# Patient Record
Sex: MALE | Race: WHITE | NOT HISPANIC OR LATINO | Employment: PART TIME | URBAN - METROPOLITAN AREA
[De-identification: names, ages, dates, MRNs, and addresses within clinical notes are randomized per-mention and may not be internally consistent; named-entity substitution may affect disease eponyms.]

---

## 2019-08-05 ENCOUNTER — APPOINTMENT (EMERGENCY)
Dept: CT IMAGING | Facility: HOSPITAL | Age: 54
End: 2019-08-05
Payer: MEDICAID

## 2019-08-05 ENCOUNTER — HOSPITAL ENCOUNTER (EMERGENCY)
Facility: HOSPITAL | Age: 54
Discharge: HOME | End: 2019-08-05
Attending: EMERGENCY MEDICINE | Admitting: EMERGENCY MEDICINE
Payer: MEDICAID

## 2019-08-05 VITALS
WEIGHT: 155 LBS | RESPIRATION RATE: 18 BRPM | SYSTOLIC BLOOD PRESSURE: 136 MMHG | DIASTOLIC BLOOD PRESSURE: 72 MMHG | TEMPERATURE: 97.1 F | HEART RATE: 76 BPM | OXYGEN SATURATION: 97 % | HEIGHT: 67 IN | BODY MASS INDEX: 24.33 KG/M2

## 2019-08-05 DIAGNOSIS — R51.9 NONINTRACTABLE HEADACHE, UNSPECIFIED CHRONICITY PATTERN, UNSPECIFIED HEADACHE TYPE: Primary | ICD-10-CM

## 2019-08-05 DIAGNOSIS — R42 VERTIGO: ICD-10-CM

## 2019-08-05 LAB
ALBUMIN SERPL BCP-MCNC: 4.2 G/DL (ref 3.5–5.7)
ALP SERPL-CCNC: 70 U/L (ref 40–150)
ALT SERPL W P-5'-P-CCNC: 19 U/L (ref 7–52)
ANION GAP SERPL CALCULATED.3IONS-SCNC: 4 MMOL/L (ref 4–13)
APTT PPP: 35 SECONDS (ref 23–37)
AST SERPL W P-5'-P-CCNC: 16 U/L (ref 13–39)
BASOPHILS # BLD AUTO: 0.1 THOUSANDS/ΜL (ref 0–0.1)
BASOPHILS NFR BLD AUTO: 1 % (ref 0–2)
BILIRUB SERPL-MCNC: 0.3 MG/DL (ref 0.2–1)
BUN SERPL-MCNC: 17 MG/DL (ref 7–25)
CALCIUM SERPL-MCNC: 9.7 MG/DL (ref 8.6–10.5)
CHLORIDE SERPL-SCNC: 105 MMOL/L (ref 98–107)
CO2 SERPL-SCNC: 28 MMOL/L (ref 21–31)
CREAT SERPL-MCNC: 1.19 MG/DL (ref 0.7–1.3)
EOSINOPHIL # BLD AUTO: 0.2 THOUSAND/ΜL (ref 0–0.61)
EOSINOPHIL NFR BLD AUTO: 2 % (ref 0–5)
ERYTHROCYTE [DISTWIDTH] IN BLOOD BY AUTOMATED COUNT: 14.6 % (ref 11.5–14.5)
GFR SERPL CREATININE-BSD FRML MDRD: 70 ML/MIN/1.73SQ M
GLUCOSE SERPL-MCNC: 90 MG/DL (ref 65–99)
HCT VFR BLD AUTO: 44.8 % (ref 42–47)
HGB BLD-MCNC: 15.3 G/DL (ref 14–18)
INR PPP: 0.95 (ref 0.9–1.5)
LYMPHOCYTES # BLD AUTO: 2 THOUSANDS/ΜL (ref 0.6–4.47)
LYMPHOCYTES NFR BLD AUTO: 23 % (ref 21–51)
MAGNESIUM SERPL-MCNC: 2.2 MG/DL (ref 1.9–2.7)
MCH RBC QN AUTO: 28.4 PG (ref 26–34)
MCHC RBC AUTO-ENTMCNC: 34.1 G/DL (ref 31–37)
MCV RBC AUTO: 83 FL (ref 81–99)
MONOCYTES # BLD AUTO: 0.7 THOUSAND/ΜL (ref 0.17–1.22)
MONOCYTES NFR BLD AUTO: 8 % (ref 2–12)
NEUTROPHILS # BLD AUTO: 5.9 THOUSANDS/ΜL (ref 1.4–6.5)
NEUTS SEG NFR BLD AUTO: 66 % (ref 42–75)
PLATELET # BLD AUTO: 228 THOUSANDS/UL (ref 149–390)
PMV BLD AUTO: 8.1 FL (ref 8.6–11.7)
POTASSIUM SERPL-SCNC: 3.7 MMOL/L (ref 3.5–5.5)
PROT SERPL-MCNC: 7.2 G/DL (ref 6.4–8.9)
PROTHROMBIN TIME: 11 SECONDS (ref 10.2–13)
RBC # BLD AUTO: 5.37 MILLION/UL (ref 4.3–5.9)
SODIUM SERPL-SCNC: 137 MMOL/L (ref 134–143)
TSH SERPL DL<=0.05 MIU/L-ACNC: 1.85 UIU/ML (ref 0.45–5.33)
WBC # BLD AUTO: 8.9 THOUSAND/UL (ref 4.8–10.8)

## 2019-08-05 PROCEDURE — 80053 COMPREHEN METABOLIC PANEL: CPT | Performed by: EMERGENCY MEDICINE

## 2019-08-05 PROCEDURE — 96374 THER/PROPH/DIAG INJ IV PUSH: CPT

## 2019-08-05 PROCEDURE — 93005 ELECTROCARDIOGRAM TRACING: CPT

## 2019-08-05 PROCEDURE — 99284 EMERGENCY DEPT VISIT MOD MDM: CPT

## 2019-08-05 PROCEDURE — 85730 THROMBOPLASTIN TIME PARTIAL: CPT | Performed by: EMERGENCY MEDICINE

## 2019-08-05 PROCEDURE — 36415 COLL VENOUS BLD VENIPUNCTURE: CPT | Performed by: EMERGENCY MEDICINE

## 2019-08-05 PROCEDURE — 85610 PROTHROMBIN TIME: CPT | Performed by: EMERGENCY MEDICINE

## 2019-08-05 PROCEDURE — 83735 ASSAY OF MAGNESIUM: CPT | Performed by: EMERGENCY MEDICINE

## 2019-08-05 PROCEDURE — 84443 ASSAY THYROID STIM HORMONE: CPT | Performed by: EMERGENCY MEDICINE

## 2019-08-05 PROCEDURE — 70450 CT HEAD/BRAIN W/O DYE: CPT

## 2019-08-05 PROCEDURE — 85025 COMPLETE CBC W/AUTO DIFF WBC: CPT | Performed by: EMERGENCY MEDICINE

## 2019-08-05 PROCEDURE — 96361 HYDRATE IV INFUSION ADD-ON: CPT

## 2019-08-05 RX ORDER — MECLIZINE HCL 12.5 MG/1
50 TABLET ORAL ONCE
Status: COMPLETED | OUTPATIENT
Start: 2019-08-05 | End: 2019-08-05

## 2019-08-05 RX ORDER — KETOROLAC TROMETHAMINE 30 MG/ML
15 INJECTION, SOLUTION INTRAMUSCULAR; INTRAVENOUS ONCE
Status: COMPLETED | OUTPATIENT
Start: 2019-08-05 | End: 2019-08-05

## 2019-08-05 RX ADMIN — KETOROLAC TROMETHAMINE 15 MG: 30 INJECTION, SOLUTION INTRAMUSCULAR; INTRAVENOUS at 20:44

## 2019-08-05 RX ADMIN — SODIUM CHLORIDE 1000 ML: 0.9 INJECTION, SOLUTION INTRAVENOUS at 19:13

## 2019-08-05 RX ADMIN — MECLIZINE 50 MG: 12.5 TABLET ORAL at 19:13

## 2019-08-05 NOTE — ED PROVIDER NOTES
History  Chief Complaint   Patient presents with    Dizziness     started yesteday with dizziness around late morning  pt states his neck is stiff as well  Lying /98 Pulse 101    Sitting /82 pulse 99  standing /87 pulse 80    61-year-old male with history of hypertension, hyperlipidemia, diabetes and bipolar disorder presents for evaluation of headache and dizziness x1 day  Patient reports generalized headache which is throbbing and currently mild in nature  Additionally patient with dizziness described as the room spinning which has been occurring intermittently times 24 hours  Patient 1st noticed symptoms early yesterday morning  Otherwise patient no fevers, chills, focal neurologic deficit, paresthesias or slurred speech or facial droop  History provided by:  Patient   used: No    Dizziness   Associated symptoms: headaches    Associated symptoms: no chest pain, no diarrhea, no nausea, no shortness of breath and no vomiting        None       Past Medical History:   Diagnosis Date    Diabetes mellitus ([facility] )     Hyperlipidemia     Hypertension     Psychiatric disorder     bipolar    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[facility] )        History reviewed  No pertinent surgical history  History reviewed  No pertinent family history  I have reviewed and agree with the history as documented  Social History     Tobacco Use    Smoking status: Current Every Day Smoker     Packs/day: 1 00    Smokeless tobacco: Never Used   Substance Use Topics    Alcohol use: Not Currently     Frequency: Never    Drug use: Not Currently        Review of Systems   Constitutional: Negative for chills and fever  HENT: Negative for rhinorrhea and sore throat  Eyes: Negative for visual disturbance  Respiratory: Negative for cough and shortness of breath  Cardiovascular: Negative for chest pain and leg swelling     Gastrointestinal: Negative for abdominal pain, diarrhea, nausea and vomiting  Genitourinary: Negative for dysuria  Musculoskeletal: Negative for back pain and myalgias  Skin: Negative for rash  Neurological: Positive for dizziness and headaches  Psychiatric/Behavioral: Negative for confusion  All other systems reviewed and are negative  Physical Exam  Physical Exam   Constitutional: He is oriented to person, place, and time  He appears well-developed and well-nourished  HENT:   Nose: Nose normal    Mouth/Throat: Oropharynx is clear and moist  No oropharyngeal exudate  Eyes: Pupils are equal, round, and reactive to light  Conjunctivae and EOM are normal  No scleral icterus  Neck: Normal range of motion  Neck supple  No JVD present  No tracheal deviation present  Cardiovascular: Normal rate, regular rhythm and normal heart sounds  No murmur heard  Pulmonary/Chest: Effort normal and breath sounds normal  No respiratory distress  He has no wheezes  He has no rales  Abdominal: Soft  Bowel sounds are normal  There is no tenderness  There is no guarding  Musculoskeletal: Normal range of motion  He exhibits no edema or tenderness  Neurological: He is alert and oriented to person, place, and time  No cranial nerve deficit or sensory deficit  He exhibits normal muscle tone  5/5 motor, nl sens   Skin: Skin is warm and dry  Psychiatric: He has a normal mood and affect  His behavior is normal    Nursing note and vitals reviewed        Vital Signs  ED Triage Vitals [08/05/19 1836]   Temperature Pulse Respirations Blood Pressure SpO2   (!) 97 1 °F (36 2 °C) 89 18 150/87 99 %      Temp Source Heart Rate Source Patient Position - Orthostatic VS BP Location FiO2 (%)   Temporal -- Standing - Orthostatic VS Right arm --      Pain Score       No Pain           Vitals:    08/05/19 1836 08/05/19 1915 08/05/19 2000 08/05/19 2030   BP: 150/87 132/76 137/78    Pulse: 89 92 80 75   Patient Position - Orthostatic VS: Standing - Orthostatic VS            Visual Acuity      ED Medications  Medications   ketorolac (TORADOL) injection 15 mg (has no administration in time range)   sodium chloride 0 9 % bolus 1,000 mL (1,000 mL Intravenous New Bag 8/5/19 1913)   meclizine (ANTIVERT) tablet 50 mg (50 mg Oral Given 8/5/19 1913)       Diagnostic Studies  Results Reviewed     Procedure Component Value Units Date/Time    TSH [[id number]]  (Normal) Collected:  08/05/19 1910    Lab Status:  Final result Specimen:  Blood from Arm, Right Updated:  08/05/19 1951     TSH 3RD GENERATON 1 850 uIU/mL     Narrative:       Patients undergoing fluorescein dye angiography may retain small amounts of fluorescein in the body for 48-72 hours post procedure  Samples containing fluorescein can produce falsely depressed TSH values  If the patient had this procedure,a specimen should be resubmitted post fluorescein clearance        Magnesium [[id number]]  (Normal) Collected:  08/05/19 1910    Lab Status:  Final result Specimen:  Blood from Arm, Right Updated:  08/05/19 1935     Magnesium 2 2 mg/dL     Comprehensive metabolic panel [[id number]] Collected:  08/05/19 1910    Lab Status:  Final result Specimen:  Blood from Arm, Right Updated:  08/05/19 1935     Sodium 137 mmol/L      Potassium 3 7 mmol/L      Chloride 105 mmol/L      CO2 28 mmol/L      ANION GAP 4 mmol/L      BUN 17 mg/dL      Creatinine 1 19 mg/dL      Glucose 90 mg/dL      Calcium 9 7 mg/dL      AST 16 U/L      ALT 19 U/L      Alkaline Phosphatase 70 U/L      Total Protein 7 2 g/dL      Albumin 4 2 g/dL      Total Bilirubin 0 30 mg/dL      eGFR 70 ml/min/1 73sq m     Narrative:       [facility] guidelines for Chronic Kidney Disease (CKD):     Stage 1 with normal or high GFR (GFR > 90 mL/min/1 73 square meters)    Stage 2 Mild CKD (GFR = 60-89 mL/min/1 73 square meters)    Stage 3A Moderate CKD (GFR = 45-59 mL/min/1 73 square meters)    Stage 3B Moderate CKD (GFR = 30-44 mL/min/1 73 square meters)    Stage 4 Severe CKD (GFR = 15-29 mL/min/1 73 square meters)    Stage 5 End Stage CKD (GFR <15 mL/min/1 73 square meters)  Note: GFR calculation is accurate only with a steady state creatinine    Protime-INR [[id number]]  (Normal) Collected:  08/05/19 1910    Lab Status:  Final result Specimen:  Blood from Arm, Right Updated:  08/05/19 1930     Protime 11 0 seconds      INR 0 95    APTT [[id number]]  (Normal) Collected:  08/05/19 1910    Lab Status:  Final result Specimen:  Blood from Arm, Right Updated:  08/05/19 1930     PTT 35 seconds     CBC and differential [[id number]]  (Abnormal) Collected:  08/05/19 1910    Lab Status:  Final result Specimen:  Blood from Arm, Right Updated:  08/05/19 1916     WBC 8 90 Thousand/uL      RBC 5 37 Million/uL      Hemoglobin 15 3 g/dL      Hematocrit 44 8 %      MCV 83 fL      MCH 28 4 pg      MCHC 34 1 g/dL      RDW 14 6 %      MPV 8 1 fL      Platelets 969 Thousands/uL      Neutrophils Relative 66 %      Lymphocytes Relative 23 %      Monocytes Relative 8 %      Eosinophils Relative 2 %      Basophils Relative 1 %      Neutrophils Absolute 5 90 Thousands/µL      Lymphocytes Absolute 2 00 Thousands/µL      Monocytes Absolute 0 70 Thousand/µL      Eosinophils Absolute 0 20 Thousand/µL      Basophils Absolute 0 10 Thousands/µL                  CT head without contrast   Final Result by Radha Veliz MD (08/05 2001)      No acute intracranial abnormality  Workstation performed: [id number]                    Procedures  Procedures       ED Course  ED Course as of Aug 05 2044   [facility] Aug 05, 2019   1251 Patient seen and examined at bedside  Labs and imaging ordered  1930 Labs reviewed, no acute abnormalities noted  2032 Imaging negative for acute pathology  Toradol 15 mg IV given  Patient with resolution of dizziness at this time  2039 Labs and imaging reviewed with patient  Discussed with patient discharge plan and instructions    Patient to follow up with primary care physician as an outpatient  Patient to return to ED if any change or worsening condition: increased pain, new onset fever or bleeding  MDM    Disposition  Final diagnoses:   Nonintractable headache, unspecified chronicity pattern, unspecified headache type   Vertigo     Time reflects when diagnosis was documented in both MDM as applicable and the Disposition within this note     Time User Action Codes Description Comment    8/5/2019  8:42 PM Sheree Capellan [R51] Nonintractable headache, unspecified chronicity pattern, unspecified headache type     8/5/2019  8:42 PM Stewart Canada [R42] Vertigo       ED Disposition     ED Disposition Condition Date/Time Comment    Discharge Stable Mon Aug 5, 2019  8:42 PM Sera Gandara discharge to home/self care  Follow-up Information     Follow up With Specialties Details Why [address] [facility]  Emergency Department Emergency Medicine  As needed, If symptoms worsen [address] [facility] [address] [phone]    PMD  In 3 days            Patient's Medications    No medications on file     No discharge procedures on file      ED Provider  Electronically Signed by           Ryan Lainez DO  08/05/19 1005

## 2019-08-07 LAB
ATRIAL RATE: 86 BPM
P AXIS: 34 DEGREES
PR INTERVAL: 146 MS
QRS AXIS: 54 DEGREES
QRSD INTERVAL: 90 MS
QT INTERVAL: 348 MS
QTC INTERVAL: 416 MS
T WAVE AXIS: 63 DEGREES
VENTRICULAR RATE: 86 BPM

## 2019-08-07 PROCEDURE — 93010 ELECTROCARDIOGRAM REPORT: CPT | Performed by: INTERNAL MEDICINE

## 2019-09-09 ENCOUNTER — HOSPITAL ENCOUNTER (EMERGENCY)
Facility: HOSPITAL | Age: 54
Discharge: HOME | End: 2019-09-09
Attending: FAMILY MEDICINE
Payer: COMMERCIAL

## 2019-09-09 VITALS
DIASTOLIC BLOOD PRESSURE: 77 MMHG | OXYGEN SATURATION: 95 % | SYSTOLIC BLOOD PRESSURE: 150 MMHG | HEART RATE: 102 BPM | BODY MASS INDEX: 25.11 KG/M2 | TEMPERATURE: 98.7 F | RESPIRATION RATE: 16 BRPM | WEIGHT: 160 LBS | HEIGHT: 67 IN

## 2019-09-09 DIAGNOSIS — S39.013A STRAIN OF MUSCLE OF RIGHT GROIN REGION: Primary | ICD-10-CM

## 2019-09-09 PROCEDURE — 99283 EMERGENCY DEPT VISIT LOW MDM: CPT

## 2019-09-09 RX ORDER — NAPROXEN 500 MG/1
500 TABLET ORAL 2 TIMES DAILY WITH MEALS
Qty: 30 TABLET | Refills: 0 | Status: SHIPPED | OUTPATIENT
Start: 2019-09-09

## 2019-09-09 NOTE — ED PROVIDER NOTES
History  Chief Complaint   Patient presents with    Groin Pain     right groin started saturday felt tare     Two days ago patient rolled over in bed and felt something pop in his right upper thigh and groin  Patient has had pain in this area since especially with moving or walking  History provided by:  Patient  Leg Pain   Location:  Leg  Injury: yes    Mechanism of injury comment:  Rolled over in bed  Leg location:  R leg  Pain details:     Quality:  Aching    Radiates to: right knee  Severity:  Moderate    Onset quality:  Sudden    Duration:  2 days    Timing:  Intermittent    Progression:  Unchanged  Chronicity:  New  Relieved by:  Nothing  Worsened by:  Flexion and bearing weight  Ineffective treatments:  None tried  Associated symptoms: no back pain, no decreased ROM, no fever, no itching, no numbness, no stiffness and no swelling        None       Past Medical History:   Diagnosis Date    Diabetes mellitus ([facility] )     Hyperlipidemia     Hypertension     Psychiatric disorder     bipolar    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[facility] )        History reviewed  No pertinent surgical history  History reviewed  No pertinent family history  I have reviewed and agree with the history as documented  Social History     Tobacco Use    Smoking status: Current Every Day Smoker     Packs/day: 1 00    Smokeless tobacco: Never Used   Substance Use Topics    Alcohol use: Not Currently     Frequency: Never    Drug use: Not Currently        Review of Systems   Constitutional: Negative for chills and fever  HENT: Negative for congestion, rhinorrhea and sore throat  Eyes: Negative for visual disturbance  Respiratory: Negative for cough and shortness of breath  Cardiovascular: Negative for chest pain  Gastrointestinal: Negative for abdominal pain, diarrhea, nausea and vomiting  Genitourinary: Negative for dysuria  Musculoskeletal: Negative for back pain and stiffness     Skin: Negative for itching and rash    Neurological: Negative for headaches  Physical Exam  Physical Exam   Constitutional: He is oriented to person, place, and time  He appears well-developed and well-nourished  HENT:   Head: Normocephalic and atraumatic  Right Ear: External ear normal    Left Ear: External ear normal    Nose: Nose normal    Eyes: Conjunctivae and EOM are normal    Neck: Normal range of motion  Neck supple  Cardiovascular: Normal rate, regular rhythm and normal heart sounds  Pulmonary/Chest: Effort normal and breath sounds normal    Abdominal: Soft  Bowel sounds are normal  He exhibits no mass  There is no tenderness  No hernia  Musculoskeletal: Normal range of motion  He exhibits tenderness  Right upper and medial thigh with tenderness; pain with hip flexion and bearing weight   Neurological: He is alert and oriented to person, place, and time  Skin: Skin is warm and dry  Capillary refill takes less than 2 seconds  Nursing note and vitals reviewed        Vital Signs  ED Triage Vitals   Temperature Pulse Respirations Blood Pressure SpO2   09/09/19 1530 09/09/19 1525 09/09/19 1525 09/09/19 1525 09/09/19 1525   98 7 °F (37 1 °C) 102 16 150/77 95 %      Temp Source Heart Rate Source Patient Position - Orthostatic VS BP Location FiO2 (%)   09/09/19 1530 -- -- 09/09/19 1525 --   Temporal   Left arm       Pain Score       09/09/19 1525       Worst Possible Pain           Vitals:    09/09/19 1525   BP: 150/77   Pulse: 102         Visual Acuity      ED Medications  Medications - No data to display    Diagnostic Studies  Results Reviewed     None                 No orders to display              Procedures  Procedures       ED Course                               MDM    Disposition  Final diagnoses:   Strain of muscle of right groin region     Time reflects when diagnosis was documented in both MDM as applicable and the Disposition within this note     Time User Action Codes Description Comment    9/9/2019  3:40 PM Judy Moran Add [I34 012O] Strain of muscle of right groin region       ED Disposition     ED Disposition Condition Date/Time Comment    Discharge Stable Mon Sep 9, 2019  3:40 PM Pleas Olszewski discharge to home/self care  Follow-up Information     Follow up With Specialties Details Why Contact Info    Elmo Tovar DO Orthopedic Surgery Schedule an appointment as soon as possible for a visit   [address] [address] [address] [facility] [address]            Patient's Medications   Discharge Prescriptions    NAPROXEN (NAPROSYN) 500 MG TABLET    Take 1 tablet (500 mg total) by mouth 2 (two) times a day with meals       Start Date: 9/9/2019  End Date: --       Order Dose: 500 mg       Quantity: 30 tablet    Refills: 0     No discharge procedures on file      ED Provider  Electronically Signed by           Viraj Duncan PA-C  09/09/19 7953

## 2022-08-29 ENCOUNTER — APPOINTMENT (OUTPATIENT)
Dept: LAB | Facility: CLINIC | Age: 57
End: 2022-08-29
Payer: COMMERCIAL

## 2022-08-29 ENCOUNTER — OFFICE VISIT (OUTPATIENT)
Dept: INTERNAL MEDICINE CLINIC | Facility: CLINIC | Age: 57
End: 2022-08-29
Payer: COMMERCIAL

## 2022-08-29 VITALS
HEIGHT: 66 IN | HEART RATE: 79 BPM | WEIGHT: 178.2 LBS | SYSTOLIC BLOOD PRESSURE: 128 MMHG | BODY MASS INDEX: 28.64 KG/M2 | TEMPERATURE: 99.3 F | OXYGEN SATURATION: 97 % | DIASTOLIC BLOOD PRESSURE: 80 MMHG

## 2022-08-29 DIAGNOSIS — R45.0 NERVOUSNESS: ICD-10-CM

## 2022-08-29 DIAGNOSIS — Z13.220 ENCOUNTER FOR LIPID SCREENING FOR CARDIOVASCULAR DISEASE: ICD-10-CM

## 2022-08-29 DIAGNOSIS — Z23 ENCOUNTER FOR IMMUNIZATION: ICD-10-CM

## 2022-08-29 DIAGNOSIS — Z11.4 SCREENING FOR HIV (HUMAN IMMUNODEFICIENCY VIRUS): ICD-10-CM

## 2022-08-29 DIAGNOSIS — Z13.0 SCREENING FOR ENDOCRINE, NUTRITIONAL, METABOLIC AND IMMUNITY DISORDER: ICD-10-CM

## 2022-08-29 DIAGNOSIS — Z13.21 SCREENING FOR ENDOCRINE, NUTRITIONAL, METABOLIC AND IMMUNITY DISORDER: ICD-10-CM

## 2022-08-29 DIAGNOSIS — Z12.11 SCREENING FOR MALIGNANT NEOPLASM OF COLON: ICD-10-CM

## 2022-08-29 DIAGNOSIS — R73.09 HIGH GLUCOSE LEVEL: ICD-10-CM

## 2022-08-29 DIAGNOSIS — Z13.6 ENCOUNTER FOR LIPID SCREENING FOR CARDIOVASCULAR DISEASE: ICD-10-CM

## 2022-08-29 DIAGNOSIS — Z72.0 TOBACCO USE: ICD-10-CM

## 2022-08-29 DIAGNOSIS — Z11.59 NEED FOR HEPATITIS C SCREENING TEST: ICD-10-CM

## 2022-08-29 DIAGNOSIS — Z13.0 SCREENING FOR IRON DEFICIENCY ANEMIA: Primary | ICD-10-CM

## 2022-08-29 DIAGNOSIS — Z13.29 SCREENING FOR ENDOCRINE, NUTRITIONAL, METABOLIC AND IMMUNITY DISORDER: ICD-10-CM

## 2022-08-29 DIAGNOSIS — Z72.0 TOBACCO ABUSE: ICD-10-CM

## 2022-08-29 DIAGNOSIS — R01.1 MURMUR: ICD-10-CM

## 2022-08-29 DIAGNOSIS — Z13.228 SCREENING FOR ENDOCRINE, NUTRITIONAL, METABOLIC AND IMMUNITY DISORDER: ICD-10-CM

## 2022-08-29 DIAGNOSIS — Z13.0 SCREENING FOR IRON DEFICIENCY ANEMIA: ICD-10-CM

## 2022-08-29 DIAGNOSIS — F12.90 MARIJUANA USE: ICD-10-CM

## 2022-08-29 LAB
ALBUMIN SERPL BCP-MCNC: 4.1 G/DL (ref 3.5–5)
ALP SERPL-CCNC: 74 U/L (ref 46–116)
ALT SERPL W P-5'-P-CCNC: 27 U/L (ref 12–78)
ANION GAP SERPL CALCULATED.3IONS-SCNC: 4 MMOL/L (ref 4–13)
AST SERPL W P-5'-P-CCNC: 16 U/L (ref 5–45)
BASOPHILS # BLD AUTO: 0.08 THOUSANDS/ΜL (ref 0–0.1)
BASOPHILS NFR BLD AUTO: 1 % (ref 0–1)
BILIRUB SERPL-MCNC: 0.34 MG/DL (ref 0.2–1)
BUN SERPL-MCNC: 14 MG/DL (ref 5–25)
CALCIUM SERPL-MCNC: 9.9 MG/DL (ref 8.3–10.1)
CHLORIDE SERPL-SCNC: 106 MMOL/L (ref 96–108)
CHOLEST SERPL-MCNC: 238 MG/DL
CO2 SERPL-SCNC: 28 MMOL/L (ref 21–32)
CREAT SERPL-MCNC: 0.87 MG/DL (ref 0.6–1.3)
EOSINOPHIL # BLD AUTO: 0.12 THOUSAND/ΜL (ref 0–0.61)
EOSINOPHIL NFR BLD AUTO: 2 % (ref 0–6)
ERYTHROCYTE [DISTWIDTH] IN BLOOD BY AUTOMATED COUNT: 14.9 % (ref 11.6–15.1)
GFR SERPL CREATININE-BSD FRML MDRD: 97 ML/MIN/1.73SQ M
GLUCOSE P FAST SERPL-MCNC: 96 MG/DL (ref 65–99)
HCT VFR BLD AUTO: 50.1 % (ref 36.5–49.3)
HDLC SERPL-MCNC: 40 MG/DL
HGB BLD-MCNC: 16.5 G/DL (ref 12–17)
IMM GRANULOCYTES # BLD AUTO: 0.03 THOUSAND/UL (ref 0–0.2)
IMM GRANULOCYTES NFR BLD AUTO: 0 % (ref 0–2)
LDLC SERPL CALC-MCNC: 167 MG/DL (ref 0–100)
LYMPHOCYTES # BLD AUTO: 1.77 THOUSANDS/ΜL (ref 0.6–4.47)
LYMPHOCYTES NFR BLD AUTO: 25 % (ref 14–44)
MCH RBC QN AUTO: 28.4 PG (ref 26.8–34.3)
MCHC RBC AUTO-ENTMCNC: 32.9 G/DL (ref 31.4–37.4)
MCV RBC AUTO: 86 FL (ref 82–98)
MONOCYTES # BLD AUTO: 0.67 THOUSAND/ΜL (ref 0.17–1.22)
MONOCYTES NFR BLD AUTO: 9 % (ref 4–12)
NEUTROPHILS # BLD AUTO: 4.48 THOUSANDS/ΜL (ref 1.85–7.62)
NEUTS SEG NFR BLD AUTO: 63 % (ref 43–75)
NONHDLC SERPL-MCNC: 198 MG/DL
NRBC BLD AUTO-RTO: 0 /100 WBCS
PLATELET # BLD AUTO: 291 THOUSANDS/UL (ref 149–390)
PMV BLD AUTO: 10.6 FL (ref 8.9–12.7)
POTASSIUM SERPL-SCNC: 4.6 MMOL/L (ref 3.5–5.3)
PROT SERPL-MCNC: 8.4 G/DL (ref 6.4–8.4)
RBC # BLD AUTO: 5.82 MILLION/UL (ref 3.88–5.62)
SODIUM SERPL-SCNC: 138 MMOL/L (ref 135–147)
TRIGL SERPL-MCNC: 157 MG/DL
TSH SERPL DL<=0.05 MIU/L-ACNC: 1.74 UIU/ML (ref 0.45–4.5)
WBC # BLD AUTO: 7.15 THOUSAND/UL (ref 4.31–10.16)

## 2022-08-29 PROCEDURE — 80061 LIPID PANEL: CPT

## 2022-08-29 PROCEDURE — 84443 ASSAY THYROID STIM HORMONE: CPT

## 2022-08-29 PROCEDURE — 87389 HIV-1 AG W/HIV-1&-2 AB AG IA: CPT

## 2022-08-29 PROCEDURE — 86803 HEPATITIS C AB TEST: CPT

## 2022-08-29 PROCEDURE — 83036 HEMOGLOBIN GLYCOSYLATED A1C: CPT

## 2022-08-29 PROCEDURE — 85025 COMPLETE CBC W/AUTO DIFF WBC: CPT

## 2022-08-29 PROCEDURE — 80053 COMPREHEN METABOLIC PANEL: CPT

## 2022-08-29 PROCEDURE — 36415 COLL VENOUS BLD VENIPUNCTURE: CPT

## 2022-08-29 PROCEDURE — 93000 ELECTROCARDIOGRAM COMPLETE: CPT

## 2022-08-29 PROCEDURE — 99214 OFFICE O/P EST MOD 30 MIN: CPT

## 2022-08-29 NOTE — PROGRESS NOTES
INTERNAL MEDICINE INITIAL OFFICE VISIT  [facility]    NAME: Neelam Suero  AGE: 49 y o  SEX: male  : 1965     DATE: 2022     Assessment and Plan:   1  Murmur  Patient i states he was told he had this for several years however he is unable to report if he had testing done or diagnoses in the past   Will obtain a baseline echo  He does state he has some shortness of breath with activity however he is a 1 pack-per-day smoker for 30 years  No chest pain reported  - Echo complete w/ contrast if indicated; Future  - POCT ECG    2  Tobacco use  One pack per day for greater than 20 years  CT lung screening ordered  Consider quitting discussed high risks of tobacco use    3  Marijuana use  Admits to smoking marijuana for 40 years for mood and pain control  He does not have his medical marijuana card however he states he is looking for someone to get him established      Health maintenance  Will get baseline labs today  Consider smoking cessation  CT lung screening, baseline ordered  Colonoscopy referral provided    BMI Counseling: Body mass index is 28 76 kg/m²  The BMI is above normal  Nutrition recommendations include decreasing portion sizes, encouraging healthy choices of fruits and vegetables, limiting drinks that contain sugar and moderation in carbohydrate intake  Exercise recommendations include exercising 3-5 times per week  No pharmacotherapy was ordered  Rationale for BMI follow-up plan is due to patient being overweight or obese  Depression Screening and Follow-up Plan: Patient was screened for depression during today's encounter  They screened negative with a PHQ-2 score of 0  Tobacco Cessation Counseling: Tobacco cessation counseling was provided  The patient is sincerely urged to quit consumption of tobacco  He is not ready to quit tobacco  Medication options discussed  Patient refused medication       Lung Cancer Screening Shared Decision Making: I discussed with him that he is a candidate for lung cancer CT screening  The following Shared Decision-Making points were covered:  1  Benefits of screening were discussed, including the rates of reduction in death from lung cancer and other causes  Harms of screening were reviewed, including false positive tests, radiation exposure levels, risks of invasive procedures, risks of complications of screening, and risk of overdiagnosis  2  I counseled on the importance of adherence to annual lung cancer LDCT screening, impact of co-morbidities, and ability or willingness to undergo diagnosis and treatment  3  I counseled on the importance of maintaining abstinence as a former smoker or was counseled on the importance of smoking cessation if a current smoker    Review of Eligibility Criteria: He meets all of the criteria for Lung Cancer Screening    - He is 49 y o    - He has 20 pack year tobacco history and is a current smoker or has quit within the past 15 years  - He presents no signs or symptoms of lung cancer    After discussion, the patient decided to elect lung cancer screening  No follow-ups on file  Chief Complaint:     Chief Complaint   Patient presents with   [facility]      Patient stated his (L) side has pins and needles sensation and he has crushed neck and shoulders, and he never feels hungry so he can sometimes forget to eat  As per the patient he has no stop or start for hunger and or appetite  And, he has been told that he has ADD and or ADHD and he states he maybe allergic to all medications and he needs a referral for medical marijuana because that is the only think that he can tolerate as per the patient   Knee Injury      Patient also, has bad knees and he has damaged his (R) knee by tearing every muscle and ligament and never went for surgery to correct the issue  History of Present Illness:     Patient presents today to establish    He does have chronic pain issues in his back due to an injury where he fell  He states he saw physical therapy in the past but it has been several years and he was not effective in the management of his pain  He states he smokes marijuana for pain  He denies any other health history at this time and has no records to review as far as labs or testing  He does report a family history of high blood pressure, high cholesterol, and diabetes but was unable to report exactly who had these in his family  The following portions of the patient's history were reviewed and updated as appropriate: allergies, current medications, past family history, past medical history, past social history, past surgical history and problem list      Review of Systems:     Review of Systems   Constitutional: Negative for appetite change, chills, diaphoresis, fatigue, fever and unexpected weight change  HENT: Negative for postnasal drip and sneezing  Eyes: Negative for visual disturbance  Respiratory: Positive for shortness of breath  Negative for chest tightness  Cardiovascular: Negative for chest pain, palpitations and leg swelling  Gastrointestinal: Negative for abdominal pain and blood in stool  Endocrine: Negative for cold intolerance, heat intolerance, polydipsia, polyphagia and polyuria  Genitourinary: Negative for difficulty urinating, dysuria, frequency and urgency  Musculoskeletal: Negative for arthralgias and myalgias  Skin: Negative for rash and wound  Neurological: Negative for dizziness, weakness, light-headedness and headaches  Hematological: Negative for adenopathy  Psychiatric/Behavioral: Negative for confusion, dysphoric mood and sleep disturbance  The patient is nervous/anxious           Past Medical History:     Past Medical History:   Diagnosis Date    Diabetes mellitus ([facility] )     Hyperlipidemia     Hypertension     Psychiatric disorder     bipolar    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[facility] )         Past Surgical History:   History reviewed  No pertinent surgical history  Social History:     Social History     Socioeconomic History    Marital status: Single     Spouse name: None    Number of children: None    Years of education: None    Highest education level: None   Occupational History    None   Tobacco Use    Smoking status: Current Every Day Smoker     Packs/day: 0 50     Years: 45 00     Pack years: 22 50    Smokeless tobacco: Never Used    Tobacco comment: Patient states that he is trying to cut down   Vaping Use    Vaping Use: None   Substance and Sexual Activity    Alcohol use: Not Currently    Drug use: Yes     Types: Marijuana    Sexual activity: Yes     Partners: Female   Other Topics Concern    None   Social History Narrative    Patient states he lives with his girlfriend      Social Determinants of Health     Financial Resource Strain: Not on file   Food Insecurity: Not on file   Transportation Needs: Not on file   Physical Activity: Not on file   Stress: Not on file   Social Connections: Not on file   Intimate Partner Violence: Not on file   Housing Stability: Not on file         Family History:     Family History   Problem Relation Age of Onset    Cancer Mother     Hypertension Mother     Diabetes Mother         Current Medications:   No current outpatient medications on file  Allergies: Allergies   Allergen Reactions    Other      Pt states he is allergic to everything  States he doesn't have a list but can only take children doses of all medication          Physical Exam:     /80 (BP Location: Left arm, Patient Position: Sitting, Cuff Size: Standard) Comment: bp  Pulse 79   Temp 99 3 °F (37 4 °C) (Temporal) Comment: no  Ht 5' 6" (1 676 m)   Wt 80 8 kg (178 lb 3 2 oz)   SpO2 97%   BMI 28 76 kg/m²     Physical Exam  Constitutional:       Appearance: He is well-developed  HENT:      Head: Normocephalic and atraumatic     Eyes:      Conjunctiva/sclera: Conjunctivae normal       Pupils: Pupils are equal, round, and reactive to light  Cardiovascular:      Rate and Rhythm: Normal rate and regular rhythm  Heart sounds: Murmur heard  Pulmonary:      Effort: Pulmonary effort is normal       Breath sounds: Normal breath sounds  Abdominal:      General: Bowel sounds are normal       Palpations: Abdomen is soft  Musculoskeletal:         General: Normal range of motion  Cervical back: Normal range of motion  Skin:     General: Skin is warm and dry  Neurological:      Mental Status: He is alert and oriented to person, place, and time          EDWARDO Duque  [facility]

## 2022-08-30 ENCOUNTER — TELEPHONE (OUTPATIENT)
Dept: GASTROENTEROLOGY | Facility: CLINIC | Age: 57
End: 2022-08-30

## 2022-08-30 ENCOUNTER — TELEPHONE (OUTPATIENT)
Dept: INTERNAL MEDICINE CLINIC | Facility: CLINIC | Age: 57
End: 2022-08-30

## 2022-08-30 LAB
EST. AVERAGE GLUCOSE BLD GHB EST-MCNC: 117 MG/DL
HBA1C MFR BLD: 5.7 %
HCV AB SER QL: NORMAL
HIV 1+2 AB+HIV1 P24 AG SERPL QL IA: NORMAL

## 2022-08-30 NOTE — TELEPHONE ENCOUNTER
----- Message from [id number] sent at 8/30/2022  1:01 PM EDT -----  Limit sweets and carbs, stay hydrated

## 2022-08-30 NOTE — TELEPHONE ENCOUNTER
Appointment scheduled for 10/11/22    Girlfriend is requesting a call from someone who knows the area with exact location  Address was given

## 2022-08-31 ENCOUNTER — TELEPHONE (OUTPATIENT)
Dept: INTERNAL MEDICINE CLINIC | Facility: CLINIC | Age: 57
End: 2022-08-31

## 2022-09-19 ENCOUNTER — HOSPITAL ENCOUNTER (OUTPATIENT)
Dept: NON INVASIVE DIAGNOSTICS | Facility: CLINIC | Age: 57
Discharge: HOME | End: 2022-09-19
Payer: COMMERCIAL

## 2022-09-19 VITALS
HEIGHT: 66 IN | HEART RATE: 79 BPM | SYSTOLIC BLOOD PRESSURE: 128 MMHG | BODY MASS INDEX: 28.61 KG/M2 | DIASTOLIC BLOOD PRESSURE: 80 MMHG | WEIGHT: 178 LBS

## 2022-09-19 DIAGNOSIS — R01.1 MURMUR: ICD-10-CM

## 2022-09-19 LAB
AORTIC ROOT: 3.9 CM
AORTIC VALVE MEAN VELOCITY: 33.9 M/S
APICAL FOUR CHAMBER EJECTION FRACTION: 57 %
ASCENDING AORTA: 3.1 CM
AV AREA BY CONTINUOUS VTI: 0.8 CM2
AV AREA PEAK VELOCITY: 0.8 CM2
AV LVOT MEAN GRADIENT: 2 MMHG
AV LVOT PEAK GRADIENT: 3 MMHG
AV MEAN GRADIENT: 56 MMHG
AV PEAK GRADIENT: 88 MMHG
AV REGURGITATION PRESSURE HALF TIME: 324 MS
AV VALVE AREA: 0.8 CM2
AV VELOCITY RATIO: 0.18
DOP CALC AO PEAK VEL: 4.7 M/S
DOP CALC AO VTI: 95.49 CM
DOP CALC LVOT AREA: 4.15 CM2
DOP CALC LVOT DIAMETER: 2.3 CM
DOP CALC LVOT PEAK VEL VTI: 18.37 CM
DOP CALC LVOT PEAK VEL: 0.86 M/S
DOP CALC LVOT STROKE INDEX: 38.9 ML/M2
DOP CALC LVOT STROKE VOLUME: 76.28 CM3
E WAVE DECELERATION TIME: 134 MS
FRACTIONAL SHORTENING: 34 % (ref 28–44)
INTERVENTRICULAR SEPTUM IN DIASTOLE (PARASTERNAL SHORT AXIS VIEW): 1.5 CM
INTERVENTRICULAR SEPTUM: 1.5 CM (ref 0.6–1.1)
LAAS-AP2: 14.7 CM2
LAAS-AP4: 12.1 CM2
LEFT ATRIUM AREA SYSTOLE SINGLE PLANE A4C: 11.5 CM2
LEFT ATRIUM SIZE: 3.3 CM
LEFT INTERNAL DIMENSION IN SYSTOLE: 2.5 CM (ref 2.1–4)
LEFT VENTRICULAR INTERNAL DIMENSION IN DIASTOLE: 3.8 CM (ref 3.5–6)
LEFT VENTRICULAR POSTERIOR WALL IN END DIASTOLE: 1.3 CM
LEFT VENTRICULAR STROKE VOLUME: 38 ML
LVSV (TEICH): 38 ML
MV E'TISSUE VEL-SEP: 8 CM/S
MV PEAK A VEL: 0.95 M/S
MV PEAK E VEL: 72 CM/S
MV STENOSIS PRESSURE HALF TIME: 39 MS
MV VALVE AREA P 1/2 METHOD: 5.64 CM2
RIGHT ATRIAL 2D VOLUME: 27 ML
RIGHT ATRIUM AREA SYSTOLE A4C: 10.3 CM2
RIGHT VENTRICLE ID DIMENSION: 3.4 CM
SL CV AV DECELERATION TIME RETROGRADE: 1116 MS
SL CV AV PEAK GRADIENT RETROGRADE: 69 MMHG
SL CV LEFT ATRIUM LENGTH A2C: 4.7 CM
SL CV PED ECHO LEFT VENTRICLE DIASTOLIC VOLUME (MOD BIPLANE) 2D: 60 ML
SL CV PED ECHO LEFT VENTRICLE SYSTOLIC VOLUME (MOD BIPLANE) 2D: 23 ML

## 2022-09-19 PROCEDURE — 93306 TTE W/DOPPLER COMPLETE: CPT

## 2022-09-19 PROCEDURE — 93306 TTE W/DOPPLER COMPLETE: CPT | Performed by: INTERNAL MEDICINE

## 2022-09-20 ENCOUNTER — TELEPHONE (OUTPATIENT)
Dept: CARDIOLOGY CLINIC | Facility: CLINIC | Age: 57
End: 2022-09-20

## 2022-09-20 ENCOUNTER — TELEPHONE (OUTPATIENT)
Dept: INTERNAL MEDICINE CLINIC | Facility: CLINIC | Age: 57
End: 2022-09-20

## 2022-09-20 DIAGNOSIS — I35.0 SEVERE AORTIC STENOSIS: Primary | ICD-10-CM

## 2022-09-20 NOTE — TELEPHONE ENCOUNTER
This patient needs to be seen soon by 1 of the physician providers.  Has significantly abnormal echocardiogram    Please see if he can be seen by anyone soon.

## 2022-09-20 NOTE — TELEPHONE ENCOUNTER
A STAT referral was sent over for pt to be seen for I35.0 (ICD-10-CM) - Severe aortic stenosis.       Please Advise if pt can be added to scheduled for this week..

## 2022-09-20 NOTE — TELEPHONE ENCOUNTER
1 st attempt, Unable to leave voice message for pt, mail box full  Left detailed message for Cardiology office at [address] to call to schedule appointment for pt

## 2022-09-20 NOTE — TELEPHONE ENCOUNTER
----- Message from Haritha Milan sent at 9/20/2022 10:57 AM EDT -----  Please let patient know that he had some narrowing of his aortic valve and I am sending him to cardiology for further evaluation and testing for this   I tried to call him but no answer

## 2022-09-23 ENCOUNTER — CONSULT (OUTPATIENT)
Dept: CARDIOLOGY CLINIC | Facility: CLINIC | Age: 57
End: 2022-09-23
Payer: COMMERCIAL

## 2022-09-23 VITALS
HEART RATE: 82 BPM | SYSTOLIC BLOOD PRESSURE: 118 MMHG | OXYGEN SATURATION: 98 % | RESPIRATION RATE: 18 BRPM | BODY MASS INDEX: 28.12 KG/M2 | WEIGHT: 175 LBS | DIASTOLIC BLOOD PRESSURE: 80 MMHG | HEIGHT: 66 IN

## 2022-09-23 DIAGNOSIS — Z72.0 TOBACCO USE: Primary | ICD-10-CM

## 2022-09-23 DIAGNOSIS — I35.0 SEVERE AORTIC STENOSIS: ICD-10-CM

## 2022-09-23 PROCEDURE — 99204 OFFICE O/P NEW MOD 45 MIN: CPT | Performed by: INTERNAL MEDICINE

## 2022-09-23 NOTE — PROGRESS NOTES
OP Consultation - Cardiology    Wesley Jacobs 49 y o  male MRN: [id number]    Physician Requesting Consult: EDWARDO Sood    Reason for Consult / Chief Complaint:  Aortic stenosis    HPI:     24-year-old man with past medical history of tobacco use who presents for evaluation of abnormal echocardiogram     Patient was noted to have a systolic heart murmur by his primary care physician  An echocardiogram was done  This showed severe aortic stenosis  Patient endorses shortness of breath on exertion  States minimal exertion makes him short of breath  He also has fatigue  Otherwise denies orthopnea, PND lower limb edema  States he does have on and off dizziness and lightheadedness  Denies true syncope  He is a lifelong smoker  Continues to smoke  He works multiple jobs  He is active and moving boxes and involved in manual labile  States he does get short of breath with exertion  Patient is hesitant with taking any medications  States he has multiple allergies and will not take any medications  States "I have had a bad reaction whenever I had taken antibiotics"    Patient is hesitant with any procedure/testing  States he does not want any testing or procedures done  Social History:  Tobacco:  Continues to smoke  Is trying to quit  Alcohol:  Denies    Echocardiogram:    Left Ventricle: Left ventricular cavity size is normal  Wall thickness is moderately increased  There is moderate concentric hypertrophy  Systolic function is normal (55%)  Wall motion is normal  Diastolic function is mildly abnormal, consistent with grade I (abnormal) relaxation    Right Ventricle: Right ventricular cavity size is normal  Systolic function is normal     Aortic Valve: The aortic valve is trileaflet  The leaflets are moderately thickened  The leaflets are moderately calcified  There is moderately reduced mobility  There is mild regurgitation  There is severe stenosis  Peak velocity is 4 69 m/sec  Peak and mean gradients across the aortic valve were 88 and 56 mmHg respectively  DVI was 0 18     Mitral Valve: There is trace regurgitation    Tricuspid Valve: There is trace regurgitation    Pulmonic Valve: There is mild regurgitation    Aorta: The aortic root is mildly dilated (3 9 cm)  FAMILY HISTORY:  Family History   Problem Relation Age of Onset   Krysten Mare Cancer Mother     Hypertension Mother     Diabetes Mother         MEDS & ALLERGIES:  Allergies   Allergen Reactions    Other      Pt states he is allergic to everything  States he doesn't have a list but can only take children doses of all medication         No current outpatient medications on file  Past Medical History:   Diagnosis Date    [facility]     Hyperlipidemia     Hypertension     Psychiatric disorder     bipolar    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[facility] )          Review of Systems:  Review of Systems   Constitutional: Positive for fatigue  Negative for activity change, chills, diaphoresis and fever  Respiratory: Positive for shortness of breath  Negative for chest tightness  Cardiovascular: Negative for chest pain, palpitations and leg swelling  Gastrointestinal: Negative for nausea and vomiting  Musculoskeletal: Negative for back pain  Neurological: Negative for dizziness, syncope and light-headedness  All other systems reviewed and are negative  PHYSICAL EXAM:  Vitals:   Vitals:    09/23/22 0918   BP: 118/80   BP Location: Left arm   Patient Position: Sitting   Cuff Size: Standard   Pulse: 82   Resp: 18   SpO2: 98%   Weight: 79 4 kg (175 lb)   Height: 5' 6" (1 676 m)        Physical Exam:  Physical Exam  Vitals and nursing note reviewed  Constitutional:       General: He is not in acute distress  Appearance: Normal appearance  He is not ill-appearing or diaphoretic  HENT:      Head: Normocephalic and atraumatic  Eyes:      Extraocular Movements: Extraocular movements intact  Cardiovascular:      Rate and Rhythm: Normal rate and regular rhythm  Heart sounds: Murmur heard  No friction rub  No gallop  Comments: 4/6 holosystolic murmur, soft S2  Pulmonary:      Effort: Pulmonary effort is normal  No respiratory distress  Breath sounds: Normal breath sounds  Abdominal:      General: There is no distension  Palpations: Abdomen is soft  Musculoskeletal:         General: No swelling  Normal range of motion  Skin:     General: Skin is warm and dry  Capillary Refill: Capillary refill takes less than 2 seconds  Neurological:      General: No focal deficit present  Mental Status: He is alert and oriented to person, place, and time  Psychiatric:         Mood and Affect: Mood normal          LABORATORY RESULTS:    Lab Results   Component Value Date    WBC 7 15 08/29/2022    HGB 16 5 08/29/2022    HCT 50 1 (H) 08/29/2022    MCV 86 08/29/2022     08/29/2022     Lab Results   Component Value Date    CALCIUM 9 9 08/29/2022    K 4 6 08/29/2022    CO2 28 08/29/2022     08/29/2022    BUN 14 08/29/2022    CREATININE 0 87 08/29/2022     Lab Results   Component Value Date    HGBA1C 5 7 (H) 08/29/2022       Lipid Profile:   No results found for: CHOL  Lab Results   Component Value Date    HDL 40 08/29/2022     Lab Results   Component Value Date    LDLCALC 167 (H) 08/29/2022     Lab Results   Component Value Date    TRIG 157 (H) 08/29/2022       The 10-year ASCVD risk score (bOed Garnett et al , 2013) is: 12 7%    Values used to calculate the score:      Age: 49 years      Sex: Male      Is Non- : No      Diabetic: No      Tobacco smoker: Yes      Systolic Blood Pressure: 960 mmHg      Is BP treated: No      HDL Cholesterol: 40 mg/dL      Total Cholesterol: 238 mg/dL    Imaging: I have personally reviewed pertinent reports  No results found      EKG reviewed personally:  Reviewed    Assessment and Plan:    Kim Schaumann was seen today for fatigue  Diagnoses and all orders for this visit:    Tobacco use    Severe aortic stenosis  -     Ambulatory Referral to Cardiology       1  Severe aortic stenosis  2  Tobacco use    Patient presents for evaluation of severe aortic stenosis  Echocardiogram was reviewed  Discuss further management options  Patient is refusing open heart surgery  He is concerned about rehab and duration of recovery  States he cannot be out of work for long time  He would be open to considering TAVR procedure  However, as of now, both patient and wife are not sure  Wants to follow-up with valve clinic for further discussion  Ultimately, patient would need cardiac catheterization and JONATHON for better visualization of aortic valve  As per gradients and transthoracic echocardiogram, does appear to have severe aortic stenosis  Patient will refer to cardiothoracic surgery for further evaluation for AVR versus TAVR  Strongly encouraged tobacco cessation  Will also place a consult for case management to help with social needs  Recommend patient presents to the emergency room or call our office if he has any new/persistent or progressive symptoms      Return to clinic in 4 weeks or PRN    Donovan Vazquez MD  9/23/2022,1:04 PM

## 2022-09-29 DIAGNOSIS — Z78.9 NEED FOR FOLLOW-UP BY SOCIAL WORKER: Primary | ICD-10-CM

## 2022-09-30 ENCOUNTER — PATIENT OUTREACH (OUTPATIENT)
Dept: INTERNAL MEDICINE CLINIC | Facility: CLINIC | Age: 57
End: 2022-09-30

## 2022-09-30 DIAGNOSIS — Z76.89 ENCOUNTER FOR SOCIAL WORK INTERVENTION: Primary | ICD-10-CM

## 2022-09-30 NOTE — PROGRESS NOTES
I received a return call from patient's girl friend , Aliya Menjivar  She informed Vianey Rose is presently working and she reports that he work a lot  I asked Aliya Menjivar where Vianey Rose works and she told me that he works at [employer] where he lives at  [employer] also informed me that his phone is not working  She agrees to give Vianey Rose my number when she sees him and request that he contact me  I thanked Aliya Menjivar for returning my call 
[facility]  is responding to referral form Cardiology regarding social issues  I reviewed patient's chart and patient has a significant cardiac history and patient continues to smoke cigarettes and marijuana  Telephone call placed to patient and I received VM for his significant other, Lily Salazar  I left a message on her VM requesting that she give patient my message and request that he return my call 
Telephone call received from Lindenbrennen whitehead and I introduced myself and explained my role  Prince whitehead informed me that he resides at the [employer] and he hold different jobs there   He works mainly in security and maintenance  He reports that his girlfriend lives with him and he is able to cook meals there  He has a toaster oven and a refrigerator  Prince whitehead does not have a car and he takes the [employer]  He also receives food stamps  I spoke to Prince whitehead about applying for public housing and he is not sure that he is on the housing list   I advised him to apply to prevent him from homelessness and he informed me that he has been homeless in the past and he doesn't mind living in a tent  Prince whitehead informed me that he is trying to get a marijuana card and he smokes marijuana to prevent cancer  He reports that many of his family members  of cancer and he believes that marijuana will help him  Prince whitehead denies taking any medications due to them making him sick  I spoke to him about his cardiac history and need for a valve replacement  Prince whitehead informed that he will consider a TAVR but he does not want open heart surgery due to the recovery period  Prince whitehead denies having any needs that I can assist him with at this time  I also asked Prince whitehead if he has any mental health concerns or history  He reports being diagnosed with ADHD as a child and also Schizophrenia but he is not interested in treatment  Prince whitehead denies having any suicidal ideations or thoughts of self harm  He reports having ample food, housing , transportation, and all basic necessities  I will place a referral to Dolores Lugo for medical follow up and medication management  Patient is agreeable  
Signs (Current):   Vitals:    08/08/19 1606 08/14/19 1057   BP:  116/59   Pulse:  (!) 48   Resp:  18   SpO2:  98%   Weight: 147 lb (66.7 kg) 147 lb (66.7 kg)   Height: 5' 11\" (1.803 m) 5' 11\" (1.803 m)                                              BP Readings from Last 3 Encounters:   08/14/19 116/59 (52 %, Z = 0.06 /  20 %, Z = -0.85)*   08/06/19 124/62 (78 %, Z = 0.77 /  29 %, Z = -0.55)*   06/28/19 125/61 (82 %, Z = 0.90 /  28 %, Z = -0.58)*     *BP percentiles are based on the August 2017 AAP Clinical Practice Guideline for boys       NPO Status: Time of last liquid consumption: 2359                        Time of last solid consumption: 2359                        Date of last liquid consumption: 08/13/19                        Date of last solid food consumption: 08/13/19    BMI:   Wt Readings from Last 3 Encounters:   08/14/19 147 lb (66.7 kg) (74 %, Z= 0.66)*   08/06/19 147 lb (66.7 kg) (75 %, Z= 0.66)*   06/28/19 152 lb (68.9 kg) (81 %, Z= 0.87)*     * Growth percentiles are based on [facility] (Boys, 2-20 Years) data. Body mass index is 20.5 kg/m². CBC:   Lab Results   Component Value Date    WBC 5.5 06/11/2019    RBC 4.52 06/11/2019    HGB 13.0 06/11/2019    HCT 39.6 06/11/2019    MCV 87.6 06/11/2019    RDW 12.3 06/11/2019     06/11/2019       CMP:   Lab Results   Component Value Date     06/11/2019    K 4.3 06/11/2019    K 3.5 06/10/2019     06/11/2019    CO2 30 06/11/2019    BUN 8 06/11/2019    CREATININE 0.8 06/11/2019    GFRAA >60 06/11/2019    LABGLOM >60 06/11/2019    GLUCOSE 96 06/11/2019    PROT 6.3 06/11/2019    CALCIUM 9.8 06/11/2019    BILITOT 1.3 06/11/2019    ALKPHOS 182 06/11/2019    AST 27 06/11/2019    ALT 16 06/11/2019       POC Tests: No results for input(s): POCGLU, POCNA, POCK, POCCL, POCBUN, POCHEMO, POCHCT in the last 72 hours.     Coags:   Lab Results   Component Value Date    PROTIME 12.7 06/09/2019    INR 1.1 06/09/2019    APTT 27.5 06/09/2019       HCG (If

## 2022-10-03 ENCOUNTER — PATIENT OUTREACH (OUTPATIENT)
Dept: FAMILY MEDICINE CLINIC | Facility: CLINIC | Age: 57
End: 2022-10-03

## 2022-10-03 DIAGNOSIS — I35.0 AORTIC VALVE STENOSIS, ETIOLOGY OF CARDIAC VALVE DISEASE UNSPECIFIED: Primary | ICD-10-CM

## 2022-10-03 NOTE — PROGRESS NOTES
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  Patient relies on [employer] for transportation and does not like to take medicine stating it makes him sick  Past Medical History  Aortic stenosis, tobacco use and marijuana use  See problem list for complete list of medical diagnosis  Future appointments:  10/11/2022 1:00 PM Appointment with Shaheed Mcnally PA-C at [facility] ([phone])   10/24/2022 2:30 PM Appointment with MO CT 1 at [address] [place] [address] CT Scan ([phone])   11/2/2022 8:20 AM Appointment with Tyler Mcaias MD at [address] [place] [address] ([phone])     Unsuccessful attempt at reaching patient for Complex Care management introduction  Phone number in patient's chart is that of his girlfriend, Cynthia Suero RN Care Manager left name, call back number, office hours and message encouraging return call of this CM    This Care manager will attempt outreach again within 1 week of today if no response from patient before then/

## 2022-10-03 NOTE — PROGRESS NOTES
Return call from patient this afternoon  RN care manager introduced self and explained complex care management for which Penelope Zapien is agreeable to RN care manager assistance and outreach  Patient tangential in providing information  He informs CM of a history of high blood pressure,  ADD, ADHD, bipolar and schizophrenia  He reports being diagnosed with these conditions at age 10 for which he was prescribed medications that made him flatline or put him in a sleepy state   He Refuses to take any medications for these conditions and states it was also at age 10 he began to smoke marijuana and has smoked daily ever since  Penelope Zapien informs RN care manager he smokes marijuana for the pain he suffers in his neck, back, right shoulder and right knee  He states 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 in which 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 right knee  He refused treatment at that time and self diagnosed himself with torn ligaments he states causes his knee to buckle  Patient also reports sustaining a 90 foot fall from [employer] in 2008 again while working in which he suffered crushing back and sciatic nerve injury  Again, he failed to report to his employer and refused medical treatment  He is convinced that he has a torn right rotator cuff but never sought any treatment nor does he have an exact date of injury  Patient informed RN care manager "I would rather smoke a little bit of weed throughout the day rather than my cigarettes "  Penelope Zapien states that he is cutting down and smokes about 1/2 a pack per day up to one pack daily  Patient informs RN care manager he has been living in the [facility] since March of 2022 for which he works as their  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  He reports being homeless prior to that since approximately 2004 -- staying with family or friends off and on    He states he works 7 days a week approximately 14 to 15 hours a day for which he is paid under the table $100 a week  He states that he does collect SNAP benefits and his girlfriend pays for the hotel room and also collects SNAP  Patient states it costs him anywhere from 30 to $60 a week for the marijuana he buys on the street  Castillo Rodriguez also states that he has to pay for a cab or local bus as he is not set up with [facility] for medical appointment transportation  Patient states the fare for the buses $1 50 one way  Upcoming appointments reviewed with patient along with detailed explanation of what a gastroenterologist is and what to expect for his consultation next week  Both patient and his girlfriend were under the impression that he would be having a colonoscopy at the 10/11 visit for which patient was informed this is merely a consultation to set up additional testing such as a colonoscopy  Patient was informed that there is substantial prepping involved and additional testing such as an EGD may be scheduled along with the colonoscopy  Castillo Rodriguez and his girlfriend both stated verbal understanding  Castillo Rodriguez informed RN care manager that he was part of a program called [employer] and unsure if he was ever placed on a housing list and would consider housing if able to afford  He states he has not heard anything from the program in the last several months  Patient plans to stay at the hotel for as long as possible  He reports his room is equipped with a microwave, toaster oven, and small refrigerator  He states that he will cook over an open fire or his girlfriend Kamran Gibbons will prepare meals consisting of sausage, kielbasa, hot dogs or chicken  He states that he drinks Hawaiian punch throughout the day along with coffee and also drinks Gatorade  RN care manager provided education regarding low sodium diet as too much salt is not good for his heart    Thankfully he denies any edema to his lower extremities but does admit to shortness of breath and dizziness  For this reason he is considering the cardiac catheterization by cardiology  and RN care manager agreed to send message to cardiologist regarding the same/ referral to cardiovascular surgeon  RN care manager provided eduction on cardiac catheter access and JONATHON  Samir Glaserurray verbalized his main concern is transportation and would need transportation for both he and his girlfriend as she accompanies him to all his appointments  He also would like to pursue having a medical marijuana card stating he has been asking for one for years  RN care manager explained to patient that in order to have a medical marijuana card, he may need to have additional completed tests to substantiate a diagnosis for which the marijuana would be dispensed  Patient verbalized understanding  Patient was counseled on the concern of street marijuana and the danger of it being tainted with another illegal substance that can cause bodily harm or result in death  Samir Mcguire states that he can tell when his weed is bad by smell or when it is first lit  Encounter routed to [facility] with patient request for [facility] application and housing assistance        In baskets sent to cardiologist with request to discuss cardiac cath with patient / refer for cardiac cath and PCP regarding update for patient request for medical marijuana card

## 2022-10-04 ENCOUNTER — TELEPHONE (OUTPATIENT)
Dept: CARDIOLOGY CLINIC | Facility: CLINIC | Age: 57
End: 2022-10-04

## 2022-10-04 NOTE — TELEPHONE ENCOUNTER
Spoke with the pts girlfriend gave her the information for the pt to contact me back to schedule his catheterization

## 2022-10-04 NOTE — TELEPHONE ENCOUNTER
----- Message from Hiren Krishnan MD sent at 10/3/2022  4:14 PM EDT -----  Please schedule patient for follow up with cardiothoracic surgery  Please schedule patient for a cardiac cath    Thanks  AQ

## 2022-10-05 ENCOUNTER — TELEPHONE (OUTPATIENT)
Dept: CARDIOLOGY CLINIC | Facility: CLINIC | Age: 57
End: 2022-10-05

## 2022-10-05 ENCOUNTER — PATIENT OUTREACH (OUTPATIENT)
Dept: INTERNAL MEDICINE CLINIC | Facility: CLINIC | Age: 57
End: 2022-10-05

## 2022-10-05 NOTE — TELEPHONE ENCOUNTER
Dr Terra Sandhoff- Patients partner would like a call after 1pm tomorrow to further discuss     [phone]

## 2022-10-05 NOTE — TELEPHONE ENCOUNTER
Scheduled an appt with Carson Julio with [facility] at 11:00 tomorrow to discuss cath and JONATHON  Per Dr Irena Morales, to cancel appt and he will call patient to discuss  Lm informing patient that appt was cancelled and to call the office to verify they have received the message

## 2022-10-05 NOTE — PROGRESS NOTES
10/3/22  In basket message received fro Dr Brady Cavalier directing Cardiology [place] Clinical pool to schedule patient for follow up with Cardiothoracic surgery  10/4/22  In basket message received  from PCP indicating office does not certify for medical marijuana and suggested patient see pain management  10/5/22  Review of chart today shows referral placed for cardiovascular surgery  RN CM sent in basket to cardiovascular coordinator with copy to [facility] cardiology clinical and clerical pools with requests to schedule patient for follow up appointment regarding cardiac cath / JONATHON  Unsuccessful attempt at reaching patient for Complex Care management follow up call  Left name, call back number and message explaining above encouraging return call of this CM  This CM will continue to monitor electronically via chart review and outreach

## 2022-10-06 ENCOUNTER — TELEPHONE (OUTPATIENT)
Dept: CARDIAC SURGERY | Facility: CLINIC | Age: 57
End: 2022-10-06

## 2022-10-07 ENCOUNTER — PREP FOR PROCEDURE (OUTPATIENT)
Dept: CARDIOLOGY CLINIC | Facility: CLINIC | Age: 57
End: 2022-10-07

## 2022-10-07 ENCOUNTER — TELEPHONE (OUTPATIENT)
Dept: CARDIOLOGY CLINIC | Facility: CLINIC | Age: 57
End: 2022-10-07

## 2022-10-07 DIAGNOSIS — I35.0 AORTIC VALVE STENOSIS, ETIOLOGY OF CARDIAC VALVE DISEASE UNSPECIFIED: Primary | ICD-10-CM

## 2022-10-07 DIAGNOSIS — R06.02 SHORTNESS OF BREATH: ICD-10-CM

## 2022-10-07 NOTE — TELEPHONE ENCOUNTER
Prescreening in progress  Pt aware of labs  He will have done on 10/24/22, pt takes no meds  Can we get an auth for a LHC at [facility] on 10/28/22 thanks

## 2022-10-07 NOTE — TELEPHONE ENCOUNTER
Hi this pt is scheduled to see one of your providers on 10/11/22, can you please have registration get the pt to sign a waiver and release for free local transport  Its for transportation for his cath procedure  I uploaded one to the media file on 10/7/22  Please print and have pt sign and re-upload thank you if you have any questions please reach out to me        Sheldon Bey- [phone]

## 2022-10-11 ENCOUNTER — CONSULT (OUTPATIENT)
Dept: GASTROENTEROLOGY | Facility: CLINIC | Age: 57
End: 2022-10-11
Payer: COMMERCIAL

## 2022-10-11 ENCOUNTER — PATIENT OUTREACH (OUTPATIENT)
Dept: INTERNAL MEDICINE CLINIC | Facility: CLINIC | Age: 57
End: 2022-10-11

## 2022-10-11 ENCOUNTER — TELEPHONE (OUTPATIENT)
Dept: GASTROENTEROLOGY | Facility: CLINIC | Age: 57
End: 2022-10-11

## 2022-10-11 VITALS
SYSTOLIC BLOOD PRESSURE: 128 MMHG | WEIGHT: 172 LBS | OXYGEN SATURATION: 95 % | HEIGHT: 66 IN | BODY MASS INDEX: 27.64 KG/M2 | DIASTOLIC BLOOD PRESSURE: 84 MMHG | HEART RATE: 91 BPM

## 2022-10-11 DIAGNOSIS — Z12.11 SCREENING FOR COLON CANCER: Primary | ICD-10-CM

## 2022-10-11 PROCEDURE — 99203 OFFICE O/P NEW LOW 30 MIN: CPT | Performed by: PHYSICIAN ASSISTANT

## 2022-10-11 NOTE — PROGRESS NOTES
VM received from patient's significant other reporting "I just found out he has an appointment at like 8:30 in the morning either Thursday or Friday of this week  We cannot get to it that early  I believe it is far enough away  We're gonna need a ride "     Return call placed to patient's significant other ORLÉANS who states that they do not have transportation for the appointment later this week  STEVEN informed her per the notes in his chart, the office has it documented for lyft transportation to be arranged  STEVEN did inform her that it is uncertain if another passenger can be transported as it may only be a ride for strictly the the patient  CM provided her with the physician name, phone number and address for his appointment scheduled for 21 [phone] on 10/14  NADINE did inform STEVEN that she did complete the waiver but she was uncertain if that was for [employer] or [facility] [place]

## 2022-10-11 NOTE — PROGRESS NOTES
[facility] - Outpatient Consultation  Alex Springer 56 y o  male MRN: [id number]  Encounter: [id number]          ASSESSMENT AND PLAN:      1  Screening for colon cancer  This will be his first screening colonoscopy  Average Risk  ______________________________________________________________________    HPI:  56-year-old male presents for evaluation prior to scheduling a screening colonoscopy  He knows that he had a colonoscopy in the early 2000s due to abdominal pain  He believes that this was a normal exam   He denies any current lower GI symptoms such as abdominal pain, diarrhea, constipation or rectal bleeding  He notes a family history significant for cancer but is not sure exactly what type of cancer  REVIEW OF SYSTEMS:    CONSTITUTIONAL: Denies any fever, chills, rigors, and weight loss  HEENT: No earache or tinnitus  Denies hearing loss or visual disturbances  CARDIOVASCULAR: No chest pain or palpitations  RESPIRATORY: Denies any cough, hemoptysis, shortness of breath or dyspnea on exertion  GASTROINTESTINAL: As noted in the History of Present Illness  GENITOURINARY: No problems with urination  Denies any hematuria or dysuria  NEUROLOGIC: No dizziness or vertigo, denies headaches  MUSCULOSKELETAL: Denies any muscle or joint pain  SKIN: Denies skin rashes or itching  ENDOCRINE: Denies excessive thirst  Denies intolerance to heat or cold  PSYCHOSOCIAL: Denies depression or anxiety  Denies any recent memory loss  Historical Information   Past Medical History:   Diagnosis Date   • Diabetes mellitus ([facility] )    • Hyperlipidemia    • Hypertension    • Psychiatric disorder     bipolar   • 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[facility] )      History reviewed  No pertinent surgical history    Social History   Social History     Substance and Sexual Activity   Alcohol Use Not Currently     Social History     Substance and Sexual Activity   Drug Use Yes   • Types: Marijuana Social History     Tobacco Use   Smoking Status Current Every Day Smoker   • Packs/day: 0 50   • Years: 45 00   • Pack years: 22 50   Smokeless Tobacco Never Used   Tobacco Comment    Patient states that he is trying to cut down     Family History   Problem Relation Age of Onset   • Cancer Mother    • Hypertension Mother    • Diabetes Mother        Meds/Allergies     No current outpatient medications on file  Allergies   Allergen Reactions   • Other      Pt states he is allergic to everything  States he doesn't have a list but can only take children doses of all medication             Objective     Blood pressure 128/84, pulse 91, height 5' 6" (1 676 m), weight 78 kg (172 lb), SpO2 95 %  Body mass index is 27 76 kg/m²  PHYSICAL EXAM:      General Appearance:   Alert, cooperative, no distress   HEENT:   Normocephalic, atraumatic, anicteric      Neck:  Supple, symmetrical, trachea midline   Lungs:   Clear to auscultation bilaterally; no rales, rhonchi or wheezing; respirations unlabored    Heart[de-identified]   Regular rate and rhythm; DONNY, rub, or gallop  Abdomen:   Soft, non-tender, non-distended; normal bowel sounds; no masses, no organomegaly    Genitalia:   Deferred    Rectal:   Deferred    Extremities:  No cyanosis, clubbing or edema    Pulses:  2+ and symmetric    Skin:  No jaundice, rashes, or lesions    Lymph nodes:  No palpable cervical lymphadenopathy        Lab Results:   No visits with results within 1 Day(s) from this visit     Latest known visit with results is:   Hospital Outpatient Visit on 09/19/2022   Component Date Value   • AV area peak nilson 09/19/2022 0 8    • AV peak gradient 09/19/2022 69    • LA size 09/19/2022 3 3    • Aortic valve mean veloci* 09/19/2022 33 90    • LVPWd 09/19/2022 1 30    • Left Atrium Area-systoli* 09/19/2022 14 7    • Left Atrium Area-systoli* 09/19/2022 12 1    • MV E' Tissue Velocity Se* 09/19/2022 8    • RA 2D Volume 09/19/2022 27 0    • IVSd 09/19/2022 1 50    • LV DIASTOLIC VOLUME (MOD* 21/52/3428 60    • LEFT VENTRICLE SYSTOLIC * 35/01/5648 23    • Left ventricular stroke * 09/19/2022 38 00    • AV Deceleration Time 09/19/2022 1,116    • A4C EF 09/19/2022 57    • LA length (A2C) 09/19/2022 4 70    • LVIDd 09/19/2022 3 80    • IVS 09/19/2022 1 5    • LVIDS 09/19/2022 2 50    • FS 09/19/2022 34    • Asc Ao 09/19/2022 3 1    • Ao root 09/19/2022 3 90    • RVID d 09/19/2022 3 4    • LVOT mn grad 09/19/2022 2 0    • AV area by cont VTI 09/19/2022 0 8    • AV regurgitation pressur* 09/19/2022 324    • AV mean gradient 09/19/2022 56 0    • AV LVOT peak gradient 09/19/2022 3    • MV valve area p 1/2 meth* 09/19/2022 5 64    • E wave deceleration time 09/19/2022 134    • LVOT diameter 09/19/2022 2 3    • LVOT peak shade 09/19/2022 0 86    • LVOT peak VTI 09/19/2022 18 37    • Aortic valve peak veloci* 09/19/2022 4 7    • Ao VTI 09/19/2022 95 49    • LVOT stroke volume 09/19/2022 76 28    • AV peak gradient 09/19/2022 88 0    • MV Peak E Shade 09/19/2022 72    • MV Peak A Shade 09/19/2022 0 95    • TOM A4C 09/19/2022 11 5    • RAA A4C 09/19/2022 10 3    • MV stenosis pressure 1/2* 09/19/2022 39    • LVOT stroke volume index 09/19/2022 38 90    • LVSV, 2D 09/19/2022 38    • LVOT area 09/19/2022 4 15    • Dimensionless velociy in* 09/19/2022 0 18    • AV valve area 09/19/2022 0 80          Radiology Results:   Echo complete w/ contrast if indicated    Result Date: 9/19/2022  Narrative: •  Left Ventricle: Left ventricular cavity size is normal  Wall thickness is moderately increased  There is moderate concentric hypertrophy  Systolic function is normal (55%)  Wall motion is normal  Diastolic function is mildly abnormal, consistent with grade I (abnormal) relaxation  •  Right Ventricle: Right ventricular cavity size is normal  Systolic function is normal  •  Aortic Valve: The aortic valve is trileaflet  The leaflets are moderately thickened  The leaflets are moderately calcified   There is moderately reduced mobility  There is mild regurgitation  There is severe stenosis  Peak velocity is 4 69 m/sec  Peak and mean gradients across the aortic valve were 88 and 56 mmHg respectively  DVI was 0 18  •  Mitral Valve: There is trace regurgitation  •  Tricuspid Valve: There is trace regurgitation  •  Pulmonic Valve: There is mild regurgitation  •  Aorta: The aortic root is mildly dilated (3 9 cm)

## 2022-10-11 NOTE — TELEPHONE ENCOUNTER
[facility] signed waiver for transportation  [facility] at  to advise  Alison Arthur stated pt has an appt on Thurs to get to their Cardiac office and he will be a new pt  Alison Arthur advised she will be contacting [employer] to get Lyft for pt and his significant other for [place]  Alison Arthur is not aware of any signed paperwork needed and never has had any paperwork needed from the pt since she is the one who sets up the transportation

## 2022-10-11 NOTE — PATIENT INSTRUCTIONS
Scheduled date of colonoscopy (as of today):12/20/22  Physician performing colonoscopy:Susi  Location of colonoscopy:[place]  Bowel prep reviewed with patient:Josie/Miralax  Instructions reviewed with patient by:Indra gonzalez  Clearances:  none

## 2022-10-14 ENCOUNTER — OFFICE VISIT (OUTPATIENT)
Dept: CARDIAC SURGERY | Facility: CLINIC | Age: 57
End: 2022-10-14
Payer: COMMERCIAL

## 2022-10-14 VITALS
TEMPERATURE: 97.7 F | DIASTOLIC BLOOD PRESSURE: 79 MMHG | WEIGHT: 173 LBS | HEART RATE: 84 BPM | HEIGHT: 66 IN | SYSTOLIC BLOOD PRESSURE: 130 MMHG | BODY MASS INDEX: 27.8 KG/M2 | OXYGEN SATURATION: 96 %

## 2022-10-14 DIAGNOSIS — Z01.89 ENCOUNTER FOR IMAGING OF BILATERAL GREATER SAPHENOUS VEINS: ICD-10-CM

## 2022-10-14 DIAGNOSIS — Z72.0 TOBACCO ABUSE: ICD-10-CM

## 2022-10-14 DIAGNOSIS — Z13.6 ENCOUNTER FOR SCREENING FOR STENOSIS OF CAROTID ARTERY: ICD-10-CM

## 2022-10-14 DIAGNOSIS — I35.0 AORTIC VALVE STENOSIS, ETIOLOGY OF CARDIAC VALVE DISEASE UNSPECIFIED: Primary | ICD-10-CM

## 2022-10-14 DIAGNOSIS — K08.9 POOR DENTITION: ICD-10-CM

## 2022-10-14 DIAGNOSIS — R55 SYNCOPE, UNSPECIFIED SYNCOPE TYPE: ICD-10-CM

## 2022-10-14 DIAGNOSIS — R53.83 FATIGUE, UNSPECIFIED TYPE: ICD-10-CM

## 2022-10-14 PROCEDURE — 99244 OFF/OP CNSLTJ NEW/EST MOD 40: CPT | Performed by: PHYSICIAN ASSISTANT

## 2022-10-14 RX ORDER — ASPIRIN 81 MG/1
81 TABLET, CHEWABLE ORAL DAILY
Qty: 30 TABLET | Refills: 11 | Status: SHIPPED | OUTPATIENT
Start: 2022-10-14

## 2022-10-14 NOTE — PROGRESS NOTES
Consultation - Cardiothoracic Surgery   Neelam Suero 56 y o  male MRN: [id number]    Physician Requesting Consult: No att  providers found    Reason for Consult / Principal Problem: Aortic stenosis, Non-Rheumatic    History of Present Illness: Neelam Suero is a 56y o  year old male who was referred to our office by Dr Natalie De Oliveira for severe aortic stenosis  His PCP heard a murmur on exam on 8/29/22, and was referred for an ECHO  ECHO revealed mild AI, severe AS with peak velocity of 4 69 m/s, peak gradient 88 mmHg, mean gradient 56 mmHg, DVI 0 18  He saw Dr Natalie De Oliveira on 9/23, and at that time, he was nervous for surgery, and refusing  He was referred to further discuss surgery  Patient accompanied by his girlfriend today  Medical history significant for HLD, HTN, DM2, schizophrenia, but he does not take any medication  He smokes cigarettes, and has smoked marijuana daily for over 40 years  He states that he becomes "mentally disabled" without it  He does not have a medical card  As far as cardiac symptoms, patient reports feeling weak and fatigued for several months  He works several jobs, as a , a , and maintenance at a hotel, and is often lifting boxes and gets short of breath with exerting himself  He also frequently has lightheadedness, and has had several episodes of syncope, the most recent episode being about 2 weeks ago  He has top dentures, and 4 bottom teeth, and does not regularly see a dentist      Past Medical History:  Past Medical History:   Diagnosis Date   • Diabetes mellitus ([facility] )    • Hyperlipidemia    • Hypertension    • Psychiatric disorder     bipolar   • Schizoid personality disorder [facility])        Past Surgical History:   History reviewed  No pertinent surgical history        Family History:  Family History   Problem Relation Age of Onset   • Cancer Mother    • Hypertension Mother    • Diabetes Mother          Social History:    Social History     Substance and Sexual Activity   Alcohol Use Not Currently     Social History     Substance and Sexual Activity   Drug Use Yes   • Types: Marijuana     Social History     Tobacco Use   Smoking Status Current Every Day Smoker   • Packs/day: 0 50   • Years: 45 00   • Pack years: 22 50   Smokeless Tobacco Never Used   Tobacco Comment    Patient states that he is trying to cut down       Home Medications:   Prior to Admission medications    Not on File       Allergies: Allergies   Allergen Reactions   • Other      Pt states he is allergic to everything  States he doesn't have a list but can only take children doses of all medication         Review of Systems:  Review of Systems - History obtained from the patient  General ROS: positive for  - fatigue  Psychological ROS: negative  Ophthalmic ROS: negative  ENT ROS: negative  Allergy and Immunology ROS: negative  Hematological and Lymphatic ROS: negative  Endocrine ROS: negative  Respiratory ROS: positive for - shortness of breath  Cardiovascular ROS: positive for - chest pain, dyspnea on exertion, loss of consciousness, orthopnea and shortness of breath  Gastrointestinal ROS: no abdominal pain, change in bowel habits, or black or bloody stools  Genito-Urinary ROS: no dysuria, trouble voiding, or hematuria  Musculoskeletal ROS: negative  Neurological ROS: positive for - weakness  Dermatological ROS: negative    Vital Signs:     Vitals:    10/14/22 0814 10/14/22 0822   BP: 116/79 130/79   BP Location: Left arm Right arm   Patient Position: Sitting Sitting   Cuff Size: Large Large   Pulse: 84    Temp: 97 7 °F (36 5 °C)    TempSrc: Tympanic    SpO2: 96%    Weight: 78 5 kg (173 lb)    Height: 5' 6" (1 676 m)        Physical Exam:    General: alert, oriented, NAD   HEENT/NECK:  PERRL  No jugular venous distention  Cardiac:Regular rate and rhythm, grade 4/6 systolic murmur heard best at RSB    Carotid arteries: bruit vs radiation of systolic murmur, 2+ pulses   Pulmonary:  Breath sounds clear bilaterally  Abdomen:  Non-tender, Non-distended  Positive bowel sounds  Upper extremities: 2+ radial pulses; brisk capillary refill  Lower extremities: Extremities warm/dry  PT/DP pulses 2+ bilaterally  No edema B/L  Neuro: Alert and oriented X 3  Sensation is grossly intact  No focal deficits  Musculoskeletal: GOLDSTEIN   Skin: Warm/Dry, without rashes or lesions  Lab Results:               Invalid input(s): LABGLOM      Lab Results   Component Value Date    HGBA1C 5 7 (H) 08/29/2022     No results found for: CKTOTAL, CKMB, CKMBINDEX, TROPONINI    Imaging Studies:     Cardiac Catheterization: scheduled for 10/24/22     Echocardiogram: 9/19/22  Interpretation Summary       •  Left Ventricle: Left ventricular cavity size is normal  Wall thickness is moderately increased  There is moderate concentric hypertrophy  Systolic function is normal (55%)  Wall motion is normal  Diastolic function is mildly abnormal, consistent with grade I (abnormal) relaxation  •  Right Ventricle: Right ventricular cavity size is normal  Systolic function is normal   •  Aortic Valve: The aortic valve is trileaflet  The leaflets are moderately thickened  The leaflets are moderately calcified  There is moderately reduced mobility  There is mild regurgitation  There is severe stenosis  Peak velocity is 4 69 m/sec  Peak and mean gradients across the aortic valve were 88 and 56 mmHg respectively  DVI was 0 18   •  Mitral Valve: There is trace regurgitation  •  Tricuspid Valve: There is trace regurgitation  •  Pulmonic Valve: There is mild regurgitation  •  Aorta: The aortic root is mildly dilated (3 9 cm)          Findings    Left Ventricle Left ventricular cavity size is normal  Wall thickness is moderately increased  There is moderate concentric hypertrophy  Systolic function is normal   Wall motion is normal  Diastolic function is mildly abnormal, consistent with grade I (abnormal) relaxation     Right Ventricle Right ventricular cavity size is normal  Systolic function is normal  Wall thickness is normal    Left Atrium The atrium is normal in size  Right Atrium The atrium is normal in size  Aortic Valve The aortic valve is trileaflet  The leaflets are moderately thickened  The leaflets are moderately calcified  There is moderately reduced mobility  There is mild regurgitation  There is severe stenosis  Peak velocity is 4 69 m/sec  Peak and mean gradients across the aortic valve were 88 and 56 mmHg respectively  DVI was 0 18  Mitral Valve There is trace regurgitation  There is no evidence of stenosis  The mitral valve has normal structure and normal function  Tricuspid Valve Tricuspid valve structure is normal  There is trace regurgitation  There is no evidence of stenosis  Pulmonic Valve Pulmonic valve structure is normal  There is mild regurgitation  There is no evidence of stenosis  Ascending Aorta The aortic root is mildly dilated (3 9 cm)  IVC/SVC The inferior vena cava is normal in size  Pericardium There is no pericardial effusion  The pericardium is normal in appearance                 Left Ventricle Measurements    Function/Volumes   A4C EF 57 %         LVOT stroke volume 76 28 cm3         LVOT stroke volume index 38 9 ml/m2         Dimensions   LVIDd 3 8 cm         LVIDS 2 5 cm         IVSd 1 5 cm         LVPWd 1 3 cm         LVOT area 4 15 cm2         FS 34 %         Diastolic Filling   MV E' Tissue Velocity Septal 8 cm/s         E wave deceleration time 134 ms         MV Peak E Shade 72 cm/s         MV Peak A Shade 0 95 m/s          Report Measurements   AV LVOT peak gradient 3 mmHg              Interventricular Septum Measurements    Shunt Ratio   LVOT peak VTI 18 37 cm         LVOT peak shade 0 86 m/s              Right Ventricle Measurements    Dimensions   RVID d 3 4 cm               Left Atrium Measurements    Dimensions   LA size 3 3 cm         LA length (A2C) 4 7 cm         TOM A4C 11 5 cm2 Right Atrium Measurements    Dimensions   RA 2D Volume 27 mL         RAA A4C 10 3 cm2               Atrial Septum Measurements    Shunt Ratio   LVOT peak VTI 18 37 cm         LVOT peak nilson 0 86 m/s               Aortic Valve Measurements    Stenosis   Aortic valve peak velocity 4 7 m/s         LVOT peak nilson 0 86 m/s         Ao VTI 95 49 cm         LVOT peak VTI 18 37 cm         AV mean gradient 56 mmHg         LVOT mn grad 2 mmHg         AV peak gradient 88 mmHg         AV LVOT peak gradient 3 mmHg         Dimensionless velociy index 0 18          Regurgitation   AV peak gradient 69 mmHg         AV Deceleration Time 1,116 ms         AV regurgitation pressure 1/2 time 324 ms         Area/Dimensions   AV valve area 0 8 cm2         AV area by cont VTI 0 8 cm2         AV area peak nilson 0 8 cm2         LVOT diameter 2 3 cm         LVOT area 4 15 cm2               Mitral Valve Measurements    Stenosis   MV stenosis pressure 1/2 time 39 ms         MV valve area p 1/2 method 5 64 cm2               Aorta Measurements    Aortic Dimensions   Ao root 3 9 cm         Asc Ao 3 1 cm                  I have personally reviewed pertinent reports  Assessment:  Patient Active Problem List    Diagnosis Date Noted   • Severe aortic stenosis 09/23/2022   • Marijuana use 08/29/2022   • Tobacco use 08/29/2022   • Murmur 08/29/2022     Severe aortic stenosis; Ongoing AVR workup    Plan:  Risks and benefits of aortic valve replacement were discussed in detail today with the patient  They understand and wish to proceed with preoperative testing, which we have ordered  Prior to his next visit, patient will need PFT's, cardiac cath (scheduled for 10/28), CT chest (scheduled for 10/24), carotid artery ultrasound, vein mapping and dental clinic referral      We will start patient on 81 mg ASA and low dose BB - metoprolol 12 5 mg BID for cardiac protection       Brandan Barnett was comfortable with our recommendations, and their questions were answered to their satisfaction  Thank you for allowing us to participate in the care of this patient  Patient saw GI this week, and has an active referral for colonoscopy in place       SIGNATURE: Teto Ren  DATE: October 14, 2022  TIME: 9:17 AM

## 2022-10-17 ENCOUNTER — PREP FOR PROCEDURE (OUTPATIENT)
Dept: CARDIOLOGY CLINIC | Facility: CLINIC | Age: 57
End: 2022-10-17

## 2022-10-17 NOTE — TELEPHONE ENCOUNTER
Spoke with pt girlfriend mariela , explained they can take the bus to [facility] the day of his procedure and to notify the [address] that the pt is to take a lyfte ride home  Katie Cordero verbally understood  I will contact them again to see if Arun Crook is available so I may explain the same to him as well

## 2022-10-17 NOTE — NURSING NOTE
This pt is going to need a lyfte ride home  He will be accompanied by his girlfriend  Waiver already signed in chart

## 2022-10-20 ENCOUNTER — OFFICE VISIT (OUTPATIENT)
Dept: INTERNAL MEDICINE CLINIC | Facility: CLINIC | Age: 57
End: 2022-10-20
Payer: COMMERCIAL

## 2022-10-20 ENCOUNTER — PATIENT OUTREACH (OUTPATIENT)
Dept: INTERNAL MEDICINE CLINIC | Facility: CLINIC | Age: 57
End: 2022-10-20

## 2022-10-20 ENCOUNTER — APPOINTMENT (OUTPATIENT)
Dept: LAB | Facility: CLINIC | Age: 57
End: 2022-10-20
Payer: COMMERCIAL

## 2022-10-20 VITALS
WEIGHT: 172.2 LBS | OXYGEN SATURATION: 98 % | DIASTOLIC BLOOD PRESSURE: 70 MMHG | BODY MASS INDEX: 27.68 KG/M2 | RESPIRATION RATE: 18 BRPM | HEART RATE: 90 BPM | SYSTOLIC BLOOD PRESSURE: 142 MMHG | HEIGHT: 66 IN | TEMPERATURE: 98.1 F

## 2022-10-20 DIAGNOSIS — T14.8XXA MUSCLE STRAIN: Primary | ICD-10-CM

## 2022-10-20 DIAGNOSIS — R06.02 SHORTNESS OF BREATH: ICD-10-CM

## 2022-10-20 DIAGNOSIS — I35.0 AORTIC VALVE STENOSIS, ETIOLOGY OF CARDIAC VALVE DISEASE UNSPECIFIED: ICD-10-CM

## 2022-10-20 LAB
ANION GAP SERPL CALCULATED.3IONS-SCNC: 4 MMOL/L (ref 4–13)
BUN SERPL-MCNC: 16 MG/DL (ref 5–25)
CALCIUM SERPL-MCNC: 9.4 MG/DL (ref 8.3–10.1)
CHLORIDE SERPL-SCNC: 106 MMOL/L (ref 96–108)
CO2 SERPL-SCNC: 26 MMOL/L (ref 21–32)
CREAT SERPL-MCNC: 0.9 MG/DL (ref 0.6–1.3)
ERYTHROCYTE [DISTWIDTH] IN BLOOD BY AUTOMATED COUNT: 14.4 % (ref 11.6–15.1)
GFR SERPL CREATININE-BSD FRML MDRD: 95 ML/MIN/1.73SQ M
GLUCOSE P FAST SERPL-MCNC: 95 MG/DL (ref 65–99)
HCT VFR BLD AUTO: 46.4 % (ref 36.5–49.3)
HGB BLD-MCNC: 14.6 G/DL (ref 12–17)
INR PPP: 0.89 (ref 0.84–1.19)
MCH RBC QN AUTO: 26.7 PG (ref 26.8–34.3)
MCHC RBC AUTO-ENTMCNC: 31.5 G/DL (ref 31.4–37.4)
MCV RBC AUTO: 85 FL (ref 82–98)
PLATELET # BLD AUTO: 262 THOUSANDS/UL (ref 149–390)
PMV BLD AUTO: 11.1 FL (ref 8.9–12.7)
POTASSIUM SERPL-SCNC: 4.2 MMOL/L (ref 3.5–5.3)
PROTHROMBIN TIME: 12.3 SECONDS (ref 11.6–14.5)
RBC # BLD AUTO: 5.47 MILLION/UL (ref 3.88–5.62)
SODIUM SERPL-SCNC: 136 MMOL/L (ref 135–147)
WBC # BLD AUTO: 6.29 THOUSAND/UL (ref 4.31–10.16)

## 2022-10-20 PROCEDURE — 36415 COLL VENOUS BLD VENIPUNCTURE: CPT

## 2022-10-20 PROCEDURE — 85610 PROTHROMBIN TIME: CPT

## 2022-10-20 PROCEDURE — 80048 BASIC METABOLIC PNL TOTAL CA: CPT

## 2022-10-20 PROCEDURE — 99214 OFFICE O/P EST MOD 30 MIN: CPT

## 2022-10-20 PROCEDURE — 85027 COMPLETE CBC AUTOMATED: CPT

## 2022-10-20 RX ORDER — METHOCARBAMOL 500 MG/1
500 TABLET, FILM COATED ORAL 4 TIMES DAILY
Qty: 30 TABLET | Refills: 1 | Status: SHIPPED | OUTPATIENT
Start: 2022-10-20

## 2022-10-20 NOTE — PROGRESS NOTES
Telephone call received from [place] from [employer] and she informed me that she has a cancellation tomorrow and would be able to schedule Michelle Wallace   I informed her that patient will need Lyft transport for his appointments out of [facility] agrees to contact Nicolas Seaman to discuss  I informed Medford that Michelle Wallace needs cardiac clearance and she informed me that those patients are given priority  She reports that if Michelle Wallace is unable to make this appointment, they will try to offer an appointment in the near future  [place] is aware that the [employer] waiver is already signed by patient and she agrees to assist with transport  I thanked her for returning my call

## 2022-10-20 NOTE — PROGRESS NOTES
Chart review completed  Taft Primrose attended his gastroenterology appointment on 10/11 for routine colon cancer screening and order placed for colonoscopy (scheduled for 12/20)  He also attended his cardiovascular appointment 10/14 for evaluation of severe aortic stenosis and aortic valve replacement  Provider directed patient to start aspirin 81 mg daily and metoprolol tartrate 12 5 mg twice daily while completing full preoperative evaluation  Taft Primrose will need to demonstrate compliance and overcome other social barriers with appointment attendance and following through on preoperative testing in order to proceed  At time of this review, he is scheduled for cardiac left heart cath on 10/28    Patient case discussed in this am's huddle with embedded RN CM [facility] for hand off and VICKY Roach for transportation  CM removed self from care team and added RN CM [facility]  Encounter routed to ZEV Collado

## 2022-10-20 NOTE — PROGRESS NOTES
Patient discussed during morning huddle and patient reports having a transportation barrier  Patient reported that he was registered with [employer] during out conversation on 9/30  Telephone call placed to patient to discuss and I left a message for his SO, Armando Bagley on her VM requesting a return call so that I can provide needed assistance  Telephone call placed to [address] [place] [address], [employer] and I spoke to Supervisor, Glenn Ortiz  He informed me that patient is not registered  However, patient's significant other, Armando Bagley called them and Flaco Garcia mailed them both applications for [employer]  Rose provided Flaco Garcia with a PO box address  I also requested that he mail a form to patient for Armando Bagley to accompany patient as his escort  Flaco Garcia agrees to send the escort form  I will gladly assist patient with any additional needs if he returns my call

## 2022-10-20 NOTE — PROGRESS NOTES
Telephone call returned from patient's significant other, Kari Zarate informed me that she is awaiting applications from [employer] for herself and patient  I informed her that she will need to complete the escort form if she is going to accompany patient to his appointments  I asked Justin Zarate if she needs assistance with completing the applications and she replied, "No unless they are written in [place] "  I informed her that we can assist her if she has any difficulty  Justin Zarate informed me that patient signed a waiver to take Lyft to medical appointments  She is aware that [employer] does not go out of [facility]  Justin Zarate also reports that patient needs dental clearance and oral surgery prior to having cardiac surgery  She informed me that she is working with a  named Rema Cano in the Cardiology office  I informed her that I am not familiar with her  Justin Zarate reports that  patient was referred to [address] [place] [address] in [place] and she reports that she has been calling for three days and no one has returned her call  Telephone call placed to [facility] in [place] and I was informed that the next appointment for a new patient is on Tuesday, 1/10 at 10:30 am   Justin Zarate did not want to accept this appointment at this time until she verified that Leila Courts is available  Justin Elliot is also aware that Leila Berger needs dental follow up and treatment as soon as possible  Telephone call placed to [facility] and I also left a VM requesting that someone contact Justin Zarate with an appointment as no one is getting back to her  I advised Rose to contact me if she/kadeem need any further assistance and she agrees

## 2022-10-20 NOTE — PROGRESS NOTES
INTERNAL MEDICINE FOLLOW-UP VISIT  [facility] - [facility] OF [address] [place] [address]    NAME: Luis F Yo  AGE: 56 y o  SEX: male  : 1965     DATE: 10/20/2022     Assessment and Plan:   1  Muscle strain  To left trapezius due to occupation  - methocarbamol (ROBAXIN) 500 mg tablet; Take 1 tablet (500 mg total) by mouth 4 (four) times a day  Dispense: 30 tablet; Refill: 1  Use moist heat 3-4 times per day  Ointment such as CBD can assist in pain management  Gentle ROM stretches 3 times per day after use of moist heat        No follow-ups on file  Chief Complaint:     Chief Complaint   Patient presents with   • Neck Pain     C/o of neck pain starting 2-4 days ago,states this pain has started when he was working as a   States pain is worse at night  History of Present Illness:     Patient is here today with a 3 day history of pain to his left trapezius muscle as well as stiffness to his neck  He states he works as a  and notice when he gets home sometimes his muscles are tight  The following portions of the patient's history were reviewed and updated as appropriate: allergies, current medications, past family history, past medical history, past social history, past surgical history and problem list      Review of Systems:     Review of Systems   Constitutional: Negative for appetite change, chills, diaphoresis, fatigue, fever and unexpected weight change  HENT: Negative for postnasal drip and sneezing  Eyes: Negative for visual disturbance  Respiratory: Negative for chest tightness and shortness of breath  Cardiovascular: Negative for chest pain, palpitations and leg swelling  Gastrointestinal: Negative for abdominal pain and blood in stool  Endocrine: Negative for cold intolerance, heat intolerance, polydipsia, polyphagia and polyuria  Genitourinary: Negative for difficulty urinating, dysuria, frequency and urgency     Musculoskeletal: Positive for neck pain and neck stiffness  Negative for arthralgias and myalgias  Skin: Negative for rash and wound  Neurological: Negative for dizziness, weakness, light-headedness and headaches  Hematological: Negative for adenopathy  Psychiatric/Behavioral: Negative for confusion, dysphoric mood and sleep disturbance  The patient is not nervous/anxious  Past Medical History:     Past Medical History:   Diagnosis Date   • Diabetes mellitus ([facility] )    • Hyperlipidemia    • Hypertension    • Psychiatric disorder     bipolar   • 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[facility] )         Current Medications:     Current Outpatient Medications:   •  methocarbamol (ROBAXIN) 500 mg tablet, Take 1 tablet (500 mg total) by mouth 4 (four) times a day, Disp: 30 tablet, Rfl: 1  •  aspirin 81 mg chewable tablet, Chew 1 tablet (81 mg total) daily, Disp: 30 tablet, Rfl: 11  •  metoprolol tartrate (LOPRESSOR) 25 mg tablet, Take 0 5 tablets (12 5 mg total) by mouth every 12 (twelve) hours, Disp: 30 tablet, Rfl: 11     Allergies: Allergies   Allergen Reactions   • Other      Pt states he is allergic to everything  States he doesn't have a list but can only take children doses of all medication          Physical Exam:     /70 (BP Location: Left arm, Patient Position: Sitting, Cuff Size: Standard)   Pulse 90   Temp 98 1 °F (36 7 °C) (Tympanic)   Resp 18   Ht 5' 6" (1 676 m)   Wt 78 1 kg (172 lb 3 2 oz)   SpO2 98%   BMI 27 79 kg/m²     Physical Exam  Constitutional:       Appearance: He is well-developed  HENT:      Head: Normocephalic and atraumatic  Eyes:      Conjunctiva/sclera: Conjunctivae normal       Pupils: Pupils are equal, round, and reactive to light  Cardiovascular:      Rate and Rhythm: Normal rate and regular rhythm  Heart sounds: Normal heart sounds  Pulmonary:      Effort: Pulmonary effort is normal       Breath sounds: Normal breath sounds     Abdominal:      General: Bowel sounds are normal       Palpations: Abdomen is soft  Musculoskeletal:      Cervical back: Spasms and tenderness present  Decreased range of motion  Skin:     General: Skin is warm and dry  Neurological:      Mental Status: He is alert and oriented to person, place, and time  Data:     Laboratory Results: I have personally reviewed the pertinent laboratory results/reports   Radiology/Other Diagnostic Testing Results: I have personally reviewed pertinent reports        [address] [facility]

## 2022-10-24 ENCOUNTER — HOSPITAL ENCOUNTER (OUTPATIENT)
Dept: CT IMAGING | Facility: HOSPITAL | Age: 57
Discharge: HOME | End: 2022-10-24
Payer: COMMERCIAL

## 2022-10-24 DIAGNOSIS — Z72.0 TOBACCO ABUSE: ICD-10-CM

## 2022-10-24 PROCEDURE — 71271 CT THORAX LUNG CANCER SCR C-: CPT

## 2022-10-25 ENCOUNTER — PATIENT OUTREACH (OUTPATIENT)
Dept: INTERNAL MEDICINE CLINIC | Facility: CLINIC | Age: 57
End: 2022-10-25

## 2022-10-25 NOTE — PROGRESS NOTES
Complex Care Plan Note:  Patient transferred from Vivian Cha RN to myself  Case reviewed with Cathryn Cerda and Chart review completed  Last office visit note from cardiac surgeon Dr Ethan Ordonez dated 10/14/22 reviewed for evaluation of care plan  Per surgeon’s note patient has several social issues which may impact utilization  Patient will need dental clearance, carotid duplex, vein mapping, and PFT's completed  Patient also needs to demonstrate adherence to care plan prior to further discussion with Dr Ethan Ordonez re TAVR procedure  Future appointments are as follows:    - 10/28/22 Cardiac cath lab for left heart catherization    10/31/2022 9:00 AM (Arrive by 8:45 AM) Con[facility] [place],  [address]   11/1/2022 12:15 PM [facility] PFT's   11/2/2022 8:20 AM (Arrive by 8:05 AM) Robyn Syed MD [facility]   11/30/2022 10:00 AM AN POC VASCULAR 1 [facility] AN POC H&V   11/30/2022 11:00 AM AN POC VASCULAR 1 [facility] Carotid duplex and vein mapping   12/20/2022 10:55 AM Ginny Tijerina DO; [place] [facility] ROOM 02  [facility]      Telephone outreach attempt #1 made to introduce myself and follow up to adherence to careplan  No answer  Left voicemail message with general contact information with name, title, call back phone number office hours when I am available and message encouraging return call to this

## 2022-10-27 ENCOUNTER — TELEPHONE (OUTPATIENT)
Dept: INTERNAL MEDICINE CLINIC | Facility: CLINIC | Age: 57
End: 2022-10-27

## 2022-10-27 NOTE — TELEPHONE ENCOUNTER
----- Message from [id number] sent at 10/27/2022  8:05 AM EDT -----  Normal lung screening

## 2022-10-27 NOTE — TELEPHONE ENCOUNTER
----- Message from [id number] sent at 10/27/2022  8:05 AM EDT -----  Normal lung screening

## 2022-10-28 ENCOUNTER — PATIENT OUTREACH (OUTPATIENT)
Dept: INTERNAL MEDICINE CLINIC | Facility: CLINIC | Age: 57
End: 2022-10-28

## 2022-10-28 ENCOUNTER — HOSPITAL ENCOUNTER (OUTPATIENT)
Facility: HOSPITAL | Age: 57
Setting detail: OUTPATIENT SURGERY
Discharge: HOME | End: 2022-10-28
Attending: INTERNAL MEDICINE | Admitting: INTERNAL MEDICINE
Payer: COMMERCIAL

## 2022-10-28 VITALS
SYSTOLIC BLOOD PRESSURE: 131 MMHG | HEIGHT: 66 IN | RESPIRATION RATE: 23 BRPM | HEART RATE: 93 BPM | BODY MASS INDEX: 27.88 KG/M2 | TEMPERATURE: 97.5 F | WEIGHT: 173.5 LBS | DIASTOLIC BLOOD PRESSURE: 94 MMHG | OXYGEN SATURATION: 97 %

## 2022-10-28 DIAGNOSIS — I35.0 AORTIC VALVE STENOSIS, ETIOLOGY OF CARDIAC VALVE DISEASE UNSPECIFIED: ICD-10-CM

## 2022-10-28 DIAGNOSIS — R06.02 SHORTNESS OF BREATH: ICD-10-CM

## 2022-10-28 LAB
CATH EF QUANTITATIVE: 55 %
GLUCOSE SERPL-MCNC: 88 MG/DL (ref 65–140)

## 2022-10-28 PROCEDURE — 82948 REAGENT STRIP/BLOOD GLUCOSE: CPT

## 2022-10-28 RX ORDER — FENTANYL CITRATE 50 UG/ML
INJECTION, SOLUTION INTRAMUSCULAR; INTRAVENOUS AS NEEDED
Status: DISCONTINUED | OUTPATIENT
Start: 2022-10-28 | End: 2022-10-28 | Stop reason: HOSPADM

## 2022-10-28 RX ORDER — VERAPAMIL HCL 2.5 MG/ML
AMPUL (ML) INTRAVENOUS AS NEEDED
Status: DISCONTINUED | OUTPATIENT
Start: 2022-10-28 | End: 2022-10-28 | Stop reason: HOSPADM

## 2022-10-28 RX ORDER — LIDOCAINE HYDROCHLORIDE 10 MG/ML
INJECTION, SOLUTION EPIDURAL; INFILTRATION; INTRACAUDAL; PERINEURAL AS NEEDED
Status: DISCONTINUED | OUTPATIENT
Start: 2022-10-28 | End: 2022-10-28 | Stop reason: HOSPADM

## 2022-10-28 RX ORDER — MIDAZOLAM HYDROCHLORIDE 2 MG/2ML
INJECTION, SOLUTION INTRAMUSCULAR; INTRAVENOUS AS NEEDED
Status: DISCONTINUED | OUTPATIENT
Start: 2022-10-28 | End: 2022-10-28 | Stop reason: HOSPADM

## 2022-10-28 RX ORDER — SODIUM CHLORIDE 9 MG/ML
100 INJECTION, SOLUTION INTRAVENOUS CONTINUOUS
Status: DISCONTINUED | OUTPATIENT
Start: 2022-10-28 | End: 2022-10-28 | Stop reason: HOSPADM

## 2022-10-28 RX ORDER — SODIUM CHLORIDE 9 MG/ML
75 INJECTION, SOLUTION INTRAVENOUS CONTINUOUS
Status: DISCONTINUED | OUTPATIENT
Start: 2022-10-28 | End: 2022-10-28 | Stop reason: HOSPADM

## 2022-10-28 RX ORDER — HEPARIN SODIUM 1000 [USP'U]/ML
INJECTION, SOLUTION INTRAVENOUS; SUBCUTANEOUS AS NEEDED
Status: DISCONTINUED | OUTPATIENT
Start: 2022-10-28 | End: 2022-10-28 | Stop reason: HOSPADM

## 2022-10-28 RX ADMIN — HYDROCORTISONE SODIUM SUCCINATE 200 MG: 100 INJECTION, POWDER, FOR SOLUTION INTRAMUSCULAR; INTRAVENOUS at 09:19

## 2022-10-28 NOTE — DISCHARGE INSTRUCTIONS
After Radial Heart Catheterization   WHAT YOU NEED TO KNOW:   What will happen after a radial heart catheterization? You will be attached to a heart monitor until you are fully awake  A heart monitor is an EKG that stays on continuously to record your heart's electrical activity  Healthcare providers will monitor your vital signs and pulses in your arm  They will frequently check your pressure bandage for bleeding or swelling  You may have a band wrapped tightly around your wrist  The band puts pressure on your wound and helps prevent bleeding  A healthcare provider can put air into the band or remove air from the band  A healthcare provider will gradually remove air from the band and decrease pressure on your wrist  The band may be removed in 2 hours or when your wound stops bleeding  You will need to keep your wrist straight for 2 to 4 hours  Do not  push or pull with your arm  Arm movements can cause serious bleeding  After you are monitored for several hours, you may go home or may need to stay in the hospital overnight  What do I need to know before I go home? Care for your wound as directed  Remove the pressure bandage in 24 hours or as directed  Mild bruising is normal and expected  A small bandage can be placed on your wound after you remove the pressure bandage  Do not put powders, lotions, or creams on your wound  They may cause your wound to get infected  Monitor your wound every day for signs of infection, such as redness, swelling, or pus  Shower the day after your procedure or as directed  Remove your pressure bandage before you shower  Do not take baths or go in hot tubs or pools  Carefully wash the wound with soap and water  Pat the area dry  A small bandage can be placed on your wound after you shower  Apply firm, steady pressure to your wound if it bleeds  Apply pressure with a clean gauze or towel for 5 to 10 minutes  Call 911 if bleeding becomes heavy or does not stop  Drink liquids as directed  Liquids will help flush the contrast liquid from your body  Ask how much liquid to drink each day and which liquids are best for you  Do not lift anything heavier than 5 pounds until directed by your healthcare provider  Heavy lifting can put stress on your wound and cause bleeding  Do not push or pull with the arm that was used for the procedure  Do not do vigorous activity for at least 48 hours  Vigorous activity may cause bleeding from your wound  Rest and do quiet activities  Take short walks around the house to prevent a blood clot  Ask your healthcare provider when you can return to your normal activities  Do not drive or return to work until your healthcare provider says it is okay  Your healthcare provider may tell you to wait 48 hours before you drive to decrease your risk for bleeding  You may not be able to return to work for at least 2 days after your procedure if your job involves heavy lifting  What medicines may I need? You may need any of the following:  Blood thinners  help prevent blood clots  Clots can cause strokes, heart attacks, and death  The following are general safety guidelines to follow while you are taking a blood thinner:    Watch for bleeding and bruising while you take blood thinners  Watch for bleeding from your gums or nose  Watch for blood in your urine and bowel movements  Use a soft washcloth on your skin, and a soft toothbrush to brush your teeth  This can keep your skin and gums from bleeding  If you shave, use an electric shaver  Do not play contact sports  Tell your dentist and other healthcare providers that you take a blood thinner  Wear a bracelet or necklace that says you take this medicine  Do not start or stop any other medicines unless your healthcare provider tells you to  Many medicines cannot be used with blood thinners  Take your blood thinner exactly as prescribed by your healthcare provider   Do not skip does or take less than prescribed  Tell your provider right away if you forget to take your blood thinner, or if you take too much  Warfarin  is a blood thinner that you may need to take  The following are things you should be aware of if you take warfarin:     Foods and medicines can affect the amount of warfarin in your blood  Do not make major changes to your diet while you take warfarin  Warfarin works best when you eat about the same amount of vitamin K every day  Vitamin K is found in green leafy vegetables and certain other foods  Ask for more information about what to eat when you are taking warfarin  You will need to see your healthcare provider for follow-up visits when you are on warfarin  You will need regular blood tests  These tests are used to decide how much medicine you need  Acetaminophen  helps decrease pain and fever  This medicine is available without a doctor's order  Ask how much medicine is safe to take, and how often to take it  Acetaminophen can cause liver damage if not taken correctly  Take your medicine as directed  Contact your healthcare provider if you think your medicine is not helping or if you have side effects  Tell him or her if you are allergic to any medicine  Keep a list of the medicines, vitamins, and herbs you take  Include the amounts, and when and why you take them  Bring the list or the pill bottles to follow-up visits  Carry your medicine list with you in case of an emergency  Call your local emergency number (911 in the [facility] [facility]) if:   You have chest pain       You have any of the following signs of a heart attack:      Squeezing, pressure, or pain in your chest    You may  also have any of the following:     Discomfort or pain in your back, neck, jaw, stomach, or arm    Shortness of breath    Nausea or vomiting    Lightheadedness or a sudden cold sweat    You have any of the following signs of a stroke:      Numbness or drooping on one side of your face Weakness in an arm or leg    Confusion or difficulty speaking    Dizziness, a severe headache, or vision loss    You cough up blood  You have trouble breathing  You cannot stop the bleeding from your wound even after you hold firm pressure for 10 minutes  When should I call my doctor? You have a fever or chills  Blood soaks through your bandage  Your stitches come apart  Your hand or arm feels numb, cool, or looks pale  Your wound gets swollen quickly  Your wound is red, swollen, or draining pus  Your wound looks more bruised or you have new bruising on the side of your wrist      You have nausea or are vomiting  Your skin is itchy, swollen, or you have a rash  You have questions or concerns about your condition or care  Healthy living tips: The following are general healthy guidelines  If your chest pain is caused by a heart problem, your healthcare provider will give you specific guidelines to follow  Manage other health conditions  Diabetes and high cholesterol increases your risk for another heart attack and stroke  Talk to your healthcare provider about your management plan  He or she will make a plan that helps you manage your conditions  Do not smoke  Nicotine and other chemicals in cigarettes and cigars can cause lung and heart damage  Ask your healthcare provider for information if you currently smoke and need help to quit  E-cigarettes or smokeless tobacco still contain nicotine  Talk to your healthcare provider before you use these products  Eat a variety of healthy, low-fat, low-salt foods  Healthy foods include fruits, vegetables, whole-grain breads, low-fat dairy products, beans, lean meats, and fish  Ask for more information about a heart healthy diet  Drink plenty of water every day  Your body is made of mostly water  Water helps your body to control your temperature and blood pressure   Ask your healthcare provider how much water you should drink every day  Ask about activity  Your healthcare provider will tell you which activities to limit or avoid  Ask when you can drive, return to work, and have sex  Ask about the best exercise plan for you  Maintain a healthy weight  Ask your healthcare provider how much you should weigh  Ask him or her to help you create a weight loss plan if you are overweight  Get the flu and pneumonia vaccines  All adults should get the influenza (flu) vaccine  Get it every year as soon as it becomes available  The pneumococcal vaccine is given to adults aged 72 years or older  The vaccine is given every 5 years to prevent pneumococcal disease, such as pneumonia  If you have a stent:   Carry your stent card with you at all times  Let all healthcare providers know that you have a stent  CARE AGREEMENT:   You have the right to help plan your care  Learn about your health condition and how it may be treated  Discuss treatment options with your healthcare providers to decide what care you want to receive  You always have the right to refuse treatment  The above information is an  only  It is not intended as medical advice for individual conditions or treatments  Talk to your doctor, nurse or pharmacist before following any medical regimen to see if it is safe and effective for you  © Copyright [employer] 2022 Information is for End User's use only and may not be sold, redistributed or otherwise used for commercial purposes   All illustrations and images included in CareNotes® are the copyrighted property of A D A M , Inc  or [address] [place] [address] [employer]

## 2022-10-28 NOTE — Clinical Note
Contrast injected into cephalic vein  Unable to advance swan  Aborting cephalic vein access  Obtaining femoral vein access

## 2022-10-28 NOTE — PROGRESS NOTES
VM received 10/28/22 from patient's significant other stating they haven't received a call for his appointment that's scheduled for tomorrow  CM placed call to significant other this am but unfortunately she didn't answer  CM left message explaining that the call ahead appointment reminder likely wouldn't occur until in the evening  Message included call back number for this CM should she have additional questions

## 2022-10-28 NOTE — H&P
Please see the cardiology consultation by Dr Mir Iverson September 23, 2022 for full details  In summary the patient is a 53-year-old male with a history of tobacco and substance abuse as well as noncompliance who has dyspnea on exertion and fatigue  He was found to have severe aortic stenosis  He underwent an echocardiogram listed below  He saw CT surgery and was started on aspirin and a beta-blocker and testing was ordered including carotid duplex, vein mapping, PFTs and dental clearance  He arrives today for cardiac catheterization prior to valve replacement        Echocardiogram:  •  Left Ventricle: Left ventricular cavity size is normal  Wall thickness is moderately increased  There is moderate concentric hypertrophy  Systolic function is normal (55%)  Wall motion is normal  Diastolic function is mildly abnormal, consistent with grade I (abnormal) relaxation  •  Right Ventricle: Right ventricular cavity size is normal  Systolic function is normal   •  Aortic Valve: The aortic valve is trileaflet  The leaflets are moderately thickened  The leaflets are moderately calcified  There is moderately reduced mobility  There is mild regurgitation  There is severe stenosis   Peak velocity is 4 69 m/sec   Peak and mean gradients across the aortic valve were 88 and 56 mmHg respectively   DVI was 0 18  •  Mitral Valve: There is trace regurgitation  •  Tricuspid Valve: There is trace regurgitation  •  Pulmonic Valve: There is mild regurgitation  •  Aorta: The aortic root is mildly dilated (3 9 cm)  Physical examination is unchanged with a 4/6 harsh systolic cardiac murmur and is otherwise unchanged  Impression:  Symptomatic severe aortic stenosis  Plan:  The plan is for right and left cardiac catheterization  Further management based on results  I have discussed in detail with patient regarding the indications, alternatives, risks and benefit of cardiac catheterization and possible PCI   The procedure risks, benefits, and complications (including but not limited to bleeding, infection, arrhythmia, nephrotoxicity, vessel injury, myocardial infarction, CVA, and death) were reviewed  Patient is alert and oriented x3 and wishes to proceed   All questions answered

## 2022-10-28 NOTE — Clinical Note
Prepped: groin, right radial and right brachial  Prepped with: ChloraPrep  The site was clipped  The patient was draped

## 2022-11-01 ENCOUNTER — HOSPITAL ENCOUNTER (OUTPATIENT)
Dept: PULMONOLOGY | Facility: HOSPITAL | Age: 57
Discharge: HOME | End: 2022-11-01

## 2022-11-01 DIAGNOSIS — I35.0 AORTIC VALVE STENOSIS, ETIOLOGY OF CARDIAC VALVE DISEASE UNSPECIFIED: ICD-10-CM

## 2022-11-01 DIAGNOSIS — Z72.0 TOBACCO ABUSE: ICD-10-CM

## 2022-11-01 LAB
ATRIAL RATE: 79 BPM
P AXIS: 20 DEGREES
PR INTERVAL: 150 MS
QRS AXIS: 13 DEGREES
QRSD INTERVAL: 90 MS
QT INTERVAL: 378 MS
QTC INTERVAL: 433 MS
T WAVE AXIS: 66 DEGREES
VENTRICULAR RATE: 79 BPM

## 2022-11-01 RX ORDER — ALBUTEROL SULFATE 2.5 MG/3ML
2.5 SOLUTION RESPIRATORY (INHALATION) ONCE
Status: COMPLETED | OUTPATIENT
Start: 2022-11-01 | End: 2022-11-01

## 2022-11-01 RX ADMIN — ALBUTEROL SULFATE 2.5 MG: 2.5 SOLUTION RESPIRATORY (INHALATION) at 12:39

## 2022-11-02 ENCOUNTER — TELEPHONE (OUTPATIENT)
Dept: CARDIOLOGY CLINIC | Facility: CLINIC | Age: 57
End: 2022-11-02

## 2022-11-02 ENCOUNTER — APPOINTMENT (EMERGENCY)
Dept: VASCULAR ULTRASOUND | Facility: HOSPITAL | Age: 57
End: 2022-11-02

## 2022-11-02 ENCOUNTER — OFFICE VISIT (OUTPATIENT)
Dept: CARDIOLOGY CLINIC | Facility: CLINIC | Age: 57
End: 2022-11-02

## 2022-11-02 ENCOUNTER — HOSPITAL ENCOUNTER (EMERGENCY)
Facility: HOSPITAL | Age: 57
Discharge: HOME | End: 2022-11-02
Attending: EMERGENCY MEDICINE

## 2022-11-02 VITALS
HEART RATE: 80 BPM | BODY MASS INDEX: 28.12 KG/M2 | SYSTOLIC BLOOD PRESSURE: 118 MMHG | RESPIRATION RATE: 16 BRPM | WEIGHT: 175 LBS | OXYGEN SATURATION: 97 % | HEIGHT: 66 IN | DIASTOLIC BLOOD PRESSURE: 70 MMHG

## 2022-11-02 VITALS
TEMPERATURE: 96.3 F | OXYGEN SATURATION: 98 % | HEART RATE: 83 BPM | RESPIRATION RATE: 16 BRPM | SYSTOLIC BLOOD PRESSURE: 130 MMHG | DIASTOLIC BLOOD PRESSURE: 86 MMHG

## 2022-11-02 DIAGNOSIS — Z72.0 TOBACCO USE: ICD-10-CM

## 2022-11-02 DIAGNOSIS — R10.31 RIGHT GROIN PAIN: Primary | ICD-10-CM

## 2022-11-02 DIAGNOSIS — I35.0 SEVERE AORTIC STENOSIS: Primary | ICD-10-CM

## 2022-11-02 NOTE — PATIENT INSTRUCTIONS
Please schedule follow-up with Cardiothoracic surgery  Please schedule follow-up for dental clearance  Please schedule testing as recommended by Cardiothoracic surgery  Please be compliant with her medications  Please stop smoking  Recommend patient presents to the emergency room or call our office if he has any new/persistent or progressive symptoms

## 2022-11-02 NOTE — LETTER
November 2, 2022     Patient: Brittany Atwood  YOB: 1965  Date of Visit: 11/2/2022      To Whom it May Concern:    Brittany Atwood is under my professional care  Prince whitehead was seen in my office on 11/2/2022  Prince whitehead can not return to work until cardiothoracic surgery  Patient will have a follow up appointment to re-evaluate date to return to work after surgery  If you have any questions or concerns, please don't hesitate to call           Sincerely,          Thaddeus Hunt MD

## 2022-11-02 NOTE — PROGRESS NOTES
Cardiology Follow Up    Lilian Bruce  1965  [id number]  [facility] [place]  [address] [place] [address] [phone]    Discussion/Summary:  1  Severe aortic stenosis  2  CAD  3  Tobacco abuse  4  Medication non-compliance    He is undergoing workup for possible SAVR versus CABG  Recently had cardiac catheterization which showed single-vessel coronary artery disease  Denies chest pain, chest pressure, shortness of breath  Patient complaining of right groin tenderness following cardiac catheterization  On examination, appears within normal limits  No swelling, discharge  No hematoma noted  Given right groin pain following cardiac catheterization, will refer patient to emergency room for vascular ultrasound to rule out pseudoaneurysm  Strongly encourage compliance with medications and follow-up appointments  Recommend patient avoid exertion/heavy lifting  Out of work letter provided  States he sometimes forgets to take his aspirin and metoprolol  Takes it occasionally  Strongly emphasize importance of compliance with medications  Recommend patient reschedule dental appointment  Patient missed call for scheduling  Strongly encourage follow-up with Cardiothoracic surgery for CABG plus AVR evaluation  Strongly encouraged tobacco cessation  Patient accompanied by his girlfriend/significant other  Recommend patient presents to the emergency room or call our office if he has any new/persistent or progressive symptoms  Previous studies were reviewed  Safety measures were reviewed  Questions were entertained and answered  Patient was advised to report any problems requiring medical attention  Follow-up with PCP and appropriate specialist and lab work as discussed  Return for follow up visit as scheduled or earlier, if needed      Thank you for allowing me to participate in the care and evaluation of your patient  Should you have any questions, please feel free to contact me  History of Present Illness:     Maycol Collado is a 56 y o  male who presents for follow up for cardiovascular care  Denies chest pain, chest pressure, shortness of breath, dizziness, lightheadedness, presyncope or syncope  Denies orthopnea, PND or lower limb edema  Patient complaining of right groin tenderness following cardiac catheterization  States it is somewhat improved but still gets tenderness on exertion  Echocardiogram 9/19/22:  •  Left Ventricle: Left ventricular cavity size is normal  Wall thickness is moderately increased  There is moderate concentric hypertrophy  Systolic function is normal (55%)  Wall motion is normal  Diastolic function is mildly abnormal, consistent with grade I (abnormal) relaxation  •  Right Ventricle: Right ventricular cavity size is normal  Systolic function is normal   •  Aortic Valve: The aortic valve is trileaflet  The leaflets are moderately thickened  The leaflets are moderately calcified  There is moderately reduced mobility  There is mild regurgitation  There is severe stenosis  Peak velocity is 4 69 m/sec  Peak and mean gradients across the aortic valve were 88 and 56 mmHg respectively  DVI was 0 18   •  Mitral Valve: There is trace regurgitation  •  Tricuspid Valve: There is trace regurgitation  •  Pulmonic Valve: There is mild regurgitation  •  Aorta: The aortic root is mildly dilated (3 9 cm)  Cardiac catheterization 10/28/22:  · Mid RCA lesion is 85% stenosed  · The left ventricular systolic function is normal   · Prox LAD to Mid LAD lesion is 20% stenosed  · Dist LAD lesion is 25% stenosed  · There is a 50 mm of mercury peak to peak gradient across aortic valve which calculates to an aortic valve area of approximately 0 8 centimeter squared consistent with severe aortic stenosis   The left heart catheterization was performed through the R radial atery and right heart cath through the R femoral vein after R bracial vein access would not allow passage of the right hear catheter  Patient Active Problem List   Diagnosis   • Marijuana use   • Tobacco use   • Murmur   • Severe aortic stenosis   • Aortic valve stenosis   • Tobacco abuse     Past Medical History:   Diagnosis Date   • Diabetes mellitus ([facility] )    • Hyperlipidemia    • Hypertension    • Psychiatric disorder     bipolar   • 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[facility] )    • Syncope      Social History     Socioeconomic History   • Marital status: Single     Spouse name: Not on file   • Number of children: Not on file   • Years of education: Not on file   • Highest education level: Not on file   Occupational History   • Not on file   Tobacco Use   • Smoking status: Current Every Day Smoker     Packs/day: 0 50     Years: 45 00     Pack years: 22 50   • Smokeless tobacco: Never Used   • Tobacco comment: Patient states that he is trying to cut down   Vaping Use   • Vaping Use: Never used   Substance and Sexual Activity   • Alcohol use: Not Currently   • Drug use: Yes     Types: Marijuana   • Sexual activity: Yes     Partners: Female   Other Topics Concern   • Not on file   Social History Narrative    Patient states he lives with his girlfriend      Social Determinants of Health     Financial Resource Strain: Not on file   Food Insecurity: Not on file   Transportation Needs: Not on file   Physical Activity: Not on file   Stress: Not on file   Social Connections: Not on file   Intimate Partner Violence: Not on file   Housing Stability: Not on file      Family History   Problem Relation Age of Onset   • Cancer Mother    • Hypertension Mother    • Diabetes Mother      Past Surgical History:   Procedure Laterality Date   • CARDIAC ASCENDING AORTOGRAM N/A 10/28/2022    Procedure: Cardiac ascending aortogram;  Surgeon: Shai Holcomb MD;  [facility]: [facility];   Service: Cardiology   • CARDIAC CATHETERIZATION Left 10/28/2022    Procedure: CARDIAC RHC/LHC; Surgeon: Bhupendra Salazar MD;  [facility]; Service: Cardiology   • CARDIAC CATHETERIZATION N/A 10/28/2022    Procedure: Cardiac Coronary Angiogram;  Surgeon: Bhupendra Salazar MD;  Location: [facility] CATH LAB; Service: Cardiology   • CARDIAC CATHETERIZATION Left 10/28/2022    Procedure: Cardiac Left Ventriculogram;  Surgeon: Bhupendra Salazar MD;  [facility]: [facility]; Service: Cardiology   • LAPAROSCOPIC LYSIS INTESTINAL ADHESIONS         Current Outpatient Medications:   •  aspirin 81 mg chewable tablet, Chew 1 tablet (81 mg total) daily, Disp: 30 tablet, Rfl: 11  •  methocarbamol (ROBAXIN) 500 mg tablet, Take 1 tablet (500 mg total) by mouth 4 (four) times a day, Disp: 30 tablet, Rfl: 1  •  metoprolol tartrate (LOPRESSOR) 25 mg tablet, Take 0 5 tablets (12 5 mg total) by mouth every 12 (twelve) hours, Disp: 30 tablet, Rfl: 11  No current facility-administered medications for this visit  Allergies   Allergen Reactions   • Other      Pt states he is allergic to everything   States he doesn't have a list but can only take children doses of all medication         Labs:  Lab Results   Component Value Date    WBC 6 29 10/20/2022    HGB 14 6 10/20/2022    HCT 46 4 10/20/2022    MCV 85 10/20/2022     10/20/2022     Lab Results   Component Value Date    CALCIUM 9 4 10/20/2022    K 4 2 10/20/2022    CO2 26 10/20/2022     10/20/2022    BUN 16 10/20/2022    CREATININE 0 90 10/20/2022     Lab Results   Component Value Date    HGBA1C 5 7 (H) 08/29/2022     No results found for: CHOL  Lab Results   Component Value Date    HDL 40 08/29/2022     Lab Results   Component Value Date    LDLCALC 167 (H) 08/29/2022     Lab Results   Component Value Date    TRIG 157 (H) 08/29/2022     No results found for: CHOLHDL    Review of Systems:  Review of Systems    Physical Exam:  Physical Exam  Vitals and nursing note reviewed  Constitutional:       General: He is not in acute distress  Appearance: Normal appearance  He is not ill-appearing or toxic-appearing  HENT:      Head: Normocephalic and atraumatic  Eyes:      Extraocular Movements: Extraocular movements intact  Cardiovascular:      Rate and Rhythm: Normal rate and regular rhythm  Heart sounds: Murmur heard  Pulmonary:      Effort: Pulmonary effort is normal  No respiratory distress  Breath sounds: Normal breath sounds  Abdominal:      General: Abdomen is flat  Palpations: Abdomen is soft  Musculoskeletal:         General: No swelling  Normal range of motion  Skin:     General: Skin is warm and dry  Capillary Refill: Capillary refill takes less than 2 seconds  Coloration: Skin is not pale  Comments: Right groin within limits with no hematoma, swelling or discharge  Mild tenderness  Neurological:      General: No focal deficit present  Mental Status: He is alert and oriented to person, place, and time     Psychiatric:         Mood and Affect: Mood normal

## 2022-11-02 NOTE — Clinical Note
Alphonso Williamson was seen and treated in our emergency department on 11/2/2022  Diagnosis:     Laine Castellanos  may return to work on return date  He may return on this date: 11/03/2022         If you have any questions or concerns, please don't hesitate to call        Brady Spence MD    ______________________________           _______________          _______________  Hospital Representative                              Date                                Time

## 2022-11-02 NOTE — ED PROVIDER NOTES
History  Chief Complaint   Patient presents with   • Groin Pain     Pt reports he had a cardiac cath on Friday, pt reports right groin pain and bruising at the site  Pt's cardiologist sent him over for [address] [facility]     HPI    Prior to Admission Medications   Prescriptions Last Dose Informant Patient Reported? Taking?   aspirin 81 mg chewable tablet   No No   Sig: Chew 1 tablet (81 mg total) daily   methocarbamol (ROBAXIN) 500 mg tablet   No No   Sig: Take 1 tablet (500 mg total) by mouth 4 (four) times a day   metoprolol tartrate (LOPRESSOR) 25 mg tablet   No No   Sig: Take 0 5 tablets (12 5 mg total) by mouth every 12 (twelve) hours      Facility-Administered Medications: None       Past Medical History:   Diagnosis Date   • Diabetes mellitus ([place] [address] )    • Hyperlipidemia    • Hypertension    • Psychiatric disorder     bipolar   • 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[facility] )    • Syncope        Past Surgical History:   Procedure Laterality Date   • CARDIAC ASCENDING AORTOGRAM N/A 10/28/2022    Procedure: Cardiac ascending aortogram;  Surgeon: Donovan Nova MD;  [facility]: [facility]; Service: Cardiology   • CARDIAC CATHETERIZATION Left 10/28/2022    Procedure: CARDIAC RHC/LHC; Surgeon: Donovan Nova MD;  [facility]; Service: Cardiology   • CARDIAC CATHETERIZATION N/A 10/28/2022    Procedure: Cardiac Coronary Angiogram;  Surgeon: Donovan Nova MD;  Location: [address] [place] [address] CATH LAB; Service: Cardiology   • CARDIAC CATHETERIZATION Left 10/28/2022    Procedure: Cardiac Left Ventriculogram;  Surgeon: Donovan Nova MD;  [facility]; Service: Cardiology   • LAPAROSCOPIC LYSIS INTESTINAL ADHESIONS         Family History   Problem Relation Age of Onset   • Cancer Mother    • Hypertension Mother    • Diabetes Mother      I have reviewed and agree with the history as documented      E-Cigarette/Vaping   • E-Cigarette Use Never User      E-Cigarette/Vaping Substances   • Nicotine Yes • THC No    • CBD No    • Flavoring No    • Other No    • Unknown No      Social History     Tobacco Use   • Smoking status: Current Every Day Smoker     Packs/day: 0 50     Years: 45 00     Pack years: 22 50     Types: Cigarettes   • Smokeless tobacco: Never Used   • Tobacco comment: Patient states that he is trying to cut down   Vaping Use   • Vaping Use: Never used   Substance Use Topics   • Alcohol use: Not Currently   • Drug use: Yes     Types: Marijuana       Review of Systems    Physical Exam  Physical Exam    Vital Signs  ED Triage Vitals [11/02/22 1139]   Temperature Pulse Respirations Blood Pressure SpO2   (!) 96 3 °F (35 7 °C) 83 16 130/86 98 %      Temp Source Heart Rate Source Patient Position - Orthostatic VS BP Location FiO2 (%)   Tympanic Monitor Sitting Left arm --      Pain Score       --           Vitals:    11/02/22 1139   BP: 130/86   Pulse: 83   Patient Position - Orthostatic VS: Sitting         Visual Acuity      ED Medications  Medications - No data to display    Diagnostic Studies  Results Reviewed     None                 VAS pseudoaneurysm study    (Results Pending)              Procedures  Procedures         ED Course                                             MDM    Disposition  Final diagnoses:   None     ED Disposition     None      Follow-up Information    None         Patient's Medications   Discharge Prescriptions    No medications on file       No discharge procedures on file      PDMP Review     None          ED Provider  Electronically Signed by murmur heard  Pulmonary:      Effort: Pulmonary effort is normal  No respiratory distress  Breath sounds: Normal breath sounds  Abdominal:      Palpations: Abdomen is soft  Tenderness: There is no abdominal tenderness  Genitourinary:     Comments: There is expected post procedural bruising to the R inguinal area  No abnormal edema  No pulsatile mass  No bruits  Musculoskeletal:      Cervical back: Neck supple  Skin:     General: Skin is warm and dry  Capillary Refill: Capillary refill takes less than 2 seconds  Neurological:      General: No focal deficit present  Mental Status: He is alert and oriented to person, place, and time  Vital Signs  ED Triage Vitals [11/02/22 1139]   Temperature Pulse Respirations Blood Pressure SpO2   (!) 96 3 °F (35 7 °C) 83 16 130/86 98 %      Temp Source Heart Rate Source Patient Position - Orthostatic VS BP Location FiO2 (%)   Tympanic Monitor Sitting Left arm --      Pain Score       --           Vitals:    11/02/22 1139   BP: 130/86   Pulse: 83   Patient Position - Orthostatic VS: Sitting         Visual Acuity      ED Medications  Medications - No data to display    Diagnostic Studies  Results Reviewed     None                 VAS pseudoaneurysm study   Final Result by Paul Urban MD (11/02 1713)                 Procedures  Procedures         ED Course                                             MDM  Number of Diagnoses or Management Options  Right groin pain: new and requires workup  Diagnosis management comments: 57 yo male sent to ED for eval of continued R inguinal soreness after catheterization  No fevers or urinary sx  No testicular pain or swelling  Cath was 3-4 days ago, making patient's described discomfort not particularly unusual  May have some continued nerve pain  PE is unremarkable aside from expected post procedural tenderness and ecchymosis   Spoke with Pt's interventional cardiologist as well, per cards request Tra Johnson 373 performed in ED and was negative for pseudoaneurysm  Plan for dc with op fu and return precautions  Amount and/or Complexity of Data Reviewed  Tests in the radiology section of CPT®: ordered and reviewed  Review and summarize past medical records: yes  Discuss the patient with other providers: yes        Disposition  Final diagnoses:   Right groin pain     Time reflects when diagnosis was documented in both MDM as applicable and the Disposition within this note     Time User Action Codes Description Comment    11/2/2022 12:39 PM Garlandodilia  Add [R10 31] Right groin pain       ED Disposition     ED Disposition   Discharge    Condition   Stable    Date/Time   Wed Nov 2, 2022 12:39 PM    Comment   Lenny Turk discharge to home/self care  Follow-up Information     Follow up With Specialties Details [address], [address] Nurse Practitioner   [address]      Araceli Echevarria MD Cardiology   [address] [place] [id number]            Discharge Medication List as of 11/2/2022 12:41 PM      CONTINUE these medications which have NOT CHANGED    Details   aspirin 81 mg chewable tablet Chew 1 tablet (81 mg total) daily, Starting Fri 10/14/2022, Normal      methocarbamol (ROBAXIN) 500 mg tablet Take 1 tablet (500 mg total) by mouth 4 (four) times a day, Starting Thu 10/20/2022, Normal      metoprolol tartrate (LOPRESSOR) 25 mg tablet Take 0 5 tablets (12 5 mg total) by mouth every 12 (twelve) hours, Starting Fri 10/14/2022, Until Mon 10/9/2023, Normal             No discharge procedures on file      PDMP Review     None          ED Provider  Electronically Signed by           Jared Blair MD  11/08/22 0582

## 2022-11-02 NOTE — TELEPHONE ENCOUNTER
Letter not to return to work due to cardiothoracic surgery okay by Dr Trixie Mirza  Letter scanned into chart  Dr Trixie Mirza is having patient go to the ED, patient requested transportation/Rajan, rajan was called for patient to the [facility] office

## 2022-11-03 ENCOUNTER — PATIENT OUTREACH (OUTPATIENT)
Dept: INTERNAL MEDICINE CLINIC | Facility: CLINIC | Age: 57
End: 2022-11-03

## 2022-11-03 NOTE — PROGRESS NOTES
Outpatient Care Management Note:  Inbasket ER alert received  Chart review completed  On 11/02/22,  patient was sent to the ED from Cardiology's office to be evaluated for complaints of right groin pain  Vascular ultrasound completed to r/o pseudoaneurysm in the setting of recent cardiac catheterization  Ultrasound was normal      Telephone outreach attempt made to follow up on yesterday's Cardio and ED visits  No answer  Unable to leave message as there was no voice mailbox set up

## 2022-11-08 ENCOUNTER — OFFICE VISIT (OUTPATIENT)
Dept: DENTISTRY | Facility: CLINIC | Age: 57
End: 2022-11-08

## 2022-11-08 VITALS — SYSTOLIC BLOOD PRESSURE: 132 MMHG | DIASTOLIC BLOOD PRESSURE: 89 MMHG | HEART RATE: 81 BPM | TEMPERATURE: 98 F

## 2022-11-08 DIAGNOSIS — Z00.8 ENCOUNTER FOR MEDICAL CLEARANCE FOR PATIENT HOLD: Primary | ICD-10-CM

## 2022-11-08 NOTE — LETTER
Dear Dr Pawan Sidhu,    This mutual patient is scheduled for dental treatment  Treatment may include: extraction of teeth    The patient Medical history reveals aortic stenosis with pt  Planned for open heart surgery  Please evaluate this patient's medical history and advise us of any special considerations that should be made  Antibiotic prophylaxis: Yes No    Interruption of anticoagulants (pt  Taking aspirin daily):  Yes No  How long before and after treatment: __________________    Anesthetic restrictions: Yes No    Is Epinephrine OK? Yes No    Is patient's hypertension controlled? Yes  No    Is patient cleared for treatment? Yes  No    Type of antibiotic allowed/recommended: __________________________________________________    Type of pain mediation allowed/recommended: ______________________________________________    Any additional comments: Thanks;     Bonnie Baird DDS, Trudy Cool DMD

## 2022-11-08 NOTE — PROGRESS NOTES
Armida Alford presents to clinic for dental clearance prior to open heart surgery  RM, no changes  Pt  Is ASA2 with aortic stenosis, HTN, and diabetes  Pt  Reports taking no medication for diabetes and is taking aspirin for cardiac condition  Pt  Last visit to a dentist was ~2004 where pt  Was given maxillary complete dentures and no lower prosthesis  Pt  Presents with only 21, 22, 27, and 28 remaining with large calculus and plaque deposits  Took 2 PA's of pt  And panoramic radiograph  Noted pt  Has bone resorption and severe periodontal disease with bone loss around #21, 22, 27, 28  Removed pt  Denture and noted large buildup of white substance (ddx: candida) and pt  Reports he has never taken his denture out since it was delivered to him in 2004 including while sleeping  Explained to pt  That he needs to remove his denture every night and needs to rinse his denture out after smoking as pt  Is a current smoker and smokes with denture in his mouth  Pt  Had denture removed from maxilla and cleaned in ultrasonic  Note black, white, and orange staining in palate of denture and note buildup of plaque on surface of maxillary denture  Explained to pt  That due to severe periodontal disease of remaining teeth it is recommended for remaining teeth to be extracted prior to cardiac clearance  Due to pt 's history of taking daily aspirin, sent medical clearance to his PCP to inquire if aspirin should be stopped prior to extractions being completed  Will wait for response prior to extracting 21, 22, 27, 28  Probed around these teeth but pt  [place] such severe discomfort we could not complete the exam  Pt  Has no opposition to extracting remaining teeth and is willing to have tx completed for cardiac clearance      NV: ext 21, 22, 27, 28 following med clearance (sign clearance form once extracted)

## 2022-11-09 ENCOUNTER — TELEPHONE (OUTPATIENT)
Dept: CARDIOLOGY CLINIC | Facility: CLINIC | Age: 57
End: 2022-11-09

## 2022-11-09 NOTE — TELEPHONE ENCOUNTER
Patient called the office today wanting to leave a question for Dr Savannah Garcia  Patient asked if Dr Savannah Garcia would be able to help him get onto disability  He stated that Dr Savannah Garcia was the doctor who took him out of work until his surgery  Patient states he is now going through financial hardship and would like assistance   He awaits your call    [phone]

## 2022-11-10 ENCOUNTER — TELEPHONE (OUTPATIENT)
Dept: CARDIOLOGY CLINIC | Facility: CLINIC | Age: 57
End: 2022-11-10

## 2022-11-10 NOTE — TELEPHONE ENCOUNTER
Patient is trying to get disability paperwork started, he does have a letter from Dr Breonna Higgins from 11/2/2022  From his discussions while trying to file they are asking him to confirm a specific date for the surgery  He understands he needs to get his other appointments to clear him out of the way first     Can the letter be addend stating that surgery date is pending on the completion of other follow up appointments? Or does that have to come specifically from those care providers?       Please advise

## 2022-11-10 NOTE — TELEPHONE ENCOUNTER
Tried to call patient regarding work letter, tried calling 3 separate times and phone number is not working at this time  Will try again later

## 2022-11-10 NOTE — TELEPHONE ENCOUNTER
Lizzy Jasen girlfriend called  She states that He needs a letter from Dr Madeleine Robin stating that he cannot work until after his surgery  He is having open heart surgery      Sunny Llanos- [phone]

## 2022-11-10 NOTE — TELEPHONE ENCOUNTER
Please ask patient that he needs to see cardiac surgery and then get out of work letter from them  Thanks

## 2022-11-11 NOTE — TELEPHONE ENCOUNTER
Pt said that he also needs a letter from Dr Breonna Higgins and also would like to talk to Dr Breonna Higgins to explain further

## 2022-11-15 ENCOUNTER — PATIENT OUTREACH (OUTPATIENT)
Dept: INTERNAL MEDICINE CLINIC | Facility: CLINIC | Age: 57
End: 2022-11-15

## 2022-11-15 NOTE — TELEPHONE ENCOUNTER
Patient's significant other Dejuan Pimentel contacting office [phone] to speak with Dr Victor Manuel Campbell regarding patient returning back to work light duty or desk duty  Patient and significant other are concerned about losing housing  If patient is able to return to work light duty please indicated restrictions

## 2022-11-15 NOTE — TELEPHONE ENCOUNTER
Please let patient know that he would likely not need long term disability after his surgery  He should discuss this with his cardiac surgeon     Please make sure that patient has a follow up with cardiac surgery scheduled

## 2022-11-15 NOTE — TELEPHONE ENCOUNTER
Recommend patient remains  out of work until he completes his preop testing and sees Cardiothoracic surgery

## 2022-11-15 NOTE — LETTER
Date: 11/15/22    Dear Kiya Velasquez,   My name is [facility] and I'm a registered nurse care manager working with [facility]  I have not been able to reach you and would like to set a time that I can talk to you over the phone or in-person  My work is to help patients that live in our community get the medical care they need, at the right place, and at the right time  This includes patients who may have been in the hospital or emergency room  I work to get to know you, and help you in your care, recovery, or assist you reach your personal health goals  I also help people find solutions to practical needs such as medical transportation, insurance, food insecurity, financial assistance, durable medical equipment, personal care, and much more  Please call me at [phone]  I look forward to speaking with you      Sincerely,     [facility], RN  Outpatient Care Manager  [phone]

## 2022-11-15 NOTE — PROGRESS NOTES
Outpatient Care Management Note:    Telephone outreach attempt #2 made to introduce self and role of outpatient care management, follow up on general health, and outreach for any possible medical or psychosocial services needed  No answer  Unable to leave message as there was no voice mailbox set up  Unable to reach letter is being sent to address listed in chart

## 2022-11-21 ENCOUNTER — TELEPHONE (OUTPATIENT)
Dept: INTERNAL MEDICINE CLINIC | Facility: CLINIC | Age: 57
End: 2022-11-21

## 2022-11-21 NOTE — TELEPHONE ENCOUNTER
Needs letter for dentist, needs to state that he can stop the asprin so he can get his teeth extracted

## 2022-11-29 ENCOUNTER — PATIENT OUTREACH (OUTPATIENT)
Dept: INTERNAL MEDICINE CLINIC | Facility: CLINIC | Age: 57
End: 2022-11-29

## 2022-11-29 NOTE — PROGRESS NOTES
Complex Care Plan Note:  LATE ENTRY  Phone call received from patient on Friday Nov 25, 2022  Patient states he received my UTR letter  He states the only assistance he needs is information on how to obtain a medical marijuana card  This CM provided patient with the [place] gov website where patient can obtain information on how to get a medical marijuana card, and how to apply for the card along with the list of physicians and dispensaries in [place] enrolled on the program     Patient does not have internet access at home, but does have transportation to the [facility] where he can use internet and PCs for free  Patient thanked me for this information  Complex CM episode will be closed   I have removed myself from the care team

## 2022-11-30 ENCOUNTER — HOSPITAL ENCOUNTER (OUTPATIENT)
Dept: NON INVASIVE DIAGNOSTICS | Facility: CLINIC | Age: 57
Discharge: HOME | End: 2022-11-30

## 2022-11-30 DIAGNOSIS — R55 SYNCOPE, UNSPECIFIED SYNCOPE TYPE: ICD-10-CM

## 2022-11-30 DIAGNOSIS — R53.83 FATIGUE, UNSPECIFIED TYPE: ICD-10-CM

## 2022-11-30 DIAGNOSIS — Z13.6 ENCOUNTER FOR SCREENING FOR STENOSIS OF CAROTID ARTERY: ICD-10-CM

## 2022-11-30 DIAGNOSIS — Z01.89 ENCOUNTER FOR IMAGING OF BILATERAL GREATER SAPHENOUS VEINS: ICD-10-CM

## 2022-11-30 DIAGNOSIS — I35.0 AORTIC VALVE STENOSIS, ETIOLOGY OF CARDIAC VALVE DISEASE UNSPECIFIED: ICD-10-CM

## 2022-12-15 ENCOUNTER — OFFICE VISIT (OUTPATIENT)
Dept: CARDIOLOGY CLINIC | Facility: CLINIC | Age: 57
End: 2022-12-15

## 2022-12-15 VITALS
HEART RATE: 86 BPM | DIASTOLIC BLOOD PRESSURE: 80 MMHG | SYSTOLIC BLOOD PRESSURE: 122 MMHG | OXYGEN SATURATION: 96 % | BODY MASS INDEX: 28.93 KG/M2 | RESPIRATION RATE: 16 BRPM | WEIGHT: 180 LBS | HEIGHT: 66 IN

## 2022-12-15 DIAGNOSIS — I35.0 NONRHEUMATIC AORTIC VALVE STENOSIS: ICD-10-CM

## 2022-12-15 DIAGNOSIS — E78.5 HYPERLIPIDEMIA, UNSPECIFIED HYPERLIPIDEMIA TYPE: Primary | ICD-10-CM

## 2022-12-15 DIAGNOSIS — I25.118 CORONARY ARTERY DISEASE OF NATIVE HEART WITH STABLE ANGINA PECTORIS, UNSPECIFIED VESSEL OR LESION TYPE (HCC): ICD-10-CM

## 2022-12-15 RX ORDER — ATORVASTATIN CALCIUM 40 MG/1
40 TABLET, FILM COATED ORAL DAILY
Qty: 30 TABLET | Refills: 10 | Status: SHIPPED | OUTPATIENT
Start: 2022-12-15

## 2022-12-15 NOTE — PROGRESS NOTES
Cardiology Follow Up    Neelam Suero  1965  [id number]  [facility] [place] [address] [place] [address] [phone]    1  Hyperlipidemia, unspecified hyperlipidemia type  -     atorvastatin (LIPITOR) 40 mg tablet; Take 1 tablet (40 mg total) by mouth daily    2  Nonrheumatic aortic valve stenosis    3  Coronary artery disease of native heart with stable angina pectoris, unspecified vessel or lesion type [facility])          Interval History:  56year-old patient of Dr Freda Maguire who has exertionally related chest heaviness and pain as well as lightheadedness  He was evaluated and found to have critical aortic stenosis and a right coronary artery stenosis  Surgery is planned for either February or March after he has dental work done          Patient Active Problem List   Diagnosis   • Marijuana use   • Tobacco use   • Murmur   • Severe aortic stenosis   • Aortic valve stenosis   • Tobacco abuse   • Hyperlipidemia   • Coronary artery disease of native heart with stable angina pectoris (HCC)     Past Medical History:   Diagnosis Date   • Diabetes mellitus ([facility] )    • Hyperlipidemia    • Hypertension    • Psychiatric disorder     bipolar   • 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[facility] )    • Syncope      Social History     Socioeconomic History   • Marital status: Single     Spouse name: Not on file   • Number of children: Not on file   • Years of education: Not on file   • Highest education level: Not on file   Occupational History   • Not on file   Tobacco Use   • Smoking status: Every Day     Packs/day: 0 50     Years: 45 00     Pack years: 22 50     Types: Cigarettes   • Smokeless tobacco: Never   • Tobacco comments:     Patient states that he is trying to cut down   Vaping Use   • Vaping Use: Never used   Substance and Sexual Activity   • Alcohol use: Not Currently   • Drug use: Yes     Types: Marijuana   • Sexual activity: Yes     Partners: Female   Other Topics Concern   • Not on file   Social History Narrative    Patient states he lives with his girlfriend      Social Determinants of Health     Financial Resource Strain: Not on file   Food Insecurity: Not on file   Transportation Needs: Not on file   Physical Activity: Not on file   Stress: Not on file   Social Connections: Not on file   Intimate Partner Violence: Not on file   Housing Stability: Not on file      Family History   Problem Relation Age of Onset   • Cancer Mother    • Hypertension Mother    • Diabetes Mother      Past Surgical History:   Procedure Laterality Date   • CARDIAC ASCENDING AORTOGRAM N/A 10/28/2022    Procedure: Cardiac ascending aortogram;  Surgeon: Herber Mock MD;  Location: [address] [place] [address] CATH LAB; Service: Cardiology   • CARDIAC CATHETERIZATION Left 10/28/2022    Procedure: CARDIAC RHC/LHC; Surgeon: Herber Mock MD;  Location: [place] CARDIAC CATH LAB; Service: Cardiology   • CARDIAC CATHETERIZATION N/A 10/28/2022    Procedure: Cardiac Coronary Angiogram;  Surgeon: Herber Mock MD;  Location: [address] [place] [address] CATH LAB; Service: Cardiology   • CARDIAC CATHETERIZATION Left 10/28/2022    Procedure: Cardiac Left Ventriculogram;  Surgeon: Herber Mock MD;  Location: [place] CARDIAC CATH LAB; Service: Cardiology   • LAPAROSCOPIC LYSIS INTESTINAL ADHESIONS         Current Outpatient Medications:   •  aspirin 81 mg chewable tablet, Chew 1 tablet (81 mg total) daily, Disp: 30 tablet, Rfl: 11  •  atorvastatin (LIPITOR) 40 mg tablet, Take 1 tablet (40 mg total) by mouth daily, Disp: 30 tablet, Rfl: 10  •  metoprolol tartrate (LOPRESSOR) 25 mg tablet, Take 0 5 tablets (12 5 mg total) by mouth every 12 (twelve) hours, Disp: 30 tablet, Rfl: 11  Allergies   Allergen Reactions   • Other      Pt states he is allergic to everything   States he doesn't have a list but can only take children doses of all medication         Labs:  Admission on 10/28/2022, Discharged on 10/28/2022   Component Date Value   • Cath EF Quantitative 10/28/2022 55    • POC Glucose 10/28/2022 88    • Ventricular Rate 10/28/2022 79    • Atrial Rate 10/28/2022 79    • TN Interval 10/28/2022 150    • QRSD Interval 10/28/2022 90    • QT Interval 10/28/2022 378    • QTC Interval 10/28/2022 433    • P Axis 10/28/2022 20    • QRS Axis 10/28/2022 13    • T Wave [place] 10/28/2022 66    Appointment on 10/20/2022   Component Date Value   • Sodium 10/20/2022 136    • Potassium 10/20/2022 4 2    • Chloride 10/20/2022 106    • CO2 10/20/2022 26    • ANION GAP 10/20/2022 4    • BUN 10/20/2022 16    • Creatinine 10/20/2022 0 90    • Glucose, Fasting 10/20/2022 95    • Calcium 10/20/2022 9 4    • eGFR 10/20/2022 95    • WBC 10/20/2022 6 29    • RBC 10/20/2022 5 47    • Hemoglobin 10/20/2022 14 6    • Hematocrit 10/20/2022 46 4    • MCV 10/20/2022 85    • MCH 10/20/2022 26 7 (L)    • MCHC 10/20/2022 31 5    • RDW 10/20/2022 14 4    • Platelets 05/95/4317 262    • MPV 10/20/2022 11 1    • Protime 10/20/2022 12 3    • INR 10/20/2022 0 89      Imaging: VAS carotid complete study    Result Date: 11/30/2022  Narrative:  [facility] REPORT CLINICAL: Indications:  Patient presents for a general health evaluation secondary to future open heart surgery  Patient is asymptomatic at this time  Operative History: 2022-10-28 Cardiac cath Risk Factors The patient has history of HTN, Diabetes (NIDDM (oral meds)), Hyperlipidemia and smoking (current) 0 5 ppd  Clinical Right Pressure:  132/80 mm Hg, Left Pressure:  132/80 mm Hg  FINDINGS:  Right        Impression  PSV  EDV (cm/s)  Direction of Flow  Ratio  Dist  ICA                 63          31                      0 78  Mid  ICA                  48          23                      0 60  Prox   ICA    Normal       59          15                      0 74  Dist CCA                  60          20                            Mid CCA                   80          23 0 81  Prox CCA                  99          23                            Ext Carotid               72          18                      0 90  Prox Vert                 35           8  Antegrade                 Subclavian               142           0                             Left         Impression  PSV  EDV (cm/s)  Direction of Flow  Ratio  Dist  ICA                 53          24                      0 62  Mid  ICA                  59          23                      0 69  Prox  ICA    1 - 49%      40          13                      0 46  Dist CCA                  55          15                            Mid CCA                   86          20                      0 58  Prox CCA                 148          25                            Ext Carotid               68          13                      0 79  Prox Vert                 33           9  Antegrade                 Subclavian               106           0                               CONCLUSION: Impression RIGHT: There is no evidence of arterial disease throughout the extracranial carotid system  Vertebral artery flow is antegrade  There is no significant subclavian artery disease  LEFT: There is <50% stenosis noted in the internal carotid artery  Plaque is heterogenous and irregular  Vertebral artery flow is antegrade  There is no significant subclavian artery disease  SIGNATURE: Electronically Signed by: Zenia Millan on 2022-11-30 01:07:10 PM    VAS lower limb vein mapping bypass graft    Result Date: 11/30/2022  Narrative:  [facility] REPORT CLINICAL: Indications: Patient presents for a general health evaluation secondary to future open heart surgery  Patient is asymptomatic at this time  Operative History: 2022-10-28 Cardiac cath Risk Factors The patient has history of HTN, Diabetes (NIDDM (oral meds)), Hyperlipidemia and smoking (current) 0 5 ppd  Clinical Right Pressure:  132/80 mm Hg, Left Pressure:  132/80 mm Hg  FINDINGS:  Segment         Right        Left                            Diameter AP  Diameter AP  GSV Inguinal            9 7          8 7  GSV Prox Thigh          3 6          3 0  GSV Mid Thigh           2 6          3 0  GSV Dist Thigh          3 0          2 1  GSV Knee                3 4          3 4  GSV Prox Calf           2 2          2 8  GSV Mid Calf            3 2          2 4  GSV Dist Calf           3 5          3 3  GSV Ankle               2 7          3 5     CONCLUSION: Impression: RIGHT LOWER LIMB: The great saphenous vein is patent  from the groin to the ankle  The vein measures >2mm in caliber from the groin to the ankle  LEFT LOWER LIMB: The great saphenous vein is patent  from the groin to the ankle  The vein measures >2mm in caliber from the groin to the ankle  SIGNATURE: Electronically Signed by: Denis Anne on 2022-11-30 01:19:51 PM      Review of Systems:  Review of Systems    Physical Exam:  122/80  Heart rate 86 and regular  Lungs clear  Carotids diminished and delayed  Grade 3 systolic ejection murmur at the base with softened 82  Regular rhythm  No organomegaly  No edema  Discussion/Summary:    1  Critical aortic stenosis  2  Coronary artery disease  3  Dental difficulties-under repair  4  Hyperlipidemia    Recommendations:    1  Patient is disabled from working until 3 months after his surgery and this disability began on 10/28/2022    Anticipated surgery is February or March of 2023   2  Lipitor 40 mg daily started        Lisa Marquez MD

## 2022-12-20 ENCOUNTER — PREP FOR PROCEDURE (OUTPATIENT)
Dept: GASTROENTEROLOGY | Facility: CLINIC | Age: 57
End: 2022-12-20

## 2022-12-20 ENCOUNTER — TELEPHONE (OUTPATIENT)
Dept: GASTROENTEROLOGY | Facility: CLINIC | Age: 57
End: 2022-12-20

## 2022-12-20 ENCOUNTER — HOSPITAL ENCOUNTER (OUTPATIENT)
Dept: GASTROENTEROLOGY | Facility: HOSPITAL | Age: 57
Setting detail: OUTPATIENT SURGERY
Discharge: HOME | End: 2022-12-20

## 2022-12-20 VITALS
HEART RATE: 85 BPM | HEIGHT: 66 IN | WEIGHT: 181.22 LBS | SYSTOLIC BLOOD PRESSURE: 134 MMHG | TEMPERATURE: 97.4 F | DIASTOLIC BLOOD PRESSURE: 81 MMHG | OXYGEN SATURATION: 98 % | BODY MASS INDEX: 29.12 KG/M2 | RESPIRATION RATE: 14 BRPM

## 2022-12-20 DIAGNOSIS — Z12.11 SCREENING FOR COLON CANCER: ICD-10-CM

## 2022-12-20 DIAGNOSIS — Z12.11 SCREENING FOR COLON CANCER: Primary | ICD-10-CM

## 2022-12-20 RX ORDER — SODIUM CHLORIDE, SODIUM LACTATE, POTASSIUM CHLORIDE, CALCIUM CHLORIDE 600; 310; 30; 20 MG/100ML; MG/100ML; MG/100ML; MG/100ML
125 INJECTION, SOLUTION INTRAVENOUS CONTINUOUS
OUTPATIENT
Start: 2022-12-20

## 2022-12-20 NOTE — TELEPHONE ENCOUNTER
Scheduled date of colonoscopy (as of today):3/20/23  Physician performing colonoscopy: DR Ajit Hoskins  Location of colonoscopy:[facility]  Clearances: NA    PT RESCHEDULE FROM UNSUCCESSFUL PREP

## 2023-01-30 ENCOUNTER — OFFICE VISIT (OUTPATIENT)
Dept: DENTISTRY | Facility: CLINIC | Age: 58
End: 2023-01-30

## 2023-01-30 VITALS — SYSTOLIC BLOOD PRESSURE: 152 MMHG | DIASTOLIC BLOOD PRESSURE: 83 MMHG | TEMPERATURE: 97.3 F | HEART RATE: 107 BPM

## 2023-01-30 DIAGNOSIS — K08.89 NONRESTORABLE TOOTH: Primary | ICD-10-CM

## 2023-01-30 NOTE — PROGRESS NOTES
Oral Surgery    Jessica Zavala presents for Ext #21, 22, 27, 28  Pt  Needs ext prior to cardiac surgery clearance  Pt  Is ASA2 with aortic stenosis, HTN, and diabetes  Pt  Reports taking no medication for diabetes and is taking aspirin for cardiac condition  Pt  Was recommended to stop aspirin for 5 days prior to exts  Obtained a direct and personal consent  Risks and complications were explained  Pt agreed and consented  Consent scanned in doc center  Pre-Op BP WNL  Administered 2 carp 4% articaine w 1:100k epi via infiltration  Adequate anesthesia obtained, reflected gingiva, elevated, and extracted #21, 22, 27, 28  #21 fractured one extraction - took PA radiograph to determine extent of fracture to ext  Socket irrigated, no sutures needed  Pt  Achieved hemostasis prior to leaving  Read last note regarding pt 's current denture wear  Upon dismissal, patient received POI, gauze      NV: exam

## 2023-01-31 ENCOUNTER — OFFICE VISIT (OUTPATIENT)
Dept: INTERNAL MEDICINE CLINIC | Facility: CLINIC | Age: 58
End: 2023-01-31

## 2023-01-31 VITALS
BODY MASS INDEX: 29.32 KG/M2 | SYSTOLIC BLOOD PRESSURE: 110 MMHG | HEIGHT: 66 IN | TEMPERATURE: 97.5 F | OXYGEN SATURATION: 98 % | HEART RATE: 89 BPM | DIASTOLIC BLOOD PRESSURE: 80 MMHG | WEIGHT: 182.4 LBS

## 2023-01-31 DIAGNOSIS — E78.5 HYPERLIPIDEMIA, UNSPECIFIED HYPERLIPIDEMIA TYPE: ICD-10-CM

## 2023-01-31 DIAGNOSIS — Z13.0 SCREENING FOR IRON DEFICIENCY ANEMIA: ICD-10-CM

## 2023-01-31 DIAGNOSIS — Z13.29 SCREENING FOR THYROID DISORDER: ICD-10-CM

## 2023-01-31 DIAGNOSIS — R73.09 ELEVATED RANDOM BLOOD GLUCOSE LEVEL: ICD-10-CM

## 2023-01-31 DIAGNOSIS — M54.2 NECK PAIN: Primary | ICD-10-CM

## 2023-01-31 NOTE — PROGRESS NOTES
INTERNAL MEDICINE FOLLOW-UP VISIT  [facility] - [facility] [address] [place] [address]    NAME: Juan Diego Ferrer  AGE: 56 y o  SEX: male  : 1965     DATE: 2023     Assessment and Plan:   1  Neck pain  To BL shoulder as well  His neck also feels stiff and feels a cracking  Can use tylenol for pain as well as heat, gentle stretching of neck  Will evaluate the XRAY results and discuss options for management  Do not want to start an NSAID or prednisone at this time due to pending cardiac surgery  - XR spine cervical 2 or 3 vw injury; Future    Colonoscopy scheduled for February      No follow-ups on file  Chief Complaint:     Chief Complaint   Patient presents with   • neck     Hears crunching when turning neck and  has been nausea  on and off bringing up liquids and little bit of food      History of Present Illness:     Patient presents with complaints of chronic pain to his neck  He also admits to hearing "cracking" when he turns from side to side  He denies any radicular pain but does state his shoulders feel "tight"  He denies any chest pain, SOB, jaw pain  He also has a vague complaint of "nausea"  He states this happens mostly in the morning upon awaking and is accompanied by coughing up phlegm  He had dental work yesterday and is expecting to be scheduled for cardiac surgery in the next 2 months  The following portions of the patient's history were reviewed and updated as appropriate: allergies, current medications, past family history, past medical history, past social history, past surgical history and problem list      Review of Systems:     Review of Systems   Constitutional: Negative for appetite change, chills, diaphoresis, fatigue, fever and unexpected weight change  HENT: Negative for postnasal drip and sneezing  Eyes: Negative for visual disturbance  Respiratory: Negative for chest tightness and shortness of breath      Cardiovascular: Negative for chest pain, palpitations and leg swelling  Gastrointestinal: Negative for abdominal pain and blood in stool  Endocrine: Negative for cold intolerance, heat intolerance, polydipsia, polyphagia and polyuria  Genitourinary: Negative for difficulty urinating, dysuria, frequency and urgency  Musculoskeletal: Positive for neck pain and neck stiffness  Negative for arthralgias and myalgias  Skin: Negative for rash and wound  Neurological: Negative for dizziness, weakness, light-headedness and headaches  Hematological: Negative for adenopathy  Psychiatric/Behavioral: Negative for confusion, dysphoric mood and sleep disturbance  The patient is not nervous/anxious  Past Medical History:     Past Medical History:   Diagnosis Date   • Diabetes mellitus ([facility] )    • Hyperlipidemia    • Hypertension    • Psychiatric disorder     bipolar   • 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[place] [address] )    • Syncope         Current Medications:     Current Outpatient Medications:   •  aspirin 81 mg chewable tablet, Chew 1 tablet (81 mg total) daily, Disp: 30 tablet, Rfl: 11  •  atorvastatin (LIPITOR) 40 mg tablet, Take 1 tablet (40 mg total) by mouth daily (Patient not taking: Reported on 1/31/2023), Disp: 30 tablet, Rfl: 10  •  metoprolol tartrate (LOPRESSOR) 25 mg tablet, Take 0 5 tablets (12 5 mg total) by mouth every 12 (twelve) hours (Patient not taking: Reported on 1/31/2023), Disp: 30 tablet, Rfl: 11     Allergies: Allergies   Allergen Reactions   • Other      Pt states he is allergic to everything  States he doesn't have a list but can only take children doses of all medication          Physical Exam:     /80   Pulse 89   Temp 97 5 °F (36 4 °C)   Ht 5' 6" (1 676 m)   Wt 82 7 kg (182 lb 6 4 oz)   SpO2 98%   BMI 29 44 kg/m²     Physical Exam  Constitutional:       Appearance: He is well-developed  HENT:      Head: Normocephalic and atraumatic     Eyes:      Conjunctiva/sclera: Conjunctivae normal       Pupils: Pupils are equal, round, and reactive to light  Cardiovascular:      Rate and Rhythm: Normal rate and regular rhythm  Heart sounds: Murmur heard  Pulmonary:      Effort: Pulmonary effort is normal       Breath sounds: Normal breath sounds  Abdominal:      General: Bowel sounds are normal       Palpations: Abdomen is soft  Musculoskeletal:      Cervical back: Pain with movement present  Decreased range of motion  Skin:     General: Skin is warm and dry  Neurological:      Mental Status: He is alert and oriented to person, place, and time  Data:     Laboratory Results: I have personally reviewed the pertinent laboratory results/reports   Radiology/Other Diagnostic Testing Results: I have personally reviewed pertinent reports        EDWARDO Shaikh  [facility]

## 2023-02-10 ENCOUNTER — OFFICE VISIT (OUTPATIENT)
Dept: CARDIAC SURGERY | Facility: CLINIC | Age: 58
End: 2023-02-10

## 2023-02-10 VITALS
DIASTOLIC BLOOD PRESSURE: 81 MMHG | SYSTOLIC BLOOD PRESSURE: 142 MMHG | HEIGHT: 66 IN | OXYGEN SATURATION: 97 % | WEIGHT: 186.3 LBS | BODY MASS INDEX: 29.94 KG/M2 | HEART RATE: 98 BPM

## 2023-02-10 DIAGNOSIS — Z01.812 ENCOUNTER FOR PRE-OPERATIVE LABORATORY TESTING: ICD-10-CM

## 2023-02-10 DIAGNOSIS — Z13.6 ENCOUNTER FOR SPECIAL SCREENING EXAMINATION FOR CARDIOVASCULAR DISORDER: ICD-10-CM

## 2023-02-10 DIAGNOSIS — I35.0 NONRHEUMATIC AORTIC VALVE STENOSIS: Primary | ICD-10-CM

## 2023-02-10 DIAGNOSIS — I25.118 CORONARY ARTERY DISEASE OF NATIVE ARTERY OF NATIVE HEART WITH STABLE ANGINA PECTORIS (HCC): ICD-10-CM

## 2023-02-10 RX ORDER — CEFAZOLIN SODIUM 2 G/50ML
2000 SOLUTION INTRAVENOUS ONCE
OUTPATIENT
Start: 2023-02-10 | End: 2023-02-10

## 2023-02-10 RX ORDER — CHLORHEXIDINE GLUCONATE 0.12 MG/ML
15 RINSE ORAL ONCE
OUTPATIENT
Start: 2023-02-10 | End: 2023-02-10

## 2023-02-10 NOTE — H&P
Consultation - Cardiothoracic Surgery   Rogerio Malik 56 y o  male MRN: [id number]    Physician Requesting Consult: Dr Heaven Shannon    Reason for Consult / Principal Problem: Aortic stenosis, Non-Rheumatic, Coronary artery disease    History of Present Illness: Rogerio Malik is a 56y o  year old male with severe AS who was initially evaluated by Dr Omaira Cobian on 10/14/22  He was started on ASA and Metoprolol and sent for preoperative testing  Mr Teresa Hopson has completed his testing and dental clearance and now presents with his girlfriend to review the results of his tests and further discuss surgery  Since his last visit, Mr Teresa Hopson reports he has been doing poorly  He reports he continues to get short of breath at rest has a squeezing type of chest pain with exertion and at rest  He admits to PND, but denies orthopnea, LE edema, weight gain, palpitations, fevers or chills  He admits that he has been taking the ASA only every other day when he remembers  He took the Metoprolol for 2 weeks, but it caused him to sleep for 2 weeks straight and could not keep anything down  He called Dr Bhavik Crawford to report the symptoms and was instructed to discontinue the medication  The patient reports he continues to have nausea, vomiting and cannot keep anything down  He also reports he sleeps all the time  In review, his PCP heard a murmur on exam on 8/29/22, and the patient was referred for an ECHO, which revealed mild AI, severe AS with peak velocity of 4 69 m/s, peak gradient 88 mmHg, mean gradient 56 mmHg, DVI 0 18  He saw Dr Heaven Shannon on 9/23/22, and at that time, he was nervous for surgery, and refusing  He was referred to Dr Omaira Cobian to further discuss surgery  His PMH is significant for HLD, HTN, DM2, schizophrenia, bipolar disorder, but he does not take any medication for any of these conditions  He smokes 2 5 pack cigarettes daily for 43 years and has smoked marijuana daily for over 40 years   He states that he becomes "mentally disabled" without it  He does not have a medical card       He works several jobs: as a , a , and maintenance at a hotel, and is often lifting boxes and gets short of breath with exerting himself  He has top dentures, and 4 bottom teeth, and does not regularly see a dentist  He did have a dental visit and extractions performed 1/30/23  As far as cardiac symptoms, patient reports feeling weak and fatigued for several months  He also frequently has lightheadedness, and has had several episodes of syncope, the most recent episode being about 2 weeks ago  Past Medical History:  Past Medical History:   Diagnosis Date   • Diabetes mellitus ([facility] [address] )    • Hyperlipidemia    • Hypertension    • Psychiatric disorder     bipolar   • Schizoid personality disorder [facility])    • Syncope          Past Surgical History:   Past Surgical History:   Procedure Laterality Date   • CARDIAC ASCENDING AORTOGRAM N/A 10/28/2022    Procedure: Cardiac ascending aortogram;  Surgeon: Miri Roldan MD;  Location: [address] [place] [address] CATH LAB; Service: Cardiology   • CARDIAC CATHETERIZATION Left 10/28/2022    Procedure: CARDIAC RHC/LHC; Surgeon: Miri Roldan MD;  Location: [place] CARDIAC C[facility] LAB; Service: Cardiology   • CARDIAC CATHETERIZATION N/A 10/28/2022    Procedure: Cardiac Coronary Angiogram;  Surgeon: Miri Roldan MD;  Location: [address] [place] [address] CATH LAB; Service: Cardiology   • CARDIAC CATHETERIZATION Left 10/28/2022    Procedure: Cardiac Left Ventriculogram;  Surgeon: Miri Roldan MD;  Location: [facility] CARDIAC CATH LAB;   Service: Cardiology   • LAPAROSCOPIC LYSIS INTESTINAL ADHESIONS           Family History:  Family History   Problem Relation Age of Onset   • Cancer Mother    • Hypertension Mother    • Diabetes Mother          Social History:      Social History     Substance and Sexual Activity   Alcohol Use Not Currently     Social History     Substance and Sexual Activity Drug Use Yes   • Types: Marijuana     Social History     Tobacco Use   Smoking Status Every Day   • Packs/day: 0 50   • Years: 45 00   • Pack years: 22 50   • Types: Cigarettes   Smokeless Tobacco Never   Tobacco Comments    Patient states that he is trying to cut down       Home Medications:   Prior to Admission medications    Medication Sig Start Date End Date Taking? Authorizing Provider   aspirin 81 mg chewable tablet Chew 1 tablet (81 mg total) daily 10/14/22  Yes Rosy Silveira PA-C       Allergies: Allergies   Allergen Reactions   • Other      Pt states he is allergic to everything  States he doesn't have a list but can only take children doses of all medication         Review of Systems:     Review of Systems   Constitutional: Positive for fever  Negative for chills, diaphoresis and fatigue  Somnolence   HENT: Negative  Eyes: Negative  Respiratory: Positive for chest tightness and shortness of breath  Cardiovascular: Positive for chest pain  Negative for leg swelling  Gastrointestinal: Positive for nausea and vomiting  Endocrine: Negative  Genitourinary: Negative  Musculoskeletal: Positive for neck pain  Skin: Negative  Allergic/Immunologic: Negative  Neurological: Positive for dizziness  Negative for syncope  Hematological: Negative for adenopathy  Does not bruise/bleed easily  Psychiatric/Behavioral: Negative  All other systems reviewed and are negative  Vital Signs:     Vitals:    02/10/23 1429   BP: 142/81   BP Location: Left arm   Patient Position: Sitting   Cuff Size: Standard   Pulse: 98   SpO2: 97%   Weight: 84 5 kg (186 lb 4 8 oz)   Height: 5' 6" (1 676 m)       Physical Exam:     Physical Exam  Vitals and nursing note reviewed  Constitutional:       General: He is not in acute distress  Appearance: He is well-developed  He is not diaphoretic  Comments: Smells of tobacco smoke   HENT:      Head: Normocephalic and atraumatic        Right Ear: External ear normal       Left Ear: External ear normal       Nose: Nose normal       Mouth/Throat:      Pharynx: No oropharyngeal exudate  Eyes:      General: No scleral icterus  Right eye: No discharge  Left eye: No discharge  Conjunctiva/sclera: Conjunctivae normal       Pupils: Pupils are equal, round, and reactive to light  Neck:      Vascular: Carotid bruit present  No JVD  Trachea: No tracheal deviation  Cardiovascular:      Rate and Rhythm: Normal rate and regular rhythm  Heart sounds: Murmur heard  Systolic murmur is present with a grade of 3/6  No friction rub  Pulmonary:      Effort: Pulmonary effort is normal  No respiratory distress  Breath sounds: Normal breath sounds  No stridor  No wheezing or rales  Abdominal:      General: Bowel sounds are normal  There is no distension  Palpations: Abdomen is soft  There is no mass  Tenderness: There is no abdominal tenderness  There is no guarding  Musculoskeletal:         General: No tenderness or deformity  Normal range of motion  Cervical back: Normal range of motion and neck supple  Right lower leg: No edema  Left lower leg: No edema  Skin:     General: Skin is warm and dry  Coloration: Skin is not pale  Findings: No erythema or rash  Neurological:      Mental Status: He is alert and oriented to person, place, and time  Cranial Nerves: No cranial nerve deficit  Sensory: No sensory deficit  Motor: No abnormal muscle tone  Coordination: Coordination normal       Deep Tendon Reflexes: Reflexes normal    Psychiatric:         Behavior: Behavior normal  Behavior is cooperative  Thought Content:  Thought content normal          Judgment: Judgment normal          Lab Results:               Invalid input(s): LABGLOM      Lab Results   Component Value Date    HGBA1C 5 7 (H) 08/29/2022     No results found for: CKTOTAL, CKMB, CKMBINDEX, TROPONINI    Imaging Studies:     Cardiac Catheterization:   Left Anterior Descending   Prox LAD to Mid LAD lesion is 20% stenosed  ULI flow is 3  Dist LAD lesion is 25% stenosed  ULI flow is 3  The lesion is diffuse  The distal LAD is small with diffuse disease  Right Coronary Artery   Mid RCA lesion is 85% stenosed  ULI flow is 3             Echocardiogram:   Left Ventricle Left ventricular cavity size is normal  Wall thickness is moderately increased  There is moderate concentric hypertrophy  Systolic function is normal   Wall motion is normal  Diastolic function is mildly abnormal, consistent with grade I (abnormal) relaxation  Right Ventricle Right ventricular cavity size is normal  Systolic function is normal  Wall thickness is normal    Left Atrium The atrium is normal in size  Right Atrium The atrium is normal in size  Aortic Valve The aortic valve is trileaflet  The leaflets are moderately thickened  The leaflets are moderately calcified  There is moderately reduced mobility  There is mild regurgitation  There is severe stenosis  Peak velocity is 4 69 m/sec  Peak and mean gradients across the aortic valve were 88 and 56 mmHg respectively  DVI was 0 18  Mitral Valve There is trace regurgitation  There is no evidence of stenosis  The mitral valve has normal structure and normal function  Tricuspid Valve Tricuspid valve structure is normal  There is trace regurgitation  There is no evidence of stenosis  Pulmonic Valve Pulmonic valve structure is normal  There is mild regurgitation  There is no evidence of stenosis  Ascending Aorta The aortic root is mildly dilated (3 9 cm)  IVC/SVC The inferior vena cava is normal in size  Pericardium There is no pericardial effusion  The pericardium is normal in appearance       Left Ventricle Measurements    Function/Volumes   A4C EF 57 %         LVOT stroke volume 76 28 cm3         LVOT stroke volume index 38 9 ml/m2         Dimensions   LVIDd 3 8 cm         LVIDS 2 5 cm         IVSd 1 5 cm         LVPWd 1 3 cm         LVOT area 4 15 cm2         FS 34 %         Diastolic Filling   MV E' Tissue Velocity Septal 8 cm/s         E wave deceleration time 134 ms         MV Peak E Shade 72 cm/s         MV Peak A Shade 0 95 m/s          Report Measurements   AV LVOT peak gradient 3 mmHg              Interventricular Septum Measurements    Shunt Ratio   LVOT peak VTI 18 37 cm         LVOT peak shade 0 86 m/s              Right Ventricle Measurements    Dimensions   RVID d 3 4 cm               Left Atrium Measurements    Dimensions   LA size 3 3 cm         LA length (A2C) 4 7 cm         TOM A4C 11 5 cm2               Right Atrium Measurements    Dimensions   RA 2D Volume 27 mL         RAA A4C 10 3 cm2               Atrial Septum Measurements    Shunt Ratio   LVOT peak VTI 18 37 cm         LVOT peak shade 0 86 m/s               Aortic Valve Measurements    Stenosis   Aortic valve peak velocity 4 7 m/s         LVOT peak shade 0 86 m/s         Ao VTI 95 49 cm         LVOT peak VTI 18 37 cm         AV mean gradient 56 mmHg         LVOT mn grad 2 mmHg         AV peak gradient 88 mmHg         AV LVOT peak gradient 3 mmHg         Regurgitation   AV peak gradient 69 mmHg         AV Deceleration Time 1,116 ms         AV regurgitation pressure 1/2 time 324 ms         Area/Dimensions   DVI 0 18          AV valve area 0 8 cm2         AV area by cont VTI 0 8 cm2         AV area peak shade 0 8 cm2         LVOT diameter 2 3 cm         LVOT area 4 15 cm2               Mitral Valve Measurements    Stenosis   MV stenosis pressure 1/2 time 39 ms         MV valve area p 1/2 method 5 64 cm2               Aorta Measurements    Aortic Dimensions   Ao root 3 9 cm         Asc Ao 3 1 cm               I have personally reviewed pertinent reports     and I have personally reviewed pertinent films in PACS    Assessment:  Patient Active Problem List    Diagnosis Date Noted   • Hyperlipidemia 12/15/2022   • Coronary artery disease of native heart with stable angina pectoris ([facility] [address] ) 12/15/2022   • Aortic valve stenosis    • Tobacco abuse    • Severe aortic stenosis 09/23/2022   • Marijuana use 08/29/2022   • Tobacco use 08/29/2022   • Murmur 08/29/2022     Severe coronary artery disease; Ongoing CABG workup  Severe aortic stenosis; Ongoing AVR workup    Plan:  Risks and benefits of aortic valve replacement and coronary artery bypass grafting were discussed in detail today with the patient  They understand and wish to proceed with further workup and ultimately surgical intervention  We have ordered routine preoperative laboratory and vascular studies  Pending the results of these tests, they will be scheduled for surgery with KARIE Romo was comfortable with our recommendations, and their questions were answered to their satisfaction  Thank you for allowing us to participate in the care of this patient  The patient has an upcoming appointment for a colonoscopy on March 20, 2023  SIGNATURE: Mable Sparks  DATE: February 10, 2023  TIME: 3:05 PM    * This note was completed in part utilizing [employer] direct voice recognition software  Grammatical errors, random word insertion, spelling mistakes, and incomplete sentences may be an occasional consequence of the system secondary to software limitations, ambient noise and hardware issues  At the time of dictation, efforts were made to edit, clarify and /or correct errors  Please read the chart carefully and recognize, using context, where substitutions have occurred  If you have any questions or concerns about the context, text or information contained within the body of this dictation, please contact myself, the provider, for further clarification

## 2023-02-10 NOTE — PATIENT INSTRUCTIONS
Preoperative instructions:  1  You will receive a phone call from the hospital between 2:00 PM and 8:00 PM the day prior to surgery to confirm arrival time and location  For surgery on Mondays, you will receive a call on Friday  2  Do not drink or eat anything after midnight the night before surgery  That includes no water, candy, gum, lozenges, Lifesavers, etc  We recommend you not eat any "junk" food, consume alcohol or smoke the night before surgery  3  Continue taking aspirin but only 81 mg daily  4  If you are diabetic, do not take any of your diabetic pills the morning of surgery  If you take Lantus insulin, you may take it at your regularly scheduled time the day before surgery  Do not take any other insulins the morning of surgery  5  The 2 nights before surgery, take a shower each night using the special antiseptic soap or soap sponges you received from the office or hospital  Maude [place] your hair with regular shampoo and rinse completely before using the antiseptic sponges  Use the sponge to wash from your neck down, with special attention to the armpits and groin area  Do not use any other soap or cleanser on your skin  Do not use lotions, powder, deodorant or perfume of any kind on your skin after you shower  Use clean bed linens and wear clean pajamas after your shower  6  You will be prescribed Mupirocin nasal ointment  Apply to both nostrils twice a day for 5 days prior to surgery  7  Do not take a shower the morning of her surgery; you'll be given a special" bath" at the hospital   8  Notify the CT surgery office if you develop a cold, sore throat, cough, fever or other health issues before your surgery    9  Other medication changes included the following: none

## 2023-02-10 NOTE — PROGRESS NOTES
Consultation - Cardiothoracic Surgery   Cole Graves 56 y o  male MRN: [id number]    Physician Requesting Consult: Dr Genoveva Brooks    Reason for Consult / Principal Problem: Aortic stenosis, Non-Rheumatic, Coronary artery disease    History of Present Illness: Cole Graves is a 56y o  year old male with severe AS who was initially evaluated by Dr Mati Thacker on 10/14/22  He was started on ASA and Metoprolol and sent for preoperative testing  Mr Esau Hashimoto has completed his testing and dental clearance and now presents with his girlfriend to review the results of his tests and further discuss surgery  Since his last visit, Mr Esau Hashimoto reports he has been doing poorly  He reports he continues to get short of breath at rest has a squeezing type of chest pain with exertion and at rest  He admits to PND, but denies orthopnea, LE edema, weight gain, palpitations, fevers or chills  He admits that he has been taking the ASA only every other day when he remembers  He took the Metoprolol for 2 weeks, but it caused him to sleep for 2 weeks straight and could not keep anything down  He called Dr Marla Almanza to report the symptoms and was instructed to discontinue the medication  The patient reports he continues to have nausea, vomiting and cannot keep anything down  He also reports he sleeps all the time  In review, his PCP heard a murmur on exam on 8/29/22, and the patient was referred for an ECHO, which revealed mild AI, severe AS with peak velocity of 4 69 m/s, peak gradient 88 mmHg, mean gradient 56 mmHg, DVI 0 18  He saw Dr Genoveva Brooks on 9/23/22, and at that time, he was nervous for surgery, and refusing  He was referred to Dr Mati Thacker to further discuss surgery  His PMH is significant for HLD, HTN, DM2, schizophrenia, bipolar disorder, but he does not take any medication for any of these conditions  He smokes 2 5 pack cigarettes daily for 43 years and has smoked marijuana daily for over 40 years   He states that he becomes "mentally disabled" without it  He does not have a medical card       He works several jobs: as a , a , and maintenance at a hotel, and is often lifting boxes and gets short of breath with exerting himself  He has top dentures, and 4 bottom teeth, and does not regularly see a dentist  He did have a dental visit and extractions performed 1/30/23  As far as cardiac symptoms, patient reports feeling weak and fatigued for several months  He also frequently has lightheadedness, and has had several episodes of syncope, the most recent episode being about 2 weeks ago  Past Medical History:  Past Medical History:   Diagnosis Date   • Diabetes mellitus ([facility] [address] )    • Hyperlipidemia    • Hypertension    • Psychiatric disorder     bipolar   • Schizoid personality disorder [facility])    • Syncope          Past Surgical History:   Past Surgical History:   Procedure Laterality Date   • CARDIAC ASCENDING AORTOGRAM N/A 10/28/2022    Procedure: Cardiac ascending aortogram;  Surgeon: Eligah Mcardle, MD;  Location: [address] [place] [address] CATH LAB; Service: Cardiology   • CARDIAC CATHETERIZATION Left 10/28/2022    Procedure: CARDIAC RHC/LHC; Surgeon: Eligah Mcardle, MD;  Location: [facility]ARDI[facility] C[facility] LAB; Service: Cardiology   • CARDIAC CATHETERIZATION N/A 10/28/2022    Procedure: Cardiac Coronary Angiogram;  Surgeon: Eligah Mcardle, MD;  Location: [address] [place] [address] CATH LAB; Service: Cardiology   • CARDIAC CATHETERIZATION Left 10/28/2022    Procedure: Cardiac Left Ventriculogram;  Surgeon: Eligah Mcardle, MD;  Location: [facility] CARDI[facility] C[facility] LAB;   Service: Cardiology   • LAPAROSCOPIC LYSIS INTESTINAL ADHESIONS           Family History:  Family History   Problem Relation Age of Onset   • Cancer Mother    • Hypertension Mother    • Diabetes Mother          Social History:      Social History     Substance and Sexual Activity   Alcohol Use Not Currently     Social History     Substance and Sexual Activity Drug Use Yes   • Types: Marijuana     Social History     Tobacco Use   Smoking Status Every Day   • Packs/day: 0 50   • Years: 45 00   • Pack years: 22 50   • Types: Cigarettes   Smokeless Tobacco Never   Tobacco Comments    Patient states that he is trying to cut down       Home Medications:   Prior to Admission medications    Medication Sig Start Date End Date Taking? Authorizing Provider   aspirin 81 mg chewable tablet Chew 1 tablet (81 mg total) daily 10/14/22  Yes Wilma Nageotte, PA-C       Allergies: Allergies   Allergen Reactions   • Other      Pt states he is allergic to everything  States he doesn't have a list but can only take children doses of all medication         Review of Systems:     Review of Systems   Constitutional: Positive for fever  Negative for chills, diaphoresis and fatigue  Somnolence   HENT: Negative  Eyes: Negative  Respiratory: Positive for chest tightness and shortness of breath  Cardiovascular: Positive for chest pain  Negative for leg swelling  Gastrointestinal: Positive for nausea and vomiting  Endocrine: Negative  Genitourinary: Negative  Musculoskeletal: Positive for neck pain  Skin: Negative  Allergic/Immunologic: Negative  Neurological: Positive for dizziness  Negative for syncope  Hematological: Negative for adenopathy  Does not bruise/bleed easily  Psychiatric/Behavioral: Negative  All other systems reviewed and are negative  Vital Signs:     Vitals:    02/10/23 1429   BP: 142/81   BP Location: Left arm   Patient Position: Sitting   Cuff Size: Standard   Pulse: 98   SpO2: 97%   Weight: 84 5 kg (186 lb 4 8 oz)   Height: 5' 6" (1 676 m)       Physical Exam:     Physical Exam  Vitals and nursing note reviewed  Constitutional:       General: He is not in acute distress  Appearance: He is well-developed  He is not diaphoretic  Comments: Smells of tobacco smoke   HENT:      Head: Normocephalic and atraumatic        Right Ear: External ear normal       Left Ear: External ear normal       Nose: Nose normal       Mouth/Throat:      Pharynx: No oropharyngeal exudate  Eyes:      General: No scleral icterus  Right eye: No discharge  Left eye: No discharge  Conjunctiva/sclera: Conjunctivae normal       Pupils: Pupils are equal, round, and reactive to light  Neck:      Vascular: Carotid bruit present  No JVD  Trachea: No tracheal deviation  Cardiovascular:      Rate and Rhythm: Normal rate and regular rhythm  Heart sounds: Murmur heard  Systolic murmur is present with a grade of 3/6  No friction rub  Pulmonary:      Effort: Pulmonary effort is normal  No respiratory distress  Breath sounds: Normal breath sounds  No stridor  No wheezing or rales  Abdominal:      General: Bowel sounds are normal  There is no distension  Palpations: Abdomen is soft  There is no mass  Tenderness: There is no abdominal tenderness  There is no guarding  Musculoskeletal:         General: No tenderness or deformity  Normal range of motion  Cervical back: Normal range of motion and neck supple  Right lower leg: No edema  Left lower leg: No edema  Skin:     General: Skin is warm and dry  Coloration: Skin is not pale  Findings: No erythema or rash  Neurological:      Mental Status: He is alert and oriented to person, place, and time  Cranial Nerves: No cranial nerve deficit  Sensory: No sensory deficit  Motor: No abnormal muscle tone  Coordination: Coordination normal       Deep Tendon Reflexes: Reflexes normal    Psychiatric:         Behavior: Behavior normal  Behavior is cooperative  Thought Content:  Thought content normal          Judgment: Judgment normal          Lab Results:               Invalid input(s): LABGLOM      Lab Results   Component Value Date    HGBA1C 5 7 (H) 08/29/2022     No results found for: CKTOTAL, CKMB, CKMBINDEX, TROPONINI    Imaging Studies:     Cardiac Catheterization:   Left Anterior Descending   Prox LAD to Mid LAD lesion is 20% stenosed  ULI flow is 3  Dist LAD lesion is 25% stenosed  ULI flow is 3  The lesion is diffuse  The distal LAD is small with diffuse disease  Right Coronary Artery   Mid RCA lesion is 85% stenosed  ULI flow is 3             Echocardiogram:   Left Ventricle Left ventricular cavity size is normal  Wall thickness is moderately increased  There is moderate concentric hypertrophy  Systolic function is normal   Wall motion is normal  Diastolic function is mildly abnormal, consistent with grade I (abnormal) relaxation  Right Ventricle Right ventricular cavity size is normal  Systolic function is normal  Wall thickness is normal    Left Atrium The atrium is normal in size  Right Atrium The atrium is normal in size  Aortic Valve The aortic valve is trileaflet  The leaflets are moderately thickened  The leaflets are moderately calcified  There is moderately reduced mobility  There is mild regurgitation  There is severe stenosis  Peak velocity is 4 69 m/sec  Peak and mean gradients across the aortic valve were 88 and 56 mmHg respectively  DVI was 0 18  Mitral Valve There is trace regurgitation  There is no evidence of stenosis  The mitral valve has normal structure and normal function  Tricuspid Valve Tricuspid valve structure is normal  There is trace regurgitation  There is no evidence of stenosis  Pulmonic Valve Pulmonic valve structure is normal  There is mild regurgitation  There is no evidence of stenosis  Ascending Aorta The aortic root is mildly dilated (3 9 cm)  IVC/SVC The inferior vena cava is normal in size  Pericardium There is no pericardial effusion  The pericardium is normal in appearance       Left Ventricle Measurements    Function/Volumes   A4C EF 57 %         LVOT stroke volume 76 28 cm3         LVOT stroke volume index 38 9 ml/m2         Dimensions   LVIDd 3 8 cm         LVIDS 2 5 cm         IVSd 1 5 cm         LVPWd 1 3 cm         LVOT area 4 15 cm2         FS 34 %         Diastolic Filling   MV E' Tissue Velocity Septal 8 cm/s         E wave deceleration time 134 ms         MV Peak E Shade 72 cm/s         MV Peak A Shade 0 95 m/s          Report Measurements   AV LVOT peak gradient 3 mmHg              Interventricular Septum Measurements    Shunt Ratio   LVOT peak VTI 18 37 cm         LVOT peak shade 0 86 m/s              Right Ventricle Measurements    Dimensions   RVID d 3 4 cm               Left Atrium Measurements    Dimensions   LA size 3 3 cm         LA length (A2C) 4 7 cm         TOM A4C 11 5 cm2               Right Atrium Measurements    Dimensions   RA 2D Volume 27 mL         RAA A4C 10 3 cm2               Atrial Septum Measurements    Shunt Ratio   LVOT peak VTI 18 37 cm         LVOT peak shade 0 86 m/s               Aortic Valve Measurements    Stenosis   Aortic valve peak velocity 4 7 m/s         LVOT peak shade 0 86 m/s         Ao VTI 95 49 cm         LVOT peak VTI 18 37 cm         AV mean gradient 56 mmHg         LVOT mn grad 2 mmHg         AV peak gradient 88 mmHg         AV LVOT peak gradient 3 mmHg         Regurgitation   AV peak gradient 69 mmHg         AV Deceleration Time 1,116 ms         AV regurgitation pressure 1/2 time 324 ms         Area/Dimensions   DVI 0 18          AV valve area 0 8 cm2         AV area by cont VTI 0 8 cm2         AV area peak shade 0 8 cm2         LVOT diameter 2 3 cm         LVOT area 4 15 cm2               Mitral Valve Measurements    Stenosis   MV stenosis pressure 1/2 time 39 ms         MV valve area p 1/2 method 5 64 cm2               Aorta Measurements    Aortic Dimensions   Ao root 3 9 cm         Asc Ao 3 1 cm               I have personally reviewed pertinent reports     and I have personally reviewed pertinent films in PACS    Assessment:  Patient Active Problem List    Diagnosis Date Noted   • Hyperlipidemia 12/15/2022   • Coronary artery disease of native heart with stable angina pectoris ([facility] [address] ) 12/15/2022   • Aortic valve stenosis    • Tobacco abuse    • Severe aortic stenosis 09/23/2022   • Marijuana use 08/29/2022   • Tobacco use 08/29/2022   • Murmur 08/29/2022     Severe coronary artery disease; Ongoing CABG workup  Severe aortic stenosis; Ongoing AVR workup    Plan:  Risks and benefits of aortic valve replacement and coronary artery bypass grafting were discussed in detail today with the patient  They understand and wish to proceed with further workup and ultimately surgical intervention  We have ordered routine preoperative laboratory and vascular studies  Pending the results of these tests, they will be scheduled for surgery with KARIE Navarrete Ora was comfortable with our recommendations, and their questions were answered to their satisfaction  Thank you for allowing us to participate in the care of this patient  The patient has an upcoming appointment for a colonoscopy on March 20, 2023  SIGNATURE: Mable Segura  DATE: February 10, 2023  TIME: 3:05 PM    * This note was completed in part utilizing [employer] direct voice recognition software  Grammatical errors, random word insertion, spelling mistakes, and incomplete sentences may be an occasional consequence of the system secondary to software limitations, ambient noise and hardware issues  At the time of dictation, efforts were made to edit, clarify and /or correct errors  Please read the chart carefully and recognize, using context, where substitutions have occurred  If you have any questions or concerns about the context, text or information contained within the body of this dictation, please contact myself, the provider, for further clarification

## 2023-02-10 NOTE — H&P (VIEW-ONLY)
Consultation - Cardiothoracic Surgery   Dov Chun 56 y o  male MRN: [id number]    Physician Requesting Consult: Dr Dillan Yoder    Reason for Consult / Principal Problem: Aortic stenosis, Non-Rheumatic, Coronary artery disease    History of Present Illness: Dov Chun is a 56y o  year old male with severe AS who was initially evaluated by Dr Gregg Montoya on 10/14/22  He was started on ASA and Metoprolol and sent for preoperative testing  Mr Ana Guy has completed his testing and dental clearance and now presents with his girlfriend to review the results of his tests and further discuss surgery  Since his last visit, Mr Ana Guy reports he has been doing poorly  He reports he continues to get short of breath at rest has a squeezing type of chest pain with exertion and at rest  He admits to PND, but denies orthopnea, LE edema, weight gain, palpitations, fevers or chills  He admits that he has been taking the ASA only every other day when he remembers  He took the Metoprolol for 2 weeks, but it caused him to sleep for 2 weeks straight and could not keep anything down  He called Dr Janel Dodge to report the symptoms and was instructed to discontinue the medication  The patient reports he continues to have nausea, vomiting and cannot keep anything down  He also reports he sleeps all the time  In review, his PCP heard a murmur on exam on 8/29/22, and the patient was referred for an ECHO, which revealed mild AI, severe AS with peak velocity of 4 69 m/s, peak gradient 88 mmHg, mean gradient 56 mmHg, DVI 0 18  He saw Dr Dillan Yoder on 9/23/22, and at that time, he was nervous for surgery, and refusing  He was referred to Dr Gregg Montoya to further discuss surgery  His PMH is significant for HLD, HTN, DM2, schizophrenia, bipolar disorder, but he does not take any medication for any of these conditions  He smokes 2 5 pack cigarettes daily for 43 years and has smoked marijuana daily for over 40 years   He states that he becomes "mentally disabled" without it  He does not have a medical card       He works several jobs: as a , a , and maintenance at a hotel, and is often lifting boxes and gets short of breath with exerting himself  He has top dentures, and 4 bottom teeth, and does not regularly see a dentist  He did have a dental visit and extractions performed 1/30/23  As far as cardiac symptoms, patient reports feeling weak and fatigued for several months  He also frequently has lightheadedness, and has had several episodes of syncope, the most recent episode being about 2 weeks ago  Past Medical History:  Past Medical History:   Diagnosis Date   • Diabetes mellitus ([facility] [address] )    • Hyperlipidemia    • Hypertension    • Psychiatric disorder     bipolar   • Schizoid personality disorder [facility])    • Syncope          Past Surgical History:   Past Surgical History:   Procedure Laterality Date   • CARDIAC ASCENDING AORTOGRAM N/A 10/28/2022    Procedure: Cardiac ascending aortogram;  Surgeon: Moustapha Adam MD;  Location: [address] [place] [address] CATH LAB; Service: Cardiology   • CARDIAC CATHETERIZATION Left 10/28/2022    Procedure: CARDIAC RHC/LHC; Surgeon: Moustapha Adam MD;  Location: [place] CARDIAC CATH LAB; Service: Cardiology   • CARDIAC CATHETERIZATION N/A 10/28/2022    Procedure: Cardiac Coronary Angiogram;  Surgeon: Moustapha Adam MD;  Location: [address] [place] [address] CATH LAB; Service: Cardiology   • CARDIAC CATHETERIZATION Left 10/28/2022    Procedure: Cardiac Left Ventriculogram;  Surgeon: Moustapha Adam MD;  Location: [facility] CARDIAC CATH LAB;   Service: Cardiology   • LAPAROSCOPIC LYSIS INTESTINAL ADHESIONS           Family History:  Family History   Problem Relation Age of Onset   • Cancer Mother    • Hypertension Mother    • Diabetes Mother          Social History:      Social History     Substance and Sexual Activity   Alcohol Use Not Currently     Social History     Substance and Sexual Activity Drug Use Yes   • Types: Marijuana     Social History     Tobacco Use   Smoking Status Every Day   • Packs/day: 0 50   • Years: 45 00   • Pack years: 22 50   • Types: Cigarettes   Smokeless Tobacco Never   Tobacco Comments    Patient states that he is trying to cut down       Home Medications:   Prior to Admission medications    Medication Sig Start Date End Date Taking? Authorizing Provider   aspirin 81 mg chewable tablet Chew 1 tablet (81 mg total) daily 10/14/22  Yes Keena Graham PA-C       Allergies: Allergies   Allergen Reactions   • Other      Pt states he is allergic to everything  States he doesn't have a list but can only take children doses of all medication         Review of Systems:     Review of Systems   Constitutional: Positive for fever  Negative for chills, diaphoresis and fatigue  Somnolence   HENT: Negative  Eyes: Negative  Respiratory: Positive for chest tightness and shortness of breath  Cardiovascular: Positive for chest pain  Negative for leg swelling  Gastrointestinal: Positive for nausea and vomiting  Endocrine: Negative  Genitourinary: Negative  Musculoskeletal: Positive for neck pain  Skin: Negative  Allergic/Immunologic: Negative  Neurological: Positive for dizziness  Negative for syncope  Hematological: Negative for adenopathy  Does not bruise/bleed easily  Psychiatric/Behavioral: Negative  All other systems reviewed and are negative  Vital Signs:     Vitals:    02/10/23 1429   BP: 142/81   BP Location: Left arm   Patient Position: Sitting   Cuff Size: Standard   Pulse: 98   SpO2: 97%   Weight: 84 5 kg (186 lb 4 8 oz)   Height: 5' 6" (1 676 m)       Physical Exam:     Physical Exam  Vitals and nursing note reviewed  Constitutional:       General: He is not in acute distress  Appearance: He is well-developed  He is not diaphoretic  Comments: Smells of tobacco smoke   HENT:      Head: Normocephalic and atraumatic        Right Ear: External ear normal       Left Ear: External ear normal       Nose: Nose normal       Mouth/Throat:      Pharynx: No oropharyngeal exudate  Eyes:      General: No scleral icterus  Right eye: No discharge  Left eye: No discharge  Conjunctiva/sclera: Conjunctivae normal       Pupils: Pupils are equal, round, and reactive to light  Neck:      Vascular: Carotid bruit present  No JVD  Trachea: No tracheal deviation  Cardiovascular:      Rate and Rhythm: Normal rate and regular rhythm  Heart sounds: Murmur heard  Systolic murmur is present with a grade of 3/6  No friction rub  Pulmonary:      Effort: Pulmonary effort is normal  No respiratory distress  Breath sounds: Normal breath sounds  No stridor  No wheezing or rales  Abdominal:      General: Bowel sounds are normal  There is no distension  Palpations: Abdomen is soft  There is no mass  Tenderness: There is no abdominal tenderness  There is no guarding  Musculoskeletal:         General: No tenderness or deformity  Normal range of motion  Cervical back: Normal range of motion and neck supple  Right lower leg: No edema  Left lower leg: No edema  Skin:     General: Skin is warm and dry  Coloration: Skin is not pale  Findings: No erythema or rash  Neurological:      Mental Status: He is alert and oriented to person, place, and time  Cranial Nerves: No cranial nerve deficit  Sensory: No sensory deficit  Motor: No abnormal muscle tone  Coordination: Coordination normal       Deep Tendon Reflexes: Reflexes normal    Psychiatric:         Behavior: Behavior normal  Behavior is cooperative  Thought Content:  Thought content normal          Judgment: Judgment normal          Lab Results:               Invalid input(s): LABGLOM      Lab Results   Component Value Date    HGBA1C 5 7 (H) 08/29/2022     No results found for: CKTOTAL, CKMB, CKMBINDEX, TROPONINI    Imaging Studies:     Cardiac Catheterization:   Left Anterior Descending   Prox LAD to Mid LAD lesion is 20% stenosed  ULI flow is 3  Dist LAD lesion is 25% stenosed  ULI flow is 3  The lesion is diffuse  The distal LAD is small with diffuse disease  Right Coronary Artery   Mid RCA lesion is 85% stenosed  ULI flow is 3             Echocardiogram:   Left Ventricle Left ventricular cavity size is normal  Wall thickness is moderately increased  There is moderate concentric hypertrophy  Systolic function is normal   Wall motion is normal  Diastolic function is mildly abnormal, consistent with grade I (abnormal) relaxation  Right Ventricle Right ventricular cavity size is normal  Systolic function is normal  Wall thickness is normal    Left Atrium The atrium is normal in size  Right Atrium The atrium is normal in size  Aortic Valve The aortic valve is trileaflet  The leaflets are moderately thickened  The leaflets are moderately calcified  There is moderately reduced mobility  There is mild regurgitation  There is severe stenosis  Peak velocity is 4 69 m/sec  Peak and mean gradients across the aortic valve were 88 and 56 mmHg respectively  DVI was 0 18  Mitral Valve There is trace regurgitation  There is no evidence of stenosis  The mitral valve has normal structure and normal function  Tricuspid Valve Tricuspid valve structure is normal  There is trace regurgitation  There is no evidence of stenosis  Pulmonic Valve Pulmonic valve structure is normal  There is mild regurgitation  There is no evidence of stenosis  Ascending Aorta The aortic root is mildly dilated (3 9 cm)  IVC/SVC The inferior vena cava is normal in size  Pericardium There is no pericardial effusion  The pericardium is normal in appearance       Left Ventricle Measurements    Function/Volumes   A4C EF 57 %         LVOT stroke volume 76 28 cm3         LVOT stroke volume index 38 9 ml/m2         Dimensions   LVIDd 3 8 cm         LVIDS 2 5 cm         IVSd 1 5 cm         LVPWd 1 3 cm         LVOT area 4 15 cm2         FS 34 %         Diastolic Filling   MV E' Tissue Velocity Septal 8 cm/s         E wave deceleration time 134 ms         MV Peak E Shade 72 cm/s         MV Peak A Shade 0 95 m/s          Report Measurements   AV LVOT peak gradient 3 mmHg              Interventricular Septum Measurements    Shunt Ratio   LVOT peak VTI 18 37 cm         LVOT peak shade 0 86 m/s              Right Ventricle Measurements    Dimensions   RVID d 3 4 cm               Left Atrium Measurements    Dimensions   LA size 3 3 cm         LA length (A2C) 4 7 cm         TOM A4C 11 5 cm2               Right Atrium Measurements    Dimensions   RA 2D Volume 27 mL         RAA A4C 10 3 cm2               Atrial Septum Measurements    Shunt Ratio   LVOT peak VTI 18 37 cm         LVOT peak shade 0 86 m/s               Aortic Valve Measurements    Stenosis   Aortic valve peak velocity 4 7 m/s         LVOT peak shade 0 86 m/s         Ao VTI 95 49 cm         LVOT peak VTI 18 37 cm         AV mean gradient 56 mmHg         LVOT mn grad 2 mmHg         AV peak gradient 88 mmHg         AV LVOT peak gradient 3 mmHg         Regurgitation   AV peak gradient 69 mmHg         AV Deceleration Time 1,116 ms         AV regurgitation pressure 1/2 time 324 ms         Area/Dimensions   DVI 0 18          AV valve area 0 8 cm2         AV area by cont VTI 0 8 cm2         AV area peak shade 0 8 cm2         LVOT diameter 2 3 cm         LVOT area 4 15 cm2               Mitral Valve Measurements    Stenosis   MV stenosis pressure 1/2 time 39 ms         MV valve area p 1/2 method 5 64 cm2               Aorta Measurements    Aortic Dimensions   Ao root 3 9 cm         Asc Ao 3 1 cm               I have personally reviewed pertinent reports     and I have personally reviewed pertinent films in PACS    Assessment:  Patient Active Problem List    Diagnosis Date Noted   • Hyperlipidemia 12/15/2022   • Coronary artery disease of native heart with stable angina pectoris ([facility] [address] ) 12/15/2022   • Aortic valve stenosis    • Tobacco abuse    • Severe aortic stenosis 09/23/2022   • Marijuana use 08/29/2022   • Tobacco use 08/29/2022   • Murmur 08/29/2022     Severe coronary artery disease; Ongoing CABG workup  Severe aortic stenosis; Ongoing AVR workup    Plan:  Risks and benefits of aortic valve replacement and coronary artery bypass grafting were discussed in detail today with the patient  They understand and wish to proceed with further workup and ultimately surgical intervention  We have ordered routine preoperative laboratory and vascular studies  Pending the results of these tests, they will be scheduled for surgery with KARIE Manjarrez was comfortable with our recommendations, and their questions were answered to their satisfaction  Thank you for allowing us to participate in the care of this patient  The patient has an upcoming appointment for a colonoscopy on March 20, 2023  SIGNATURE: Mable Valadez  DATE: February 10, 2023  TIME: 3:05 PM    * This note was completed in part utilizing [employer] direct voice recognition software  Grammatical errors, random word insertion, spelling mistakes, and incomplete sentences may be an occasional consequence of the system secondary to software limitations, ambient noise and hardware issues  At the time of dictation, efforts were made to edit, clarify and /or correct errors  Please read the chart carefully and recognize, using context, where substitutions have occurred  If you have any questions or concerns about the context, text or information contained within the body of this dictation, please contact myself, the provider, for further clarification

## 2023-02-13 ENCOUNTER — APPOINTMENT (OUTPATIENT)
Dept: LAB | Facility: HOSPITAL | Age: 58
End: 2023-02-13

## 2023-02-13 DIAGNOSIS — I25.118 CORONARY ARTERY DISEASE OF NATIVE ARTERY OF NATIVE HEART WITH STABLE ANGINA PECTORIS (HCC): ICD-10-CM

## 2023-02-13 DIAGNOSIS — Z13.6 ENCOUNTER FOR SPECIAL SCREENING EXAMINATION FOR CARDIOVASCULAR DISORDER: ICD-10-CM

## 2023-02-13 DIAGNOSIS — Z01.818 PRE-OP EVALUATION: ICD-10-CM

## 2023-02-13 DIAGNOSIS — I35.0 NONRHEUMATIC AORTIC VALVE STENOSIS: ICD-10-CM

## 2023-02-13 DIAGNOSIS — Z01.812 ENCOUNTER FOR PRE-OPERATIVE LABORATORY TESTING: ICD-10-CM

## 2023-02-13 LAB
ANION GAP SERPL CALCULATED.3IONS-SCNC: 13 MMOL/L (ref 4–13)
BUN SERPL-MCNC: 13 MG/DL (ref 5–25)
CALCIUM SERPL-MCNC: 9.1 MG/DL (ref 8.3–10.1)
CHLORIDE SERPL-SCNC: 101 MMOL/L (ref 96–108)
CHOLEST SERPL-MCNC: 259 MG/DL
CO2 SERPL-SCNC: 25 MMOL/L (ref 21–32)
CREAT SERPL-MCNC: 0.84 MG/DL (ref 0.6–1.3)
ERYTHROCYTE [DISTWIDTH] IN BLOOD BY AUTOMATED COUNT: 14.3 % (ref 11.6–15.1)
GFR SERPL CREATININE-BSD FRML MDRD: 98 ML/MIN/1.73SQ M
GLUCOSE P FAST SERPL-MCNC: 111 MG/DL (ref 65–99)
HCT VFR BLD AUTO: 48 % (ref 36.5–49.3)
HDLC SERPL-MCNC: 40 MG/DL
HGB BLD-MCNC: 16.1 G/DL (ref 12–17)
INR PPP: 0.92 (ref 0.84–1.19)
LDLC SERPL CALC-MCNC: 172 MG/DL (ref 0–100)
MCH RBC QN AUTO: 28 PG (ref 26.8–34.3)
MCHC RBC AUTO-ENTMCNC: 33.5 G/DL (ref 31.4–37.4)
MCV RBC AUTO: 84 FL (ref 82–98)
NONHDLC SERPL-MCNC: 219 MG/DL
PLATELET # BLD AUTO: 234 THOUSANDS/UL (ref 149–390)
PMV BLD AUTO: 10.6 FL (ref 8.9–12.7)
POTASSIUM SERPL-SCNC: 4 MMOL/L (ref 3.5–5.3)
PROTHROMBIN TIME: 12.2 SECONDS (ref 11.6–14.5)
RBC # BLD AUTO: 5.74 MILLION/UL (ref 3.88–5.62)
SODIUM SERPL-SCNC: 139 MMOL/L (ref 135–147)
TRIGL SERPL-MCNC: 237 MG/DL
WBC # BLD AUTO: 8.37 THOUSAND/UL (ref 4.31–10.16)

## 2023-02-14 ENCOUNTER — PREP FOR PROCEDURE (OUTPATIENT)
Dept: CARDIAC SURGERY | Facility: CLINIC | Age: 58
End: 2023-02-14

## 2023-02-14 ENCOUNTER — TELEPHONE (OUTPATIENT)
Dept: INTERNAL MEDICINE CLINIC | Facility: CLINIC | Age: 58
End: 2023-02-14

## 2023-02-14 ENCOUNTER — LAB REQUISITION (OUTPATIENT)
Dept: LAB | Facility: HOSPITAL | Age: 58
End: 2023-02-14

## 2023-02-14 ENCOUNTER — TELEPHONE (OUTPATIENT)
Dept: CARDIAC SURGERY | Facility: CLINIC | Age: 58
End: 2023-02-14

## 2023-02-14 DIAGNOSIS — Z01.818 ENCOUNTER FOR OTHER PREPROCEDURAL EXAMINATION: ICD-10-CM

## 2023-02-14 LAB
ABO GROUP BLD: NORMAL
BLD GP AB SCN SERPL QL: NEGATIVE
EST. AVERAGE GLUCOSE BLD GHB EST-MCNC: 114 MG/DL
HBA1C MFR BLD: 5.6 %
MRSA NOSE QL CULT: NORMAL
RH BLD: POSITIVE
SPECIMEN EXPIRATION DATE: NORMAL

## 2023-02-22 ENCOUNTER — ANESTHESIA EVENT (INPATIENT)
Dept: PERIOP | Facility: HOSPITAL | Age: 58
End: 2023-02-22

## 2023-02-22 NOTE — ANESTHESIA PREPROCEDURE EVALUATION
Procedure:  CORONARY ARTERY BYPASS GRAFT (CABG) WITH REPLACEMENT VALVE AORTIC (AVR) (Chest)    Relevant Problems   CARDIO   (+) Coronary artery disease of native heart with stable angina pectoris (HCC)   (+) Hyperlipidemia   (+) Severe aortic stenosis      Other   (+) Marijuana use   (+) Tobacco use      DM  HTN  Chronic neck pain: xray shows arthritis  Current smoker 2 5 pack/day  Current marijuana user x 40 years  Schizoid personality dz, bipolar dz  S/p multiple dental extractions    Carotids R is normal, L < 50%    Cardiac cath showing RCA CAD requiring revascularization:   Left Anterior Descending   Prox LAD to Mid LAD lesion is 20% stenosed  ULI flow is 3  Dist LAD lesion is 25% stenosed  ULI flow is 3  The lesion is diffuse  The distal LAD is small with diffuse disease  Right Coronary Artery   Mid RCA lesion is 85% stenosed  ULI flow is 3         Echocardiogram:    Left Ventricle: Left ventricular cavity size is normal  Wall thickness is moderately increased  There is moderate concentric hypertrophy  Systolic function is normal (55%)  Wall motion is normal  Diastolic function is mildly abnormal, consistent with grade I (abnormal) relaxation  •  Right Ventricle: Right ventricular cavity size is normal  Systolic function is normal   •  Aortic Valve: The aortic valve is trileaflet  The leaflets are moderately thickened  The leaflets are moderately calcified  There is moderately reduced mobility  There is mild regurgitation  There is severe stenosis  Peak velocity is 4 69 m/sec  Peak and mean gradients across the aortic valve were 88 and 56 mmHg respectively  DVI was 0 18  ANA 0 8 cm2  •  Mitral Valve: There is trace regurgitation  •  Tricuspid Valve: There is trace regurgitation  •  Pulmonic Valve: There is mild regurgitation  •  Aorta: The aortic root is mildly dilated (3 9 cm)          Physical Exam    Airway    Mallampati score: III  TM Distance: >3 FB  Neck ROM: full     Dental   upper dentures and lower dentures,     Cardiovascular      Pulmonary      Other Findings        Anesthesia Plan  ASA Score- 4     Anesthesia Type- general with ASA Monitors  Additional Monitors: arterial line, central venous line and pulmonary artery catheter  Airway Plan: ETT  Comment: GA with ETT, IV, [place], TLC/PAC, JONATHON, blood products, postop mechanical ventilation  Plan Factors-    Chart reviewed  EKG reviewed  Existing labs reviewed  Patient summary reviewed  Patient is a current smoker  Induction- intravenous  Postoperative Plan-     Informed Consent- Anesthetic plan and risks discussed with patient

## 2023-02-23 ENCOUNTER — APPOINTMENT (OUTPATIENT)
Dept: RADIOLOGY | Facility: HOSPITAL | Age: 58
End: 2023-02-23

## 2023-02-23 ENCOUNTER — HOSPITAL ENCOUNTER (INPATIENT)
Facility: HOSPITAL | Age: 58
LOS: 4 days | Discharge: HOME HEALTH | End: 2023-02-27
Attending: THORACIC SURGERY (CARDIOTHORACIC VASCULAR SURGERY) | Admitting: THORACIC SURGERY (CARDIOTHORACIC VASCULAR SURGERY)

## 2023-02-23 ENCOUNTER — ANESTHESIA (INPATIENT)
Dept: PERIOP | Facility: HOSPITAL | Age: 58
End: 2023-02-23

## 2023-02-23 ENCOUNTER — APPOINTMENT (OUTPATIENT)
Dept: NON INVASIVE DIAGNOSTICS | Facility: HOSPITAL | Age: 58
End: 2023-02-23

## 2023-02-23 DIAGNOSIS — Z95.2 S/P AVR: Primary | ICD-10-CM

## 2023-02-23 DIAGNOSIS — I25.10 CAD IN NATIVE ARTERY: ICD-10-CM

## 2023-02-23 DIAGNOSIS — I25.118 CORONARY ARTERY DISEASE OF NATIVE ARTERY OF NATIVE HEART WITH STABLE ANGINA PECTORIS (HCC): ICD-10-CM

## 2023-02-23 DIAGNOSIS — Z95.1 S/P CABG (CORONARY ARTERY BYPASS GRAFT): ICD-10-CM

## 2023-02-23 DIAGNOSIS — I35.0 NONRHEUMATIC AORTIC VALVE STENOSIS: ICD-10-CM

## 2023-02-23 PROBLEM — F31.9 BIPOLAR 1 DISORDER (HCC): Status: ACTIVE | Noted: 2023-02-23

## 2023-02-23 PROBLEM — F20.9 SCHIZOPHRENIA (HCC): Status: ACTIVE | Noted: 2023-02-23

## 2023-02-23 LAB
ABO GROUP BLD: NORMAL
ANION GAP SERPL CALCULATED.3IONS-SCNC: 1 MMOL/L (ref 4–13)
ATRIAL RATE: 75 BPM
BASE EXCESS BLDA CALC-SCNC: -2 MMOL/L (ref -2–3)
BASE EXCESS BLDA CALC-SCNC: -2 MMOL/L (ref -2–3)
BASE EXCESS BLDA CALC-SCNC: 1 MMOL/L (ref -2–3)
BASE EXCESS BLDA CALC-SCNC: 3 MMOL/L (ref -2–3)
BASE EXCESS BLDA CALC-SCNC: 3 MMOL/L (ref -2–3)
BASE EXCESS BLDA CALC-SCNC: 4 MMOL/L (ref -2–3)
BASE EXCESS BLDA CALC-SCNC: 6 MMOL/L (ref -2–3)
BILIRUB UR QL STRIP: NEGATIVE
BUN SERPL-MCNC: 15 MG/DL (ref 5–25)
CA-I BLD-SCNC: 1.04 MMOL/L (ref 1.12–1.32)
CA-I BLD-SCNC: 1.06 MMOL/L (ref 1.12–1.32)
CA-I BLD-SCNC: 1.19 MMOL/L (ref 1.12–1.32)
CA-I BLD-SCNC: 1.23 MMOL/L (ref 1.12–1.32)
CA-I BLD-SCNC: 1.25 MMOL/L (ref 1.12–1.32)
CALCIUM SERPL-MCNC: 7.5 MG/DL (ref 8.3–10.1)
CHLORIDE SERPL-SCNC: 116 MMOL/L (ref 96–108)
CLARITY UR: CLEAR
CO2 SERPL-SCNC: 26 MMOL/L (ref 21–32)
COLOR UR: NORMAL
CREAT SERPL-MCNC: 0.8 MG/DL (ref 0.6–1.3)
GFR SERPL CREATININE-BSD FRML MDRD: 100 ML/MIN/1.73SQ M
GLUCOSE SERPL-MCNC: 101 MG/DL (ref 65–140)
GLUCOSE SERPL-MCNC: 107 MG/DL (ref 65–140)
GLUCOSE SERPL-MCNC: 108 MG/DL (ref 65–140)
GLUCOSE SERPL-MCNC: 119 MG/DL (ref 65–140)
GLUCOSE SERPL-MCNC: 128 MG/DL (ref 65–140)
GLUCOSE SERPL-MCNC: 128 MG/DL (ref 65–140)
GLUCOSE SERPL-MCNC: 130 MG/DL (ref 65–140)
GLUCOSE SERPL-MCNC: 152 MG/DL (ref 65–140)
GLUCOSE SERPL-MCNC: 160 MG/DL (ref 65–140)
GLUCOSE SERPL-MCNC: 164 MG/DL (ref 65–140)
GLUCOSE SERPL-MCNC: 166 MG/DL (ref 65–140)
GLUCOSE SERPL-MCNC: 182 MG/DL (ref 65–140)
GLUCOSE SERPL-MCNC: 187 MG/DL (ref 65–140)
GLUCOSE UR STRIP-MCNC: NEGATIVE MG/DL
HCO3 BLDA-SCNC: 24.8 MMOL/L (ref 22–28)
HCO3 BLDA-SCNC: 26.2 MMOL/L (ref 22–28)
HCO3 BLDA-SCNC: 27.2 MMOL/L (ref 22–28)
HCO3 BLDA-SCNC: 30.1 MMOL/L (ref 24–30)
HCO3 BLDA-SCNC: 30.5 MMOL/L (ref 24–30)
HCO3 BLDA-SCNC: 30.8 MMOL/L (ref 22–28)
HCO3 BLDA-SCNC: 33.5 MMOL/L (ref 22–28)
HCT VFR BLD AUTO: 38 % (ref 36.5–49.3)
HCT VFR BLD AUTO: 46.4 % (ref 36.5–49.3)
HCT VFR BLD CALC: 29 % (ref 36.5–49.3)
HCT VFR BLD CALC: 30 % (ref 36.5–49.3)
HCT VFR BLD CALC: 31 % (ref 36.5–49.3)
HCT VFR BLD CALC: 31 % (ref 36.5–49.3)
HCT VFR BLD CALC: 35 % (ref 36.5–49.3)
HCT VFR BLD CALC: 40 % (ref 36.5–49.3)
HCT VFR BLD CALC: 42 % (ref 36.5–49.3)
HGB BLD-MCNC: 12 G/DL (ref 12–17)
HGB BLD-MCNC: 14.9 G/DL (ref 12–17)
HGB BLDA-MCNC: 10.2 G/DL (ref 12–17)
HGB BLDA-MCNC: 10.5 G/DL (ref 12–17)
HGB BLDA-MCNC: 10.5 G/DL (ref 12–17)
HGB BLDA-MCNC: 11.9 G/DL (ref 12–17)
HGB BLDA-MCNC: 13.6 G/DL (ref 12–17)
HGB BLDA-MCNC: 14.3 G/DL (ref 12–17)
HGB BLDA-MCNC: 9.9 G/DL (ref 12–17)
HGB UR QL STRIP.AUTO: NEGATIVE
KCT BLD-ACNC: 114 SEC (ref 89–137)
KCT BLD-ACNC: 120 SEC (ref 89–137)
KCT BLD-ACNC: 469 SEC (ref 89–137)
KCT BLD-ACNC: 523 SEC (ref 89–137)
KCT BLD-ACNC: 555 SEC (ref 89–137)
KCT BLD-ACNC: 562 SEC (ref 89–137)
KCT BLD-ACNC: 594 SEC (ref 89–137)
KCT BLD-ACNC: 717 SEC (ref 89–137)
KETONES UR STRIP-MCNC: NEGATIVE MG/DL
LEUKOCYTE ESTERASE UR QL STRIP: NEGATIVE
NITRITE UR QL STRIP: NEGATIVE
P AXIS: 33 DEGREES
PCO2 BLD: 26 MMOL/L (ref 21–32)
PCO2 BLD: 28 MMOL/L (ref 21–32)
PCO2 BLD: 29 MMOL/L (ref 21–32)
PCO2 BLD: 32 MMOL/L (ref 21–32)
PCO2 BLD: 32 MMOL/L (ref 21–32)
PCO2 BLD: 33 MMOL/L (ref 21–32)
PCO2 BLD: 35 MMOL/L (ref 21–32)
PCO2 BLD: 50.4 MM HG (ref 36–44)
PCO2 BLD: 52.5 MM HG (ref 36–44)
PCO2 BLD: 54.8 MM HG (ref 42–50)
PCO2 BLD: 55.9 MM HG (ref 42–50)
PCO2 BLD: 58.3 MM HG (ref 36–44)
PCO2 BLD: 66.1 MM HG (ref 36–44)
PCO2 BLD: 66.2 MM HG (ref 36–44)
PH BLD: 7.26 [PH] (ref 7.35–7.45)
PH BLD: 7.28 [PH] (ref 7.35–7.45)
PH BLD: 7.28 [PH] (ref 7.35–7.45)
PH BLD: 7.31 [PH] (ref 7.35–7.45)
PH BLD: 7.34 [PH] (ref 7.35–7.45)
PH BLD: 7.34 [PH] (ref 7.3–7.4)
PH BLD: 7.35 [PH] (ref 7.3–7.4)
PH UR STRIP.AUTO: 7 [PH]
PLATELET # BLD AUTO: 154 THOUSANDS/UL (ref 149–390)
PLATELET # BLD AUTO: 187 THOUSANDS/UL (ref 149–390)
PMV BLD AUTO: 10.7 FL (ref 8.9–12.7)
PMV BLD AUTO: 10.7 FL (ref 8.9–12.7)
PO2 BLD: 131 MM HG (ref 75–129)
PO2 BLD: 135 MM HG (ref 75–129)
PO2 BLD: 164 MM HG (ref 75–129)
PO2 BLD: 45 MM HG (ref 35–45)
PO2 BLD: 46 MM HG (ref 35–45)
PO2 BLD: >400 MM HG (ref 75–129)
PO2 BLD: >400 MM HG (ref 75–129)
POTASSIUM BLD-SCNC: 3.7 MMOL/L (ref 3.5–5.3)
POTASSIUM BLD-SCNC: 4 MMOL/L (ref 3.5–5.3)
POTASSIUM BLD-SCNC: 4.3 MMOL/L (ref 3.5–5.3)
POTASSIUM BLD-SCNC: 4.6 MMOL/L (ref 3.5–5.3)
POTASSIUM BLD-SCNC: 4.8 MMOL/L (ref 3.5–5.3)
POTASSIUM BLD-SCNC: 4.9 MMOL/L (ref 3.5–5.3)
POTASSIUM BLD-SCNC: 5 MMOL/L (ref 3.5–5.3)
POTASSIUM SERPL-SCNC: 4.2 MMOL/L (ref 3.5–5.3)
POTASSIUM SERPL-SCNC: 4.3 MMOL/L (ref 3.5–5.3)
POTASSIUM SERPL-SCNC: 4.9 MMOL/L (ref 3.5–5.3)
PR INTERVAL: 156 MS
PROT UR STRIP-MCNC: NEGATIVE MG/DL
QRS AXIS: 60 DEGREES
QRSD INTERVAL: 90 MS
QT INTERVAL: 434 MS
QTC INTERVAL: 484 MS
RH BLD: POSITIVE
SAO2 % BLD FROM PO2: 77 % (ref 60–85)
SAO2 % BLD FROM PO2: 78 % (ref 60–85)
SAO2 % BLD FROM PO2: 98 % (ref 60–85)
SAO2 % BLD FROM PO2: 99 % (ref 60–85)
SAO2 % BLD FROM PO2: 99 % (ref 60–85)
SODIUM BLD-SCNC: 136 MMOL/L (ref 136–145)
SODIUM BLD-SCNC: 137 MMOL/L (ref 136–145)
SODIUM BLD-SCNC: 138 MMOL/L (ref 136–145)
SODIUM BLD-SCNC: 141 MMOL/L (ref 136–145)
SODIUM BLD-SCNC: 141 MMOL/L (ref 136–145)
SODIUM SERPL-SCNC: 143 MMOL/L (ref 135–147)
SP GR UR STRIP.AUTO: 1.02 (ref 1–1.03)
SPECIMEN SOURCE: ABNORMAL
SPECIMEN SOURCE: NORMAL
SPECIMEN SOURCE: NORMAL
T WAVE AXIS: 60 DEGREES
UROBILINOGEN UR STRIP-ACNC: <2 MG/DL
VENTRICULAR RATE: 75 BPM

## 2023-02-23 PROCEDURE — 02RF08Z REPLACEMENT OF AORTIC VALVE WITH ZOOPLASTIC TISSUE, OPEN APPROACH: ICD-10-PCS | Performed by: THORACIC SURGERY (CARDIOTHORACIC VASCULAR SURGERY)

## 2023-02-23 PROCEDURE — 06BP4ZZ EXCISION OF RIGHT SAPHENOUS VEIN, PERCUTANEOUS ENDOSCOPIC APPROACH: ICD-10-PCS | Performed by: THORACIC SURGERY (CARDIOTHORACIC VASCULAR SURGERY)

## 2023-02-23 PROCEDURE — 021009W BYPASS CORONARY ARTERY, ONE ARTERY FROM AORTA WITH AUTOLOGOUS VENOUS TISSUE, OPEN APPROACH: ICD-10-PCS | Performed by: THORACIC SURGERY (CARDIOTHORACIC VASCULAR SURGERY)

## 2023-02-23 PROCEDURE — 30233N0 TRANSFUSION OF AUTOLOGOUS RED BLOOD CELLS INTO PERIPHERAL VEIN, PERCUTANEOUS APPROACH: ICD-10-PCS | Performed by: THORACIC SURGERY (CARDIOTHORACIC VASCULAR SURGERY)

## 2023-02-23 PROCEDURE — 5A1221Z PERFORMANCE OF CARDIAC OUTPUT, CONTINUOUS: ICD-10-PCS | Performed by: THORACIC SURGERY (CARDIOTHORACIC VASCULAR SURGERY)

## 2023-02-23 PROCEDURE — 02HV33Z INSERTION OF INFUSION DEVICE INTO SUPERIOR VENA CAVA, PERCUTANEOUS APPROACH: ICD-10-PCS | Performed by: ANESTHESIOLOGY

## 2023-02-23 PROCEDURE — 5A1223Z PERFORMANCE OF CARDIAC PACING, CONTINUOUS: ICD-10-PCS | Performed by: THORACIC SURGERY (CARDIOTHORACIC VASCULAR SURGERY)

## 2023-02-23 DEVICE — MARKER CORONARY BYPASS VOSS GRAFT: Type: IMPLANTABLE DEVICE | Site: HEART | Status: FUNCTIONAL

## 2023-02-23 DEVICE — VALVE AORTIC INSPIRIS RESILIA 25MM: Type: IMPLANTABLE DEVICE | Site: HEART | Status: FUNCTIONAL

## 2023-02-23 RX ORDER — MANNITOL 250 MG/ML
INJECTION, SOLUTION INTRAVENOUS AS NEEDED
Status: DISCONTINUED | OUTPATIENT
Start: 2023-02-23 | End: 2023-02-23

## 2023-02-23 RX ORDER — FENTANYL CITRATE 50 UG/ML
INJECTION, SOLUTION INTRAMUSCULAR; INTRAVENOUS AS NEEDED
Status: DISCONTINUED | OUTPATIENT
Start: 2023-02-23 | End: 2023-02-23

## 2023-02-23 RX ORDER — CALCIUM CHLORIDE 100 MG/ML
1 INJECTION INTRAVENOUS; INTRAVENTRICULAR ONCE
Status: DISCONTINUED | OUTPATIENT
Start: 2023-02-23 | End: 2023-02-23

## 2023-02-23 RX ORDER — POTASSIUM CHLORIDE 14.9 MG/ML
20 INJECTION INTRAVENOUS
Status: DISCONTINUED | OUTPATIENT
Start: 2023-02-23 | End: 2023-02-24

## 2023-02-23 RX ORDER — PROTAMINE SULFATE 10 MG/ML
INJECTION, SOLUTION INTRAVENOUS AS NEEDED
Status: DISCONTINUED | OUTPATIENT
Start: 2023-02-23 | End: 2023-02-23

## 2023-02-23 RX ORDER — MAGNESIUM SULFATE HEPTAHYDRATE 40 MG/ML
2 INJECTION, SOLUTION INTRAVENOUS ONCE
Status: COMPLETED | OUTPATIENT
Start: 2023-02-23 | End: 2023-02-23

## 2023-02-23 RX ORDER — POLYETHYLENE GLYCOL 3350 17 G/17G
17 POWDER, FOR SOLUTION ORAL DAILY
Status: DISCONTINUED | OUTPATIENT
Start: 2023-02-23 | End: 2023-02-27 | Stop reason: HOSPADM

## 2023-02-23 RX ORDER — PROPOFOL 10 MG/ML
INJECTION, EMULSION INTRAVENOUS CONTINUOUS PRN
Status: DISCONTINUED | OUTPATIENT
Start: 2023-02-23 | End: 2023-02-23

## 2023-02-23 RX ORDER — CARDIOPLEGIC SOLUTION NO.16 26/1052.8
PLASTIC BAG, PERFUSION (ML) PERFUSION AS NEEDED
Status: DISCONTINUED | OUTPATIENT
Start: 2023-02-23 | End: 2023-02-23

## 2023-02-23 RX ORDER — ONDANSETRON 2 MG/ML
4 INJECTION INTRAMUSCULAR; INTRAVENOUS EVERY 6 HOURS PRN
Status: DISCONTINUED | OUTPATIENT
Start: 2023-02-23 | End: 2023-02-27 | Stop reason: HOSPADM

## 2023-02-23 RX ORDER — CEFAZOLIN SODIUM 1 G/3ML
INJECTION, POWDER, FOR SOLUTION INTRAMUSCULAR; INTRAVENOUS AS NEEDED
Status: DISCONTINUED | OUTPATIENT
Start: 2023-02-23 | End: 2023-02-23

## 2023-02-23 RX ORDER — SODIUM CHLORIDE, SODIUM LACTATE, POTASSIUM CHLORIDE, CALCIUM CHLORIDE 600; 310; 30; 20 MG/100ML; MG/100ML; MG/100ML; MG/100ML
INJECTION, SOLUTION INTRAVENOUS CONTINUOUS PRN
Status: DISCONTINUED | OUTPATIENT
Start: 2023-02-23 | End: 2023-02-23

## 2023-02-23 RX ORDER — AMIODARONE HYDROCHLORIDE 200 MG/1
200 TABLET ORAL EVERY 8 HOURS SCHEDULED
Status: DISCONTINUED | OUTPATIENT
Start: 2023-02-23 | End: 2023-02-27 | Stop reason: HOSPADM

## 2023-02-23 RX ORDER — FONDAPARINUX SODIUM 2.5 MG/.5ML
2.5 INJECTION SUBCUTANEOUS DAILY
Status: DISCONTINUED | OUTPATIENT
Start: 2023-02-24 | End: 2023-02-27 | Stop reason: HOSPADM

## 2023-02-23 RX ORDER — MIDAZOLAM HYDROCHLORIDE 2 MG/2ML
INJECTION, SOLUTION INTRAMUSCULAR; INTRAVENOUS AS NEEDED
Status: DISCONTINUED | OUTPATIENT
Start: 2023-02-23 | End: 2023-02-23

## 2023-02-23 RX ORDER — SODIUM CHLORIDE 450 MG/100ML
20 INJECTION, SOLUTION INTRAVENOUS CONTINUOUS
Status: DISCONTINUED | OUTPATIENT
Start: 2023-02-23 | End: 2023-02-24

## 2023-02-23 RX ORDER — SODIUM CHLORIDE 9 MG/ML
INJECTION, SOLUTION INTRAVENOUS CONTINUOUS PRN
Status: DISCONTINUED | OUTPATIENT
Start: 2023-02-23 | End: 2023-02-23

## 2023-02-23 RX ORDER — ESMOLOL HYDROCHLORIDE 10 MG/ML
INJECTION INTRAVENOUS AS NEEDED
Status: DISCONTINUED | OUTPATIENT
Start: 2023-02-23 | End: 2023-02-23

## 2023-02-23 RX ORDER — FUROSEMIDE 10 MG/ML
40 INJECTION INTRAMUSCULAR; INTRAVENOUS EVERY 6 HOURS PRN
Status: DISCONTINUED | OUTPATIENT
Start: 2023-02-23 | End: 2023-02-24

## 2023-02-23 RX ORDER — ATORVASTATIN CALCIUM 80 MG/1
80 TABLET, FILM COATED ORAL
Status: DISCONTINUED | OUTPATIENT
Start: 2023-02-23 | End: 2023-02-27 | Stop reason: HOSPADM

## 2023-02-23 RX ORDER — LIDOCAINE HYDROCHLORIDE 10 MG/ML
INJECTION, SOLUTION INFILTRATION; PERINEURAL AS NEEDED
Status: DISCONTINUED | OUTPATIENT
Start: 2023-02-23 | End: 2023-02-23

## 2023-02-23 RX ORDER — DEXMEDETOMIDINE HYDROCHLORIDE 4 UG/ML
.1-.7 INJECTION, SOLUTION INTRAVENOUS
Status: DISCONTINUED | OUTPATIENT
Start: 2023-02-23 | End: 2023-02-24

## 2023-02-23 RX ORDER — AMIODARONE HYDROCHLORIDE 50 MG/ML
INJECTION, SOLUTION INTRAVENOUS AS NEEDED
Status: DISCONTINUED | OUTPATIENT
Start: 2023-02-23 | End: 2023-02-23

## 2023-02-23 RX ORDER — OXYCODONE HYDROCHLORIDE 5 MG/1
2.5 TABLET ORAL EVERY 4 HOURS PRN
Status: DISCONTINUED | OUTPATIENT
Start: 2023-02-23 | End: 2023-02-27 | Stop reason: HOSPADM

## 2023-02-23 RX ORDER — FENTANYL CITRATE 50 UG/ML
50 INJECTION, SOLUTION INTRAMUSCULAR; INTRAVENOUS
Status: DISCONTINUED | OUTPATIENT
Start: 2023-02-23 | End: 2023-02-23

## 2023-02-23 RX ORDER — SODIUM CHLORIDE, SODIUM GLUCONATE, SODIUM ACETATE, POTASSIUM CHLORIDE, MAGNESIUM CHLORIDE, SODIUM PHOSPHATE, DIBASIC, AND POTASSIUM PHOSPHATE .53; .5; .37; .037; .03; .012; .00082 G/100ML; G/100ML; G/100ML; G/100ML; G/100ML; G/100ML; G/100ML
INJECTION, SOLUTION INTRAVENOUS AS NEEDED
Status: DISCONTINUED | OUTPATIENT
Start: 2023-02-23 | End: 2023-02-23

## 2023-02-23 RX ORDER — LIDOCAINE HYDROCHLORIDE 10 MG/ML
INJECTION, SOLUTION EPIDURAL; INFILTRATION; INTRACAUDAL; PERINEURAL
Status: COMPLETED | OUTPATIENT
Start: 2023-02-23 | End: 2023-02-23

## 2023-02-23 RX ORDER — BISACODYL 10 MG
10 SUPPOSITORY, RECTAL RECTAL DAILY PRN
Status: DISCONTINUED | OUTPATIENT
Start: 2023-02-23 | End: 2023-02-27 | Stop reason: HOSPADM

## 2023-02-23 RX ORDER — PANTOPRAZOLE SODIUM 40 MG/1
40 TABLET, DELAYED RELEASE ORAL DAILY
Status: DISCONTINUED | OUTPATIENT
Start: 2023-02-23 | End: 2023-02-27 | Stop reason: HOSPADM

## 2023-02-23 RX ORDER — ACETAMINOPHEN 650 MG/1
650 SUPPOSITORY RECTAL EVERY 4 HOURS PRN
Status: DISCONTINUED | OUTPATIENT
Start: 2023-02-23 | End: 2023-02-27 | Stop reason: HOSPADM

## 2023-02-23 RX ORDER — HEPARIN SODIUM 1000 [USP'U]/ML
400 INJECTION, SOLUTION INTRAVENOUS; SUBCUTANEOUS ONCE
Status: COMPLETED | OUTPATIENT
Start: 2023-02-23 | End: 2023-02-23

## 2023-02-23 RX ORDER — HEPARIN SODIUM 1000 [USP'U]/ML
INJECTION, SOLUTION INTRAVENOUS; SUBCUTANEOUS AS NEEDED
Status: DISCONTINUED | OUTPATIENT
Start: 2023-02-23 | End: 2023-02-23

## 2023-02-23 RX ORDER — ASPIRIN 325 MG
325 TABLET ORAL DAILY
Status: DISCONTINUED | OUTPATIENT
Start: 2023-02-23 | End: 2023-02-27 | Stop reason: HOSPADM

## 2023-02-23 RX ORDER — OXYCODONE HYDROCHLORIDE 5 MG/1
5 TABLET ORAL EVERY 4 HOURS PRN
Status: DISCONTINUED | OUTPATIENT
Start: 2023-02-23 | End: 2023-02-27 | Stop reason: HOSPADM

## 2023-02-23 RX ORDER — CHLORHEXIDINE GLUCONATE 0.12 MG/ML
15 RINSE ORAL 2 TIMES DAILY
Status: DISCONTINUED | OUTPATIENT
Start: 2023-02-23 | End: 2023-02-27 | Stop reason: HOSPADM

## 2023-02-23 RX ORDER — POTASSIUM CHLORIDE 14.9 MG/ML
20 INJECTION INTRAVENOUS ONCE AS NEEDED
Status: DISCONTINUED | OUTPATIENT
Start: 2023-02-23 | End: 2023-02-24

## 2023-02-23 RX ORDER — CHLORHEXIDINE GLUCONATE 0.12 MG/ML
15 RINSE ORAL ONCE
Status: COMPLETED | OUTPATIENT
Start: 2023-02-23 | End: 2023-02-23

## 2023-02-23 RX ORDER — ACETAMINOPHEN 325 MG/1
975 TABLET ORAL EVERY 8 HOURS SCHEDULED
Status: DISCONTINUED | OUTPATIENT
Start: 2023-02-23 | End: 2023-02-27 | Stop reason: HOSPADM

## 2023-02-23 RX ORDER — HEPARIN SODIUM 1000 [USP'U]/ML
10000 INJECTION, SOLUTION INTRAVENOUS; SUBCUTANEOUS ONCE
Status: DISCONTINUED | OUTPATIENT
Start: 2023-02-23 | End: 2023-02-23 | Stop reason: HOSPADM

## 2023-02-23 RX ORDER — VANCOMYCIN HYDROCHLORIDE 1 G/20ML
INJECTION, POWDER, LYOPHILIZED, FOR SOLUTION INTRAVENOUS AS NEEDED
Status: DISCONTINUED | OUTPATIENT
Start: 2023-02-23 | End: 2023-02-23 | Stop reason: HOSPADM

## 2023-02-23 RX ORDER — HYDROMORPHONE HCL/PF 1 MG/ML
0.5 SYRINGE (ML) INJECTION EVERY 2 HOUR PRN
Status: DISCONTINUED | OUTPATIENT
Start: 2023-02-23 | End: 2023-02-24

## 2023-02-23 RX ORDER — ROCURONIUM BROMIDE 10 MG/ML
INJECTION, SOLUTION INTRAVENOUS AS NEEDED
Status: DISCONTINUED | OUTPATIENT
Start: 2023-02-23 | End: 2023-02-23

## 2023-02-23 RX ORDER — ETOMIDATE 2 MG/ML
INJECTION INTRAVENOUS AS NEEDED
Status: DISCONTINUED | OUTPATIENT
Start: 2023-02-23 | End: 2023-02-23

## 2023-02-23 RX ORDER — CEFAZOLIN SODIUM 2 G/50ML
2000 SOLUTION INTRAVENOUS ONCE
Status: DISCONTINUED | OUTPATIENT
Start: 2023-02-23 | End: 2023-02-23 | Stop reason: HOSPADM

## 2023-02-23 RX ORDER — CEFAZOLIN SODIUM 2 G/50ML
2000 SOLUTION INTRAVENOUS EVERY 8 HOURS
Status: COMPLETED | OUTPATIENT
Start: 2023-02-23 | End: 2023-02-24

## 2023-02-23 RX ORDER — CALCIUM CHLORIDE 100 MG/ML
INJECTION INTRAVENOUS; INTRAVENTRICULAR AS NEEDED
Status: DISCONTINUED | OUTPATIENT
Start: 2023-02-23 | End: 2023-02-23

## 2023-02-23 RX ORDER — FENTANYL CITRATE 50 UG/ML
50 INJECTION, SOLUTION INTRAMUSCULAR; INTRAVENOUS ONCE
Status: COMPLETED | OUTPATIENT
Start: 2023-02-23 | End: 2023-02-23

## 2023-02-23 RX ADMIN — MANNITOL 25 G: 12.5 INJECTION, SOLUTION INTRAVENOUS at 09:41

## 2023-02-23 RX ADMIN — AMIODARONE HYDROCHLORIDE 200 MG: 200 TABLET ORAL at 21:25

## 2023-02-23 RX ADMIN — ESMOLOL HYDROCHLORIDE 20 MG: 10 INJECTION, SOLUTION INTRAVENOUS at 09:00

## 2023-02-23 RX ADMIN — NICARDIPINE HYDROCHLORIDE 15 MG/HR: 2.5 INJECTION, SOLUTION INTRAVENOUS at 14:20

## 2023-02-23 RX ADMIN — DEXMEDETOMIDINE HYDROCHLORIDE 0.2 MCG/KG/HR: 400 INJECTION INTRAVENOUS at 13:59

## 2023-02-23 RX ADMIN — AMINOCAPROIC ACID 5 G: 250 INJECTION, SOLUTION INTRAVENOUS at 08:32

## 2023-02-23 RX ADMIN — Medication 100 ML: at 10:04

## 2023-02-23 RX ADMIN — LIDOCAINE HYDROCHLORIDE 100 MG: 20 INJECTION INTRAVENOUS at 11:09

## 2023-02-23 RX ADMIN — NICARDIPINE HYDROCHLORIDE 12 MG/HR: 2.5 INJECTION, SOLUTION INTRAVENOUS at 20:15

## 2023-02-23 RX ADMIN — CALCIUM CHLORIDE 1 G: 100 INJECTION INTRAVENOUS; INTRAVENTRICULAR at 11:17

## 2023-02-23 RX ADMIN — CEFAZOLIN SODIUM 2000 MG: 2 SOLUTION INTRAVENOUS at 21:25

## 2023-02-23 RX ADMIN — LIDOCAINE HYDROCHLORIDE 1 ML: 10 INJECTION, SOLUTION EPIDURAL; INFILTRATION; INTRACAUDAL; PERINEURAL at 07:38

## 2023-02-23 RX ADMIN — ONDANSETRON HYDROCHLORIDE 4 MG: 2 INJECTION, SOLUTION INTRAMUSCULAR; INTRAVENOUS at 21:49

## 2023-02-23 RX ADMIN — FENTANYL CITRATE 250 MCG: 0.05 INJECTION, SOLUTION INTRAMUSCULAR; INTRAVENOUS at 08:44

## 2023-02-23 RX ADMIN — PHENYLEPHRINE HYDROCHLORIDE 80 MCG/MIN: 50 INJECTION INTRAVENOUS at 12:35

## 2023-02-23 RX ADMIN — NICARDIPINE HYDROCHLORIDE 5 MG/HR: 2.5 INJECTION, SOLUTION INTRAVENOUS at 12:45

## 2023-02-23 RX ADMIN — MUPIROCIN 1 APPLICATION.: 20 OINTMENT TOPICAL at 06:18

## 2023-02-23 RX ADMIN — SODIUM BICARBONATE 50 MEQ: 84 INJECTION, SOLUTION INTRAVENOUS at 09:42

## 2023-02-23 RX ADMIN — SODIUM CHLORIDE, SODIUM LACTATE, POTASSIUM CHLORIDE, AND CALCIUM CHLORIDE: .6; .31; .03; .02 INJECTION, SOLUTION INTRAVENOUS at 07:23

## 2023-02-23 RX ADMIN — MIDAZOLAM 3 MG: 1 INJECTION INTRAMUSCULAR; INTRAVENOUS at 11:15

## 2023-02-23 RX ADMIN — PHENYLEPHRINE HYDROCHLORIDE 50 MCG/MIN: 50 INJECTION INTRAVENOUS at 09:50

## 2023-02-23 RX ADMIN — SODIUM CHLORIDE 3 UNITS/HR: 9 INJECTION, SOLUTION INTRAVENOUS at 10:39

## 2023-02-23 RX ADMIN — FENTANYL CITRATE 100 MCG: 0.05 INJECTION, SOLUTION INTRAMUSCULAR; INTRAVENOUS at 11:22

## 2023-02-23 RX ADMIN — HEPARIN SODIUM 33800 UNITS: 1000 INJECTION INTRAVENOUS; SUBCUTANEOUS at 09:12

## 2023-02-23 RX ADMIN — PHENYLEPHRINE HYDROCHLORIDE 50 MCG: 10 INJECTION INTRAVENOUS at 11:12

## 2023-02-23 RX ADMIN — LIDOCAINE HYDROCHLORIDE 50 MG: 10 INJECTION, SOLUTION EPIDURAL; INFILTRATION; INTRACAUDAL; PERINEURAL at 07:54

## 2023-02-23 RX ADMIN — OXYCODONE HYDROCHLORIDE 5 MG: 5 TABLET ORAL at 21:25

## 2023-02-23 RX ADMIN — PHENYLEPHRINE HYDROCHLORIDE 25 MCG: 10 INJECTION INTRAVENOUS at 10:44

## 2023-02-23 RX ADMIN — Medication 750 ML: at 09:48

## 2023-02-23 RX ADMIN — HEPARIN SODIUM 1 EACH: 5000 INJECTION INTRAVENOUS; SUBCUTANEOUS at 07:31

## 2023-02-23 RX ADMIN — FENTANYL CITRATE 100 MCG: 0.05 INJECTION, SOLUTION INTRAMUSCULAR; INTRAVENOUS at 07:40

## 2023-02-23 RX ADMIN — SODIUM CHLORIDE 20 ML/HR: 0.45 INJECTION, SOLUTION INTRAVENOUS at 13:21

## 2023-02-23 RX ADMIN — SODIUM CHLORIDE, SODIUM GLUCONATE, SODIUM ACETATE, POTASSIUM CHLORIDE, MAGNESIUM CHLORIDE, SODIUM PHOSPHATE, DIBASIC, AND POTASSIUM PHOSPHATE 500 ML: .53; .5; .37; .037; .03; .012; .00082 INJECTION, SOLUTION INTRAVENOUS at 07:31

## 2023-02-23 RX ADMIN — PHENYLEPHRINE HYDROCHLORIDE 25 MCG: 10 INJECTION INTRAVENOUS at 10:02

## 2023-02-23 RX ADMIN — SODIUM CHLORIDE, SODIUM LACTATE, POTASSIUM CHLORIDE, AND CALCIUM CHLORIDE: .6; .31; .03; .02 INJECTION, SOLUTION INTRAVENOUS at 11:59

## 2023-02-23 RX ADMIN — CEFAZOLIN 2000 MG: 1 INJECTION, POWDER, FOR SOLUTION INTRAMUSCULAR; INTRAVENOUS at 12:06

## 2023-02-23 RX ADMIN — FENTANYL CITRATE 150 MCG: 0.05 INJECTION, SOLUTION INTRAMUSCULAR; INTRAVENOUS at 08:27

## 2023-02-23 RX ADMIN — Medication 20 MG: at 11:42

## 2023-02-23 RX ADMIN — HEPARIN SODIUM 5000 UNITS: 1000 INJECTION INTRAVENOUS; SUBCUTANEOUS at 09:44

## 2023-02-23 RX ADMIN — MUPIROCIN 1 APPLICATION.: 20 OINTMENT TOPICAL at 21:25

## 2023-02-23 RX ADMIN — ROCURONIUM BROMIDE 50 MG: 10 INJECTION INTRAVENOUS at 09:34

## 2023-02-23 RX ADMIN — PROPOFOL 50 MCG/KG/MIN: 10 INJECTION, EMULSION INTRAVENOUS at 09:24

## 2023-02-23 RX ADMIN — CHLORHEXIDINE GLUCONATE 15 ML: 1.2 SOLUTION ORAL at 06:18

## 2023-02-23 RX ADMIN — HYDROMORPHONE HYDROCHLORIDE 0.5 MG: 1 INJECTION, SOLUTION INTRAMUSCULAR; INTRAVENOUS; SUBCUTANEOUS at 13:53

## 2023-02-23 RX ADMIN — Medication 30 MG: at 12:09

## 2023-02-23 RX ADMIN — Medication 12.5 MG: at 06:21

## 2023-02-23 RX ADMIN — ACETAMINOPHEN 975 MG: 325 TABLET ORAL at 21:25

## 2023-02-23 RX ADMIN — Medication 100 ML: at 10:06

## 2023-02-23 RX ADMIN — NICARDIPINE HYDROCHLORIDE 5 MG/HR: 2.5 INJECTION, SOLUTION INTRAVENOUS at 17:13

## 2023-02-23 RX ADMIN — AMIODARONE HYDROCHLORIDE 150 MG: 50 INJECTION, SOLUTION INTRAVENOUS at 11:11

## 2023-02-23 RX ADMIN — MAGNESIUM SULFATE HEPTAHYDRATE 2 G: 40 INJECTION, SOLUTION INTRAVENOUS at 13:21

## 2023-02-23 RX ADMIN — ETOMIDATE 20 MG: 2 INJECTION, SOLUTION INTRAVENOUS at 07:54

## 2023-02-23 RX ADMIN — NICARDIPINE HYDROCHLORIDE 15 MG/HR: 2.5 INJECTION, SOLUTION INTRAVENOUS at 22:37

## 2023-02-23 RX ADMIN — MIDAZOLAM 1 MG: 1 INJECTION INTRAMUSCULAR; INTRAVENOUS at 07:39

## 2023-02-23 RX ADMIN — FENTANYL CITRATE 150 MCG: 0.05 INJECTION, SOLUTION INTRAMUSCULAR; INTRAVENOUS at 08:52

## 2023-02-23 RX ADMIN — FENTANYL CITRATE 150 MCG: 0.05 INJECTION, SOLUTION INTRAMUSCULAR; INTRAVENOUS at 07:54

## 2023-02-23 RX ADMIN — FENTANYL CITRATE 50 MCG: 50 INJECTION INTRAMUSCULAR; INTRAVENOUS at 13:02

## 2023-02-23 RX ADMIN — PHENYLEPHRINE HYDROCHLORIDE 25 MCG: 10 INJECTION INTRAVENOUS at 11:16

## 2023-02-23 RX ADMIN — HEPARIN SODIUM 5000 UNITS: 1000 INJECTION INTRAVENOUS; SUBCUTANEOUS at 10:27

## 2023-02-23 RX ADMIN — FENTANYL CITRATE 100 MCG: 0.05 INJECTION, SOLUTION INTRAMUSCULAR; INTRAVENOUS at 07:55

## 2023-02-23 RX ADMIN — PROTAMINE SULFATE 340 MG: 10 INJECTION, SOLUTION INTRAVENOUS at 11:22

## 2023-02-23 RX ADMIN — SODIUM CHLORIDE 5 MG/HR: 0.9 INJECTION, SOLUTION INTRAVENOUS at 08:41

## 2023-02-23 RX ADMIN — PHENYLEPHRINE HYDROCHLORIDE 25 MCG: 10 INJECTION INTRAVENOUS at 09:56

## 2023-02-23 RX ADMIN — SODIUM CHLORIDE: 0.9 INJECTION, SOLUTION INTRAVENOUS at 08:18

## 2023-02-23 RX ADMIN — PHENYLEPHRINE HYDROCHLORIDE 50 MCG: 10 INJECTION INTRAVENOUS at 10:10

## 2023-02-23 RX ADMIN — MAGNESIUM SULFATE HEPTAHYDRATE 2 G: 500 INJECTION, SOLUTION INTRAMUSCULAR; INTRAVENOUS at 10:58

## 2023-02-23 RX ADMIN — CEFAZOLIN 2000 MG: 1 INJECTION, POWDER, FOR SOLUTION INTRAMUSCULAR; INTRAVENOUS at 08:28

## 2023-02-23 RX ADMIN — MIDAZOLAM 2 MG: 1 INJECTION INTRAMUSCULAR; INTRAVENOUS at 07:28

## 2023-02-23 NOTE — INTERVAL H&P NOTE
Vitals:    02/23/23 0605   BP: 135/86   Pulse: 87   Resp: 20   Temp: (!) 97 3 °F (36 3 °C)   SpO2: 95%         H&P reviewed  After examining the patient I find no changes in the patients condition since the H&P was completed  Plan for Tissue AVR, CABG  Preoperative Beta Blocker: indicated  Anticipated Length of Stay: Patient will be admitted on an inpatient basis with an anticipated length of stay of grater than 2 midnights  Justification for Hospital StaY: Post surgical recovery following open heart surgery        Eric Oden MD  02/23/23  7:21 AM

## 2023-02-23 NOTE — ANESTHESIA PROCEDURE NOTES
Central Line Insertion  Performed by: Ever Ellison MD  Authorized by: Ever Ellison MD     Date/Time: 2/23/2023 8:18 AM  Catheter Type:  triple lumen  Consent: Written consent obtained  Risks and benefits: risks, benefits and alternatives were discussed  Consent given by: patient  Patient understanding: patient states understanding of the procedure being performed  Required items: required blood products, implants, devices, and special equipment available  Patient identity confirmed: arm band  Indications: vascular access and central pressure monitoring  Catheter size: 7 Fr  Patient position: Trendelenburg  Anesthesia method: under GA  Sedation:  Patient sedated: under GA      Assessment: blood return through all ports and free fluid flow  Preparation: skin prepped with ChloraPrep  Skin prep agent dried: skin prep agent completely dried prior to procedure  Sterile barriers: all five maximum sterile barriers used - cap, mask, sterile gown, sterile gloves, and large sterile sheet  Hand hygiene: hand hygiene performed prior to central venous catheter insertion  sterile gel and probe cover used in ultrasound-guided central venous catheter insertionultrasound permanent image saved  Pre-procedure: landmarks identified  Number of attempts: 1  Successful placement: yes  Post-procedure: dressing applied and line sutured  Patient tolerance: patient tolerated the procedure well with no immediate complications  Comments: Ultrasound guidance  Ultrasound pic in MEDIA section of Epic  Performed postinduction to anesthesia  [id number]

## 2023-02-23 NOTE — ANESTHESIA PROCEDURE NOTES
Introducer/Itz-González  Performed by: Oscar Wilder MD  Authorized by: Oscar Wilder MD     Date/Time: 2/23/2023 8:18 AM  Consent: Written consent obtained  Risks and benefits: risks, benefits and alternatives were discussed  Consent given by: patient  Patient understanding: patient states understanding of the procedure being performed  Required items: required blood products, implants, devices, and special equipment available  Patient identity confirmed: arm band  Indications: vascular access and central pressure monitoring  Location details: right internal jugular  Catheter size: 7 Fr  Patient position: Trendelenburg  Anesthesia method: under GA  Sedation:  Patient sedated: under GA      Assessment: blood return through all ports  Preparation: skin prepped with ChloraPrep  Skin prep agent dried: skin prep agent completely dried prior to procedure  Sterile barriers: all five maximum sterile barriers used - cap, mask, sterile gown, sterile gloves, and large sterile sheet  Hand hygiene: hand hygiene performed prior to central venous catheter insertion  Ultrasound guidance: yes  ultrasound permanent image saved  Pre-procedure: landmarks identified  Number of attempts: 1  Successful placement: yes  Post-procedure: line sutured and dressing applied  Patient tolerance: patient tolerated the procedure well with no immediate complications  Comments: Ultrasound guidance  Ultrasound pic in MEDIA section of Epic  Performed postinduction to anesthesia  [id number]

## 2023-02-23 NOTE — ANESTHESIA PROCEDURE NOTES
Arterial Line Insertion  Performed by: Baylee Foy MD  Authorized by: Baylee Foy MD   Consent: Written consent obtained  Risks and benefits: risks, benefits and alternatives were discussed  Consent given by: patient  Patient understanding: patient states understanding of the procedure being performed  Required items: required blood products, implants, devices, and special equipment available  Patient identity confirmed: arm band  Preparation: Patient was prepped and draped in the usual sterile fashion  Indications: multiple ABGs and hemodynamic monitoring  Orientation:  Left  Location: radial arterylidocaine (PF) (XYLOCAINE-MPF) 1 % - Infiltration   1 mL - 2/23/2023 7:38:00 AM  Sedation:  Patient sedated: yes  Sedatives: fentanyl and midazolam  Vitals: Vital signs were monitored during sedation      Procedure Details:  Needle gauge: 20  Seldinger technique: Seldinger technique used  Number of attempts: 1    Post-procedure:  Post-procedure: dressing applied  Waveform: good waveform and waveform confirmed  Post-procedure CNS: normal  Patient tolerance: patient tolerated the procedure well with no immediate complications  Comments: One attempt  Performed predinduction to anesthesia [id number]

## 2023-02-23 NOTE — ANESTHESIA PROCEDURE NOTES
Procedure Performed: JONATHON Anesthesia  Start Time:  2/23/2023 8:52 AM        Preanesthesia Checklist    Patient identified, IV assessed, risks and benefits discussed, monitors and equipment assessed, procedure being performed at surgeon's request and anesthesia consent obtained  Procedure    Diagnostic Indications for JONATHON:  assessment of ascending aorta and hemodynamic monitoring  Type of JONATHON: interventional JONATHON with real time guidance of intracardiac procedure  Images Saved: ultrasound permanent image saved  Physician Requesting Echo: Keyla Piedra MD   Location performed: OR  Intubated  Bite block not placed  Heart visualized  Insertion of JONATHON Probe:  Atraumatic  Probe Type:  Multiplane  Modalities:  3D, color flow mapping, continuous wave Doppler and pulse wave Doppler  Echocardiographic and Doppler Measurements    PREPROCEDURE    LEFT VENTRICLE:  Systolic Function: normal  Ejection Fraction: 50%  Cavity size: normal    Regional Wall Motion Abnormalities: mild HK of inferior wall at mid views ofLV  RIGHT VENTRICLE:  Systolic Function: normal   Cavity size normal  No hypertrophy              AORTIC VALVE:  Leaflets: trileaflet  Leaflet motions restricted and calcifications along RCC, LCC  Stenosis: severe  Mean Gradient: 17 mmHg  Peak Gradient: 35 mmHg  Area: 0 8 cm²  Regurgitation: mild-mod AI and mild  Pressure Half-time: 330 ms  MITRAL VALVE:  Leaflets: normal  Leaflet Motions: normal  Regurgitation: trace  Stenosis: none  TRICUSPID VALVE:  Leaflets: normal  Leaflet Motions: normal  Stenosis: none  Regurgitation: trace  PULMONIC VALVE:  Leaflets: normal  Regurgitation: trace  Stenosis: none  ASCENDING AORTA:  Size:  normal   Dissection not present  AORTIC ARCH:  Size:  normal   dissection not present  Grade 3: atheroma protruding < 0 5 cm into lumen  DESCENDING AORTA:  Size: normal   Dissection not present   Grade 3: atheroma protruding < 0 5 cm into lumen         RIGHT ATRIUM:  Size:  normal  No spontaneous echo contrast     LEFT ATRIUM:  Size: normal  No spontaneous echo contrast     LEFT ATRIAL APPENDAGE:  Size: normal  No spontaneous echo contrast         ATRIAL SEPTUM:  Intra-atrial septal morphology: lipomatous hypertrophy and normal           VENTRICULAR SEPTUM:  Intra-ventricular septum morphology: normal          EPIAORTIC:  Plaque Thickness: 0-5 mm  OTHER FINDINGS:  Pericardium:  normal  Pleural Effusion:  none  POSTPROCEDURE    LEFT VENTRICLE: Unchanged   Systolic Function: normal  Ejection Fraction: 50 %  Cavity Size: dilated  Regional Wall Motion Abnormalities: interval improvement in inferior wall motion abnormalities  RIGHT VENTRICLE: Unchanged   Systolic Function: normal  Cavity Size: normal         AORTIC VALVE:   Leaflets: #25 mm bioprosthetic aortic valve, well seated, no perivalvular leaks, no AI noted and bioprosthetic  Stenosis: none  Mean Gradient: 4 (7mmHg before chest closure, 4 mm Hg after chest closed) mmHg  Regurgitation: none  Valve Size: 25 mm  MITRAL VALVE: Unchanged   Leaflets: native  Regurgitation: trace  Stenosis: none  TRICUSPID VALVE: Unchanged   Leaflets: native  Regurgitation: trace  Stenosis: none  PULMONIC VALVE: Unchanged   Leaflets: native  Regurgitation: trace  Stenosis: none  ATRIA: Unchanged   Left Atrial Appendage Ligate: No  Residual Flow in Left Atrial Appendage by Color Flow Doppler: No        AORTA: Unchanged   Dissection: Dissection not present  REMOVAL:  Probe Removal: atraumatic  ECHOCARDIOGRAM COMMENTS:  Pre CPB: AV annulus 25 mm, SoV 33 mm  STJ 38 mm  Post CPB: well seated #25 mm bioprosthetic aortic valve  No perivalvular leaks, no AI noted  Mean grad 4 mmHg upon chest closure

## 2023-02-23 NOTE — CONSULTS
[address] [place] [address] 56 y o  male MRN: [id number]  Unit/Bed#: [facility] 414-01 Encounter: [id number]    Inpatient consult to Jeannie Allen performed by: Katrina Ramirez PA-C  Consult ordered by: Rafa Bolden PA-C          Physician Requesting Consult: Sarai Quiroz MD    Reason for Consult / Principal Problem: S/P AVR    HPI: Gabe Elmore is a 56 y o  male w/ PMHx severe AS and CAD who now presents s/p bioprosthetic AVR and CABGx1  Patient was noted to have a murmur when being seen by his PCP in August  He was then sent for an ECHO demonstrating mild AI and severe AS  Pre-op testing revealed CAD  Patient also underwent dental extractions  Patient completed outpatient w/u and now presents s/p bioprosthetic AVR and CABGx1  PMHx: HLD, HPTN, DM2, schizophrenia, Bipolar disorder, tobacco abuse, marijuana abuse    History obtained from chart review due to patient being intubated and sedated  ---------------------------------------------------------------------------------------------------------------------------------------------------------------------  Impressions:  1  Severe AS, mild AI s/p AVR  2  CAD s/p CABG  3  HPTN  4  HLD  5  DM  6  Schizophrenia  7  Bipolar disorder  8  Tobacco abuse  9  Marijuana Abuse      Plan:    Neuro: d/c continuous sedation  ATC tylenol PRN oxycodone for pain  Trend neuro exam   Delirium precautions  CV: MAP goal >65  SBP goal <130  CI>2 2  Post-op medications: Neosynephrine, 30 mcg/min  Wean neptali as able  Volume resuscitation as needed  Monitor rhythm on telemetry  Epicardial pacing wires  Intra-op JONATHON LVEF 50%  Lung: Check STAT post-op ABG and CXR  Wean vent with spontaneous breathing trial with goal to extubate   GI: GI prophylaxis with PPI  Bowel regimen  Zofran PRN for nausea  FEN: NPO  Replenish K >4 0, mag >2 0 and calcium >7 0  : Check STAT post-op BMP  Smith in place   Monitor UOP with goal >0 5cc/kg/hour  Lasix versus volume resuscitate as needed depending on hemodynamics and volume status  ID: Prophylactic post-op abx  Maintain normothermia  Trend temps  Heme: Check STAT post-op H/H and platelets  Monitor incision site, invasive lines, and chest tube outputs for bleeding  Send coag panel if needed  Endo: Insulin gtt for blood sugar control  Results from last 6 Months   Lab Units 02/13/23  1218 08/29/22  1430   HEMOGLOBIN A1C % 5 6 5 7*     Disposition: ICU Care   ---------------------------------------------------------------------------------------------------------------------------------------------------------------------  Historical Information   Past Medical History:   Diagnosis Date   • [facility]    • Hyperlipidemia    • Hypertension    • Psychiatric disorder     bipolar   • Schizoid personality disorder [facility])    • Syncope      Past Surgical History:   Procedure Laterality Date   • CARDIAC ASCENDING AORTOGRAM N/A 10/28/2022    Procedure: Cardiac ascending aortogram;  Surgeon: Miri Roldan MD;  Location: [address] [place] [address] CATH LAB; Service: Cardiology   • CARDIAC CATHETERIZATION Left 10/28/2022    Procedure: CARDIAC RHC/LHC; Surgeon: Miri Roldan MD;  Location: [facility]ARD[facility] C[facility] LAB; Service: Cardiology   • CARDIAC CATHETERIZATION N/A 10/28/2022    Procedure: Cardiac Coronary Angiogram;  Surgeon: Miri Roldan MD;  Location: [address] [place] [address] CATH LAB; Service: Cardiology   • CARDIAC CATHETERIZATION Left 10/28/2022    Procedure: Cardiac Left Ventriculogram;  Surgeon: Miri Roldan MD;  Location: [facility] CARDIAC CATH LAB;   Service: Cardiology   • LAPAROSCOPIC LYSIS INTESTINAL ADHESIONS       Social History   Social History     Substance and Sexual Activity   Alcohol Use Not Currently     Social History     Substance and Sexual Activity   Drug Use Yes   • Types: Marijuana     Social History     Tobacco Use   Smoking Status Every Day   • Packs/day: 0 50   • Years: 45 00   • Pack years: 22 50   • Types: Cigarettes   Smokeless Tobacco Never   Tobacco Comments    Patient states that he is trying to cut down     Family History   Problem Relation Age of Onset   • Cancer Mother    • Hypertension Mother    • Diabetes Mother      I have reviewed this patient's family history and commented on sigificant items within the HPI    ROS: ROS unable to be obtained due to patient being intubated and sedated  Allergies: Allergies   Allergen Reactions   • Other      Pt states he is allergic to everything  States he doesn't have a list but can only take children doses of all medication         Home Medications:   Prior to Admission medications    Medication Sig Start Date End Date Taking? Authorizing Provider   mupirocin (BACTROBAN) 2 % ointment Apply inside both nostrils two times per day  Start 5 days prior to surgery   2/10/23  Yes Blossom Duval PA-C   aspirin 81 mg chewable tablet Chew 1 tablet (81 mg total) daily 10/14/22   Lenny Patton PA-C     Inpatient Medications:  Scheduled Meds:   acetaminophen, 975 mg, Q8H [address]  amiodarone, 200 mg, Q8H [address]  aspirin, 325 mg, Daily  atorvastatin, 80 mg, Daily With Dinner  calcium chloride, 1 g, Once  cefazolin, 2,000 mg, Q8H  chlorhexidine, 15 mL, BID  [START ON 2/24/2023] fondaparinux, 2 5 mg, Daily  magnesium sulfate, 2 g, Once  mupirocin, 1 application, N23T [address]  pantoprazole, 40 mg, Daily  polyethylene glycol, 17 g, Daily       Continuous Infusions:  insulin regular (HumuLIN R,NovoLIN R) infusion, 0 3-21 Units/hr, Last Rate: Stopped (02/23/23 1143)  niCARdipine, 2 5-15 mg/hr  phenylephine,  mcg/min  sodium chloride, 20 mL/hr      PRN Meds:  acetaminophen, 650 mg, Q4H PRN  bisacodyl, 10 mg, Daily PRN  fentanyl citrate (PF), 50 mcg, Q1H PRN  furosemide, 40 mg, Q6H PRN  HYDROmorphone, 0 5 mg, Q2H PRN  lactated ringers, 500 mL, Q30 Min PRN  lidocaine (cardiac), 100 mg, Q30 Min PRN  ondansetron, 4 mg, Q6H PRN  oxyCODONE, 2 5 mg, Q4H PRN  oxyCODONE, 5 mg, Q4H PRN  potassium chloride, 20 mEq, Once PRN  potassium chloride, 20 mEq, Q1H PRN  potassium chloride, 20 mEq, Q30 Min PRN      ---------------------------------------------------------------------------------------------------------------------------------------------------------------------  Vitals:   Vitals:    23 0605   BP: 135/86   Pulse: 87   Resp: 20   Temp: (!) 97 3 °F (36 3 °C)   TempSrc: Temporal   SpO2: 95%   Weight: 85 1 kg (187 lb 9 6 oz)   Height: 5' 6" (1 676 m)     Arterial Line:          Temperature: Temp (24hrs), Av 3 °F (36 3 °C), Min:97 3 °F (36 3 °C), Max:97 3 °F (36 3 °C)  Current: Temperature: (!) 97 3 °F (36 3 °C)    Weights: IBW (Ideal Body Weight): 63 8 kg  Body mass index is 30 28 kg/m²  Hemodynamic Monitoring:  PAP:  , CVP:  , CO:  , CI:  , SVR:      Ventilator Settings:  Respiratory    Lab Data (Last 4 hours)    None         O2/Vent Data (Last 4 hours)       1240          Patient safety screen outcome: Failed                 Labs:   Results from last 7 days   Lab Units 23  1253 23  1143 23  1117 23  1046   I STAT HEMOGLOBIN g/dl 11 9* 9 9* 10 5*  --    HEMATOCRIT, ISTAT % 35* 29* 31*  --    PLATELETS Thousands/uL  --   --   --  187     Results from last 7 days   Lab Units 23  1253 23  1143 23  1117   CO2, I-STAT mmol/L 28 26 33*   GLUCOSE, ISTAT mg/dl 119 152* 187*     Post-op /58/131/-2/28/98%  Post-op CXR: Lines and tubes in position, no PTX I have personally reviewed pertinent films in PACS  Post-op EKG: NSR This was personally reviewed by myself  Physical Exam  Constitutional:       Appearance: He is obese  HENT:      Head: Normocephalic  Mouth/Throat:      Mouth: Mucous membranes are moist    Eyes:      Pupils: Pupils are equal, round, and reactive to light  Neck:      Comments: RIJ lines  Cardiovascular:      Rate and Rhythm: Normal rate and regular rhythm        Comments: Sternal incision c/d/i  Pulmonary:      Effort: Pulmonary effort is normal       Breath sounds: Normal breath sounds  Abdominal:      General: There is distension  Tenderness: There is no abdominal tenderness  Musculoskeletal:      Right lower leg: Edema present  Left lower leg: Edema present  Skin:     General: Skin is warm  Capillary Refill: Capillary refill takes less than 2 seconds  Neurological:      General: No focal deficit present  Mental Status: He is alert  Invasive lines and devices: Invasive Devices     Central Venous Catheter Line  Duration           CVC Central Lines 02/23/23 Triple <1 day    Introducer 02/23/23 <1 day          Peripheral Intravenous Line  Duration           Peripheral IV 08/05/19 Right Arm 1297 days    Peripheral IV 10/28/22 Right;Ventral (anterior) Forearm 118 days    Peripheral IV 02/23/23 Left Hand <1 day    Peripheral IV 02/23/23 Right Hand <1 day          Arterial Line  Duration           Arterial Line 02/23/23 Left Radial <1 day          Line  Duration           Pacer Wires <1 day    Pacer Wires <1 day    Pacer Wires <1 day    Pacer Wires <1 day          Drain  Duration           Chest Tube 1 Mediastinal;Posterior 32 Fr  <1 day    Chest Tube 2 Mediastinal;Anterior 32 Fr  <1 day    Urethral Catheter Non-latex; Temperature probe 16 Fr  <1 day          Airway  Duration           ETT  Hi-Lo; Cuffed;Straight;Oral 8 mm <1 day              ---------------------------------------------------------------------------------------------------------------------------------------------------------------------  Care Time Delivered:   No Critical Care time spent     SIGNATURE: Ronnie Garner PA-C  DATE: February 23, 2023  TIME: 1:05 PM

## 2023-02-23 NOTE — OP NOTE
OPERATIVE REPORT  PATIENT NAME: Desirae Strange    :  1965  MRN: [id number]  Pt Location: [facility] OR ROOM 16    SURGERY DATE: 2023    SURGEON: Rich Guillaume MD    ASSISTANT: Geradl Gallegos PA-C    ADDITIONAL ASSISTANT: Debbie Willis MD (resident)    PREOPERATIVE DIAGNOSES: Symptomatic severe aortic stenosis and Aortic valve insufficiency,   Coronary artery disease     POSTOPERATIVE DIAGNOSES Symptomatic severe aortic stenosis and Aortic valve insufficiency, Coronary Artery disease  NYHA Class: 3    CCS Class: 3    PROCEDURES 1  Aortic valve replacement with a 25 mm Granados Inspiris bioprosthetic valve  2  Coronary artery bypass grafting x 1 with saphenous vein graft to right posterior descending artery  ANESTHESIA General endotracheal anesthesia with transesophageal echocardiogram guidance, DR Alysia Cherry     CARDIOPULMONARY BYPASS TIME 96 minutes  CROSSCLAMP TIME 82 minutes  PACKS/TUBES/DRAINS Chest tubes x3  MATERIALS Pacing wires: A x1, V x1  SPECIMENS Aortic valve  TRANSFUSION None  ESTIMATED BLOOD LOSS: 200 mL    OPERATIVE TECHNIQUE The patient was taken to the operating room and placed supine on the operating table  Following the satisfactory induction of general anesthesia and placement of monitoring lines, the patient was prepped and draped in the usual sterile fashion  A time-out procedure was performed  The patient underwent median sternotomy, endoscopic right greater saphenous vein harvest, systemic heparinization, and conduit preparation  The patient underwent pericardiotomy and epiaortic ultrasound was used to evaluate the ascending aorta, which was found to be free of significant atheromatous disease  The patient underwent aortic and right atrial cannulation and an antegrade was placed  The patient was initiated on bypass and an LV vent was placed through the right superior pulmonary vein  The ascending aorta was crossclamped   Antegrade [place] cardioplegia was delivered with an excellent arrest      The heart was positioned for grafting  The saphenous vein was anastomosed to the right posterior descending artery in end-to-side fashion using running 7-0 Prolene suture  The quality of the graft was excellent  The heart was returned to its anatomic position  Aortotomy was performed and the aortic valve was exposed  It was a trileaflet valve with severe stenosis and heavily calcified leaflets and annulus  The leaflets were excised and the annulus was debrided of calcium  The annulus was sized to a 25 mm Granados Mensajeros Urbanosiris bioprosthetic valve  Valve sutures were placed with pledgets on the ventricular side  The valve was brought to the field  The sutures were passed through the sewing ring of the valve  The valve was seated and the sutures were tied down, and clearance of the coronary ostia was confirmed  While de-airing the heart, the aortotomy was closed with 2 layers of running 4-0 Prolene suture  A total of 1 proximal anastomosis was completed on the ascending aorta in an end-to-side fashion using running 5-0 Prolene suture  The heart was extensively de-aired and the crossclamp was removed  Atrial and ventricular pacing wires were placed  Following a period of reperfusion, the patient was weaned from cardiopulmonary bypass and decannulated  Protamine was administered with normalization of the ACT  Hemostasis was confirmed in all fields  Thoracostomy tubes were placed  The sternum was closed with stainless steel wires  The fascia, subdermis and skin were closed as multiple layers of running absorbable suture  As the attending surgeon, I was present and scrubbed for all critical portions of this procedure  Sponge, needle and instrument counts were reported as correct by the nursing staff   There is no cardiac residency program at this facility and for complex procedures such as this, it is vital to have a physician assistant experienced in cardiac surgery  The assistance of Reg Florez PA-C was necessary for endoscopic saphenous vein harvesting, retraction of the heart and bypass conduit with proper tissue handling techniques, and following of the suture with correct tension during construction of the proximal and distal coronary anastomoses  The assistance of Reg Florez PA-C was necessary for experienced gentle retraction of the aortic root thereby providing exposure of the aortic valve during valve excision, annular debridement, placement of annular sutures, seating of the valve, and tying of the valve sutures  Reg Florez PA-C was also necessary for following the suture with correct tension during closure of the aortotomy  Final transesophageal echocardiogram demonstrated normal biventricular function and normal bioAVR function        Bari Dee MD  DATE: February 23, 2023  TIME: 11:56 AM

## 2023-02-23 NOTE — ANESTHESIA POSTPROCEDURE EVALUATION
Post-Op Assessment Note    CV Status:  Stable  Pain scale: intubated, sedated  Pain control: intubated, sedated  Post-procedure mental status: intubated, sedated  Hydration Status:  Stable   PONV status: intubated, sedated  Airway patency: intubated, sedated  Intubated: intubated, sedated  Post Op Vitals Reviewed: Yes      Staff: Anesthesiologist   Comments: transferred to CICU on phenylephrine gtt  VSS        No notable events documented      BP   153/78   Temp  96 6F   Pulse  77   Resp   14   SpO2   99%

## 2023-02-23 NOTE — CASE MANAGEMENT
Case Management Assessment & Discharge Planning Note    Patient name Pato Lam  Location [address] [place] [address] [address] [id number]  : 1965 Date 2023       Current Admission Date: 2023  Current Admission Diagnosis:S/P AVR   Patient Active Problem List    Diagnosis Date Noted   • Schizophrenia ([facility] [address] ) 2023   • Bipolar 1 disorder ([facility] [address] ) 2023   • S/P CABG (coronary artery bypass graft) 2023   • S/P AVR 2023   • Hyperlipidemia 12/15/2022   • Coronary artery disease of native heart with stable angina pectoris ([facility] [address] ) 12/15/2022   • Aortic valve stenosis    • Tobacco abuse    • Severe aortic stenosis 2022   • Marijuana use 2022   • Tobacco use 2022   • Murmur 2022      LOS (days): 0  Geometric Mean LOS (GMLOS) (days):   Days to GMLOS:     OBJECTIVE:    Risk of Unplanned Readmission Score: 14 53         Current admission status: Inpatient       Preferred Pharmacy:   [facility] [address] CYNDEE VICKERS [address] [place] [address]  Phone: [phone] Fax: [phone]    Primary Care Provider: EDWARDO Higuera    Primary Insurance: [address] [place] [address]  Secondary Insurance:     ASSESSMENT:  Pologajorge  Proxies    There are no active Health Care Proxies on file  Advance Directives  Does patient have a Health Care POA?: No  Was patient offered paperwork?: No (vented)  Does patient currently have a Health Care decision maker?: Yes, please see Health Care Proxy section  Does patient have Advance Directives?: No  Was patient offered paperwork?: No (vented)  Primary Contact: sig  other Isidra Doctor         Readmission Root Cause  30 Day Readmission: No    Patient Information  Admitted from[de-identified] Other (comment) (resides at [facility] in [facility])  Mental Status: Intubated, Sedated  During Assessment patient was accompanied by:  Other-Comment (sig  other)  Assessment information provided by[de-identified] Other - please comment (sig other)  Primary Caregiver: Self  Support Systems: Spouse/significant other  [place] of Residence: [place] [address] do you live in?: [address] [facility] Real entry access options  Select all that apply : No steps to enter home  Type of Current Residence: Other (Comment) [facility]  Living Arrangements: Lives w/ Spouse/significant other  Is patient a ?: No    Activities of Daily Living Prior to Admission  Functional Status: Independent  Completes ADLs independently?: Yes  Ambulates independently?: Yes  Does patient use assisted devices?: No  Does patient currently own DME?: No  Does patient have a history of Outpatient Therapy (PT/OT)?: No  Does the patient have a history of Short-Term Rehab?: No  Does patient have a history of HHC?: Yes (unsure, possible [facility])  Does patient currently have [address]?: No         Patient Information Continued  Income Source: Employed  Does patient have prescription coverage?: Yes  Within the past 12 months, you worried that your food would run out before you got the money to buy more : Never true  Within the past 12 months, the food you bought just didn't last and you didn't have money to get more : Never true  Does patient receive dialysis treatments?: No  Does patient have a history of substance abuse?: Yes  Historical substance use preference: Marijuana (daily)  Is patient currently in treatment for substance abuse?: No  Patient declined treatment information    Does patient have a history of Mental Health Diagnosis?: Yes (bipolar, schizophrenia)  Is patient receiving treatment for mental health?:  (UNKNOWN)  Has patient received inpatient treatment related to mental health in the last 2 years?: No (sig other denies hospitalization)         Means of Transportation  Means of Transport to [employer][de-identified] [employer] - Bus  In the past 12 months, has lack of transportation kept you from medical appointments or from getting medications?: Yes  In the past 12 months, has lack of transportation kept you from meetings, work, or from getting things needed for daily living?: Yes        DISCHARGE DETAILS:    Discharge planning discussed with[de-identified] significant other--will need HHC  Freedom of Choice: Yes     CM contacted family/caregiver?: Yes             Contacts  Patient Contacts: sig  other Etta Najera  Relationship to Patient[de-identified] Other (Comment) (S O )  Contact Method: Phone  Phone Number: [phone] [address]         Is the patient interested in [address] at discharge?: Yes  Via Zachary Samano requested[de-identified] Nursing, Medical Social Work  Home Health Follow-Up Provider[de-identified] PCP  Home Health Services Needed[de-identified] Post-Op Care and Assessment, Other (comment) (bipolar, schizophrenia, daily THC, +cigarette smoker)  Homebound Criteria Met[de-identified] Requires the Assistance of Another Person for Safe Ambulation or to Leave the Home  Supporting Clincal Findings[de-identified] Limited Endurance, Fatigues E[place] in [employer]         Other Referral/Resources/Interventions Provided:  Interventions: HHC  Referral Comments: Will discuss [address] with pt when extubated    Would you like to participate in our [address] service program?  : Yes    Treatment Team Recommendation: Home with [address] [employer]     Transport at Discharge : Other (Comment) (Will need Lyft when ready)                                      Additional Comments: 53yo male POD#0 AVR  Sig other Etta Najera at bedside  She spoke earlier with nurse manager, who supposedly said pt could stay in [facility]  CM will provide LYFT ride home when ready  They live in [facility], where he was working  No POA, no Adv directive, has a daughter in [place] but does not know her name  Will need HHC, will check tomorrow with [facility] as pt may be open to them    CM will follow up in am

## 2023-02-24 ENCOUNTER — TELEPHONE (OUTPATIENT)
Dept: INTERNAL MEDICINE CLINIC | Facility: CLINIC | Age: 58
End: 2023-02-24

## 2023-02-24 ENCOUNTER — APPOINTMENT (OUTPATIENT)
Dept: RADIOLOGY | Facility: HOSPITAL | Age: 58
End: 2023-02-24

## 2023-02-24 LAB
ABO GROUP BLD BPU: NORMAL
ANION GAP SERPL CALCULATED.3IONS-SCNC: 8 MMOL/L (ref 4–13)
ATRIAL RATE: 113 BPM
AV REGURGITATION PRESSURE HALF TIME: 330 MS
AVA (PLAN): 0.9 CM2
BPU ID: NORMAL
BUN SERPL-MCNC: 13 MG/DL (ref 5–25)
CALCIUM SERPL-MCNC: 7.8 MG/DL (ref 8.3–10.1)
CHLORIDE SERPL-SCNC: 111 MMOL/L (ref 96–108)
CO2 SERPL-SCNC: 20 MMOL/L (ref 21–32)
CREAT SERPL-MCNC: 1.03 MG/DL (ref 0.6–1.3)
CROSSMATCH: NORMAL
ERYTHROCYTE [DISTWIDTH] IN BLOOD BY AUTOMATED COUNT: 15.2 % (ref 11.6–15.1)
GFR SERPL CREATININE-BSD FRML MDRD: 81 ML/MIN/1.73SQ M
GLUCOSE SERPL-MCNC: 128 MG/DL (ref 65–140)
GLUCOSE SERPL-MCNC: 130 MG/DL (ref 65–140)
GLUCOSE SERPL-MCNC: 136 MG/DL (ref 65–140)
GLUCOSE SERPL-MCNC: 144 MG/DL (ref 65–140)
GLUCOSE SERPL-MCNC: 155 MG/DL (ref 65–140)
GLUCOSE SERPL-MCNC: 158 MG/DL (ref 65–140)
GLUCOSE SERPL-MCNC: 160 MG/DL (ref 65–140)
GLUCOSE SERPL-MCNC: 169 MG/DL (ref 65–140)
GLUCOSE SERPL-MCNC: 174 MG/DL (ref 65–140)
HCT VFR BLD AUTO: 43 % (ref 36.5–49.3)
HGB BLD-MCNC: 14 G/DL (ref 12–17)
MAGNESIUM SERPL-MCNC: 2.4 MG/DL (ref 1.6–2.6)
MCH RBC QN AUTO: 27.7 PG (ref 26.8–34.3)
MCHC RBC AUTO-ENTMCNC: 32.6 G/DL (ref 31.4–37.4)
MCV RBC AUTO: 85 FL (ref 82–98)
P AXIS: 40 DEGREES
PLATELET # BLD AUTO: 119 THOUSANDS/UL (ref 149–390)
PMV BLD AUTO: 10.7 FL (ref 8.9–12.7)
POTASSIUM SERPL-SCNC: 4.1 MMOL/L (ref 3.5–5.3)
PR INTERVAL: 132 MS
QRS AXIS: 19 DEGREES
QRSD INTERVAL: 80 MS
QT INTERVAL: 360 MS
QTC INTERVAL: 494 MS
RBC # BLD AUTO: 5.06 MILLION/UL (ref 3.88–5.62)
SL CV AV PEAK GRADIENT RETROGRADE: 79 MMHG
SODIUM SERPL-SCNC: 139 MMOL/L (ref 135–147)
T WAVE AXIS: 49 DEGREES
UNIT DISPENSE STATUS: NORMAL
UNIT PRODUCT CODE: NORMAL
UNIT PRODUCT VOLUME: 350 ML
UNIT RH: NORMAL
VENTRICULAR RATE: 113 BPM
WBC # BLD AUTO: 21.25 THOUSAND/UL (ref 4.31–10.16)

## 2023-02-24 RX ORDER — DOCUSATE SODIUM 100 MG/1
100 CAPSULE, LIQUID FILLED ORAL 2 TIMES DAILY
Status: DISCONTINUED | OUTPATIENT
Start: 2023-02-24 | End: 2023-02-27 | Stop reason: HOSPADM

## 2023-02-24 RX ORDER — ACETAMINOPHEN 325 MG/1
650 TABLET ORAL EVERY 6 HOURS PRN
Status: DISCONTINUED | OUTPATIENT
Start: 2023-02-24 | End: 2023-02-24

## 2023-02-24 RX ORDER — FUROSEMIDE 10 MG/ML
40 INJECTION INTRAMUSCULAR; INTRAVENOUS DAILY
Status: DISCONTINUED | OUTPATIENT
Start: 2023-02-24 | End: 2023-02-25

## 2023-02-24 RX ORDER — POTASSIUM CHLORIDE 20 MEQ/1
20 TABLET, EXTENDED RELEASE ORAL DAILY
Status: DISCONTINUED | OUTPATIENT
Start: 2023-02-24 | End: 2023-02-27 | Stop reason: HOSPADM

## 2023-02-24 RX ORDER — INSULIN LISPRO 100 [IU]/ML
1-6 INJECTION, SOLUTION INTRAVENOUS; SUBCUTANEOUS
Status: DISCONTINUED | OUTPATIENT
Start: 2023-02-24 | End: 2023-02-27 | Stop reason: HOSPADM

## 2023-02-24 RX ADMIN — MUPIROCIN 1 APPLICATION.: 20 OINTMENT TOPICAL at 08:31

## 2023-02-24 RX ADMIN — CHLORHEXIDINE GLUCONATE 15 ML: 1.2 SOLUTION ORAL at 17:49

## 2023-02-24 RX ADMIN — CHLORHEXIDINE GLUCONATE 15 ML: 1.2 SOLUTION ORAL at 08:31

## 2023-02-24 RX ADMIN — METOPROLOL TARTRATE 25 MG: 25 TABLET, FILM COATED ORAL at 08:31

## 2023-02-24 RX ADMIN — POTASSIUM CHLORIDE 20 MEQ: 1500 TABLET, EXTENDED RELEASE ORAL at 08:31

## 2023-02-24 RX ADMIN — NICARDIPINE HYDROCHLORIDE 7.5 MG/HR: 2.5 INJECTION, SOLUTION INTRAVENOUS at 04:36

## 2023-02-24 RX ADMIN — NICARDIPINE HYDROCHLORIDE 15 MG/HR: 2.5 INJECTION, SOLUTION INTRAVENOUS at 02:06

## 2023-02-24 RX ADMIN — CEFAZOLIN SODIUM 2000 MG: 2 SOLUTION INTRAVENOUS at 11:13

## 2023-02-24 RX ADMIN — DOCUSATE SODIUM 100 MG: 100 CAPSULE, LIQUID FILLED ORAL at 08:31

## 2023-02-24 RX ADMIN — ACETAMINOPHEN 975 MG: 325 TABLET ORAL at 21:32

## 2023-02-24 RX ADMIN — NICARDIPINE HYDROCHLORIDE 15 MG/HR: 2.5 INJECTION, SOLUTION INTRAVENOUS at 00:10

## 2023-02-24 RX ADMIN — ACETAMINOPHEN 975 MG: 325 TABLET ORAL at 14:45

## 2023-02-24 RX ADMIN — ACETAMINOPHEN 975 MG: 325 TABLET ORAL at 05:35

## 2023-02-24 RX ADMIN — MUPIROCIN 1 APPLICATION.: 20 OINTMENT TOPICAL at 20:25

## 2023-02-24 RX ADMIN — AMIODARONE HYDROCHLORIDE 200 MG: 200 TABLET ORAL at 21:33

## 2023-02-24 RX ADMIN — DOCUSATE SODIUM 100 MG: 100 CAPSULE, LIQUID FILLED ORAL at 17:49

## 2023-02-24 RX ADMIN — AMIODARONE HYDROCHLORIDE 200 MG: 200 TABLET ORAL at 05:35

## 2023-02-24 RX ADMIN — FONDAPARINUX SODIUM 2.5 MG: 2.5 INJECTION, SOLUTION SUBCUTANEOUS at 08:31

## 2023-02-24 RX ADMIN — PANTOPRAZOLE SODIUM 40 MG: 40 TABLET, DELAYED RELEASE ORAL at 08:31

## 2023-02-24 RX ADMIN — OXYCODONE HYDROCHLORIDE 5 MG: 5 TABLET ORAL at 21:34

## 2023-02-24 RX ADMIN — SODIUM CHLORIDE 3 UNITS/HR: 9 INJECTION, SOLUTION INTRAVENOUS at 00:19

## 2023-02-24 RX ADMIN — METOPROLOL TARTRATE 25 MG: 25 TABLET, FILM COATED ORAL at 20:25

## 2023-02-24 RX ADMIN — FUROSEMIDE 40 MG: 10 INJECTION, SOLUTION INTRAMUSCULAR; INTRAVENOUS at 08:31

## 2023-02-24 RX ADMIN — OXYCODONE HYDROCHLORIDE 5 MG: 5 TABLET ORAL at 01:27

## 2023-02-24 RX ADMIN — ASPIRIN 325 MG ORAL TABLET 325 MG: 325 PILL ORAL at 08:31

## 2023-02-24 RX ADMIN — AMIODARONE HYDROCHLORIDE 200 MG: 200 TABLET ORAL at 14:47

## 2023-02-24 RX ADMIN — CEFAZOLIN SODIUM 2000 MG: 2 SOLUTION INTRAVENOUS at 04:36

## 2023-02-24 RX ADMIN — ONDANSETRON HYDROCHLORIDE 4 MG: 2 INJECTION, SOLUTION INTRAMUSCULAR; INTRAVENOUS at 08:41

## 2023-02-24 RX ADMIN — ATORVASTATIN CALCIUM 80 MG: 80 TABLET, FILM COATED ORAL at 16:28

## 2023-02-24 NOTE — PHYSICAL THERAPY NOTE
Physical Therapy Evaluation     Patient's Name: Romayne Mccallum    Admitting Diagnosis  Nonrheumatic aortic valve stenosis [I35 0]  Coronary artery disease of native artery of native heart with stable angina pectoris ([facility] [address] ) [I25 118]    Problem List  Patient Active Problem List   Diagnosis    Marijuana use    Tobacco use    Murmur    Severe aortic stenosis    Aortic valve stenosis    Tobacco abuse    Hyperlipidemia    Coronary artery disease of native heart with stable angina pectoris (HCC)    Schizophrenia (HCC)    Bipolar 1 disorder (HCC)    S/P CABG (coronary artery bypass graft)    S/P AVR       Past Medical History  Past Medical History:   Diagnosis Date    Diabetes mellitus ([facility] [address] )     Hyperlipidemia     Hypertension     Psychiatric disorder     bipolar    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[facility] [address] )     Syncope        Past Surgical History  Past Surgical History:   Procedure Laterality Date    CARDIAC ASCENDING AORTOGRAM N/A 10/28/2022    Procedure: Cardiac ascending aortogram;  Surgeon: Araseli Stewart MD;  Location: [facility]ARD[facility] CATH LAB; Service: Cardiology    CARDIAC CATHETERIZATION Left 10/28/2022    Procedure: CARDIAC RHC/LHC; Surgeon: Araseli Stewart MD;  Location: [facility]; Service: Cardiology    CARDIAC CATHETERIZATION N/A 10/28/2022    Procedure: Cardiac Coronary Angiogram;  Surgeon: Araseli Stewart MD;  Location: [address] [place] [address] CATH LAB; Service: Cardiology    CARDIAC CATHETERIZATION Left 10/28/2022    Procedure: Cardiac Left Ventriculogram;  Surgeon: Araseli Stewart MD;  Location: [facility] C[facility];   Service: Cardiology    LAPAROSCOPIC LYSIS INTESTINAL ADHESIONS      AL RPLCMT AORTIC VALVE OPN W/STENTLESS TISSUE VALVE N/A 2/23/2023    Procedure: CORONARY ARTERY BYPASS GRAFT (CABG) 1 VESSEL GSV-->RCA, EVH RIGHT LEG,  WITH REPLACEMENT VALVE AORTIC (AVR) 25MM INSPIRIA RESILIA TISSUE VALVE;  Surgeon: Ash Sanchez MD;  Location: [facility] MAIN OR;  Service: Cardiac Surgery 02/24/23 0930   PT Last Visit   PT Visit Date 02/24/23   Note Type   Note type Evaluation  (and Tx)   Pain Assessment   Pain Assessment Tool 0-10   Pain Score No Pain   Restrictions/Precautions   Braces or Orthoses   (denies)   Other Precautions Cardiac/sternal;Impulsive;Cognitive;Multiple lines;Telemetry;O2  (CT; a-line)   Home Living   Type of Home Other (Comment)  [facility])   Home Layout One level  (no MAXWELL)   Prior Function   Level of [place] Independent with functional mobility  (amb w/o AD)   Lives With Significant other   General   Additional Pertinent History cleared for assessment (spoke to nsg)   Family/Caregiver Present Yes   Cognition   Overall Cognitive Status WFL   Arousal/Participation Cooperative   Attention Attends with cues to redirect   Orientation Level Oriented to person;Oriented to place;Oriented to situation   Memory Decreased recall of recent events;Decreased recall of precautions   Following Commands Follows one step commands without difficulty   Subjective   Subjective Alert; in the vickie; somewhat flat affect; occasionally min irritable; agreeable to mobilize; wishes to go/walk to the BR for BM and declines using BSC (when offerred);   RUE Assessment   RUE Assessment WFL  (AROM)   LUE Assessment   LUE Assessment WFL  (AROM)   RLE Assessment   RLE Assessment WFL  (AROM)   Strength RLE   RLE Overall Strength   (fair +/ good - (grossly))   LLE Assessment   LLE Assessment WFL  (AROM)   Strength LLE   LLE Overall Strength   (fair +/ good - (grossly))   Transfers   Sit to Stand 3  Moderate assistance   Additional items Assist x 1; Increased time required;Verbal cues   Stand to Sit 3  Moderate assistance   Additional items Assist x 1; Increased time required;Verbal cues   Toilet transfer 3  Moderate assistance   Additional items Assist x 1; Increased time required;Standard toilet   Ambulation/Elevation   Gait pattern Short stride; Inconsistent kerry   Gait Assistance 4  Minimal assist   Additional items Assist x 1;Verbal cues; Tactile cues  (pacing)   Assistive Device Rolling walker   Distance 12 ft  (to the BR;)   Balance   Static Sitting Fair +   Dynamic Sitting Fair   Static Standing Fair -   Dynamic Standing Poor +   Ambulatory Poor +   Activity Tolerance   Activity Tolerance Patient limited by fatigue   Medical Staff Made Aware Co-chatoal performed w/ OTR due to complexity of medical status and multiple comorbidities   Nurse Made Aware spoke to [place], [facility]   Assessment   Prognosis Good   Problem List Decreased strength; Impaired balance;Decreased mobility; Decreased cognition; Impaired judgement;Decreased safety awareness; Obesity   Assessment Pt is 56 y o  male admitted with Dx of Symptomatic severe aortic stenosis and Aortic valve insufficiency and Coronary artery disease and underwent Aortic valve replacement with a 25 mm Granados Inspiris bioprosthetic valve and Coronary artery bypass grafting x 1 with saphenous vein graft to right posterior descending artery on 2/23/2023  Pt 's comorbidities affecting POC include: Diabetes mellitus ([address] ), Hypertension, Psychiatric disorder, Schizoid personality disorder and Syncope and personal factors of: ? level of support available at home  Pt's clinical presentation is currently unstable/unpredictable which is evident in ongoing telemetry monitoring while in a critical care unit w/ a-line in place, abnormal lab values being monitored/trending, suppl O2 needs, CT in place and inability to progress further w/ mobilization at this time  Pt presents w/ postop guarding, generalized weakness, incl decreased LE strength, decreased functional endurance and activity tolerance, impaired balance w/ associated gait deviations requiring use of rw at this time and fall risk  Will cont to follow pt in PT for progressive mobilization to address above functional deficits and to max level of (I), endurance, and safety   Otherwise, anticipate pt will return home w/ available support upon D/C provided he cont improving w/ mobility skills, safety, and endurance and when medically cleared; home PT follow up is recommended at this time; will follow  Goals   Patient Goals to go to the [facility] Expiration Date 03/06/23   Short Term Goal #1 7-10 days  Pt will amb 300 ft w/ least restrictive assistive device PRN, mod (I) in order to facilitate safe return to premorbid environment and community amb status  Pt will achieve (I) level w/ bed mob in order to facilitate safety with OOB and back to bed transitions in own living environment  Pt will perform transfers w/ mod (I) to assure (I) and safety w/ functional mobility/transitions w/ all aspects of mobility/locomotion  Pt will participate in LE therex and balance activities to max progression w/ mobility skills  PT Treatment Day 0   Plan   Treatment/Interventions Functional transfer training;LE strengthening/ROM; Therapeutic exercise; Endurance training;Bed mobility;Gait training;Spoke to nursing;Spoke to case management;OT   PT Frequency 4-6x/wk   Recommendation   PT Discharge Recommendation Home with home health rehabilitation   Equipment Recommended [facility] Recommended Wheeled walker   AM-PAC Basic Mobility Inpatient   Turning in Flat Bed Without Bedrails 3   Lying on Back to Sitting on Edge of Flat Bed Without Bedrails 2   Moving Bed to Chair 2   Standing Up From Chair Using Arms 2   Walk in Room 3   Climb 3-5 Stairs With Railing 2   Basic Mobility Inpatient Raw Score 14   Basic Mobility Standardized Score 35 55   Highest Level Of Mobility   -HLM Goal 4: Move to chair/commode   JH-HLM Achieved 6: Walk 10 steps or more   Modified [place] Scale   Modified [place] Scale 4   Additional Treatment Session   Start Time 0932   End Time 4022   Treatment Assessment Additional follow up consecutive session performed to progress further w/ mobilization/ambulation distance to max overall strength and endurance and to facilitate progression w/ functional mobility skills and overall level of (I)  Sit <--> stand transfers w/ min (A)x1; amb 200 ft w/ rw, min (A)x1;   Equipment Use rw   Additional Treatment Day 1   End of Consult   Patient Position at End of Consult Bedside chair; All needs within reach         [facility],

## 2023-02-24 NOTE — CONSULTS
Consultation - Cardiology Team One  Samreen Truong 56 y o  male MRN: [id number]  Unit/Bed#: [facility] 414-01 Encounter: [id number]    Consults    Physician Requesting Consult: Ge Zepeda MD  Reason for Consult / Principal Problem: s/p CABG and AVR    Assessment:    1   CAD: s/p CABG x1 with SVG-RPDA  POD #1  · Final intraoperative JONATHON: EF 50%  · Rhythm management: Amiodarone 200 mg TID and Lopressor 25 mg BID  · Volume management: IV Lasix 40 mg daily with K+ supplement  Net output +895 mL  · Hemodynamics: Nicardipine discontinued  Remains with EPW and CTs  · Aspirin, beta-blocker and statin  2  Severe AS: s/p AVR with a 25 mm Granados Inspiris bioprosthetic valve  POD #1  3  Low normal LV function: Final intraoperative JONATHON EF 50%  Volume management as above  · Echocardiogram 9/19/2022: EF 55% with no WMA, G1DD, normal RV function, severe AS with  mmHg  4   Postoperative respiratory insufficiency: Currently on 4 L NC  5  Essential hypertension: Average /70 on Lopressor 25 mg BID  6  Hyperlipidemia: , , HDL 40,  on atorvastatin 80 mg daily  7  Type II DM: Hemoglobin A1c 5 6  Management per primary team   8   Schizophrenia  9  Bipolar disorder  10    Tobacco abuse      Plan/Recommendations:  · Continue IV Lasix for negative fluid balance  · Monitor I&O's, renal function, electrolytes and standing weight  · Maintain potassium >4 and magnesium >2  · Continue current medications  · Wean oxygen as able  · Encourage ambulation and incentive spirometry  · Follow-up with Dr Robert Hess  _________________________________________________________________________    CC: Exertional chest pain and shortness of breath      History of Present Illness   HPI: Samreen Truong is a 56y o  year old male who has severe AS, CAD with 85% stenosis of the mid LAD, essential hypertension, hyperlipidemia, type II DM, schizophrenia, bipolar disorder, tobacco abuse who follows with cardiologist Dr Vicky Melgar  Patient was found to have a murmur by his PCP in 8/2022 and underwent an echo that revealed severe AAS  He was evaluated by Dr Vicky Melgar in 9/2022 and was referred to CT surgery  He was evaluated 10/2022 for initial CT surgery evaluation and was started on aspirin and beta-blocker preoperatively  He had reported shortness of breath at rest with squeezing chest pain with exertion  He was recommended for CABG and bioprosthetic AVR  After routine preoperative testing and dental extractions patient presented 2/23/2023 and underwent CABG x1 with SVG-RPDA and AVR with a 25 mm Granados Inspiris bioprosthetic valve  Final intraoperative JONATHON revealed EF of 50% with well-seated normally functioning bioprosthetic valve with no perivalvular leak  Neurology has been consulted for postoperative co-management  Home medication regimen: Aspirin 81 mg daily    Patient resting in a chair during consultation and denies any significant incisional chest pain, shortness of breath or palpitations  He denies any lightheadedness or dizziness  Patient reports that these medications make him feel tired  Reiterated that he also had anesthesia and open heart surgery  Discussed the education for medications that he is currently taking and that they do not have a sedative effect  Cardiac catheterization 10/28/2022:  Impression:  • Mid RCA lesion is 85% stenosed  • The left ventricular systolic function is normal   • Prox LAD to Mid LAD lesion is 20% stenosed  • Dist LAD lesion is 25% stenosed  • There is a 50 mm of mercury peak to peak gradient across aortic valve which calculates to an aortic valve area of approximately 0 8 centimeter squared consistent with severe aortic stenosis  The left heart catheterization was performed through the R radial atery and right heart cath through the R femoral vein after R bracial vein access would not allow passage of the right hear catheter       Severe aortic valve stenosis  Single-vessel CAD of the RCA  Recommendation:  Aortic valve replacement and CABG x1 is indicated, class 1A indication, and recommended  The patient is advised to keep his upcoming appointment with CT surgery  The patient was advised to be compliant with aspirin and metoprolol which he has not been taking for several days  Left Anterior Descending   Prox LAD to Mid LAD lesion is 20% stenosed  ULI flow is 3  Dist LAD lesion is 25% stenosed  ULI flow is 3  The lesion is diffuse  The distal LAD is small with diffuse disease  Right Coronary Artery   Mid RCA lesion is 85% stenosed  ULI flow is 3  Intervention     No interventions have been documented  Echocardiogram 9/19/2022: EF 55% with no WMA, G1DD, normal RV function, severe AS with  mmHg  EKG reviewed personally: 2/24/2023-sinus tachycardia rate of 113 bpm with nonspecific T wave abnormality  When compared to EKG from 2/23/2023 heart rate was 75 otherwise no significant change  Telemetry reviewed personally: Sinus rhythm with rates in the 90s        Review of Systems   Constitutional: Negative  Negative for chills  Cardiovascular: Negative for chest pain, dyspnea on exertion, leg swelling, near-syncope, orthopnea, palpitations, paroxysmal nocturnal dyspnea and syncope  Respiratory: Negative  Negative for cough, shortness of breath and wheezing  Endocrine: Negative  Hematologic/Lymphatic: Negative  Skin: Negative  Musculoskeletal: Negative  Gastrointestinal: Negative  Negative for diarrhea, nausea and vomiting  Neurological: Negative for dizziness, light-headedness and weakness  Psychiatric/Behavioral: Negative  Negative for altered mental status  All other systems reviewed and are negative      Historical Information   Past Medical History:   Diagnosis Date   • Diabetes mellitus ([facility] [address] )    • Hyperlipidemia    • Hypertension    • Psychiatric disorder     bipolar   • Schizoid personality disorder [facility])    • Syncope      Past Surgical History:   Procedure Laterality Date   • CARDIAC ASCENDING AORTOGRAM N/A 10/28/2022    Procedure: Cardiac ascending aortogram;  Surgeon: Jose Haynes MD;  Location: [facility] CARDIAC CATH LAB; Service: Cardiology   • CARDIAC CATHETERIZATION Left 10/28/2022    Procedure: CARDIAC RHC/LHC; Surgeon: Jose Haynes MD;  Location: [facility] CARD[facility] C[facility]; Service: Cardiology   • CARDIAC CATHETERIZATION N/A 10/28/2022    Procedure: Cardiac Coronary Angiogram;  Surgeon: Jose Haynes MD;  Location: [address] [place] [address] CATH LAB; Service: Cardiology   • CARDIAC CATHETERIZATION Left 10/28/2022    Procedure: Cardiac Left Ventriculogram;  Surgeon: Jose Haynes MD;  Location: [facility] CARDI[facility] CATH [facility];   Service: Cardiology   • LAPAROSCOPIC LYSIS INTESTINAL ADHESIONS     • [place] RPLCMT AORTIC VALVE OPN W/STENTLESS TISSUE VALVE N/A 2/23/2023    Procedure: CORONARY ARTERY BYPASS GRAFT (CABG) 1 VESSEL GSV-->RCA, EVH RIGHT LEG,  WITH REPLACEMENT VALVE AORTIC (AVR) 25MM INSPIRIA RESILIA TISSUE VALVE;  Surgeon: Rosita Wilson MD;  Location:  MAIN OR;  Service: Cardiac Surgery     Social History     Substance and Sexual Activity   Alcohol Use Not Currently     Social History     Substance and Sexual Activity   Drug Use Yes   • Types: Marijuana     Social History     Tobacco Use   Smoking Status Every Day   • Packs/day: 0 50   • Years: 45 00   • Pack years: 22 50   • Types: Cigarettes   Smokeless Tobacco Never   Tobacco Comments    Patient states that he is trying to cut down     Family History:   Family History   Problem Relation Age of Onset   • Cancer Mother    • Hypertension Mother    • Diabetes Mother        Meds/Allergies   all current active meds have been reviewed, current meds:   Current Facility-Administered Medications   Medication Dose Route Frequency   • acetaminophen (TYLENOL) rectal suppository 650 mg  650 mg Rectal Q4H PRN   • acetaminophen (TYLENOL) tablet 975 mg  975 mg Oral Q8H [facility]   • amiodarone tablet 200 mg  200 mg Oral Q8H [facility]   • aspirin tablet 325 mg  325 mg Oral Daily   • atorvastatin (LIPITOR) tablet 80 mg  80 mg Oral Daily With Dinner   • bisacodyl (DULCOLAX) rectal suppository 10 mg  10 mg Rectal Daily PRN   • ceFAZolin (ANCEF) IVPB (premix in dextrose) 2,000 mg 50 mL  2,000 mg Intravenous Q8H   • chlorhexidine (PERIDEX) 0 12 % oral rinse 15 mL  15 mL Swish & Spit BID   • docusate sodium (COLACE) capsule 100 mg  100 mg Oral BID   • fondaparinux (ARIXTRA) subcutaneous injection 2 5 mg  2 5 mg Subcutaneous Daily   • furosemide (LASIX) injection 40 mg  40 mg Intravenous Daily   • insulin lispro (HumaLOG) 100 units/mL subcutaneous injection 1-6 Units  1-6 Units Subcutaneous TID AC   • insulin lispro (HumaLOG) 100 units/mL subcutaneous injection 1-6 Units  1-6 Units Subcutaneous HS   • metoprolol tartrate (LOPRESSOR) tablet 25 mg  25 mg Oral Q12H [facility]   • mupirocin (BACTROBAN) 2 % nasal ointment 1 application  1 application Nasal H53U [facility]   • ondansetron (ZOFRAN) injection 4 mg  4 mg Intravenous Q6H PRN   • oxyCODONE (ROXICODONE) IR tablet 2 5 mg  2 5 mg Oral Q4H PRN   • oxyCODONE (ROXICODONE) IR tablet 5 mg  5 mg Oral Q4H PRN   • pantoprazole (PROTONIX) EC tablet 40 mg  40 mg Oral Daily   • polyethylene glycol (MIRALAX) packet 17 g  17 g Oral Daily   • potassium chloride (K-DUR,KLOR-CON) CR tablet 20 mEq  20 mEq Oral Daily    and PTA meds:   Prior to Admission Medications   Prescriptions Last Dose Informant Patient Reported? Taking?   aspirin 81 mg chewable tablet 2/21/2023  No No   Sig: Chew 1 tablet (81 mg total) daily   mupirocin (BACTROBAN) 2 % ointment 2/22/2023  No Yes   Sig: Apply inside both nostrils two times per day  Start 5 days prior to surgery  Facility-Administered Medications: None          Allergies   Allergen Reactions   • Other      Pt states he is allergic to everything   States he doesn't have a list but can only take children doses of all medication         Objective   Vitals: Blood pressure 117/66, pulse 94, temperature 98 3 °F (36 8 °C), temperature source Oral, resp  rate (!) 23, height 5' 6" (1 676 m), weight 91 7 kg (202 lb 2 6 oz), SpO2 94 %  ,     Body mass index is 32 63 kg/m²  ,     Systolic (33QQE), FCH:119 , Min:117 , SHANICE:739     Diastolic (44TIB), CHASTITY:70, Min:66, Max:73    Wt Readings from Last 3 Encounters:   02/24/23 91 7 kg (202 lb 2 6 oz)   02/10/23 84 5 kg (186 lb 4 8 oz)   01/31/23 82 7 kg (182 lb 6 4 oz)      Lab Results   Component Value Date    CREATININE 1 03 02/24/2023    CREATININE 0 80 02/23/2023    CREATININE 0 84 02/13/2023             Intake/Output Summary (Last 24 hours) at 2/24/2023 1056  Last data filed at 2/24/2023 1000  Gross per 24 hour   Intake 6750 56 ml   Output 5705 ml   Net 1045 56 ml     Weight (last 2 days)     Date/Time Weight    02/24/23 0600 91 7 (202 16)    02/23/23 0605 85 1 (187 6)        Invasive Devices     Central Venous Catheter Line  Duration           CVC Central Lines 02/23/23 Triple 1 day          Peripheral Intravenous Line  Duration           Peripheral IV 08/05/19 Right Arm 1298 days    Peripheral IV 10/28/22 Right;Ventral (anterior) Forearm 119 days    Peripheral IV 02/23/23 Left Hand 1 day    Peripheral IV 02/23/23 Right Hand 1 day          Arterial Line  Duration           Arterial Line 02/23/23 Left Radial 1 day          Line  Duration           Pacer Wires <1 day    Pacer Wires <1 day          Drain  Duration           Chest Tube 1 Mediastinal;Posterior 32 Fr  1 day    Urethral Catheter Non-latex; Temperature probe 16 Fr  1 day    Chest Tube 2 Mediastinal;Anterior 32 Fr  <1 day                  Physical Exam  Vitals and nursing note reviewed  Constitutional:       General: He is not in acute distress  Appearance: He is well-developed  Comments: On 4 L NC in NAD   HENT:      Head: Normocephalic and atraumatic  Neck:      Vascular: No JVD        Comments: Invasive lines noted in right neck  Cardiovascular:      Rate and Rhythm: Normal rate and regular rhythm  Heart sounds: Normal heart sounds  No murmur heard  No friction rub  No gallop  Pulmonary:      Effort: Pulmonary effort is normal  No respiratory distress  Breath sounds: No wheezing or rales  Comments: Diminished breath sounds at the bases bilaterally  Chest:      Chest wall: No tenderness  Abdominal:      General: Bowel sounds are normal  There is no distension  Palpations: Abdomen is soft  Tenderness: There is no abdominal tenderness  Musculoskeletal:         General: No tenderness  Normal range of motion  Cervical back: Normal range of motion and neck supple  Right lower leg: Edema present  Left lower leg: Edema present  Skin:     General: Skin is warm and dry  Coloration: Skin is pale  Findings: No erythema  Comments: Sternal incision with occlusive dressing   Neurological:      Mental Status: He is alert and oriented to person, place, and time  Psychiatric:         Mood and Affect: Mood normal          Behavior: Behavior normal          Thought Content:  Thought content normal          Judgment: Judgment normal            LABORATORY RESULTS:      CBC with diff:   Results from last 7 days   Lab Units 02/24/23  0405 02/23/23 2007 02/23/23  1301 02/23/23  1253 02/23/23  1143 02/23/23  1117 02/23/23  1046 02/23/23  1038   WBC Thousand/uL 21 25*  --   --   --   --   --   --   --    HEMOGLOBIN g/dL 14 0 14 9 12 0  --   --   --   --   --    I STAT HEMOGLOBIN g/dl  --   --   --  11 9* 9 9* 10 5*  --  10 5*   HEMATOCRIT % 43 0 46 4 38 0  --   --   --   --   --    HEMATOCRIT, ISTAT %  --   --   --  35* 29* 31*  --  31*   MCV fL 85  --   --   --   --   --   --   --    PLATELETS Thousands/uL 119*  --  154  --   --   --  187  --    MCH pg 27 7  --   --   --   --   --   --   --    MCHC g/dL 32 6  --   --   --   --   --   --   --    RDW % 15 2*  --   --   --   --   --   -- --    MPV fL 10 7  --  10 7  --   --   --  10 7  --        CMP:  Results from last 7 days   Lab Units 02/24/23  0405 02/23/23 2007 02/23/23  1724 02/23/23  1301 02/23/23  1253 02/23/23  1143 02/23/23  1117 02/23/23  1038 02/23/23  0958 02/23/23  0920 02/23/23  0813   POTASSIUM mmol/L 4 1 4 3 4 9 4 2  --   --   --   --   --   --   --    CHLORIDE mmol/L 111*  --   --  116*  --   --   --   --   --   --   --    CO2 mmol/L 20*  --   --  26  --   --   --   --   --   --   --    CO2, I-STAT mmol/L  --   --   --   --  28 26 33* 35* 32 29 32   BUN mg/dL 13  --   --  15  --   --   --   --   --   --   --    CREATININE mg/dL 1 03  --   --  0 80  --   --   --   --   --   --   --    GLUCOSE, ISTAT mg/dl  --   --   --   --  119 152* 187* 182* 130 128 101   CALCIUM mg/dL 7 8*  --   --  7 5*  --   --   --   --   --   --   --    EGFR ml/min/1 73sq m 81  --   --  100  --   --   --   --   --   --   --        BMP:  Results from last 7 days   Lab Units 02/24/23  0405 02/23/23 2007 02/23/23  1724 02/23/23  1301 02/23/23  1253 02/23/23  1143 02/23/23  1117 02/23/23  1038 02/23/23  0958   POTASSIUM mmol/L 4 1 4 3 4 9 4 2  --   --   --   --   --    CHLORIDE mmol/L 111*  --   --  116*  --   --   --   --   --    CO2 mmol/L 20*  --   --  26  --   --   --   --   --    CO2, I-STAT mmol/L  --   --   --   --  28 26 33* 35* 32   BUN mg/dL 13  --   --  15  --   --   --   --   --    CREATININE mg/dL 1 03  --   --  0 80  --   --   --   --   --    GLUCOSE, ISTAT mg/dl  --   --   --   --  119 152* 187* 182* 130   CALCIUM mg/dL 7 8*  --   --  7 5*  --   --   --   --   --           No results found for: NTBNP         Results from last 7 days   Lab Units 02/24/23  0405   MAGNESIUM mg/dL 2 4     Lipid Profile:   No results found for: CHOL  Lab Results   Component Value Date    HDL 40 02/13/2023    HDL 40 08/29/2022     Lab Results   Component Value Date    LDLCALC 172 (H) 02/13/2023    LDLCALC 167 (H) 08/29/2022     Lab Results   Component Value Date TRIG 237 (H) 02/13/2023    TRIG 157 (H) 08/29/2022         Cardiac testing:   No results found for this or any previous visit  No results found for this or any previous visit  No valid procedures specified  No results found for this or any previous visit  Imaging: I have personally reviewed pertinent reports  JONATHON Anesthesia    Result Date: 2/23/2023  Narrative: Shaila Myrick MD     2/23/2023 12:48 PM Procedure Performed: JONATHON Anesthesia Start Time:  2/23/2023 8:52 AM Preanesthesia Checklist Patient identified, IV assessed, risks and benefits discussed, monitors and equipment assessed, procedure being performed at surgeon's request and anesthesia consent obtained  Procedure Diagnostic Indications for JONATHON:  assessment of ascending aorta and hemodynamic monitoring  Type of JONATHON: interventional JONATHON with real time guidance of intracardiac procedure  Images Saved: ultrasound permanent image saved  Physician Requesting Echo: Wendy Barnard MD   Location performed: OR  Intubated  Bite block not placed  Heart visualized  Insertion of JONATHON Probe:  Atraumatic  Probe Type:  Multiplane  Modalities:  3D, color flow mapping, continuous wave Doppler and pulse wave Doppler  Echocardiographic and Doppler Measurements PREPROCEDURE LEFT VENTRICLE: Systolic Function: normal  Ejection Fraction: 50%  Cavity size: normal    Regional Wall Motion Abnormalities: mild HK of inferior wall at mid views ofLV  RIGHT VENTRICLE: Systolic Function: normal   Cavity size normal  No hypertrophy  AORTIC VALVE: Leaflets: trileaflet  Leaflet motions restricted and calcifications along RCC, LCC  Stenosis: severe  Mean Gradient: 17 mmHg  Peak Gradient: 35 mmHg  Area: 0 8 cm²  Regurgitation: mild-mod AI and mild  Pressure Half-time: 330 ms  MITRAL VALVE: Leaflets: normal  Leaflet Motions: normal  Regurgitation: trace  Stenosis: none  TRICUSPID VALVE: Leaflets: normal  Leaflet Motions: normal  Stenosis: none   Regurgitation: trace  PULMONIC VALVE: Leaflets: normal  Regurgitation: trace  Stenosis: none  ASCENDING AORTA: Size:  normal   Dissection not present  AORTIC ARCH: Size:  normal   dissection not present  Grade 3: atheroma protruding < 0 5 cm into lumen  DESCENDING AORTA: Size: normal   Dissection not present  Grade 3: atheroma protruding < 0 5 cm into lumen  RIGHT ATRIUM: Size:  normal  No spontaneous echo contrast  LEFT ATRIUM: Size: normal  No spontaneous echo contrast  LEFT ATRIAL APPENDAGE: Size: normal  No spontaneous echo contrast ATRIAL SEPTUM: Intra-atrial septal morphology: lipomatous hypertrophy and normal   VENTRICULAR SEPTUM: Intra-ventricular septum morphology: normal  EPIAORTIC: Plaque Thickness: 0-5 mm  OTHER FINDINGS: Pericardium:  normal  Pleural Effusion:  none  POSTPROCEDURE LEFT VENTRICLE: Unchanged   Systolic Function: normal  Ejection Fraction: 50 %  Cavity Size: dilated  Regional Wall Motion Abnormalities: interval improvement in inferior wall motion abnormalities  RIGHT VENTRICLE: Unchanged   Systolic Function: normal  Cavity Size: normal  AORTIC VALVE: Leaflets: #25 mm bioprosthetic aortic valve, well seated, no perivalvular leaks, no AI noted and bioprosthetic  Stenosis: none  Mean Gradient: 4 (7mmHg before chest closure, 4 mm Hg after chest closed) mmHg  Regurgitation: none  Valve Size: 25 mm  MITRAL VALVE: Unchanged   Leaflets: native  Regurgitation: trace  Stenosis: none  TRICUSPID VALVE: Unchanged   Leaflets: native  Regurgitation: trace  Stenosis: none  PULMONIC VALVE: Unchanged Leaflets: native  Regurgitation: trace  Stenosis: none  ATRIA: Unchanged   Left Atrial Appendage Ligate: No  Residual Flow in Left Atrial Appendage by Color Flow Doppler: No   AORTA: Unchanged   Dissection: Dissection not present  REMOVAL: Probe Removal: atraumatic  ECHOCARDIOGRAM COMMENTS: Pre CPB: AV annulus 25 mm, SoV 33 mm  STJ 38 mm Post CPB: well seated #25 mm bioprosthetic aortic valve   No perivalvular leaks, no AI noted  Mean grad 4 mmHg upon chest closure   XR chest portable ICU    Result Date: 2/23/2023  Narrative: CHEST INDICATION:   S/P open heart  COMPARISON:  Chest CT 10/24/2022  EXAM PERFORMED/VIEWS:  XR CHEST PORTABLE ICU FINDINGS:  Right jugular catheter in right brachiocephalic vein  Right jugular pulmonary artery catheter in right pulmonary artery  ET tube 5 cm above the rome  NG tube in stomach  Normal heart size  CABG, AVR  Sternal wires well aligned  Temporary epicardial pacemaker wires  Two mediastinal drains  The lungs are clear  No pneumothorax or pleural effusion  Osseous structures appear within normal limits for patient age  Impression: Expected appearance after cardiac surgery  Workstation performed: [id number]     JONATHON intraop interventional w/realtime guidance of cardiac procedures    Result Date: 2/24/2023  Narrative: This order contains the linked images for the JONATHON that was performed by the Anesthesiologist   Please see the  CARDIAC JONATHON ANESTHESIA procedure for results  Counseling / Coordination of Care  Total floor / unit time spent today 45 minutes  Greater than 50% of total time was spent with the patient and / or family counseling and / or coordination of care  A description of the counseling / coordination of care: Review of history, current assessment, development of a plan  Code Status: Level 1 - Full Code    ** Please Note: Dragon 360 Dictation voice to text software may have been used in the creation of this document   **

## 2023-02-24 NOTE — OCCUPATIONAL THERAPY NOTE
Occupational Therapy Evaluation and Treatment       Patient Name: Judy ARIASX Date: 2/24/2023  Problem List  Principal Problem:    S/P AVR  Active Problems:    Marijuana use    Tobacco use    Aortic valve stenosis    Hyperlipidemia    Coronary artery disease of native heart with stable angina pectoris (HCC)    Schizophrenia (HCC)    Bipolar 1 disorder (HCC)    S/P CABG (coronary artery bypass graft)    Past Medical History  Past Medical History:   Diagnosis Date    Diabetes mellitus ([facility] )     Hyperlipidemia     Hypertension     Psychiatric disorder     bipolar    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[facility] )     Syncope      Past Surgical History  Past Surgical History:   Procedure Laterality Date    CARDIAC ASCENDING AORTOGRAM N/A 10/28/2022    Procedure: Cardiac ascending aortogram;  Surgeon: Glynn Pfeiffer MD;  [facility]: [facility]; Service: Cardiology    CARDIAC CATHETERIZATION Left 10/28/2022    Procedure: CARDIAC RHC/LHC; Surgeon: Glynn Pfeiffer MD;  [facility]: [facility]; Service: Cardiology    CARDIAC CATHETERIZATION N/A 10/28/2022    Procedure: Cardiac Coronary Angiogram;  Surgeon: Glynn Pfeiffer MD;  Location: [address] [place] [address] CATH LAB; Service: Cardiology    CARDIAC CATHETERIZATION Left 10/28/2022    Procedure: Cardiac Left Ventriculogram;  Surgeon: Glynn Pfeiffer MD;  Location: [facility];   Service: Cardiology    LAPAROSCOPIC LYSIS INTESTINAL ADHESIONS      MD RPLCMT AORTIC VALVE OPN W/STENTLESS TISSUE VALVE N/A 2/23/2023    Procedure: CORONARY ARTERY BYPASS GRAFT (CABG) 1 VESSEL GSV-->RCA, EVH RIGHT LEG,  WITH REPLACEMENT VALVE AORTIC (AVR) 25MM INSPIRIA RESILIA TISSUE VALVE;  Surgeon: Joyce Leon MD;  Location: [facility];  Service: Cardiac Surgery             02/24/23 0943   OT Last Visit   OT Visit Date 02/24/23   Note Type   Note type Evaluation   Pain Assessment   Pain Assessment Tool 0-10   Pain Score 3   Pain Location/Orientation Location: Chest;Orientation: Mid;Location: Incision   Restrictions/Precautions   Weight Bearing Precautions Per Order No   Other Precautions Cardiac/sternal;Impulsive;Multiple lines;Telemetry;O2;Fall Risk;Pain  (4L of 02, adeola, CT)   Home Living   Type of Home Other (Comment)  (hotel)   Home Layout One level;Performs ADLs on one level  (0 MAXWELL)   Bathroom Shower/Tub Tub/shower unit   Bathroom Toilet Standard   Bathroom Equipment Grab bars in shower   [address] Other (Comment)  (denies)   Additional Comments Pt lives on the first floor of a hotel with 0 MAXWELL and remains on one floor with no steps inside  Prior Function   Level of [place] Independent with ADLs; Independent with functional mobility; Independent with IADLS   Lives With Significant other  (girlfriend)   Receives Help From Family   IADLs Independent with meal prep; Independent with medication management; Family/Friend/Other provides transportation   Falls in the last 6 months 0   Vocational Retired   Lifestyle   Autonomy Pt reports being I in ADLs, IADLs, and does not use DME at baseline  (-)   Reciprocal Relationships Pt lives with his girlfriend who is able to A if needed  Service to Others Pt is retired/not currently working  Intrinsic Gratification Pt enjoys keeping busy     General   Family/Caregiver Present Yes   Subjective   Subjective "I really need to use the restroom"   ADL   Where Assessed Chair   Eating Assistance 5  Supervision/Setup   Grooming Assistance 5  Supervision/Setup   UB [address] 4  Minimal Assistance   LB Bathing Assistance 4  [address] 4  [address]  3  Moderate [address] 4  Minimal Assistance   Bed Mobility   Additional Comments unable to assess, pt was OOB and in chair upon arrival  Pt was left sitting in the chair with all necessary items within reach at the end of the session  Transfers   Sit to Stand 3  Moderate assistance   Additional items Assist x 1; Increased time required   Stand to Sit 3  Moderate assistance   Additional items Assist x 1; Increased time required   Additional Comments progressed to min Ax1 during 2nd trial    Functional Mobility   Functional Mobility 4  Minimal assistance   Additional Comments Pt was able to demonstrate household mobility with min Ax1 and RW  Pt requires vc'ing t/o session for safety awareness  Additional items Rolling walker   Balance   Static Sitting Fair +   Dynamic Sitting Fair +   Static Standing Fair   Dynamic Standing Fair -   Ambulatory Poor +   Activity Tolerance   Activity Tolerance Patient limited by fatigue;Patient limited by pain   Medical Staff Made Aware Seen with PT 2* medical complexity  Nurse Made Aware RN made aware   RUE Assessment   RUE Assessment WFL   LUE Assessment   LUE Assessment WFL   Hand Function   Gross Motor Coordination Functional   Fine Motor Coordination Functional   Psychosocial   Psychosocial (WDL) WDL   Cognition   Overall Cognitive Status WFL   Arousal/Participation Alert; Responsive;Arousable; Cooperative   Attention Within functional limits   Orientation Level Oriented X4   Memory Within functional limits   Following Commands Follows one step commands with increased time or repetition   Comments Pt was pleasant and cooperative t/o session  Pt was educated on cardiac precautions and was given cardiac packet  Assessment   Limitation Decreased ADL status; Decreased endurance;Decreased high-level ADLs; Decreased UE ROM; Decreased UE strength   Prognosis Fair   Assessment Pt is 56 y o  male admitted [facility] on [facility]/2023 w/ symptomatic aortic stenosis and aortic valve insufficiency  Pt received aortic valve replacement and CABGx1 on 2/23/2023   Pt  has a past medical history of Diabetes mellitus ([facility] ), Hyperlipidemia, Hypertension, Psychiatric disorder, 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[facility] ), and Syncope    Pt with active OT orders and activity orders  Pt resides in a first floor hotel room with 0 MAXWELL and no steps inside  Pt lives with his girlfriend who is able to A if needed  Pt was I w/ ADLs, IADLs, (-) drove  Pt is currently functioning at S for grooming and eating, min A for UB and LB ADLs, and mod A for toilet-ing  Pt participated in a treatment session, pt was able to demonstrate toilet transfers with mod Ax1 and was able to ambulate into the BR with min Ax1 and RW  Pt able to demonstrate transfers with mod Ax1 progressing to min Ax1  Pt was able to demonstrate functional mobility with min Ax1 with RW  Pt is limited 2* pain, endurance, activity tolerance, functional mobility, functional standing tolerance, decreased I w/ ADLS/IADLS and decreased safety awareness  The Areas of Occupational Performance Areas to address w/ pt include: eating, grooming, bathing/shower, toilet hygiene, dressing, health maintenance and functional mobility  Based off of an OT evaulation, assessment, and performance functioning, pt is identified as a high complexity  The patient's raw score on the AM-PAC Daily Activity Inpatient Short Form is 19  A raw score of greater than or equal to 19 suggests the patient may benefit from discharge to home  Please refer to the recommendation of the Occupational Therapist for safe discharge planning  OT recommendation for d/c includes Home with increased social support  Pt would benefit from continued acute OT services for  3-5x/week to  w/in 10-14 days:  to address functional goals  Goals   Patient Goals to go home   LTG Time Frame 10-14   Long Term Goal see goals below   Plan   Treatment Interventions ADL retraining;UE strengthening/ROM; Functional transfer training; Endurance training;Equipment evaluation/education; Compensatory technique education;Continued evaluation;Cardiac education; Energy conservation; Activityengagement   Goal Expiration Date 03/10/23   OT Frequency 3-5x/wk   Recommendation   OT Discharge Recommendation No rehabilitation needs  (home with increased social support)   AM-PAC Daily Activity Inpatient   Lower Body Dressing 2   Bathing 3   Toileting 3   Upper Body Dressing 3   Grooming 4   Eating 4   Daily Activity Raw Score 19   Daily Activity Standardized Score (Calc for Raw Score >=11) 40 22   AM-PAC Applied Cognition Inpatient   Following a Speech/Presentation 4   Understanding Ordinary Conversation 4   Taking Medications 4   Remembering Where Things Are Placed or Put Away 4   Remembering List of 4-5 Errands 4   Taking Care of Complicated Tasks 4   Applied Cognition Raw Score 24   Applied Cognition Standardized Score 62 21   Additional Treatment Session   Start Time 0931   End Time 7600   Treatment Assessment Pt also participated in a treatment session today  Pt was able to complete toilet transfers with mod Ax1  Pt was able to ambulate into the BR with min Ax1  Pt required vc'ing t/o session for safety awareness  End of Consult   Education Provided Yes;Family or social support of family present for education by provider   Patient Position at End of Consult Bedside chair; All needs within reach   Nurse Communication Nurse aware of consult     Pt w/ mod I will complete daily grooming tasks w/ adaptive equipment as needed  Pt will increase standing tolerance to 10 mins w/ mod I w/ adaptive equipment as needed  Pt will complete functional transfers w/ mod I on and off all surfaces w/ equipment as needed  Pt will complete functional mobility w/ mod I on and off all surfaces w/ equipment as needed  Pt will complete tolieting w/ mod I while demonstrating safety techniques w/ DME  Pt will complete feeding tasks w/ mod I using adaptive devices  Pt will demonstrate G safety awareness w/ mod I during OT session  Pt will demonstrate LE body dressing w/ mod I w/ adaptive equipment as needed       Pt will demonstrate UE body dressing w/ mod I w/ adaptive equipment as needed  Pt will increase activity tolerance to G during a 30 min OT tx session  Pt will demonstrate bed mobility skills w/ mod I  Pt will complete IADLs tasks w/ mod I  Pt will participate in a formal/functional cognitive assessment as needed to assist with safe discharge planning  Pt will participate in cardiac packet and will I'ly demonstrate G understanding w/ adherence to precautions of cardiac packet while performing functional tasks w/in OT session         Kishore Scherer OTR/CAMILA

## 2023-02-24 NOTE — CASE MANAGEMENT
Case Management Discharge Planning Note    Patient name Judy Esparza  [facility] [id number]  : 1965 Date 2023       Current Admission Date: 2023  Current Admission Diagnosis:S/P AVR   Patient Active Problem List    Diagnosis Date Noted   • Schizophrenia ([facility] ) 2023   • Bipolar 1 disorder ([facility] ) 2023   • S/P CABG (coronary artery bypass graft) 2023   • S/P AVR 2023   • Hyperlipidemia 12/15/2022   • Coronary artery disease of native heart with stable angina pectoris ([facility] ) 12/15/2022   • Aortic valve stenosis    • Tobacco abuse    • Severe aortic stenosis 2022   • Marijuana use 2022   • Tobacco use 2022   • Murmur 2022      LOS (days): 1  Geometric Mean LOS (GMLOS) (days): 8 90  Days to GMLOS:7 5     OBJECTIVE:  Risk of Unplanned Readmission Score: 13 68         Current admission status: Inpatient   Preferred Pharmacy:   [address] - [facility] [address] [place] [address]  Phone: [phone] Fax: [phone]    Primary Care Provider: EDWARDO Madrid    Primary Insurance: [address] [place] [address]  Secondary Insurance:     DISCHARGE DETAILS:                           Contacts  Reason/Outcome: Continuity of Care, Emergency Contact, Discharge [address]         Is the patient interested in [facility] at discharge?: Yes  [address] requested[de-identified] Nursing, Medical Social Work, Physical [address] Name[de-identified] [facility] Provider[de-identified] PCP  Home Health Services Needed[de-identified] Post-Op Care and Assessment, Evaluate Functional Status and Safety, Gait/ADL Training, Strengthening/Theraputic Exercises to Improve Function, Other (comment)         Other Referral/Resources/Interventions Provided:  Interventions: [facility]  Referral Comments: [facility] referrals made, only accepting was [facility], pt agrees, reserved            Discharge Destination Plan[de-identified] Home with [place] at Discharge : Other (Comment) (LYFT)                             IMM Given (Date)::  (NA)        Additional Comments: Accepted by [address] [place], [place] [address]vd  Sig  other given Lyft ride home as [facility] brought her here, informed we cannot transport her again  Pt can [place] in [place]

## 2023-02-24 NOTE — PROGRESS NOTES
Progress Note - Critical Care   [place] Trice 56 y o  male MRN: [id number]  Unit/Bed#: [facility] 414-01 Encounter: [id number]      Attending Physician: Nya Arceo MD    24 Hour Events/HPI: POD # 1 s/p CABG x 1, tissue AVR  Extubated without difficulty  Received 1L LR  Started on cardene for hypertension, currently on 5 mcg/hr  CI > 2 2 overnight - delined this AM      ROS: Review of Systems   Respiratory: Negative for shortness of breath  Cardiovascular: Negative for chest pain  Gastrointestinal: Negative for abdominal pain and nausea  ---------------------------------------------------------------------------------------------------------------------------------------------------------------------  Impressions:  1  Severe AS, mild AI s/p AVR  2  CAD s/p CABG  3  HPTN  4  HLD  5  DM  6  Schizophrenia  7  Bipolar disorder  8  Tobacco abuse  9  Marijuana Abuse    Plan:    Neuro:   · Pain controlled with: Tylenol 975 mg PO q8h, PRN oxycodone/dilaudid  · Regulate sleep/wake cycle  · Delirium precautions  · CAM-ICU daily  · Trend neuro exam    CV:   · Cardiac infusions: Cardene, 5 mg/hour  · Wean cardene as able  · MAP goal > 65 and CI >2 2  · Discontinue PA catheter  · Keep arterial line  · Rhythm: Sinus Tachycardia  · Follow rhythm on telemetry  · Lopressor 25 mg PO BID  · Maintain epicardial pacing wires for pacing PRN  ·  mg QD  · Statin: Lipitor 80mg qHS  · Amiodarone 200 mg PO q8 hours     Lung:   · Acute post-op pulmonary insufficiency; Requiring 4 liters via nasal cannla, secondary to poor inspiratory effort secondary to pain  Continue incentive spirometry/coughing/deep breathing exercises    Wean supplemental oxygen as tolerated for saturation > 90%  · Chest tube output remains persistently high; Continue chest tubes to suction today    GI:   · Continue PPI for stress ulcer prophylaxis  · Continue bowel regimen  · Trend abdominal exam and bowel function    FEN:   · Diuretic plan: Lasix 40 mg IV q QD  · K-dur 20 mEQ PO q QD  · Nutrition/diet plan: Cardiac diet with 1800 ml fluid restriction  · Replenish electrolytes with goals: K >4 0, Mag >2 0, and Phos >3 0    :   · Indwelling Smith present: yes   · Discontinue Smith  · Trend UOP and BUN/creat  · Strict I and O    ID:   · Trend temps and WBC count  · Maintain normothermia        Heme:   · Trend hgb and plts    Endo:   · Glycemic control plan: Glucose well-controlled  Discontinue continuous insulin infusion and add Insulin sliding scale coverage    Results from last 6 Months   Lab Units 02/13/23  1218 08/29/22  1430   HEMOGLOBIN A1C % 5 6 5 7*     MSK/Skin:  · Mobility goal: OOB to chair  · PT consult: yes  · OT consult: yes  · Frequent turning and pressure off-loading  · Local wound care as needed    Disposition:  Transfer to telemetry  ---------------------------------------------------------------------------------------------------------------------------------------------------------------------  Allergies: Allergies   Allergen Reactions   • Other      Pt states he is allergic to everything   States he doesn't have a list but can only take children doses of all medication         Medications:     VTE Pharmacologic Prophylaxis: Fondaparinux (Arixtra)  VTE Mechanical Prophylaxis: sequential compression device    Scheduled Meds:   acetaminophen, 975 mg, Q8H [facility]  amiodarone, 200 mg, Q8H LUIS  aspirin, 325 mg, Daily  atorvastatin, 80 mg, Daily With Dinner  cefazolin, 2,000 mg, Q8H  chlorhexidine, 15 mL, BID  fondaparinux, 2 5 mg, Daily  mupirocin, 1 application, F74Q LUIS  pantoprazole, 40 mg, Daily  polyethylene glycol, 17 g, Daily       Continuous Infusions:  dexmedetomidine, 0 1-0 7 mcg/kg/hr, Last Rate: Stopped (02/23/23 1729)  insulin regular (HumuLIN R,NovoLIN R) infusion, 0 3-21 Units/hr, Last Rate: 1 5 Units/hr (02/24/23 0612)  niCARdipine, 2 5-15 mg/hr, Last Rate: 5 mg/hr (02/24/23 0623)  sodium chloride, 20 mL/hr, Last Rate: 20 mL/hr (23 0000)      PRN Meds:  acetaminophen, 650 mg, Q4H PRN  bisacodyl, 10 mg, Daily PRN  furosemide, 40 mg, Q6H PRN  HYDROmorphone, 0 5 mg, Q2H PRN  lidocaine (cardiac), 100 mg, Q30 Min PRN  ondansetron, 4 mg, Q6H PRN  oxyCODONE, 2 5 mg, Q4H PRN  oxyCODONE, 5 mg, Q4H PRN  potassium chloride, 20 mEq, Once PRN  potassium chloride, 20 mEq, Q1H PRN  potassium chloride, 20 mEq, Q30 Min PRN      ---------------------------------------------------------------------------------------------------------------------------------------------------------------------  Vitals:   Vitals:    23 0300 23 0400 23 0500 23 0600   BP:       Pulse: (!) 116 (!) 116 (!) 110 102   Resp: (!) 31 (!) 29 (!) 28 (!) 24   Temp:  99 7 °F (37 6 °C)     TempSrc:  Core     SpO2: 92% 92% 93% 94%   Weight:    91 7 kg (202 lb 2 6 oz)   Height:         Arterial Line:  Arterial Line BP: 122/53  Arterial Line MAP (mmHg): 71 mmHg    Tele Rhythm: Sinus Tachycardia (This was personally reviewed by myself )    Respiratory:  SpO2: SpO2: 94 %  SpO2 Device: O2 Device: Nasal cannula  Nasal Cannula O2 Flow Rate (L/min): 4 L/min    Temperature: Temp (24hrs), Av 1 °F (36 7 °C), Min:96 3 °F (35 7 °C), Max:99 7 °F (37 6 °C)  Current: Temperature: 99 7 °F (37 6 °C)    Weights:   Weight (last 2 days)     Date/Time Weight    23 0600 91 7 (202 16)    23 0605 85 1 (187 6)        Body mass index is 32 63 kg/m²  Hemodynamic Monitoring:  PAP: PAP: 33/11  CVP: CVP (mean): 11 mmHg  CO: CO (L/min): 8 3 L/min  CI: CI (L/min/m2): 4 2 L/min/m2  SVR: SVR (dyne*sec)/cm5: 544 (dyne*sec)/cm5    Intake and Outputs:    Intake/Output Summary (Last 24 hours) at 2023 0635  Last data filed at 2023 0600  Gross per 24 hour   Intake 6546 25 ml   Output 5095 ml   Net 1451 25 ml     I/O last 24 hours: In: 6546 3 [I V :5146 3; IV Piggyback:150]  Out: 9713 [Urine:3300; Blood:1250;  Chest Tube:545]    UOP: 50 ml/hour     Chest tube Output:  Mediastinal tubes: 260 mL/8 hours  545 mL/24 hours          Labs:  Results from last 7 days   Lab Units 02/24/23  0405 02/23/23 2007 02/23/23  1301 02/23/23  1117 02/23/23  1046   WBC Thousand/uL 21 25*  --   --   --   --    HEMOGLOBIN g/dL 14 0 14 9 12 0  --   --    I STAT HEMOGLOBIN   --   --   --    < >  --    HEMATOCRIT % 43 0 46 4 38 0  --   --    HEMATOCRIT, ISTAT   --   --   --    < >  --    PLATELETS Thousands/uL 119*  --  154  --  187    < > = values in this interval not displayed  Results from last 7 days   Lab Units 02/24/23  0405 02/23/23 2007 02/23/23  1724 02/23/23  1301 02/23/23  1301 02/23/23  1253 02/23/23  1143 02/23/23  1117   SODIUM mmol/L 139  --   --   --  143  --   --   --    POTASSIUM mmol/L 4 1 4 3 4 9   < > 4 2  --   --   --    CHLORIDE mmol/L 111*  --   --   --  116*  --   --   --    CO2 mmol/L 20*  --   --   --  26  --   --   --    CO2, I-STAT mmol/L  --   --   --   --   --  28 26 33*   BUN mg/dL 13  --   --   --  15  --   --   --    CREATININE mg/dL 1 03  --   --   --  0 80  --   --   --    CALCIUM mg/dL 7 8*  --   --   --  7 5*  --   --   --    GLUCOSE, ISTAT mg/dl  --   --   --   --   --  119 152* 187*    < > = values in this interval not displayed  Baseline Creat: 0 8    Results from last 7 days   Lab Units 02/24/23  0405   MAGNESIUM mg/dL 2 4       Micro:   Blood Culture: No results found for: BLOODCX  Urine Culture: No results found for: URINECX  Sputum Culture: No components found for: SPUTUMCX  Wound Culure: No results found for: WOUNDCULT    Diagnostic Studies:  02/24/23 CXR: Lines and tubes in appropriate position  This was personally reviewed by myself in PACS   02/24/23 EKG: Sinus tachycardia  This was personally reviewed by myself       Nutrition:        Diet Orders   (From admission, onward)             Start     Ordered    02/24/23 0000  Diet Cardiovascular; Cardiac; Fluid Restriction 1800 ML  Diet effective 0500        References:    Nutrtion Support Algorithm Enteral vs  Parenteral   Question Answer Comment   Diet Type Cardiovascular    Cardiac Cardiac    Other Restriction(s): Fluid Restriction 1800 ML    RD to adjust diet per protocol? Yes        23 1237              Physical Exam  HENT:      Head: Atraumatic  Mouth/Throat:      Mouth: Mucous membranes are moist    Cardiovascular:      Rate and Rhythm: Regular rhythm  Tachycardia present  Pulses:           Radial pulses are 2+ on the right side and 2+ on the left side  Dorsalis pedis pulses are 2+ on the right side and 2+ on the left side  Pulmonary:      Effort: No respiratory distress  Breath sounds: Rhonchi present  No wheezing or rales  Abdominal:      General: Bowel sounds are decreased  There is distension  Palpations: Abdomen is soft  Tenderness: There is no abdominal tenderness  Genitourinary:     Comments: + delgadillo  Musculoskeletal:      Cervical back: Neck supple  Right lower le+ Pitting Edema present  Left lower le+ Pitting Edema present  Skin:     General: Skin is warm and dry  Capillary Refill: Capillary refill takes less than 2 seconds  Neurological:      General: No focal deficit present  Mental Status: He is alert and oriented to person, place, and time  Invasive lines and devices:   Invasive Devices     Central Venous Catheter Line  Duration           CVC Central Lines 23 Triple <1 day          Peripheral Intravenous Line  Duration           Peripheral IV 19 Right Arm 1298 days    Peripheral IV 10/28/22 Right;Ventral (anterior) Forearm 118 days    Peripheral IV 23 Left Hand <1 day    Peripheral IV 23 Right Hand <1 day          Arterial Line  Duration           Arterial Line 23 Left Radial <1 day          Line  Duration           Pacer Wires <1 day    Pacer Wires <1 day          Drain  Duration           Chest Tube 1 Mediastinal;Posterior 32 Fr  <1 day    Chest Tube 2 Mediastinal;Anterior 32 Fr  <1 day    Urethral Catheter Non-latex; Temperature probe 16 Fr  <1 day              ---------------------------------------------------------------------------------------------------------------------------------------------------------------------  Code Status: Level 1 - Full Code    Care Time Delivered:   No Critical Care time spent     Collaborative bedside rounds performed with cardiac surgery attending, critical care attending and bedside RN      SIGNATURE: Peewee Salcedo PA-C  DATE: February 24, 2023  TIME: 6:35 AM

## 2023-02-24 NOTE — UTILIZATION REVIEW
Initial Clinical Review    Elective Inpatient surgical procedure  Age/Sex: 56 y o  male  Surgery Date: 02/23/2023  Procedure:   1  Aortic valve replacement with a 25 mm Granados Inspiris bioprosthetic valve  2  Coronary artery bypass grafting x 1 with saphenous vein graft to right posterior descending artery  Anesthesia: General endotracheal anesthesia with transesophageal echocardiogram guidance    POD#1 Progress Note: s/p CABG x 1, tissue AVR  Transferred to ICU post op intubated, sedated on vasopressors  Extubated without difficulty  Received 1L LR  Started on cardene for hypertension, currently on 5 mcg/hr  CI > 2 2 overnight - delined this AM  On exam, aaox3, tachycardia, rhonchi, decreased bowel sounds, abdominal distension, +delgadillo, +1 b/l LE pitting edema present  On 4L NC  On sinus rhythm on tele  Plan: Cont Cardene gtt, wean as able  MAP goal > 65 and CI >2 2  Keep [place]  Maintain epicardial pacing wires for pacing prn  Telemetry  Lopressor  ASA, statin, amiodarone  Chest tube output remains persistently high; Continue chest tubes to suction today  Wean supplemental O2 as keith for O2 sat >90%  Lasix 40 mg IV QD  Kdur 20 mEq QD  Replenish electrolytes with goals: K >4 0, Mag >2 0, and Phos >3 0  DC delgadillo  Trend UOP, BUN/Cr  Strict I/O  DC insulin gtt - glucose well controlled  PT/OT  LWC      Admission Orders: Date/Time/Statement:   Admission Orders (From admission, onward)     Ordered        02/23/23 0722  Inpatient Admission  Once                      Orders Placed This Encounter   Procedures   • Inpatient Admission     Standing Status:   Standing     Number of Occurrences:   1     Order Specific Question:   Level of Care     Answer:   Critical Care [15]     Order Specific Question:   Estimated length of stay     Answer:   More than 2 Midnights     Order Specific Question:   Certification     Answer:   I certify that inpatient services are medically necessary for this patient for a duration of greater than two midnights  See H&P and MD Progress Notes for additional information about the patient's course of treatment  Vital Signs: /70   Pulse 104   Temp 98 3 °F (36 8 °C) (Oral)   Resp (!) 24   Ht 5' 6" (1 676 m)   Wt 91 7 kg (202 lb 2 6 oz)   SpO2 94%   BMI 32 63 kg/m²     Pertinent Labs/Diagnostic Test Results:   XR chest portable ICU   Final Result by Sophy French MD (02/23 1653)      Expected appearance after cardiac surgery  Workstation performed: [id number]         XR chest portable    (Results Pending)         Results from last 7 days   Lab Units 02/24/23  0405 02/23/23 2007 02/23/23  1301 02/23/23  1253 02/23/23  1143 02/23/23  1117 02/23/23  1046   WBC Thousand/uL 21 25*  --   --   --   --   --   --    HEMOGLOBIN g/dL 14 0 14 9 12 0  --   --   --   --    I STAT HEMOGLOBIN g/dl  --   --   --  11 9* 9 9*   < >  --    HEMATOCRIT % 43 0 46 4 38 0  --   --   --   --    HEMATOCRIT, ISTAT %  --   --   --  35* 29*   < >  --    PLATELETS Thousands/uL 119*  --  154  --   --   --  187    < > = values in this interval not displayed           Results from last 7 days   Lab Units 02/24/23  0405 02/23/23 2007 02/23/23  1724 02/23/23  1301 02/23/23  1253 02/23/23  1143 02/23/23  1117 02/23/23  1038 02/23/23  0958   SODIUM mmol/L 139  --   --  143  --   --   --   --   --    POTASSIUM mmol/L 4 1 4 3 4 9 4 2  --   --   --   --   --    CHLORIDE mmol/L 111*  --   --  116*  --   --   --   --   --    CO2 mmol/L 20*  --   --  26  --   --   --   --   --    CO2, I-STAT mmol/L  --   --   --   --  28 26 33* 35* 32   ANION GAP mmol/L 8  --   --  1*  --   --   --   --   --    BUN mg/dL 13  --   --  15  --   --   --   --   --    CREATININE mg/dL 1 03  --   --  0 80  --   --   --   --   --    EGFR ml/min/1 73sq m 81  --   --  100  --   --   --   --   --    CALCIUM mg/dL 7 8*  --   --  7 5*  --   --   --   --   --    CALCIUM, IONIZED, ISTAT mmol/L  --   --   --   --  1 19 1 19 1 23 1 06* 1 04* MAGNESIUM mg/dL 2 4  --   --   --   --   --   --   --   --          Results from last 7 days   Lab Units 02/24/23  0842 02/24/23  0611 02/24/23  0404 02/24/23  0209 02/24/23  0016 02/23/23 2002 02/23/23  1725 02/23/23  1452 02/23/23  1251 02/23/23  0634   POC GLUCOSE mg/dl 169* 130 155* 158* 174* 166* 160* 164* 108 107     Results from last 7 days   Lab Units 02/24/23  0405 02/23/23  1301   GLUCOSE RANDOM mg/dL 160* 128             Results from last 7 days   Lab Units 02/23/23  1253 02/23/23  1143 02/23/23  1117 02/23/23  1038 02/23/23  0958 02/23/23  0920 02/23/23  0813   PH, RICK I-STAT   --   --   --   --  7 347  --  7 344   PCO2, RICK ISTAT mm HG  --   --   --   --  54 8*  --  55 9*   PO2, RICK ISTAT mm HG  --   --   --   --  46 0*  --  45 0   HCO3, RICK ISTAT mmol/L  --   --   --   --  30 1*  --  30 5*   I STAT BASE EXC mmol/L -2 -2 3   < > 4*   < > 3   I STAT O2 SAT % 98* 99*  --   --  78   < > 77   ISTAT PH ART  7 261* 7 283* 7 277*   < >  --    < >  --    I STAT ART PCO2 mm HG 58 3* 52 5* 66 1*   < >  --    < >  --    I STAT ART PO2 mm  0* 135 0* >400 0*   < >  --    < >  --    I STAT ART HCO3 mmol/L 26 2 24 8 30 8*   < >  --    < >  --     < > = values in this interval not displayed             Results from last 7 days   Lab Units 02/23/23  0744   CLARITY UA  Clear   COLOR UA  Light Yellow   SPEC GRAV UA  1 017   PH UA  7 0   GLUCOSE UA mg/dl Negative   KETONES UA mg/dl Negative   BLOOD UA  Negative   PROTEIN UA mg/dl Negative   NITRITE UA  Negative   BILIRUBIN UA  Negative   UROBILINOGEN UA (BE) mg/dl <2 0   LEUKOCYTES UA  Negative     Diet: Cardiovascular; fld restriction 1800 ml  Mobility: Ambulate pt off suction  DVT Prophylaxis: SCD, Fondaparinux SC    Medications/Pain Control:   Scheduled Medications:  acetaminophen, 975 mg, Oral, Q8H [address]  amiodarone, 200 mg, Oral, Q8H LUIS  aspirin, 325 mg, Oral, Daily  atorvastatin, 80 mg, Oral, Daily With Dinner  cefazolin, 2,000 mg, Intravenous, Q8H  chlorhexidine, 15 mL, Swish & Spit, BID  docusate sodium, 100 mg, Oral, BID  fondaparinux, 2 5 mg, Subcutaneous, Daily  furosemide, 40 mg, Intravenous, Daily  insulin lispro, 1-6 Units, Subcutaneous, TID AC  insulin lispro, 1-6 Units, Subcutaneous, HS  metoprolol tartrate, 25 mg, Oral, Q12H LUIS  mupirocin, 1 application, Nasal, O77O [address]  pantoprazole, 40 mg, Oral, Daily  polyethylene glycol, 17 g, Oral, Daily  potassium chloride, 20 mEq, Oral, Daily      Continuous IV Infusions:  niCARdipine, 2 5-15 mg/hr, Intravenous, Titrated  sodium chloride infusion 0 45 %  Rate: 20 mL/hr Dose: 20 mL/hr  Freq: Continuous Route: IV  Last Dose: Stopped (02/24/23 0944)  Start: 02/23/23 1245 End: 02/24/23 0743  insulin regular (HumuLIN R,NovoLIN R) 1 Units/mL in sodium chloride 0 9 % 100 mL infusion  Rate: 0 3-21 mL/hr Dose: 0 3-21 Units/hr  Freq: Titrated Route: IV  Last Dose: Stopped (02/24/23 0730)  Start: 02/23/23 0730 End: 02/24/23 0743  dexmedeTOMIDine (Precedex) 400 mcg in sodium chloride 0 9% 100 mL  Rate: 2 1-14 9 mL/hr Dose: 0 1-0 7 mcg/kg/hr  Weight Dosing Info: 85 1 kg  Freq: Titrated Route: IV  Last Dose: Stopped (02/23/23 1729)  Start: 02/23/23 1330 End: 02/24/23 0743    PRN Meds:  acetaminophen, 650 mg, Rectal, Q4H PRN  bisacodyl, 10 mg, Rectal, Daily PRN  ondansetron, 4 mg, Intravenous, Q6H PRN 02/23 x 1, 02/24 x 1  oxyCODONE, 2 5 mg, Oral, Q4H PRN  oxyCODONE, 5 mg, Oral, Q4H PRN 02/23 x 1, 02/24 x 1  HYDROmorphone (DILAUDID) injection 0 5 mg q2h IV prn 02/23 x 1      Network Utilization Review Department  ATTENTION: Please call with any questions or concerns to [phone] and carefully listen to the prompts so that you are directed to the right person  All voicemails are confidential   Laquita Garcia all requests for admission clinical reviews, approved or denied determinations and any other requests to dedicated fax number below belonging to the campus where the patient is receiving treatment   List of dedicated fax numbers for the Facilities:  [address] [place] [address] DENIALS (Administrative/Medical Necessity) [phone]   [address] (Maternity/NICU/Pediatrics) [phone]   [address] Radha Gonzales [address] Janet Hager [address] [phone]   [address] [address] [address] [facility] 28 [phone]   [address] [place] [facility] 815 [facility] [phone]

## 2023-02-24 NOTE — RESTORATIVE TECHNICIAN NOTE
Restorative Technician Note      Patient Name: Judy Esparza     Restorative Tech Visit Date: 02/24/23  Note Type: Mobility  Patient Position Upon Consult: Bedside chair  Activity Performed: Ambulated  Assistive Device: Roller walker  Patient Position at End of Consult: All needs within reach;  Bedside chair

## 2023-02-24 NOTE — PLAN OF CARE
Problem: PHYSICAL THERAPY ADULT  Goal: Performs mobility at highest level of function for planned discharge setting  See evaluation for individualized goals  Description: Treatment/Interventions: Functional transfer training, LE strengthening/ROM, Therapeutic exercise, Endurance training, Bed mobility, Gait training, Spoke to nursing, Spoke to case management, OT  Equipment Recommended: Radha Monae       See flowsheet documentation for full assessment, interventions and recommendations  Note: Prognosis: Good  Problem List: Decreased strength, Impaired balance, Decreased mobility, Decreased cognition, Impaired judgement, Decreased safety awareness, Obesity  Assessment: Pt is 56 y o  male admitted with Dx of Symptomatic severe aortic stenosis and Aortic valve insufficiency and Coronary artery disease and underwent Aortic valve replacement with a 25 mm Granados Inspiris bioprosthetic valve and Coronary artery bypass grafting x 1 with saphenous vein graft to right posterior descending artery on 2/23/2023  Pt 's comorbidities affecting POC include: Diabetes mellitus ([facility] [address] ), Hypertension, Psychiatric disorder, Schizoid personality disorder and Syncope and personal factors of: ? level of support available at home  Pt's clinical presentation is currently unstable/unpredictable which is evident in ongoing telemetry monitoring while in a critical care unit w/ a-line in place, abnormal lab values being monitored/trending, suppl O2 needs, CT in place and inability to progress further w/ mobilization at this time  Pt presents w/ postop guarding, generalized weakness, incl decreased LE strength, decreased functional endurance and activity tolerance, impaired balance w/ associated gait deviations requiring use of rw at this time and fall risk  Will cont to follow pt in PT for progressive mobilization to address above functional deficits and to max level of (I), endurance, and safety   Otherwise, anticipate pt will return home w/ available support upon D/C provided he cont improving w/ mobility skills, safety, and endurance and when medically cleared; home PT follow up is recommended at this time; will follow  PT Discharge Recommendation: Home with home health rehabilitation    See flowsheet documentation for full assessment

## 2023-02-24 NOTE — PLAN OF CARE
Problem: OCCUPATIONAL THERAPY ADULT  Goal: Performs self-care activities at highest level of function for planned discharge setting  See evaluation for individualized goals  Description: Treatment Interventions: ADL retraining, UE strengthening/ROM, Functional transfer training, Endurance training, Equipment evaluation/education, Compensatory technique education, Continued evaluation, Cardiac education, Energy conservation, Activityengagement          See flowsheet documentation for full assessment, interventions and recommendations  Note: Limitation: Decreased ADL status, Decreased endurance, Decreased high-level ADLs, Decreased UE ROM, Decreased UE strength  Prognosis: Fair  Assessment: Pt is 56 y o  male admitted to [facility] on 2/23/2023 w/ symptomatic aortic stenosis and aortic valve insufficiency  Pt received aortic valve replacement and CABGx1 on 2/23/2023  Pt  has a past medical history of Diabetes mellitus ([facility] [address] ), Hyperlipidemia, Hypertension, Psychiatric disorder, 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[facility] [address] ), and Syncope    Pt with active OT orders and activity orders  Pt resides in a first floor hotel room with 0 MAXWELL and no steps inside  Pt lives with his girlfriend who is able to A if needed  Pt was I w/ ADLs, IADLs, (-) drove  Pt is currently functioning at S for grooming and eating, min A for UB and LB ADLs, and mod A for toilet-ing  Pt participated in a treatment session, pt was able to demonstrate toilet transfers with mod Ax1 and was able to ambulate into the BR with min Ax1 and RW  Pt able to demonstrate transfers with mod Ax1 progressing to min Ax1  Pt was able to demonstrate functional mobility with min Ax1 with RW  Pt is limited 2* pain, endurance, activity tolerance, functional mobility, functional standing tolerance, decreased I w/ ADLS/IADLS and decreased safety awareness   The Areas of Occupational Performance Areas to address w/ pt include: eating, grooming, bathing/shower, toilet hygiene, dressing, health maintenance and functional mobility  Based off of an OT evaulation, assessment, and performance functioning, pt is identified as a high complexity  The patient's raw score on the AM-PAC Daily Activity Inpatient Short Form is 19  A raw score of greater than or equal to 19 suggests the patient may benefit from discharge to home  Please refer to the recommendation of the Occupational Therapist for safe discharge planning  OT recommendation for d/c includes Home with increased social support  Pt would benefit from continued acute OT services for  3-5x/week to  w/in 10-14 days:  to address functional goals       OT Discharge Recommendation: No rehabilitation needs (home with increased social support)

## 2023-02-24 NOTE — TELEPHONE ENCOUNTER
Message for Blair Michaels:    appt put in as a long appt- should be TCM   Our office didn't contact the pt- still in hospital     Pt is seeing Marimar Lambert on: 3/8  F/u after hospital discharge(discharge?- Sunday or Monday) from [address] [place] [address] in [facility] called to make the appt  Pt had open heart surgery

## 2023-02-25 ENCOUNTER — HOME HEALTH ADMISSION (OUTPATIENT)
Dept: HOME HEALTH SERVICES | Facility: HOME HEALTHCARE | Age: 58
End: 2023-02-25

## 2023-02-25 PROBLEM — D72.829 LEUKOCYTOSIS: Status: ACTIVE | Noted: 2023-02-25

## 2023-02-25 PROBLEM — D69.6 THROMBOCYTOPENIA (HCC): Status: ACTIVE | Noted: 2023-02-25

## 2023-02-25 PROBLEM — D62 ACUTE POSTOPERATIVE ANEMIA DUE TO EXPECTED BLOOD LOSS: Status: ACTIVE | Noted: 2023-02-25

## 2023-02-25 LAB
ANION GAP SERPL CALCULATED.3IONS-SCNC: 3 MMOL/L (ref 4–13)
BUN SERPL-MCNC: 17 MG/DL (ref 5–25)
CALCIUM SERPL-MCNC: 8.5 MG/DL (ref 8.3–10.1)
CHLORIDE SERPL-SCNC: 107 MMOL/L (ref 96–108)
CO2 SERPL-SCNC: 26 MMOL/L (ref 21–32)
CREAT SERPL-MCNC: 0.76 MG/DL (ref 0.6–1.3)
ERYTHROCYTE [DISTWIDTH] IN BLOOD BY AUTOMATED COUNT: 15.4 % (ref 11.6–15.1)
GFR SERPL CREATININE-BSD FRML MDRD: 102 ML/MIN/1.73SQ M
GLUCOSE SERPL-MCNC: 104 MG/DL (ref 65–140)
GLUCOSE SERPL-MCNC: 122 MG/DL (ref 65–140)
GLUCOSE SERPL-MCNC: 124 MG/DL (ref 65–140)
GLUCOSE SERPL-MCNC: 135 MG/DL (ref 65–140)
GLUCOSE SERPL-MCNC: 145 MG/DL (ref 65–140)
HCT VFR BLD AUTO: 37.2 % (ref 36.5–49.3)
HGB BLD-MCNC: 11.9 G/DL (ref 12–17)
MAGNESIUM SERPL-MCNC: 2.4 MG/DL (ref 1.6–2.6)
MCH RBC QN AUTO: 27.5 PG (ref 26.8–34.3)
MCHC RBC AUTO-ENTMCNC: 32 G/DL (ref 31.4–37.4)
MCV RBC AUTO: 86 FL (ref 82–98)
PLATELET # BLD AUTO: 106 THOUSANDS/UL (ref 149–390)
PMV BLD AUTO: 11 FL (ref 8.9–12.7)
POTASSIUM SERPL-SCNC: 4.2 MMOL/L (ref 3.5–5.3)
RBC # BLD AUTO: 4.33 MILLION/UL (ref 3.88–5.62)
SODIUM SERPL-SCNC: 136 MMOL/L (ref 135–147)
WBC # BLD AUTO: 16.79 THOUSAND/UL (ref 4.31–10.16)

## 2023-02-25 RX ORDER — METOPROLOL TARTRATE 50 MG/1
50 TABLET, FILM COATED ORAL EVERY 12 HOURS SCHEDULED
Status: DISCONTINUED | OUTPATIENT
Start: 2023-02-25 | End: 2023-02-26

## 2023-02-25 RX ORDER — FUROSEMIDE 10 MG/ML
60 INJECTION INTRAMUSCULAR; INTRAVENOUS DAILY
Status: DISCONTINUED | OUTPATIENT
Start: 2023-02-25 | End: 2023-02-26

## 2023-02-25 RX ADMIN — ACETAMINOPHEN 975 MG: 325 TABLET ORAL at 21:49

## 2023-02-25 RX ADMIN — MUPIROCIN 1 APPLICATION.: 20 OINTMENT TOPICAL at 21:49

## 2023-02-25 RX ADMIN — POTASSIUM CHLORIDE 20 MEQ: 1500 TABLET, EXTENDED RELEASE ORAL at 09:03

## 2023-02-25 RX ADMIN — OXYCODONE HYDROCHLORIDE 5 MG: 5 TABLET ORAL at 21:49

## 2023-02-25 RX ADMIN — AMIODARONE HYDROCHLORIDE 200 MG: 200 TABLET ORAL at 06:02

## 2023-02-25 RX ADMIN — AMIODARONE HYDROCHLORIDE 200 MG: 200 TABLET ORAL at 21:49

## 2023-02-25 RX ADMIN — METOPROLOL TARTRATE 50 MG: 50 TABLET, FILM COATED ORAL at 09:06

## 2023-02-25 RX ADMIN — FONDAPARINUX SODIUM 2.5 MG: 2.5 INJECTION, SOLUTION SUBCUTANEOUS at 09:13

## 2023-02-25 RX ADMIN — ACETAMINOPHEN 975 MG: 325 TABLET ORAL at 14:37

## 2023-02-25 RX ADMIN — FUROSEMIDE 60 MG: 10 INJECTION, SOLUTION INTRAMUSCULAR; INTRAVENOUS at 09:04

## 2023-02-25 RX ADMIN — METOPROLOL TARTRATE 50 MG: 50 TABLET, FILM COATED ORAL at 21:49

## 2023-02-25 RX ADMIN — DOCUSATE SODIUM 100 MG: 100 CAPSULE, LIQUID FILLED ORAL at 17:16

## 2023-02-25 RX ADMIN — POLYETHYLENE GLYCOL 3350 17 G: 17 POWDER, FOR SOLUTION ORAL at 09:04

## 2023-02-25 RX ADMIN — AMIODARONE HYDROCHLORIDE 200 MG: 200 TABLET ORAL at 14:38

## 2023-02-25 RX ADMIN — DOCUSATE SODIUM 100 MG: 100 CAPSULE, LIQUID FILLED ORAL at 09:03

## 2023-02-25 RX ADMIN — OXYCODONE HYDROCHLORIDE 5 MG: 5 TABLET ORAL at 06:06

## 2023-02-25 RX ADMIN — CHLORHEXIDINE GLUCONATE 15 ML: 1.2 SOLUTION ORAL at 09:03

## 2023-02-25 RX ADMIN — ASPIRIN 325 MG ORAL TABLET 325 MG: 325 PILL ORAL at 09:03

## 2023-02-25 RX ADMIN — PANTOPRAZOLE SODIUM 40 MG: 40 TABLET, DELAYED RELEASE ORAL at 09:03

## 2023-02-25 RX ADMIN — ONDANSETRON HYDROCHLORIDE 4 MG: 2 INJECTION, SOLUTION INTRAMUSCULAR; INTRAVENOUS at 21:55

## 2023-02-25 RX ADMIN — ACETAMINOPHEN 975 MG: 325 TABLET ORAL at 06:02

## 2023-02-25 RX ADMIN — MUPIROCIN 1 APPLICATION.: 20 OINTMENT TOPICAL at 09:04

## 2023-02-25 RX ADMIN — ATORVASTATIN CALCIUM 80 MG: 80 TABLET, FILM COATED ORAL at 16:47

## 2023-02-25 RX ADMIN — CHLORHEXIDINE GLUCONATE 15 ML: 1.2 SOLUTION ORAL at 17:15

## 2023-02-25 NOTE — PROGRESS NOTES
[facility]    Cardiology Progress Note  Demario Yen 56 y o  male   YOB: 1965 MRN: [id number]  Unit/Bed#: [facility] 403-01 Encounter: [id number]      Subjective:   No significant events overnight  He has mild chest discomfort related to the tubes, but is otherwise doing well  He was taken off oxygen earlier today    Assessments  82-year-old gentleman with history of hypertension, diabetes, was admitted for elective CABG and aortic valve replacement due to ongoing issues with shortness of breath and dizziness over the past several months  He underwent CABG 1, #25 surgical bioAVR on 2/23/23, and has been recovering well  We are now consulted to assist with postoperative cardiac management  Principal Problem:    S/P AVR  Active Problems:    Marijuana use    Tobacco use    Aortic valve stenosis    Hyperlipidemia    Coronary artery disease of native heart with stable angina pectoris (HCC)    Schizophrenia (HCC)    Bipolar 1 disorder (HCC)    S/P CABG (coronary artery bypass graft)    Thrombocytopenia (HCC)    Leukocytosis    Acute postoperative anemia due to expected blood loss    1  CAD s/p CABGx1  2  S/p #25 bio-AVR  3  Hypertension  4  Hyperlipidemia  5  Diabetes mellitus type 2  6  Obesity, Body mass index is 32 63 kg/m²       Plan  • Continues to do well postoperatively  • No pacing needs --> Epicardial pacing wires removed today  • Chest tube output remains high, output 300 cc yesterday  • Hb down to 11 9 from 15  • Monitor Hb  • Reports some chest discomfort related to the chest tubes  • Plan per CTS  • Not much diuresis with IV lasix 40  • UO 1 5 L  • IV lasix increased to 60 daily today --> monitor  • He was weaned off oxygen earlier today --> SPO2 95% on room air  • Continue aspirin, atorvastatin, amiodarone prophylaxis      Review of Systems   All other systems reviewed and are negative  Telemetry Review: Normal sinus rhythm/sinus tachycardia    Heart rate between     Objective:   Vitals: Blood pressure 140/95, pulse 105, temperature 98 °F (36 7 °C), resp  rate 16, height 5' 6" (1 676 m), weight 88 7 kg (195 lb 8 8 oz), SpO2 93 %  , Body mass index is 31 56 kg/m² ,   Orthostatic Blood Pressures    Flowsheet Row Most Recent Value   Blood Pressure 140/95 filed at 02/25/2023 0845   Patient Position - Orthostatic VS Sitting filed at 02/25/2023 6671         Systolic (64ZGW), UJY:509 , Min:109 , JAJ:585     Diastolic (95SOG), CCC:92, Min:66, Max:95    Wt Readings from Last 5 Encounters:   02/25/23 88 7 kg (195 lb 8 8 oz)   02/10/23 84 5 kg (186 lb 4 8 oz)   01/31/23 82 7 kg (182 lb 6 4 oz)   12/20/22 82 2 kg (181 lb 3 5 oz)   12/15/22 81 6 kg (180 lb)     I/O       02/23 0701  02/24 0700 02/24 0701  02/25 0700 02/25 0701  02/26 0700    P  O   120 180    I V  (mL/kg) 5146 3 (56 1) 254 3 (2 9)     IV Piggyback 150      Cell Saver 1250      Total Intake(mL/kg) 6546 3 (71 4) 374 3 (4 2) 180 (2)    Urine (mL/kg/hr) 3300 (1 5) 1510 (0 7) 115 (0 7)    Blood 1250      Chest Tube 545 300     Total Output 5095 1810 115    Net +1451 3 -1435 7 +65                     Physical Exam  Vitals and nursing note reviewed  Constitutional:       General: He is not in acute distress  Appearance: He is well-developed  HENT:      Head: Normocephalic and atraumatic  Nose: No congestion  Eyes:      General: No scleral icterus  Conjunctiva/sclera: Conjunctivae normal    Neck:      Vascular: No carotid bruit or JVD  Cardiovascular:      Rate and Rhythm: Normal rate and regular rhythm  Heart sounds: Normal heart sounds  No murmur heard  No friction rub  No gallop  Comments: Central sternal wound noted in place, clean & dry  Pulmonary:      Effort: Pulmonary effort is normal  No respiratory distress  Breath sounds: Normal breath sounds  No wheezing or rales  Chest:      Chest wall: No tenderness  Abdominal:      General: There is no distension  Palpations: Abdomen is soft  Tenderness: There is no abdominal tenderness  Musculoskeletal:         General: No swelling, tenderness or deformity  Cervical back: Neck supple  No muscular tenderness  Right lower leg: No edema  Left lower leg: No edema  Skin:     General: Skin is warm  Neurological:      General: No focal deficit present  Mental Status: He is alert and oriented to person, place, and time  Mental status is at baseline  Psychiatric:         Mood and Affect: Mood normal          Behavior: Behavior normal          Thought Content:  Thought content normal          Laboratory Results: personally reviewed        CBC with diff:   Results from last 7 days   Lab Units 02/25/23 0405 02/24/23 0405 02/23/23 2007 02/23/23  1301 02/23/23  1253 02/23/23  1143 02/23/23  1117 02/23/23  1046   WBC Thousand/uL 16 79* 21 25*  --   --   --   --   --   --    HEMOGLOBIN g/dL 11 9* 14 0 14 9 12 0  --   --   --   --    I STAT HEMOGLOBIN g/dl  --   --   --   --  11 9* 9 9* 10 5*  --    HEMATOCRIT % 37 2 43 0 46 4 38 0  --   --   --   --    HEMATOCRIT, ISTAT %  --   --   --   --  35* 29* 31*  --    MCV fL 86 85  --   --   --   --   --   --    PLATELETS Thousands/uL 106* 119*  --  154  --   --   --  187   MCH pg 27 5 27 7  --   --   --   --   --   --    MCHC g/dL 32 0 32 6  --   --   --   --   --   --    RDW % 15 4* 15 2*  --   --   --   --   --   --    MPV fL 11 0 10 7  --  10 7  --   --   --  10 7         CMP:  Results from last 7 days   Lab Units 02/25/23 0405 02/24/23  0405 02/23/23 2007 02/23/23  1724 02/23/23  1301 02/23/23  1253 02/23/23  1143 02/23/23  1117 02/23/23  1038 02/23/23  0958 02/23/23  0920 02/23/23  0813   POTASSIUM mmol/L 4 2 4 1 4 3 4 9 4 2  --   --   --   --   --   --   --    CHLORIDE mmol/L 107 111*  --   --  116*  --   --   --   --   --   --   --    CO2 mmol/L 26 20*  --   --  26  --   --   --   --   --   --   --    CO2, I-STAT mmol/L  --   --   --   --   --  28 26 33* 35* 32 29 32   BUN mg/dL 17 13  --   --  15  --   --   --   --   --   --   --    CREATININE mg/dL 0 76 1 03  --   --  0 80  --   --   --   --   --   --   --    GLUCOSE, ISTAT mg/dl  --   --   --   --   --  119 152* 187* 182* 130 128 101   CALCIUM mg/dL 8 5 7 8*  --   --  7 5*  --   --   --   --   --   --   --    EGFR ml/min/1 73sq m 102 81  --   --  100  --   --   --   --   --   --   --          BMP:  Results from last 7 days   Lab Units 02/25/23 0405 02/24/23 0405 02/23/23 2007 02/23/23  1724 02/23/23  1301 02/23/23  1253 02/23/23  1143 02/23/23  1117 02/23/23  1038   POTASSIUM mmol/L 4 2 4 1 4 3 4 9 4 2  --   --   --   --    CHLORIDE mmol/L 107 111*  --   --  116*  --   --   --   --    CO2 mmol/L 26 20*  --   --  26  --   --   --   --    CO2, I-STAT mmol/L  --   --   --   --   --  28 26 33* 35*   BUN mg/dL 17 13  --   --  15  --   --   --   --    CREATININE mg/dL 0 76 1 03  --   --  0 80  --   --   --   --    GLUCOSE, ISTAT mg/dl  --   --   --   --   --  119 152* 187* 182*   CALCIUM mg/dL 8 5 7 8*  --   --  7 5*  --   --   --   --        BNP: No results for input(s): BNP in the last 72 hours      Magnesium:   Results from last 7 days   Lab Units 02/25/23 0405 02/24/23 0405   MAGNESIUM mg/dL 2 4 2 4       Coags:       TSH:        Hemoglobin A1C       Lipid Profile:       Meds/Allergies   all current active meds have been reviewed and current meds:   Current Facility-Administered Medications   Medication Dose Route Frequency   • acetaminophen (TYLENOL) rectal suppository 650 mg  650 mg Rectal Q4H PRN   • acetaminophen (TYLENOL) tablet 975 mg  975 mg Oral Q8H [facility]   • amiodarone tablet 200 mg  200 mg Oral Q8H [facility]   • aspirin tablet 325 mg  325 mg Oral Daily   • atorvastatin (LIPITOR) tablet 80 mg  80 mg Oral Daily With Dinner   • bisacodyl (DULCOLAX) rectal suppository 10 mg  10 mg Rectal Daily PRN   • chlorhexidine (PERIDEX) 0 12 % oral rinse 15 mL  15 mL Swish & Spit BID   • docusate sodium (COLACE) capsule 100 mg  100 mg Oral BID   • fondaparinux (ARIXTRA) subcutaneous injection 2 5 mg  2 5 mg Subcutaneous Daily   • furosemide (LASIX) injection 60 mg  60 mg Intravenous Daily   • insulin lispro (HumaLOG) 100 units/mL subcutaneous injection 1-6 Units  1-6 Units Subcutaneous TID AC   • insulin lispro (HumaLOG) 100 units/mL subcutaneous injection 1-6 Units  1-6 Units Subcutaneous HS   • metoprolol tartrate (LOPRESSOR) tablet 50 mg  50 mg Oral Q12H [facility]   • mupirocin (BACTROBAN) 2 % nasal ointment 1 application  1 application Nasal [id number] [facility]   • ondansetron (ZOFRAN) injection 4 mg  4 mg Intravenous Q6H PRN   • oxyCODONE (ROXICODONE) IR tablet 2 5 mg  2 5 mg Oral Q4H PRN   • oxyCODONE (ROXICODONE) IR tablet 5 mg  5 mg Oral Q4H PRN   • pantoprazole (PROTONIX) EC tablet 40 mg  40 mg Oral Daily   • polyethylene glycol (MIRALAX) packet 17 g  17 g Oral Daily   • potassium chloride (K-DUR,KLOR-CON) CR tablet 20 mEq  20 mEq Oral Daily     Medications Prior to Admission   Medication   • mupirocin (BACTROBAN) 2 % ointment   • aspirin 81 mg chewable tablet            Cardiac testing: reviewed  No results found for this or any previous visit  No results found for this or any previous visit  No results found for this or any previous visit  No results found for this or any previous visit

## 2023-02-25 NOTE — PROGRESS NOTES
Progress Note - Cardiothoracic Surgery   Kiki Wilkerson 56 y o  male MRN: [id number]  Unit/Bed#: [facility] 403-01 Encounter: [id number]    Aortic stenosis, Non-Rheumatic, Coronary artery disease  S/P aortic valve replacement and coronary artery bypass grafting (SVG to RCA); POD # 2      24 Hour Events: No overnight events, feels well, pain controlled, +SOB, using IS (1000), weaned to room air today  No appetite, +nausea, no emesis, +flatus, +belching  Ambulated w/ walker yesterday      Medications:   Scheduled Meds:  Current Facility-Administered Medications   Medication Dose Route Frequency Provider Last Rate   • acetaminophen  650 mg Rectal Q4H PRN Anastasia Demarco PA-C     • acetaminophen  975 mg Oral [facility] Anastasia Demarco PA-C     • amiodarone  200 mg Oral [facility] Anastasia Demarco PA-C     • aspirin  325 mg Oral Daily Anastasia Demarco PA-C     • atorvastatin  80 mg Oral Daily With Alison Reza PA-C     • bisacodyl  10 mg Rectal Daily PRN Anastasia Demarco PA-C     • chlorhexidine  15 mL Swish & Spit BID Anastasia Demarco PA-C     • docusate sodium  100 mg Oral BID Anastasia Demarco PA-C     • fondaparinux  2 5 mg Subcutaneous Daily Anastasia Demarco PA-C     • furosemide  40 mg Intravenous Daily Anastasia Demarco PA-C     • insulin lispro  1-6 Units Subcutaneous TID AC Anastasia Demarco PA-C     • insulin lispro  1-6 Units Subcutaneous HS Anastasia Demarco PA-C     • metoprolol tartrate  25 mg Oral Q12H [address] Anastasia Demarco PA-C     • mupirocin  1 application Nasal R10N [address] Anastasia Demarco PA-C     • ondansetron  4 mg Intravenous Q6H PRN Anastasia Demarco PA-C     • oxyCODONE  2 5 mg Oral Q4H PRN Anastasia Demarco PA-C     • oxyCODONE  5 mg Oral Q4H PRN Anastasia Demarco PA-C     • pantoprazole  40 mg Oral Daily Anastasia Demarco PA-C     • polyethylene glycol  17 g Oral Daily Anastasia Demarco PA-C     • potassium chloride  20 mEq Oral Daily Anastasia Demarco PA-C       Continuous Infusions: PRN Meds: •  acetaminophen  •  bisacodyl  •  ondansetron  •  oxyCODONE  •  oxyCODONE    Vitals:   Vitals:    02/24/23 2025 02/24/23 2250 02/25/23 0245 02/25/23 0600   BP:  109/76 116/79    BP Location:   Left arm    Pulse: (!) 106  105    Resp:  17 17    Temp:  98 3 °F (36 8 °C) 98 1 °F (36 7 °C)    TempSrc:   Oral    SpO2:   96%    Weight:    88 7 kg (195 lb 8 8 oz)   Height:       afebrile, HR- 100-110s    Telemetry: Sinus Tachycardia; Heart Rate: 105    Respiratory:   SpO2: SpO2: 96 %; Room Air    Intake/Output:     Intake/Output Summary (Last 24 hours) at 2/25/2023 0704  Last data filed at 2/25/2023 2813  Gross per 24 hour   Intake 374 31 ml   Output 1810 ml   Net -1435 69 ml    UOP 1 5L    Chest tube Output:    Mediastinal tubes: 110 mL/8 hours  300 mL/24 hours          Weights:   Weight (last 2 days)     Date/Time Weight    02/24/23 0600 91 7 (202 16)    02/23/23 0605 85 1 (187 6)            Results:   Results from last 7 days   Lab Units 02/25/23 0405 02/24/23 0405 02/23/23 2007 02/23/23  1301   WBC Thousand/uL 16 79* 21 25*  --   --    HEMOGLOBIN g/dL 11 9* 14 0 14 9 12 0   HEMATOCRIT % 37 2 43 0 46 4 38 0   PLATELETS Thousands/uL 106* 119*  --  154     Results from last 7 days   Lab Units 02/25/23 0405 02/24/23  0405 02/23/23 2007 02/23/23  1724 02/23/23  1301 02/23/23  1253   SODIUM mmol/L 136 139  --   --  143  --    POTASSIUM mmol/L 4 2 4 1 4 3   < > 4 2  --    CHLORIDE mmol/L 107 111*  --   --  116*  --    CO2 mmol/L 26 20*  --   --  26  --    CO2, I-STAT mmol/L  --   --   --   --   --  28   BUN mg/dL 17 13  --   --  15  --    CREATININE mg/dL 0 76 1 03  --   --  0 80  --    GLUCOSE, ISTAT mg/dl  --   --   --   --   --  119   CALCIUM mg/dL 8 5 7 8*  --   --  7 5*  --     < > = values in this interval not displayed           Point of care glucose: 128 - 144 (no hx of DM, A1c 5 6)    Studies:  No new studies    I have personally reviewed pertinent films in PACS    Invasive Lines/Tubes:  Invasive Devices     Central Venous Catheter Line  Duration           CVC Central Lines 02/23/23 Triple 1 day          Peripheral Intravenous Line  Duration           Peripheral IV 08/05/19 Right Arm 1299 days    Peripheral IV 10/28/22 Right;Ventral (anterior) Forearm 119 days    Peripheral IV 02/23/23 Left Hand 1 day    Peripheral IV 02/23/23 Right Hand 1 day          Line  Duration           Pacer Wires 1 day    Pacer Wires 1 day          Drain  Duration           Chest Tube 1 Mediastinal;Posterior 32 Fr  1 day    Chest Tube 2 Mediastinal;Anterior 32 Fr  1 day                Physical Exam:    HEENT/NECK:  Normocephalic  Atraumatic  No jugular venous distention  Cardiac: Regular rate and rhythm and No murmurs/rubs/gallops  Pulmonary:  Breath sounds clear bilaterally and No rales/rhonchi/wheezes  Abdomen:  Non-tender, Non-distended and Normal bowel sounds  Incisions: Sternum is stable  Incision dressed with Acticoat  No erythema or drainage and Saphenectomy incision dressed with Acticoat  No erythema or drainage  Extremities: Extremities warm/dry and Trace edema B/L  Neuro: Alert and oriented X 3 and No focal deficits  Skin: Warm/Dry, without rashes or lesions  Assessment:  Principal Problem:    S/P AVR  Active Problems:    Marijuana use    Tobacco use    Aortic valve stenosis    Hyperlipidemia    Coronary artery disease of native heart with stable angina pectoris (HCC)    Schizophrenia (HCC)    Bipolar 1 disorder (HCC)    S/P CABG (coronary artery bypass graft)       Aortic stenosis, Non-Rheumatic, Coronary artery disease  S/P aortic valve replacement and coronary artery bypass grafting (SVG to RCA); POD # 2    Plan:    1  Cardiac:   Sinus Tachycardia; BP well-controlled  Increase to Lopressor, 50mg PO BID  Continue ASA and Statin therapy  Epicardial pacing wires no longer required  Remove today  Maintain central IV access today for meds  Continue DVT prophylaxis    2   Pulmonary:   Good Room air oxygen saturation; Continue incentive spirometry/Coughing/Deep breathing exercises  Chest tube output remains persistently high; Continue chest tubes to suction today    3  Renal:   Intake/Output net: -1 4 mL/24 hours (since admit +)  Diuretic Regimen:  Increase to Lasix 60 mg IV QD  Continue Potassium Chloride 20 mEq PO QD  Post op Creatinine stable; Follow up labs prn    4  Neuro:  Neurologically intact; No active issues  Incisional pain well-controlled  Continue Tylenol, 975 mg PO q 8, standing dose  Continue Oxycodone, 2 5 to 5 mg PO q 4 hours prn pain  Bipolar disorder- stable (no home meds PTA), continue to monitor  Schizophrenia- stable (no home meds PTA), continue to monitor    5  GI:  Tolerating TLC 2 3 gm sodium diet  Maintain 1800 mL daily fluid restriction   Continue stool softeners and prn suppository  Continue GI prophylaxis    6  Endo:   Glucose well-controlled with sliding scale coverage    7    Hematology:    Post-operative acute blood loss anemia; Hemoglobin 11 9; trend prn, Thrombocytopenia and Leukocytosis- likely reactive, afebrile, continue to monitor    8     Disposition:      Follow daily PT/OT recommendations regarding home vs  rehab when medically cleared for discharge- home PT    VTE Pharmacologic Prophylaxis: Fondaparinux (Arixtra)  VTE Mechanical Prophylaxis: sequential compression device    Collaborative rounds completed with supervising physician  Plan of care discussed with bedside nurse    SIGNATURE: Buck Kendall PA-C  DATE: February 25, 2023  TIME: 5:32 AM

## 2023-02-25 NOTE — PROCEDURES
02/25/23    Procedure: Epicardial Pacing Wire removal    Judy  was returned to bed and informed of mandatory one hour post-procedure bed rest   The assigned nurse was notified  Epicardial pacing wires removed in routine fashion, without incident  The patient tolerated the procedure well  Vital signs ordered  q 15 minutes for one hour, as per protocol      SIGNATURE: Lindsay Espinosa PA-C  DATE: February 25, 2023  TIME: 8:36 AM

## 2023-02-26 LAB
ANION GAP SERPL CALCULATED.3IONS-SCNC: 4 MMOL/L (ref 4–13)
BASOPHILS # BLD AUTO: 0.03 THOUSANDS/ÂΜL (ref 0–0.1)
BASOPHILS NFR BLD AUTO: 0 % (ref 0–1)
BUN SERPL-MCNC: 21 MG/DL (ref 5–25)
CALCIUM SERPL-MCNC: 8.9 MG/DL (ref 8.3–10.1)
CHLORIDE SERPL-SCNC: 106 MMOL/L (ref 96–108)
CO2 SERPL-SCNC: 28 MMOL/L (ref 21–32)
CREAT SERPL-MCNC: 0.84 MG/DL (ref 0.6–1.3)
EOSINOPHIL # BLD AUTO: 0.06 THOUSAND/ÂΜL (ref 0–0.61)
EOSINOPHIL NFR BLD AUTO: 1 % (ref 0–6)
ERYTHROCYTE [DISTWIDTH] IN BLOOD BY AUTOMATED COUNT: 15.2 % (ref 11.6–15.1)
GFR SERPL CREATININE-BSD FRML MDRD: 98 ML/MIN/1.73SQ M
GLUCOSE SERPL-MCNC: 103 MG/DL (ref 65–140)
GLUCOSE SERPL-MCNC: 106 MG/DL (ref 65–140)
GLUCOSE SERPL-MCNC: 107 MG/DL (ref 65–140)
GLUCOSE SERPL-MCNC: 95 MG/DL (ref 65–140)
GLUCOSE SERPL-MCNC: 99 MG/DL (ref 65–140)
HCT VFR BLD AUTO: 34.4 % (ref 36.5–49.3)
HGB BLD-MCNC: 10.8 G/DL (ref 12–17)
IMM GRANULOCYTES # BLD AUTO: 0.07 THOUSAND/UL (ref 0–0.2)
IMM GRANULOCYTES NFR BLD AUTO: 1 % (ref 0–2)
LYMPHOCYTES # BLD AUTO: 1.47 THOUSANDS/ÂΜL (ref 0.6–4.47)
LYMPHOCYTES NFR BLD AUTO: 12 % (ref 14–44)
MCH RBC QN AUTO: 27.6 PG (ref 26.8–34.3)
MCHC RBC AUTO-ENTMCNC: 31.4 G/DL (ref 31.4–37.4)
MCV RBC AUTO: 88 FL (ref 82–98)
MONOCYTES # BLD AUTO: 1 THOUSAND/ÂΜL (ref 0.17–1.22)
MONOCYTES NFR BLD AUTO: 8 % (ref 4–12)
NEUTROPHILS # BLD AUTO: 9.82 THOUSANDS/ÂΜL (ref 1.85–7.62)
NEUTS SEG NFR BLD AUTO: 78 % (ref 43–75)
NRBC BLD AUTO-RTO: 0 /100 WBCS
PLATELET # BLD AUTO: 138 THOUSANDS/UL (ref 149–390)
PMV BLD AUTO: 11.1 FL (ref 8.9–12.7)
POTASSIUM SERPL-SCNC: 4.1 MMOL/L (ref 3.5–5.3)
RBC # BLD AUTO: 3.91 MILLION/UL (ref 3.88–5.62)
SODIUM SERPL-SCNC: 138 MMOL/L (ref 135–147)
WBC # BLD AUTO: 12.45 THOUSAND/UL (ref 4.31–10.16)

## 2023-02-26 RX ORDER — TORSEMIDE 20 MG/1
40 TABLET ORAL DAILY
Status: DISCONTINUED | OUTPATIENT
Start: 2023-02-26 | End: 2023-02-27 | Stop reason: HOSPADM

## 2023-02-26 RX ORDER — SENNOSIDES 8.6 MG
1 TABLET ORAL
Status: DISCONTINUED | OUTPATIENT
Start: 2023-02-26 | End: 2023-02-27 | Stop reason: HOSPADM

## 2023-02-26 RX ORDER — METOPROLOL TARTRATE 50 MG/1
100 TABLET, FILM COATED ORAL EVERY 12 HOURS SCHEDULED
Status: DISCONTINUED | OUTPATIENT
Start: 2023-02-26 | End: 2023-02-27 | Stop reason: HOSPADM

## 2023-02-26 RX ORDER — LANOLIN ALCOHOL/MO/W.PET/CERES
6 CREAM (GRAM) TOPICAL
Status: DISCONTINUED | OUTPATIENT
Start: 2023-02-26 | End: 2023-02-27 | Stop reason: HOSPADM

## 2023-02-26 RX ADMIN — OXYCODONE HYDROCHLORIDE 5 MG: 5 TABLET ORAL at 23:41

## 2023-02-26 RX ADMIN — SENNOSIDES 8.6 MG: 8.6 TABLET, FILM COATED ORAL at 09:00

## 2023-02-26 RX ADMIN — AMIODARONE HYDROCHLORIDE 200 MG: 200 TABLET ORAL at 13:38

## 2023-02-26 RX ADMIN — ATORVASTATIN CALCIUM 80 MG: 80 TABLET, FILM COATED ORAL at 15:33

## 2023-02-26 RX ADMIN — AMIODARONE HYDROCHLORIDE 200 MG: 200 TABLET ORAL at 05:30

## 2023-02-26 RX ADMIN — MUPIROCIN 1 APPLICATION.: 20 OINTMENT TOPICAL at 21:23

## 2023-02-26 RX ADMIN — TORSEMIDE 40 MG: 20 TABLET ORAL at 08:55

## 2023-02-26 RX ADMIN — CHLORHEXIDINE GLUCONATE 15 ML: 1.2 SOLUTION ORAL at 17:01

## 2023-02-26 RX ADMIN — ACETAMINOPHEN 975 MG: 325 TABLET ORAL at 21:23

## 2023-02-26 RX ADMIN — ASPIRIN 325 MG ORAL TABLET 325 MG: 325 PILL ORAL at 08:55

## 2023-02-26 RX ADMIN — ACETAMINOPHEN 975 MG: 325 TABLET ORAL at 05:30

## 2023-02-26 RX ADMIN — FONDAPARINUX SODIUM 2.5 MG: 2.5 INJECTION, SOLUTION SUBCUTANEOUS at 08:56

## 2023-02-26 RX ADMIN — POLYETHYLENE GLYCOL 3350 17 G: 17 POWDER, FOR SOLUTION ORAL at 09:05

## 2023-02-26 RX ADMIN — AMIODARONE HYDROCHLORIDE 200 MG: 200 TABLET ORAL at 21:23

## 2023-02-26 RX ADMIN — MUPIROCIN 1 APPLICATION.: 20 OINTMENT TOPICAL at 08:56

## 2023-02-26 RX ADMIN — OXYCODONE HYDROCHLORIDE 5 MG: 5 TABLET ORAL at 03:59

## 2023-02-26 RX ADMIN — METOPROLOL TARTRATE 100 MG: 50 TABLET ORAL at 21:23

## 2023-02-26 RX ADMIN — MELATONIN 6 MG: at 21:22

## 2023-02-26 RX ADMIN — MAGNESIUM HYDROXIDE 30 ML: 400 SUSPENSION ORAL at 09:04

## 2023-02-26 RX ADMIN — DOCUSATE SODIUM 100 MG: 100 CAPSULE, LIQUID FILLED ORAL at 08:56

## 2023-02-26 RX ADMIN — METOPROLOL TARTRATE 100 MG: 50 TABLET ORAL at 09:01

## 2023-02-26 RX ADMIN — CHLORHEXIDINE GLUCONATE 15 ML: 1.2 SOLUTION ORAL at 08:56

## 2023-02-26 RX ADMIN — PANTOPRAZOLE SODIUM 40 MG: 40 TABLET, DELAYED RELEASE ORAL at 08:55

## 2023-02-26 RX ADMIN — ACETAMINOPHEN 975 MG: 325 TABLET ORAL at 13:36

## 2023-02-26 RX ADMIN — POTASSIUM CHLORIDE 20 MEQ: 1500 TABLET, EXTENDED RELEASE ORAL at 08:55

## 2023-02-26 NOTE — PROGRESS NOTES
[facility]    Cardiology Progress Note  Juan Diego Ferrer 56 y o  male   YOB: 1965 MRN: [id number]  Unit/Bed#: [facility] 403-01 Encounter: [id number]      Subjective:   No significant events overnight  He has mild chest discomfort related to the tubes, but is otherwise doing well  He was taken off oxygen earlier today    Assessments  44-year-old gentleman with history of hypertension, diabetes, was admitted for elective CABG and aortic valve replacement due to ongoing issues with shortness of breath and dizziness over the past several months  He underwent CABG 1, #25 surgical bioAVR on 2/23/23, and has been recovering well  We are now consulted to assist with postoperative cardiac management  Principal Problem:    S/P AVR  Active Problems:    Marijuana use    Tobacco use    Aortic valve stenosis    Hyperlipidemia    Coronary artery disease of native heart with stable angina pectoris (HCC)    Schizophrenia (HCC)    Bipolar 1 disorder (HCC)    S/P CABG (coronary artery bypass graft)    Thrombocytopenia (HCC)    Leukocytosis    Acute postoperative anemia due to expected blood loss    1  CAD s/p CABGx1  2  S/p #25 bio-AVR  3  Hypertension  4  Hyperlipidemia  5  Diabetes mellitus type 2  6  Obesity, Body mass index is 32 63 kg/m²       Plan  • Continues to do well postoperatively  • EPW removed earlier  • Chest tubes removed today  • No further hypoxia  • Got IV lasix 60 yesterday, and now transitioned to PO torsemide 40  • HR remains elevated in    • Metoprolol increased to 100 mg twice daily today  • Continue aspirin, atorvastatin, amiodarone prophylaxis    Discussed with CT surgery Kari Crump  Anticipate being stable for discharge by tomorrow    Review of Systems   All other systems reviewed and are negative  Telemetry Review: remains NSR/sinus tachycardia  HR     Objective:   Vitals: Blood pressure 123/83, pulse 88, temperature 98 4 °F (36 9 °C), resp   rate 18, height 5' 6" (1 676 m), weight 86 2 kg (190 lb 0 6 oz), SpO2 91 % , Body mass index is 30 67 kg/m² ,   Orthostatic Blood Pressures    Flowsheet Row Most Recent Value   Blood Pressure 123/83 filed at 02/26/2023 1059   Patient Position - Orthostatic VS Sitting filed at 02/25/2023 5377         Systolic (46ANH), ZVJ:386 , Min:123 , OHH:024     Diastolic (31IWW), QIF:91, Min:72, Max:93    Wt Readings from Last 5 Encounters:   02/26/23 86 2 kg (190 lb 0 6 oz)   02/10/23 84 5 kg (186 lb 4 8 oz)   01/31/23 82 7 kg (182 lb 6 4 oz)   12/20/22 82 2 kg (181 lb 3 5 oz)   12/15/22 81 6 kg (180 lb)     I/O       02/23 0701  02/24 0700 02/24 0701  02/25 0700 02/25 0701  02/26 0700    P  O   120 180    I V  (mL/kg) 5146 3 (56 1) 254 3 (2 9)     IV Piggyback 150      Cell Saver 1250      Total Intake(mL/kg) 6546 3 (71 4) 374 3 (4 2) 180 (2)    Urine (mL/kg/hr) 3300 (1 5) 1510 (0 7) 115 (0 7)    Blood 1250      Chest Tube 545 300     Total Output 5095 1810 115    Net +1451 3 -1435 7 +65                     Physical Exam  Vitals and nursing note reviewed  Constitutional:       General: He is not in acute distress  Appearance: He is well-developed  HENT:      Head: Normocephalic and atraumatic  Nose: No congestion  Eyes:      General: No scleral icterus  Conjunctiva/sclera: Conjunctivae normal    Neck:      Vascular: No carotid bruit or JVD  Cardiovascular:      Rate and Rhythm: Normal rate and regular rhythm  Heart sounds: Normal heart sounds  No murmur heard  No friction rub  No gallop  Comments: Central sternal wound noted in place, clean & dry  Pulmonary:      Effort: Pulmonary effort is normal  No respiratory distress  Breath sounds: Normal breath sounds  No wheezing or rales  Chest:      Chest wall: No tenderness  Abdominal:      General: There is no distension  Palpations: Abdomen is soft  Tenderness: There is no abdominal tenderness     Musculoskeletal:         General: No swelling, tenderness or deformity  Cervical back: Neck supple  No muscular tenderness  Right lower leg: No edema  Left lower leg: No edema  Skin:     General: Skin is warm  Neurological:      General: No focal deficit present  Mental Status: He is alert and oriented to person, place, and time  Mental status is at baseline  Psychiatric:         Mood and Affect: Mood normal          Behavior: Behavior normal          Thought Content: Thought content normal          Laboratory Results: personally reviewed        CBC with diff:   Results from last 7 days   Lab Units 02/26/23  0530 02/25/23 0405 02/24/23  0405 02/23/23 2007 02/23/23  1301 02/23/23  1253 02/23/23  1143 02/23/23  1117 02/23/23  1046   WBC Thousand/uL 12 45* 16 79* 21 25*  --   --   --   --   --   --    HEMOGLOBIN g/dL 10 8* 11 9* 14 0 14 9 12 0  --   --   --   --    I STAT HEMOGLOBIN g/dl  --   --   --   --   --  11 9* 9 9*   < >  --    HEMATOCRIT % 34 4* 37 2 43 0 46 4 38 0  --   --   --   --    HEMATOCRIT, ISTAT %  --   --   --   --   --  35* 29*   < >  --    MCV fL 88 86 85  --   --   --   --   --   --    PLATELETS Thousands/uL 138* 106* 119*  --  154  --   --   --  187   MCH pg 27 6 27 5 27 7  --   --   --   --   --   --    MCHC g/dL 31 4 32 0 32 6  --   --   --   --   --   --    RDW % 15 2* 15 4* 15 2*  --   --   --   --   --   --    MPV fL 11 1 11 0 10 7  --  10 7  --   --   --  10 7   NRBC AUTO /100 WBCs 0  --   --   --   --   --   --   --   --     < > = values in this interval not displayed           CMP:  Results from last 7 days   Lab Units 02/26/23 0530 02/25/23 0405 02/24/23  0405 02/23/23 2007 02/23/23  1724 02/23/23  1301 02/23/23  1253 02/23/23  1143 02/23/23  1117 02/23/23  1038 02/23/23  0958 02/23/23  0920 02/23/23  0813   POTASSIUM mmol/L 4 1 4 2 4 1 4 3 4 9 4 2  --   --   --   --   --   --   --    CHLORIDE mmol/L 106 107 111*  --   --  116*  --   --   --   --   --   --   --    CO2 mmol/L 28 26 20*  --   -- 26  --   --   --   --   --   --   --    CO2, I-STAT mmol/L  --   --   --   --   --   --  28 26 33* 35* 32 29 32   BUN mg/dL 21 17 13  --   --  15  --   --   --   --   --   --   --    CREATININE mg/dL 0 84 0 76 1 03  --   --  0 80  --   --   --   --   --   --   --    GLUCOSE, ISTAT mg/dl  --   --   --   --   --   --  119 152* 187* 182* 130 128 101   CALCIUM mg/dL 8 9 8 5 7 8*  --   --  7 5*  --   --   --   --   --   --   --    EGFR ml/min/1 73sq m 98 102 81  --   --  100  --   --   --   --   --   --   --          BMP:  Results from last 7 days   Lab Units 02/26/23  0530 02/25/23 0405 02/24/23 0405 02/23/23 2007 02/23/23  1724 02/23/23  1301 02/23/23  1253 02/23/23  1143 02/23/23  1117   POTASSIUM mmol/L 4 1 4 2 4 1 4 3 4 9 4 2  --   --   --    CHLORIDE mmol/L 106 107 111*  --   --  116*  --   --   --    CO2 mmol/L 28 26 20*  --   --  26  --   --   --    CO2, I-STAT mmol/L  --   --   --   --   --   --  28 26 33*   BUN mg/dL 21 17 13  --   --  15  --   --   --    CREATININE mg/dL 0 84 0 76 1 03  --   --  0 80  --   --   --    GLUCOSE, ISTAT mg/dl  --   --   --   --   --   --  119 152* 187*   CALCIUM mg/dL 8 9 8 5 7 8*  --   --  7 5*  --   --   --        BNP: No results for input(s): BNP in the last 72 hours      Magnesium:   Results from last 7 days   Lab Units 02/25/23 0405 02/24/23 0405   MAGNESIUM mg/dL 2 4 2 4       Coags:       TSH:        Hemoglobin A1C       Lipid Profile:       Meds/Allergies   all current active meds have been reviewed and current meds:   Current Facility-Administered Medications   Medication Dose Route Frequency   • acetaminophen (TYLENOL) rectal suppository 650 mg  650 mg Rectal Q4H PRN   • acetaminophen (TYLENOL) tablet 975 mg  975 mg Oral Q8H [address]   • amiodarone tablet 200 mg  200 mg Oral Q8H [address]   • aspirin tablet 325 mg  325 mg Oral Daily   • atorvastatin (LIPITOR) tablet 80 mg  80 mg Oral Daily With Dinner   • bisacodyl (DULCOLAX) rectal suppository 10 mg  10 mg Rectal Daily PRN   • chlorhexidine (PERIDEX) 0 12 % oral rinse 15 mL  15 mL Swish & Spit BID   • docusate sodium (COLACE) capsule 100 mg  100 mg Oral BID   • fondaparinux (ARIXTRA) subcutaneous injection 2 5 mg  2 5 mg Subcutaneous Daily   • insulin lispro (HumaLOG) 100 units/mL subcutaneous injection 1-6 Units  1-6 Units Subcutaneous TID AC   • insulin lispro (HumaLOG) 100 units/mL subcutaneous injection 1-6 Units  1-6 Units Subcutaneous HS   • melatonin tablet 6 mg  6 mg Oral HS   • metoprolol tartrate (LOPRESSOR) tablet 100 mg  100 mg Oral Q12H [address]   • mupirocin (BACTROBAN) 2 % nasal ointment 1 application  1 application Nasal H05I [address]   • ondansetron (ZOFRAN) injection 4 mg  4 mg Intravenous Q6H PRN   • oxyCODONE (ROXICODONE) IR tablet 2 5 mg  2 5 mg Oral Q4H PRN   • oxyCODONE (ROXICODONE) IR tablet 5 mg  5 mg Oral Q4H PRN   • pantoprazole (PROTONIX) EC tablet 40 mg  40 mg Oral Daily   • polyethylene glycol (MIRALAX) packet 17 g  17 g Oral Daily   • potassium chloride (K-DUR,KLOR-CON) CR tablet 20 mEq  20 mEq Oral Daily   • senna (SENOKOT) tablet 8 6 mg  1 tablet Oral HS   • torsemide (DEMADEX) tablet 40 mg  40 mg Oral Daily     Medications Prior to Admission   Medication   • mupirocin (BACTROBAN) 2 % ointment   • aspirin 81 mg chewable tablet            Cardiac testing: reviewed  No results found for this or any previous visit  No results found for this or any previous visit  No results found for this or any previous visit  No results found for this or any previous visit

## 2023-02-26 NOTE — PROCEDURES
02/26/23    Procedure: Chest tube removal    Chest tubes removed in routine fashion without incident  Insertion site dressed with Acticoat  Cheryn Salt tolerated the procedure well  Nurse notified      SIGNATURE: Celeste Noble PA-C  DATE: February 26, 2023  TIME: 10:25 AM

## 2023-02-26 NOTE — PROGRESS NOTES
Progress Note - Cardiothoracic Surgery   Abdon Stein 56 y o  male MRN: [id number]  Unit/Bed#: [facility] 403-01 Encounter: [id number]    Aortic stenosis, Non-Rheumatic, Coronary artery disease  S/P aortic valve replacement and coronary artery bypass grafting (SVG to RCA); POD # 3      24 Hour Events: No overnight events, pain controlled, no longer SOB, using IS (2000),  Appetite improving (difficult without dentures), nausea improving, no emesis, +flatus, +belching  Ambulated well w/ walker yesterday      Medications:   Scheduled Meds:  Current Facility-Administered Medications   Medication Dose Route Frequency Provider Last Rate   • acetaminophen  650 mg Rectal Q4H PRN Lorraine Reaves PA-C     • acetaminophen  975 mg Oral [facility] Lorraine Reaves PA-C     • amiodarone  200 mg Oral [facility] Lorraine Reaves PA-C     • aspirin  325 mg Oral Daily Lorraine Reaves PA-C     • atorvastatin  80 mg Oral Daily With Macario Gonzalez PA-C     • bisacodyl  10 mg Rectal Daily PRN Lorraine Reaves PA-C     • chlorhexidine  15 mL Swish & Spit BID Lorraine Reaves PA-C     • docusate sodium  100 mg Oral BID Lorraine Reaves PA-C     • fondaparinux  2 5 mg Subcutaneous Daily Lorraine Reaves PA-C     • furosemide  60 mg Intravenous Daily Jessica Marmolejo PA-C     • insulin lispro  1-6 Units Subcutaneous TID AC Lorraine Reaves PA-C     • insulin lispro  1-6 Units Subcutaneous HS Lorraine Reaves PA-C     • metoprolol tartrate  50 mg Oral Q12H [address] Jessica Marmolejo PA-C     • mupirocin  1 application Nasal Y65M [address] Lorraine Reaves PA-C     • ondansetron  4 mg Intravenous Q6H PRN Lorraine Reaves PA-C     • oxyCODONE  2 5 mg Oral Q4H PRN Lorraine Reaves PA-C     • oxyCODONE  5 mg Oral Q4H PRN Ninetta Mattock, PA-C     • pantoprazole  40 mg Oral Daily Lorraine Reaves PA-C     • polyethylene glycol  17 g Oral Daily Lorraine Reaves PA-C     • potassium chloride  20 mEq Oral Daily Lorraine Reaves PA-C Continuous Infusions:   PRN Meds: •  acetaminophen  •  bisacodyl  •  ondansetron  •  oxyCODONE  •  oxyCODONE    Vitals:   Vitals:    02/25/23 2154 02/25/23 2242 02/26/23 0230 02/26/23 0543   BP: 138/90 124/72 123/93    BP Location:       Pulse: (!) 114 100 92    Resp:       Temp:  99 3 °F (37 4 °C) 99 °F (37 2 °C)    TempSrc:       SpO2: 94% 92% 94%    Weight:    86 2 kg (190 lb 0 6 oz)   Height:       afebrile, continues with episodes of tachycardia    Telemetry: SR; Heart Rate: 96    Respiratory:   SpO2: SpO2: 94 %; Room Air    Intake/Output:     Intake/Output Summary (Last 24 hours) at 2/26/2023 0717  Last data filed at 2/26/2023 0531  Gross per 24 hour   Intake 778 ml   Output 2305 ml   Net -1527 ml    UOP 2 1L/24    Chest tube Output:    Mediastinal tubes: 40 mL/8 hours  140 mL/24 hours          Weights:   Weight (last 2 days)     Date/Time Weight    02/26/23 0543 86 2 (190 04)    02/25/23 0600 88 7 (195 55)    02/24/23 0600 91 7 (202 16)            Results:   Results from last 7 days   Lab Units 02/26/23  0530 02/25/23  0405 02/24/23  0405   WBC Thousand/uL 12 45* 16 79* 21 25*   HEMOGLOBIN g/dL 10 8* 11 9* 14 0   HEMATOCRIT % 34 4* 37 2 43 0   PLATELETS Thousands/uL 138* 106* 119*     Results from last 7 days   Lab Units 02/26/23  0530 02/25/23  0405 02/24/23  0405 02/23/23  1301 02/23/23  1253   SODIUM mmol/L 138 136 139   < >  --    POTASSIUM mmol/L 4 1 4 2 4 1   < >  --    CHLORIDE mmol/L 106 107 111*   < >  --    CO2 mmol/L 28 26 20*   < >  --    CO2, I-STAT mmol/L  --   --   --   --  28   BUN mg/dL 21 17 13   < >  --    CREATININE mg/dL 0 84 0 76 1 03   < >  --    GLUCOSE, ISTAT mg/dl  --   --   --   --  119   CALCIUM mg/dL 8 9 8 5 7 8*   < >  --     < > = values in this interval not displayed           Point of care glucose: 104 - 145 (no hx of DM, A1c 5 6)    Studies:  No new studies    I have personally reviewed pertinent films in PACS    Invasive Lines/Tubes:  Invasive Devices     Central Venous Catheter Line  Duration           CVC Central Lines 02/23/23 Triple 2 days          Drain  Duration           Chest Tube 1 Mediastinal;Posterior 32 Fr  2 days    Chest Tube 2 Mediastinal;Anterior 32 Fr  2 days              Physical Exam:    HEENT/NECK:  Normocephalic  Atraumatic  No jugular venous distention  Cardiac: Regular rate and rhythm and No murmurs/rubs/gallops  Pulmonary:  Breath sounds clear bilaterally and No rales/rhonchi/wheezes  Abdomen:  Non-tender, Non-distended and Normal bowel sounds  Incisions: Sternum is stable  Incision is clean, dry, and intact  and Saphenectomy incison is clean, dry, and intact  Extremities: Extremities warm/dry and Trace edema B/L  Neuro: Alert and oriented X 3 and No focal deficits  Skin: Warm/Dry, without rashes or lesions  Assessment:  Principal Problem:    S/P AVR  Active Problems:    Marijuana use    Tobacco use    Aortic valve stenosis    Hyperlipidemia    Coronary artery disease of native heart with stable angina pectoris (HCC)    Schizophrenia (HCC)    Bipolar 1 disorder (HCC)    S/P CABG (coronary artery bypass graft)    Thrombocytopenia (HCC)    Leukocytosis    Acute postoperative anemia due to expected blood loss       Aortic stenosis, Non-Rheumatic, Coronary artery disease  S/P aortic valve replacement and coronary artery bypass grafting (SVG to RCA); POD # 3    Plan:    1  Cardiac:   Sinus Tachycardia; BP well-controlled  Increase to Lopressor, 100mg PO BID  Continue ASA and Statin therapy  Epicardial pacing wires out  D/C central IV access today   Continue DVT prophylaxis    2  Pulmonary:   Good Room air oxygen saturation; Continue incentive spirometry/Coughing/Deep breathing exercises  Chest tube drainage diminished; D/C today    3  Renal:   Intake/Output net: -1 4 mL/24 hours (since admit +)  Diuretic Regimen:  Discontinue Lasix 60 mg IV QD- transition to demadex 40 mg QD  Continue Potassium Chloride 20 mEq PO QD  Post op Creatinine stable;  Follow up labs prn    4  Neuro:  Neurologically intact; No active issues  Incisional pain well-controlled  Continue Tylenol, 975 mg PO q 8, standing dose  Continue Oxycodone, 2 5 to 5 mg PO q 4 hours prn pain  Bipolar disorder- stable (no home meds PTA), continue to monitor  Schizophrenia- stable (no home meds PTA), continue to monitor  Add melatonin    5  GI:  Tolerating TLC 2 3 gm sodium diet  Maintain 1800 mL daily fluid restriction   Continue stool softeners and prn suppository  Continue GI prophylaxis  Add senna and MOM    6  Endo:   Glucose well-controlled with sliding scale coverage    7    Hematology:    Post-operative blood count acceptable; Trend prn, Thrombocytopenia and Leukocytosis- likely reactive, afebrile    8     Disposition:      Follow daily PT/OT recommendations regarding home vs  rehab when medically cleared for discharge- home PT    VTE Pharmacologic Prophylaxis: Fondaparinux (Arixtra)  VTE Mechanical Prophylaxis: sequential compression device    Collaborative rounds completed with supervising physician  Plan of care discussed with bedside nurse    SIGNATURE: Tresa Blood PA-C  DATE: February 26, 2023  TIME: 7:09 AM

## 2023-02-27 VITALS
HEART RATE: 98 BPM | BODY MASS INDEX: 29.55 KG/M2 | WEIGHT: 183.86 LBS | SYSTOLIC BLOOD PRESSURE: 122 MMHG | OXYGEN SATURATION: 95 % | DIASTOLIC BLOOD PRESSURE: 74 MMHG | HEIGHT: 66 IN | RESPIRATION RATE: 18 BRPM | TEMPERATURE: 99 F

## 2023-02-27 LAB
GLUCOSE SERPL-MCNC: 108 MG/DL (ref 65–140)
GLUCOSE SERPL-MCNC: 109 MG/DL (ref 65–140)

## 2023-02-27 RX ORDER — PANTOPRAZOLE SODIUM 40 MG/1
40 TABLET, DELAYED RELEASE ORAL DAILY
Qty: 30 TABLET | Refills: 0 | Status: SHIPPED | OUTPATIENT
Start: 2023-02-28

## 2023-02-27 RX ORDER — DOCUSATE SODIUM 100 MG/1
100 CAPSULE, LIQUID FILLED ORAL 2 TIMES DAILY
Refills: 0 | COMMUNITY
Start: 2023-02-27

## 2023-02-27 RX ORDER — ASPIRIN 325 MG
325 TABLET ORAL DAILY
Qty: 30 TABLET | Refills: 3 | Status: SHIPPED | OUTPATIENT
Start: 2023-02-28

## 2023-02-27 RX ORDER — POTASSIUM CHLORIDE 20 MEQ/1
20 TABLET, EXTENDED RELEASE ORAL DAILY
Qty: 7 TABLET | Refills: 1 | Status: SHIPPED | OUTPATIENT
Start: 2023-02-28 | End: 2023-03-08

## 2023-02-27 RX ORDER — ACETAMINOPHEN 325 MG/1
650 TABLET ORAL EVERY 4 HOURS PRN
Refills: 0 | COMMUNITY
Start: 2023-02-27

## 2023-02-27 RX ORDER — SENNOSIDES 8.6 MG
8.6 TABLET ORAL
Qty: 14 TABLET | Refills: 0 | Status: SHIPPED | OUTPATIENT
Start: 2023-02-27 | End: 2023-03-13

## 2023-02-27 RX ORDER — OXYCODONE HYDROCHLORIDE 5 MG/1
TABLET ORAL
Qty: 30 TABLET | Refills: 0 | Status: SHIPPED | OUTPATIENT
Start: 2023-02-27

## 2023-02-27 RX ORDER — METOPROLOL TARTRATE 100 MG/1
100 TABLET ORAL EVERY 12 HOURS SCHEDULED
Qty: 60 TABLET | Refills: 2 | Status: SHIPPED | OUTPATIENT
Start: 2023-02-27 | End: 2023-03-02

## 2023-02-27 RX ORDER — POLYETHYLENE GLYCOL 3350 17 G/17G
17 POWDER, FOR SOLUTION ORAL DAILY
Qty: 510 G | Refills: 0 | Status: SHIPPED | OUTPATIENT
Start: 2023-02-28 | End: 2023-03-30

## 2023-02-27 RX ORDER — ATORVASTATIN CALCIUM 80 MG/1
80 TABLET, FILM COATED ORAL
Qty: 30 TABLET | Refills: 2 | Status: SHIPPED | OUTPATIENT
Start: 2023-02-27

## 2023-02-27 RX ADMIN — POTASSIUM CHLORIDE 20 MEQ: 1500 TABLET, EXTENDED RELEASE ORAL at 08:35

## 2023-02-27 RX ADMIN — ACETAMINOPHEN 975 MG: 325 TABLET ORAL at 05:26

## 2023-02-27 RX ADMIN — ASPIRIN 325 MG ORAL TABLET 325 MG: 325 PILL ORAL at 08:34

## 2023-02-27 RX ADMIN — AMIODARONE HYDROCHLORIDE 200 MG: 200 TABLET ORAL at 05:26

## 2023-02-27 RX ADMIN — METOPROLOL TARTRATE 100 MG: 50 TABLET ORAL at 08:34

## 2023-02-27 RX ADMIN — PANTOPRAZOLE SODIUM 40 MG: 40 TABLET, DELAYED RELEASE ORAL at 08:35

## 2023-02-27 RX ADMIN — TORSEMIDE 40 MG: 20 TABLET ORAL at 08:35

## 2023-02-27 RX ADMIN — CHLORHEXIDINE GLUCONATE 15 ML: 1.2 SOLUTION ORAL at 08:35

## 2023-02-27 NOTE — OCCUPATIONAL THERAPY NOTE
Occupational Therapy Progress Note     Patient Name: Gerardo EVANS'S Date: 2/27/2023  Problem List  Principal Problem:    S/P AVR  Active Problems:    Marijuana use    Tobacco use    Aortic valve stenosis    Hyperlipidemia    Coronary artery disease of native heart with stable angina pectoris (HCC)    Schizophrenia (HCC)    Bipolar 1 disorder (HCC)    S/P CABG (coronary artery bypass graft)    Thrombocytopenia (HCC)    Leukocytosis    Acute postoperative anemia due to expected blood loss              02/27/23 0916   OT Last Visit   OT Visit Date 02/27/23   Note Type   Note Type Treatment   Pain Assessment   Pain Assessment Tool 0-10   Pain Score No Pain   Restrictions/Precautions   Weight Bearing Precautions Per Order No   Other Precautions Cardiac/sternal;Telemetry   Lifestyle   Autonomy Pt reports being I in ADLs, IADLs, and does not use DME at baseline  (-)   Reciprocal Relationships Pt lives with his girlfriend who is able to A if needed  Service to Others Pt is retired/not currently working  Intrinsic Gratification Pt enjoys keeping busy  ADL   Where Assessed Standing at sink   Grooming Assistance 5  Supervision/Setup   Grooming Deficit Wash/dry hands; Wash/dry face; Teeth care;Brushing hair   UB Bathing Assistance 5  Supervision/Setup   UB Bathing Deficit Setup;Right arm;Left arm; Chest;Abdomen   UB Dressing Assistance 5  Supervision/Setup   UB Dressing Deficit Thread LUE; Thread RUE   LB Dressing Assistance 5  Supervision/Setup   LB Dressing Deficit Don/doff R sock; Don/doff L sock   Functional Standing Tolerance   Time ~ 5 mins   Activity dynamic standing balance   Comments Pt was able to tolerate ~ 5 mins of dynamic standing balance to complete grooming tasks at the sink  Bed Mobility   Additional Comments pt was OOB in chair upon arrival  Pt was left sitting in the chair with all necessary items within reach     Transfers   Sit to Stand 5  Supervision   Additional items Increased time required   Stand to Sit 5  Supervision   Additional items Increased time required   Additional Comments transfers w no AD   Functional Mobility   Functional Mobility 5  Supervision   Additional Comments Pt was able to demonstrate short distance household mobility with S and no AD  Subjective   Subjective "I am feeling great!"   Cognition   Overall Cognitive Status WFL   Arousal/Participation Alert; Responsive;Arousable; Cooperative   Attention Within functional limits   Orientation Level Oriented X4   Memory Within functional limits   Following Commands Follows one step commands with increased time or repetition   Comments Pt was pleasant and cooperative t/o session  Pt was re-educated on cardiac precautions and was receptive  Activity Tolerance   Activity Tolerance Patient tolerated treatment well   Medical Staff Made Aware RN made aware   Assessment   Assessment Pt was seen on 2/27/2023 for skilled OT session  OT session was focused on ADLs, functional mobility, functional transfers, static/dynamic balance, safety awareness, education, increased activity tolerance, and energy conservation  Pt was agreeable to OT session  Pt was able to demonstrate grooming tasks while standing at the sink ~ 5 mins with S  Pt was able to demonstrate UB ADLs and LB dressing with S  Pt does not require further acute OT services while in the hospital  Pt was re-educated on cardiac precautions and was receptive  Pt will continue to benefit from practice with ADLs and functional mobility with staff until d/c  The patient's raw score on the AM-PAC Daily Activity Inpatient Short Form is 24  A raw score of greater than or equal to 19 suggests the patient may benefit from discharge to home  Please refer to the recommendation of the Occupational Therapist for safe discharge planning  OT recommendations for d/c include Home with increased social support  Pt will be d/c from OT caseload     Plan   Treatment Interventions ADL retraining;Functional transfer training;Equipment evaluation/education; Compensatory technique education;Continued evaluation; Energy conservation; Activityengagement;Cardiac education   Goal Expiration Date 03/10/23   OT Treatment Day 1   OT Frequency 3-5x/wk   Recommendation   OT Discharge Recommendation No rehabilitation needs  (home with increased social support)   AM-PAC Daily Activity Inpatient   Lower Body Dressing 4   Bathing 4   Toileting 4   Upper Body Dressing 4   Grooming 4   Eating 4   Daily Activity Raw Score 24   Daily Activity Standardized Score (Calc for Raw Score >=11) 57 54   AM-PAC Applied Cognition Inpatient   Following a Speech/Presentation 4   Understanding Ordinary Conversation 4   Taking Medications 4   Remembering Where Things Are Placed or Put Away 4   Remembering List of 4-5 Errands 4   Taking Care of Complicated Tasks 4   Applied Cognition Raw Score 24   Applied Cognition Standardized Score 62 21   End of Consult   Education Provided Yes   Patient Position at End of Consult Bedside chair; All needs within reach   Nurse Communication Nurse aware of consult     Sharyn Gunter OTR/CAMILA

## 2023-02-27 NOTE — PHYSICAL THERAPY NOTE
Physical Therapy Evaluation     Patient's Name: Sindy England    Admitting Diagnosis  Nonrheumatic aortic valve stenosis [I35 0]  Coronary artery disease of native artery of native heart with stable angina pectoris ([facility] [address] [facility] ) [I25 118]    Problem List  Patient Active Problem List   Diagnosis    Marijuana use    Tobacco use    Murmur    Severe aortic stenosis    Aortic valve stenosis    Tobacco abuse    Hyperlipidemia    Coronary artery disease of native heart with stable angina pectoris (HCC)    Schizophrenia (HCC)    Bipolar 1 disorder (HCC)    S/P CABG (coronary artery bypass graft)    S/P AVR    Thrombocytopenia (HCC)    Leukocytosis    Acute postoperative anemia due to expected blood loss       Past Medical History  Past Medical History:   Diagnosis Date    Diabetes mellitus ([facility] [facility] )     Hyperlipidemia     Hypertension     Psychiatric disorder     bipolar    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[facility] [address] )     Syncope        Past Surgical History  Past Surgical History:   Procedure Laterality Date    CARDIAC ASCENDING AORTOGRAM N/A 10/28/2022    Procedure: Cardiac ascending aortogram;  Surgeon: Sima Gomez MD;  Location: [facility]ARD[facility] C[facility]; Service: Cardiology    CARDIAC CATHETERIZATION Left 10/28/2022    Procedure: CARDIAC RHC/LHC; Surgeon: Sima Gomez MD;  Location: [facility]; Service: Cardiology    CARDIAC CATHETERIZATION N/A 10/28/2022    Procedure: Cardiac Coronary Angiogram;  Surgeon: Sima Gomez MD;  Location: [address] [place] [address] CATH LAB; Service: Cardiology    CARDIAC CATHETERIZATION Left 10/28/2022    Procedure: Cardiac Left Ventriculogram;  Surgeon: Sima Gomez MD;  Location: [facility] CARD[facility] CATH [facility];   Service: Cardiology    LAPAROSCOPIC LYSIS INTESTINAL ADHESIONS      RI RPLCMT AORTIC VALVE OPN W/STENTLESS TISSUE VALVE N/A 2/23/2023    Procedure: CORONARY ARTERY BYPASS GRAFT (CABG) 1 VESSEL GSV-->RCA, EVH RIGHT LEG,  WITH REPLACEMENT VALVE AORTIC (AVR) 25MM INSPIRIA RESILIA TISSUE VALVE;  Surgeon: Carlos Goel MD;  Location: [facility] MAIN [facility];  Service: Cardiac Surgery        02/27/23 0850   PT Last Visit   PT Visit Date 02/27/23   Note Type   Note Type Treatment   Pain Assessment   Pain Assessment Tool 0-10   Pain Score No Pain   Restrictions/Precautions   Other Precautions Cardiac/sternal;Telemetry   General   Chart Reviewed Yes   Additional Pertinent History cleared for Tx session (spoke to genaro)   Response to Previous Treatment Patient with no complaints from previous session  Family/Caregiver Present No   Cognition   Overall Cognitive Status WFL   Arousal/Participation Alert; Cooperative   Attention Within functional limits   Orientation Level Oriented to person;Oriented to place;Oriented to situation   Memory Decreased recall of precautions   Following Commands Follows one step commands without difficulty   Subjective   Subjective Alert; in the chair; reports he has been mobilizing in the room w/o rw; agreeable to amb and try steps (reports he may need to do them at work); states he will be going home today   Transfers   Sit to Stand 6  Modified independent   Stand to Sit 6  Modified independent   Ambulation/Elevation   Gait pattern Short stride  (No overt uncorrected LOB, gross knee buckling, or swaying )   Gait Assistance 6  Modified independent   Assistive Device None   Distance 120 ft + 300 ft + 160 ft w/ standing rest periods and steps negotiation in between   Stair Management Assistance 6  Modified independent   Stair Management Technique Alternating pattern;Reciprocal;One rail L   Number of Stairs 7   Balance   Static Sitting Good   Static Standing Fair +   Ambulatory Fair   Activity Tolerance   Activity Tolerance Patient tolerated treatment well   Nurse Made Aware spoke to ZEV Steen   Assessment   Assessment Pt demonstrated overall significant improvement in balance, endurance and all aspects of observed mobility progressing to mod (I) level on level surfaces (incl amb w/o AD) and on the steps; no overt uncorrected LOB, gross knee buckling, or swaying observed during the session; no increased discomfort or excessive fatigue expressed; pt appeared to be comfortable at the end of session; overall, cont to anticipate pt will return home w/ available support upon D/C from PT/mobility stand point and when medically cleared; no other immediate PT needs otherwise identified while in the hospital; pt informed and concurred; pt expressed no concerns about going home from mobility stand point, when medically cleared; will therefore sign off   Goals   Patient Goals to return home   PT Treatment Day 1   Plan   Treatment/Interventions Spoke to nursing;Spoke to advanced practitioner;Spoke to case management   Progress Discontinue PT   PT Frequency Other (Comment)  (D/C PT)   Recommendation   PT Discharge Recommendation No rehabilitation needs   AM-PAC Basic Mobility Inpatient   Turning in Flat Bed Without Bedrails 4   Lying on Back to Sitting on Edge of Flat Bed Without Bedrails 4   Moving Bed to Chair 4   Standing Up From Chair Using Arms 4   Walk in Room 4   Climb 3-5 Stairs With Railing 4   Basic Mobility Inpatient Raw Score 24   Basic Mobility Standardized Score 57 68   Highest Level Of Mobility   JH-HLM Goal 8: Walk 250 feet or more   JH-HLM Achieved 8: Walk 250 feet ot more   Education   Education Provided Mobility training   End of Consult   Patient Position at End of Consult Bedside chair         Shanna Yan, PT

## 2023-02-27 NOTE — PLAN OF CARE
Problem: OCCUPATIONAL THERAPY ADULT  Goal: Performs self-care activities at highest level of function for planned discharge setting  See evaluation for individualized goals  Description: Treatment Interventions: ADL retraining, UE strengthening/ROM, Functional transfer training, Endurance training, Equipment evaluation/education, Compensatory technique education, Continued evaluation, Cardiac education, Energy conservation, Activityengagement          See flowsheet documentation for full assessment, interventions and recommendations  Outcome: Adequate for Discharge  Note: Limitation: Decreased ADL status, Decreased endurance, Decreased high-level ADLs, Decreased UE ROM, Decreased UE strength  Prognosis: Fair  Assessment: Pt was seen on 2/27/2023 for skilled OT session  OT session was focused on ADLs, functional mobility, functional transfers, static/dynamic balance, safety awareness, education, increased activity tolerance, and energy conservation  Pt was agreeable to OT session  Pt was able to demonstrate grooming tasks while standing at the sink ~ 5 mins with S  Pt was able to demonstrate UB ADLs and LB dressing with S  Pt does not require further acute OT services while in the hospital  Pt was re-educated on cardiac precautions and was receptive  Pt will continue to benefit from practice with ADLs and functional mobility with staff until d/c  The patient's raw score on the AM-PAC Daily Activity Inpatient Short Form is 24  A raw score of greater than or equal to 19 suggests the patient may benefit from discharge to home  Please refer to the recommendation of the Occupational Therapist for safe discharge planning  OT recommendations for d/c include Home with increased social support  Pt will be d/c from OT caseload       OT Discharge Recommendation: No rehabilitation needs (home with increased social support)

## 2023-02-27 NOTE — CASE MANAGEMENT
Case Management Discharge Planning Note    Patient name Desirae Strange  Location [address] [place] [address] [facility] [id number]  : 1965 Date 2023       Current Admission Date: 2023  Current Admission Diagnosis:S/P AVR   Patient Active Problem List    Diagnosis Date Noted   • Thrombocytopenia ([facility] ) 2023   • Leukocytosis 2023   • Acute postoperative anemia due to expected blood loss 2023   • Schizophrenia ([facility] ) 2023   • Bipolar 1 disorder ([place] [address] ) 2023   • S/P CABG (coronary artery bypass graft) 2023   • S/P AVR 2023   • Hyperlipidemia 12/15/2022   • Coronary artery disease of native heart with stable angina pectoris ([place] [address] ) 12/15/2022   • Aortic valve stenosis    • Tobacco abuse    • Severe aortic stenosis 2022   • Marijuana use 2022   • Tobacco use 2022   • Murmur 2022      LOS (days): 4  Geometric Mean LOS (GMLOS) (days): 8 90  Days to GMLOS:4 7     OBJECTIVE:  Risk of Unplanned Readmission Score: 15 61         Current admission status: Inpatient   Preferred Pharmacy:   [facility] [address] [place] [address] - [place] [facility] [address] [place] [address]  Phone: [phone] Fax: [phone]    Primary Care Provider: EDWARDO Sr    Primary Insurance: [facility]  Secondary Insurance:     DISCHARGE DETAILS:                                                                                                 Additional Comments: Pt provided with LYFT keira home

## 2023-02-27 NOTE — PROGRESS NOTES
Progress Note - Cardiothoracic Surgery   Dov Chun 56 y o  male MRN: [id number]  Unit/Bed#: [facility] 403-01 Encounter: [id number]    Aortic stenosis, Non-Rheumatic, Coronary artery disease  S/P aortic valve replacement and coronary artery bypass grafting (SVG to RCA); POD # 4    24 Hour Events: No events overnight  No complaints  Ambulating well independently  Pain controlled       Medications:   Scheduled Meds:  Current Facility-Administered Medications   Medication Dose Route Frequency Provider Last Rate   • acetaminophen  650 mg Rectal Q4H PRN Magdiel Dial PA-C     • acetaminophen  975 mg Oral [facility] Veloz Lacrosse, HILLARY     • amiodarone  200 mg Oral [facility] Veloz Lacrosse, HILLARY     • aspirin  325 mg Oral Daily Veloz Lacrkeke, HILLARY     • atorvastatin  80 mg Oral Daily With Jann Ibanez PA-C     • bisacodyl  10 mg Rectal Daily PRN Veloz LacrHILLARY davies     • chlorhexidine  15 mL Swish & Spit BID Veloz Lacrkeke, HILLARY     • docusate sodium  100 mg Oral BID Veloz Lacrkeke, HILLARY     • fondaparinux  2 5 mg Subcutaneous Daily Veloz Lacrkeke, HILLARY     • insulin lispro  1-6 Units Subcutaneous TID AC Magdiel Dial PA-C     • insulin lispro  1-6 Units Subcutaneous HS Veloz HILLARY Dial     • melatonin  6 mg Oral HS Barbie Barillas PA-C     • metoprolol tartrate  100 mg Oral Q12H [address] Barbie Barillas PA-C     • mupirocin  1 application Nasal Z70Y [address] Veloz LacrHILLARY davies     • ondansetron  4 mg Intravenous Q6H PRN Veloz Lacrkeke, HILLARY     • oxyCODONE  2 5 mg Oral Q4H PRN Veloz LacrosseHILLARY     • oxyCODONE  5 mg Oral Q4H PRN Veloz Lacrosse, HILLARY     • pantoprazole  40 mg Oral Daily Veloz Lacrosse, HILLARY     • polyethylene glycol  17 g Oral Daily Veloz Lacrosse, HILLARY     • potassium chloride  20 mEq Oral Daily Veloz LacrosseHILLARY     • senna  1 tablet Oral HS Barbie Barillas PA-C     • torsemide  40 mg Oral Daily Barbie Barillas PA-C       Continuous Infusions:   PRN Meds: •  acetaminophen  •  bisacodyl  •  ondansetron  •  oxyCODONE  •  oxyCODONE    Vitals:   Vitals:    02/26/23 2123 02/27/23 0317 02/27/23 0554 02/27/23 0715   BP: 136/80 108/71  117/73   Pulse: 102   94   Resp:  16  18   Temp:  98 8 °F (37 1 °C)  99 5 °F (37 5 °C)   TempSrc:       SpO2: 93%   95%   Weight:   83 4 kg (183 lb 13 8 oz)    Height:           Telemetry: NSR; Heart Rate: 92    Respiratory:   SpO2: SpO2: 95 %; Room Air    Intake/Output:     Intake/Output Summary (Last 24 hours) at 2/27/2023 0755  Last data filed at 2/26/2023 1200  Gross per 24 hour   Intake 598 ml   Output 750 ml   Net -152 ml      Weights:   Weight (last 2 days)     Date/Time Weight    02/27/23 0554 83 4 (183 86)    02/26/23 0543 86 2 (190 04)    02/25/23 0600 88 7 (195 55)        Results:   Results from last 7 days   Lab Units 02/26/23  0530 02/25/23  0405 02/24/23  0405   WBC Thousand/uL 12 45* 16 79* 21 25*   HEMOGLOBIN g/dL 10 8* 11 9* 14 0   HEMATOCRIT % 34 4* 37 2 43 0   PLATELETS Thousands/uL 138* 106* 119*     Results from last 7 days   Lab Units 02/26/23  0530 02/25/23  0405 02/24/23  0405 02/23/23  1301 02/23/23  1253   SODIUM mmol/L 138 136 139   < >  --    POTASSIUM mmol/L 4 1 4 2 4 1   < >  --    CHLORIDE mmol/L 106 107 111*   < >  --    CO2 mmol/L 28 26 20*   < >  --    CO2, I-STAT mmol/L  --   --   --   --  28   BUN mg/dL 21 17 13   < >  --    CREATININE mg/dL 0 84 0 76 1 03   < >  --    GLUCOSE, ISTAT mg/dl  --   --   --   --  119   CALCIUM mg/dL 8 9 8 5 7 8*   < >  --     < > = values in this interval not displayed  Point of care glucose:     Invasive Lines/Tubes:  Invasive Devices     Central Venous Catheter Line  Duration           CVC Central Lines 02/23/23 Triple 3 days                Physical Exam:    HEENT/NECK:  Normocephalic  Atraumatic  NO jugular venous distention      Cardiac: Regular rate and rhythm  Pulmonary:  Breath sounds clear bilaterally  Abdomen:  Non-tender, Non-distended and Normal bowel sounds  Incisions: Sternum is stable  Incision is clean, dry, and intact  Extremities: Extremities warm/dry and Trace edema B/L  Neuro: Alert and oriented X 3  Skin: Warm/Dry, without rashes or lesions  Assessment:  Principal Problem:    S/P AVR  Active Problems:    Marijuana use    Tobacco use    Aortic valve stenosis    Hyperlipidemia    Coronary artery disease of native heart with stable angina pectoris (HCC)    Schizophrenia (HCC)    Bipolar 1 disorder (HCC)    S/P CABG (coronary artery bypass graft)    Thrombocytopenia (HCC)    Leukocytosis    Acute postoperative anemia due to expected blood loss         Aortic stenosis, Non-Rheumatic, Coronary artery disease  S/P aortic valve replacement and coronary artery bypass grafting (SVG to RCA); POD # 4    Plan:    1  Cardiac:   NSR; HR/BP well-controlled  Continue Lopressor, 100mg PO BID  Continue ASA and Statin therapy  Epicardial pacing wires out  Central IV access no longer required; Remove central venous catheter today  Continue DVT prophylaxis    2  Pulmonary:   Good Room air oxygen saturation; Continue incentive spirometry/Coughing/Deep breathing exercises  Chest tubes have been discontinued    3  Renal:   Intake/Output net: -152 mL/24 hours  Diuretic Regimen: Continue torsemide 40 mg daily  Post op Creatinine stable; Follow up labs prn    4  Neuro:  Neurologically intact; No active issues  Incisional pain well-controlled  Continue Tylenol, 975 mg PO q 8, standing dose  Continue Oxycodone, 2 5 to 5 mg PO q 4 hours prn pain    5  GI:  Tolerating TLC 2 3 gm sodium diet  Maintain 1800 mL daily fluid restriction   Continue stool softeners and prn suppository  Continue GI prophylaxis    6  Endo:   Glucose well-controlled with sliding scale coverage    7    Hematology:    Post-operative blood count acceptable; Trend prn    8     Disposition:      Anticipate discharge to home today     VTE Pharmacologic Prophylaxis: Fondaparinux (Arixtra)  VTE Mechanical Prophylaxis: sequential compression device    Collaborative rounds completed with supervising physician  Plan of care discussed with bedside nurse    SIGNATURE: Carine Rasheed PA-C  DATE: February 27, 2023  TIME: 7:55 AM

## 2023-02-27 NOTE — DISCHARGE INSTRUCTIONS
Weigh yourself every day -- if you gain more than 3 pounds over 2 days call Dr Padma Canales office for further advice

## 2023-02-27 NOTE — PLAN OF CARE
Problem: PHYSICAL THERAPY ADULT  Goal: Performs mobility at highest level of function for planned discharge setting  See evaluation for individualized goals  Description: Treatment/Interventions: Functional transfer training, LE strengthening/ROM, Therapeutic exercise, Endurance training, Bed mobility, Gait training, Spoke to nursing, Spoke to case management, OT  Equipment Recommended: Rosalia Logan       See flowsheet documentation for full assessment, interventions and recommendations  Outcome: Adequate for Discharge  Note: Prognosis: Good  Problem List: Decreased strength, Impaired balance, Decreased mobility, Decreased cognition, Impaired judgement, Decreased safety awareness, Obesity  Assessment: Pt demonstrated overall significant improvement in balance, endurance and all aspects of observed mobility progressing to mod (I) level on level surfaces (incl amb w/o AD) and on the steps; no overt uncorrected LOB, gross knee buckling, or swaying observed during the session; no increased discomfort or excessive fatigue expressed; pt appeared to be comfortable at the end of session; overall, cont to anticipate pt will return home w/ available support upon D/C from PT/mobility stand point and when medically cleared; no other immediate PT needs otherwise identified while in the hospital; pt informed and concurred; pt expressed no concerns about going home from mobility stand point, when medically cleared; will therefore sign off        PT Discharge Recommendation: No rehabilitation needs    See flowsheet documentation for full assessment

## 2023-02-27 NOTE — PROGRESS NOTES
Cardiology Progress Note - Jolanta Govea 56 y o  male MRN: [id number]    Unit/Bed#: [facility] 403-01 Encounter: [id number]      Assessment:  1  Severe symptomatic aortic stenosis/aortic insufficiency status post bioprosthetic AVR - POD #4  2  CAD status post CABG x1 - SVG to RPDA, POD #4  3  Postoperative volume overload  4  Benign essential hypertension  5  Dyslipidemia  6  Type 2 diabetes mellitus  7  Obesity - BMI 30  8  Acute postoperative blood loss anemia    Plan:  1  Hemodynamically stable postoperatively - maintaining NSR  2  On oral amiodarone and beta-blocker  3  Euvolemic on oral diuretics - continue torsemide, renal function stable   4  Continue aspirin and statin  5  Stable for discharge from cardiac perspective - follow-up with [facility] upon discharge     Subjective:   Patient seen and examined  No significant events overnight  Objective:     Vitals: Blood pressure 117/73, pulse 94, temperature 99 5 °F (37 5 °C), resp  rate 18, height 5' 6" (1 676 m), weight 83 4 kg (183 lb 13 8 oz), SpO2 95 %  , Body mass index is 29 68 kg/m² ,   Orthostatic Blood Pressures    Flowsheet Row Most Recent Value   Blood Pressure 117/73 filed at 02/27/2023 0715   Patient Position - Orthostatic VS Sitting filed at 02/25/2023 1822            Intake/Output Summary (Last 24 hours) at 2/27/2023 0835  Last data filed at 2/27/2023 0715  Gross per 24 hour   Intake 600 ml   Output 750 ml   Net -150 ml         Physical Exam:    GEN: Jolanta Govea appears well, alert and oriented x 3, pleasant and cooperative   HEENT: pupils equal, round, and reactive to light; extraocular muscles intact  NECK: supple, no carotid bruits   HEART: regular rhythm, normal S1 and S2, no murmurs, clicks, gallops or rubs   LUNGS: clear to auscultation bilaterally; no wheezes, rales, or rhonchi   ABDOMEN: normal bowel sounds, soft, no tenderness, no distention  EXTREMITIES: No edema  NEURO: no focal findings   SKIN: Sternotomy scar with healing without erythema or drainage    Medications:      Current Facility-Administered Medications:   •  acetaminophen (TYLENOL) rectal suppository 650 mg, 650 mg, Rectal, Q4H PRN, Damon Peng, PA-C  •  acetaminophen (TYLENOL) tablet 975 mg, 975 mg, Oral, Q8H [address], Damon Shines, PA-C, 975 mg at 02/27/23 9162  •  amiodarone tablet 200 mg, 200 mg, Oral, Q8H [address], Damon Shines, PA-C, 200 mg at 02/27/23 0785  •  aspirin tablet 325 mg, 325 mg, Oral, Daily, Damon Shinraul, PA-C, 325 mg at 02/27/23 1476  •  atorvastatin (LIPITOR) tablet 80 mg, 80 mg, Oral, Daily With Cherylin Eagle, PA-C, 80 mg at 02/26/23 1533  •  bisacodyl (DULCOLAX) rectal suppository 10 mg, 10 mg, Rectal, Daily PRN, Damon Peng PA-C  •  chlorhexidine (PERIDEX) 0 12 % oral rinse 15 mL, 15 mL, Swish & Spit, BID, Damon Shinraul, PA-C, 15 mL at 02/26/23 1701  •  docusate sodium (COLACE) capsule 100 mg, 100 mg, Oral, BID, Damon Shinraul, PA-C, 100 mg at 02/26/23 7523  •  fondaparinux (ARIXTRA) subcutaneous injection 2 5 mg, 2 5 mg, Subcutaneous, Daily, Damon Peng PA-C, 2 5 mg at 02/26/23 0856  •  insulin lispro (HumaLOG) 100 units/mL subcutaneous injection 1-6 Units, 1-6 Units, Subcutaneous, TID AC **AND** Fingerstick Glucose (POCT), , , TID AC, CAL Morgan-C  •  insulin lispro (HumaLOG) 100 units/mL subcutaneous injection 1-6 Units, 1-6 Units, Subcutaneous, HS, Damon Peng PA-C  •  melatonin tablet 6 mg, 6 mg, Oral, HS, Berna Repress, PA-C, 6 mg at 02/26/23 2122  •  metoprolol tartrate (LOPRESSOR) tablet 100 mg, 100 mg, Oral, Q12H [address], Berna HILLARY Sanchez, 100 mg at 02/27/23 1548  •  mupirocin (BACTROBAN) 2 % nasal ointment 1 application, 1 application, Nasal, D42M [address], Damon Peng PA-C, 1 application at 38/82/15 2123  •  ondansetron [facility]) injection 4 mg, 4 mg, Intravenous, Q6H PRN, Damon Peng PA-C, 4 mg at 02/25/23 2155  •  oxyCODONE (ROXICODONE) IR tablet 2 5 mg, 2 5 mg, Oral, Q4H PRN, Veloz Lacrosse, PA-C  •  oxyCODONE (ROXICODONE) IR tablet 5 mg, 5 mg, Oral, Q4H PRN, Veloz Lacrosse, PA-C, 5 mg at 02/26/23 2341  •  pantoprazole (PROTONIX) EC tablet 40 mg, 40 mg, Oral, Daily, Veloz Lacrosse, PA-C, 40 mg at 02/27/23 9515  •  polyethylene glycol (MIRALAX) packet 17 g, 17 g, Oral, Daily, Veloz Lacrosse, PA-C, 17 g at 02/26/23 9148  •  potassium chloride (K-DUR,KLOR-CON) CR tablet 20 mEq, 20 mEq, Oral, Daily, Veloz Lacrosse, PA-C, 20 mEq at 02/27/23 9919  •  senna (SENOKOT) tablet 8 6 mg, 1 tablet, Oral, HS, Barbie Shillings, PA-C, 8 6 mg at 02/26/23 0900  •  torsemide (DEMADEX) tablet 40 mg, 40 mg, Oral, Daily, Barbie Shillings, PA-C, 40 mg at 02/27/23 0835     Labs & Results:        Results from last 7 days   Lab Units 02/26/23  0530 02/25/23  0405 02/24/23  0405   WBC Thousand/uL 12 45* 16 79* 21 25*   HEMOGLOBIN g/dL 10 8* 11 9* 14 0   HEMATOCRIT % 34 4* 37 2 43 0   PLATELETS Thousands/uL 138* 106* 119*         Results from last 7 days   Lab Units 02/26/23  0530 02/25/23  0405 02/24/23  0405 02/23/23  1301 02/23/23  1253 02/23/23  1143 02/23/23  1117   POTASSIUM mmol/L 4 1 4 2 4 1   < >  --   --   --    CHLORIDE mmol/L 106 107 111*   < >  --   --   --    CO2 mmol/L 28 26 20*   < >  --   --   --    CO2, I-STAT mmol/L  --   --   --   --  28 26 33*   BUN mg/dL 21 17 13   < >  --   --   --    CREATININE mg/dL 0 84 0 76 1 03   < >  --   --   --    CALCIUM mg/dL 8 9 8 5 7 8*   < >  --   --   --    GLUCOSE, ISTAT mg/dl  --   --   --   --  119 152* 187*    < > = values in this interval not displayed  Results from last 7 days   Lab Units 02/25/23  0405 02/24/23  0405   MAGNESIUM mg/dL 2 4 2 4       Counseling / Coordination of Care  Total floor / unit time spent today 25 minutes  Greater than 50% of total time was spent with the patient and / or family counseling and / or coordination of care

## 2023-02-27 NOTE — DISCHARGE SUMMARY
Discharge Summary - Cardiothoracic Surgery   Sedona Carole 56 y o  male MRN: [id number]  Unit/Bed#: [facility] 403-01 Encounter: [id number]    Admission Date: 2/23/2023     Discharge Date: 02/27/23    Admitting Diagnosis: Nonrheumatic aortic valve stenosis [I35 0]  Coronary artery disease of native artery of native heart with stable angina pectoris ([address] ) [I25 118]    Primary Discharge Diagnosis:   Symptomatic severe aortic stenosis and Aortic valve insufficiency,   Coronary artery disease  S/P Aortic valve replacement with a 25 mm [place]ados Inspiris bioprosthetic valve  2  Coronary artery bypass grafting x 1 with saphenous vein graft to right posterior descending artery  Secondary Discharge Diagnosis:   severe AS, CAD, HL, HTN, tobacco use, marijuana use, bipolar disorder, schizophrenia, poor medication compliance      Attending: KARIE Loo  Consulting Physician(s):   Cardiology  Medical/Critical Care    Procedures Performed:   Procedure(s):  CORONARY ARTERY BYPASS GRAFT (CABG) 1 VESSEL GSV-->RCA, EVH RIGHT LEG,  WITH REPLACEMENT VALVE AORTIC (AVR) 25MM [address] Course:   2/23: Elective admission for AVR (#25 Inspiris), CABG x 1 (SVG to RCA)  Intraoperative blood products include: none  No significant postoperative bleeding  Transferred to ICU supported with Matthew 80  Matthew weaned off, on Cardene 15mg  Extubated  Intra-op UA (-) infx  2/24: On 4LNC, wean as tolerated  Delined  Given 1L LR post-op  On cardene 5, ST in 110s, start Lopressor 25mg, wean cardene as able then discontinue a line  Start Lasix 40mg IV QDay, discontinue delgadillo catheter  Discontinue insulin gtt, transition to [facility]  Transfer to stepdown  2/25: Reports feeling SOB, nauseous, +belching, denies emesis, +flatus  Weaned to RA, net neg 1 4L, remains + from admit, increase lasix to 60 mg IV QD w/ K  -110s, increase lopressor to 50 mg BID  D/C EPW   Transfer to tele     2/26: Irene Murphy with intermittent nausea, +belching, no emesis, add senna and MOM  ST, increase lopressor to 100 mg BID, net neg 1 5L, transition to demadex 40 mg QD  D/C CT  Labs stable  D/C central access  Add melatonin  2/27: + BM  Ambulating independently without any assistance  Labs stable  Stable for d/c to home  Condition at Discharge:   good     Discharge Physical Exam:    Please see the documented physical exam from this morning's progress note for details  Discharge Data:  Results from last 7 days   Lab Units 02/26/23  0530 02/25/23  0405 02/24/23  0405   WBC Thousand/uL 12 45* 16 79* 21 25*   HEMOGLOBIN g/dL 10 8* 11 9* 14 0   HEMATOCRIT % 34 4* 37 2 43 0   PLATELETS Thousands/uL 138* 106* 119*     Results from last 7 days   Lab Units 02/26/23  0530 02/25/23  0405 02/24/23  0405 02/23/23  1301 02/23/23  1253   POTASSIUM mmol/L 4 1 4 2 4 1   < >  --    CHLORIDE mmol/L 106 107 111*   < >  --    CO2 mmol/L 28 26 20*   < >  --    CO2, I-STAT mmol/L  --   --   --   --  28   BUN mg/dL 21 17 13   < >  --    CREATININE mg/dL 0 84 0 76 1 03   < >  --    GLUCOSE, ISTAT mg/dl  --   --   --   --  119   CALCIUM mg/dL 8 9 8 5 7 8*   < >  --     < > = values in this interval not displayed  Discharge instructions/Information to patient and family:   See after visit summary for information provided to patient and family  Kiki Wilkerson was educated on restrictions regarding driving and lifting, and techniques of proper incisional care  They were specifically counselled on signs and symptoms of an incisional infection, and advised to contact our service immediately should they develop fevers, sweats, chill, redness or drainage at the site of any incisions  Provisions for Follow-Up Care:  See after visit summary for information related to follow-up care and any pertinent home health orders        Disposition:  Home    Planned Readmission:   No    Discharge Medications:  See after visit summary for reconciled discharge medications provided to patient and family  Gerardo Wallis was provided contact information and scheduled a follow up appointment with KARIE Bowman  Additionally, follow up appointments have been scheduled for their primary care physician and primary cardiologist   Contact information was provided  Gerardo Wallis was counseled on the importance of avoiding tobacco products  As with all patients whom have undergone open heart surgery, tobacco cessation medication was contraindicated at the time of discharge  ACE/ARB was Contraindicated secondary to hypotension    Beta Blocker was Prescribed at discharge    Aspirin was Prescribed at discharge    Statin was Prescribed at discharge      The patient was discharged on ongoing diuretic therapy with Torsemide 40 mg, PO QD and Potassium Chloride 20 mEq, PO QD  They were advised to continue these medications for 7 days, unless otherwise directed  Narcotic pain medication was prescribed in the form of oxycodone  Prior to prescribing, their prescription profile was reviewed on the [facility] of health prescription drug monitoring program     The patient was informed that following their postoperative surgical evaluation, they will be referred to outpatient cardiac rehabilitation  They were counseled that this program is run by specialists who will help them safely strengthen their heart and prevent more heart disease  Cardiac rehabilitation will include exercise, relaxation, stress management, and heart-healthy nutrition  Caregivers will also check to make sure their medication regimen is working  During this admission, the patient was questioned on their use of tobacco, alcohol, and illicit/non-prescription drug use in the  previous 24 months  During this time frame they admit to using tobacco  As such they have been counseled on the importance of cessation and abstinence       I spent 30 minutes discharging the patient  This time was spent on the day of discharge  I had direct contact with the patient on the day of discharge  Additional documentation is required if more than 30 minutes were spent on discharge       SIGNATURE: Mable Gan  DATE: February 27, 2023  TIME: 10:30 AM

## 2023-02-28 ENCOUNTER — HOME CARE VISIT (OUTPATIENT)
Dept: HOME HEALTH SERVICES | Facility: HOME HEALTHCARE | Age: 58
End: 2023-02-28

## 2023-02-28 ENCOUNTER — TELEPHONE (OUTPATIENT)
Dept: INTERNAL MEDICINE CLINIC | Facility: CLINIC | Age: 58
End: 2023-02-28

## 2023-02-28 ENCOUNTER — TRANSITIONAL CARE MANAGEMENT (OUTPATIENT)
Dept: INTERNAL MEDICINE CLINIC | Facility: CLINIC | Age: 58
End: 2023-02-28

## 2023-02-28 NOTE — UTILIZATION REVIEW
NOTIFICATION OF ADMISSION DISCHARGE   This is a Notification of Discharge from [address]  Please be advised that this patient has been discharge from our facility  Below you will find the admission and discharge date and time including the patient’s disposition  UTILIZATION REVIEW CONTACT:  Miguel Hernandez  Utilization   Network Utilization Review Department  Phone: [phone] x carefully listen to the prompts  All voicemails are confidential   Email: [email or web address]     ADMISSION INFORMATION  PRESENTATION DATE: 2/23/2023  5:47 AM  OBERVATION ADMISSION DATE:   INPATIENT ADMISSION DATE: 2/23/23  7:23 AM   DISCHARGE DATE: 2/27/2023  5:09 PM   DISPOSITION:Home with Home Health Care    IMPORTANT INFORMATION:  Send all requests for admission clinical reviews, approved or denied determinations and any other requests to dedicated fax number below belonging to the campus where the patient is receiving treatment   List of dedicated fax numbers:  [address] DENIALS (Administrative/Medical Necessity) [phone]   [address] (Maternity/NICU/Pediatrics) [phone]   [facility] [phone]   [place] [address] [phone]   [facility]  [phone]   [address] [facility] [phone] [address] [facility] [phone]   [address] [place], [place] [address] [address] [phone]   [facility]  [phone]   [address] [facility] [phone]   [address] [facility] [address] [facility] [phone]

## 2023-03-01 ENCOUNTER — PREP FOR PROCEDURE (OUTPATIENT)
Dept: GASTROENTEROLOGY | Facility: AMBULARY SURGERY CENTER | Age: 58
End: 2023-03-01

## 2023-03-01 ENCOUNTER — HOME CARE VISIT (OUTPATIENT)
Dept: HOME HEALTH SERVICES | Facility: HOME HEALTHCARE | Age: 58
End: 2023-03-01

## 2023-03-01 DIAGNOSIS — Z12.11 SCREENING FOR COLON CANCER: Primary | ICD-10-CM

## 2023-03-02 ENCOUNTER — HOME CARE VISIT (OUTPATIENT)
Dept: HOME HEALTH SERVICES | Facility: HOME HEALTHCARE | Age: 58
End: 2023-03-02

## 2023-03-02 ENCOUNTER — TELEPHONE (OUTPATIENT)
Dept: CARDIOLOGY CLINIC | Facility: CLINIC | Age: 58
End: 2023-03-02

## 2023-03-02 VITALS
DIASTOLIC BLOOD PRESSURE: 80 MMHG | TEMPERATURE: 98.8 F | SYSTOLIC BLOOD PRESSURE: 140 MMHG | OXYGEN SATURATION: 99 % | RESPIRATION RATE: 20 BRPM | HEART RATE: 105 BPM

## 2023-03-02 DIAGNOSIS — Z95.1 S/P CABG (CORONARY ARTERY BYPASS GRAFT): ICD-10-CM

## 2023-03-02 DIAGNOSIS — Z95.2 S/P AVR: ICD-10-CM

## 2023-03-02 RX ORDER — METOPROLOL TARTRATE 100 MG/1
100 TABLET ORAL ONCE
Qty: 90 TABLET | Refills: 3
Start: 2023-03-02 | End: 2023-03-08

## 2023-03-02 NOTE — TELEPHONE ENCOUNTER
Spoke with patient and he verbally understood   Patient state he only going to take metoprolol 100 mg daily instead ,that what he been doing and he ok        Patient has an appt on 3/14/23

## 2023-03-02 NOTE — TELEPHONE ENCOUNTER
Spoke with patient and he states that if he can take metoprolol 100 mg once daily because he getting sick when he does twice daily       Patient states pulse 105     Please advise

## 2023-03-02 NOTE — TELEPHONE ENCOUNTER
Please let patient know that as per prior discharge summary, he is supposed to be on twice daily  He can stop the medication if it is making you feel sick  If he continues to feel unwell, he should go to the emergency room  We will discuss more at his cardiology visit  He was recently discharged after cardiac surgery and has not been seen by us in our office

## 2023-03-02 NOTE — TELEPHONE ENCOUNTER
Patient Beto Song [phone] contacting office regarding his metoprolol states he feels sick when he takes it  Patient also would like to confirm dosage  Patient's pulse today 105 & regular

## 2023-03-06 ENCOUNTER — TELEPHONE (OUTPATIENT)
Dept: CARDIAC SURGERY | Facility: CLINIC | Age: 58
End: 2023-03-06

## 2023-03-06 ENCOUNTER — HOME CARE VISIT (OUTPATIENT)
Dept: HOME HEALTH SERVICES | Facility: HOME HEALTHCARE | Age: 58
End: 2023-03-06

## 2023-03-06 NOTE — TELEPHONE ENCOUNTER
Called patient today for routine postop call  Surgery: CABG x1, AVR on 2/23 - preop weight 187 lbs  Discharged home on 2/27, POD #4 - weight 183 lbs    Patient states that he is feeling well overall  Reports some difficulty sleeping, and his appetite is fair  He understands that he needs to eat consistently for healing  Having some issues with metoprolol (feeling nauseated, delirious), was instructed by cardiology to only take it once a day at night  He is weighing himself daily, weights have been stable  Taking medications as prescribed  Denies SOB, LE swelling  Answered questions and reviewed upcoming appointments

## 2023-03-07 ENCOUNTER — HOME CARE VISIT (OUTPATIENT)
Dept: HOME HEALTH SERVICES | Facility: HOME HEALTHCARE | Age: 58
End: 2023-03-07

## 2023-03-07 ENCOUNTER — TELEPHONE (OUTPATIENT)
Dept: INTERNAL MEDICINE CLINIC | Facility: CLINIC | Age: 58
End: 2023-03-07

## 2023-03-07 VITALS
HEART RATE: 88 BPM | TEMPERATURE: 98 F | SYSTOLIC BLOOD PRESSURE: 142 MMHG | RESPIRATION RATE: 18 BRPM | BODY MASS INDEX: 28.41 KG/M2 | WEIGHT: 176 LBS | OXYGEN SATURATION: 98 % | DIASTOLIC BLOOD PRESSURE: 80 MMHG

## 2023-03-07 NOTE — TELEPHONE ENCOUNTER
Spoke to NADINE that we don't provide a lift for the pt to come in for his appt tomorrow with Oscar Keshia    Only the specialty drs do that     He has to take public transportation

## 2023-03-08 ENCOUNTER — OFFICE VISIT (OUTPATIENT)
Dept: INTERNAL MEDICINE CLINIC | Facility: CLINIC | Age: 58
End: 2023-03-08

## 2023-03-08 ENCOUNTER — HOME CARE VISIT (OUTPATIENT)
Dept: HOME HEALTH SERVICES | Facility: HOME HEALTHCARE | Age: 58
End: 2023-03-08

## 2023-03-08 ENCOUNTER — APPOINTMENT (OUTPATIENT)
Dept: LAB | Facility: CLINIC | Age: 58
End: 2023-03-08

## 2023-03-08 VITALS
HEART RATE: 118 BPM | RESPIRATION RATE: 20 BRPM | WEIGHT: 179.4 LBS | DIASTOLIC BLOOD PRESSURE: 74 MMHG | SYSTOLIC BLOOD PRESSURE: 120 MMHG | OXYGEN SATURATION: 92 % | HEIGHT: 66 IN | TEMPERATURE: 98.5 F | BODY MASS INDEX: 28.83 KG/M2

## 2023-03-08 DIAGNOSIS — R00.0 TACHYCARDIA: ICD-10-CM

## 2023-03-08 DIAGNOSIS — R35.0 FREQUENCY OF URINATION: Primary | ICD-10-CM

## 2023-03-08 DIAGNOSIS — Z95.2 S/P AVR: ICD-10-CM

## 2023-03-08 PROBLEM — I35.0 SEVERE AORTIC STENOSIS: Status: RESOLVED | Noted: 2022-09-23 | Resolved: 2023-03-08

## 2023-03-08 NOTE — PROGRESS NOTES
INTERNAL MEDICINE TRANSITION OF CARE OFFICE VISIT  [facility] - [facility] [address] [place] [address]    NAME: Akbar Linton  AGE: 56 y o  SEX: male  : 1965     DATE: 3/8/2023     Assessment and Plan:     Frequency of urination  Urinalysis, freq urination not on diuretic    S/P AVR  Sinus tach on EKG today, not taking metoprolol 100 mg BID as it makes him feel "sick"  Take metoprolol twice a day as instructed as this will keep you pulse lower  Contact your surgeons office for further direction on medications  Weight daily, if 3 lbs weight gain in 24 hours or 5 lbs in 1 week contact provider-surgeon office  Keep using incentive spirometer every hour to prevent pneumonia  Home health nurses seeing patient 2 times per week  May use miralax for constipation daily, when loose stools occur may cut back to three times a week until when you are off narcotics and bowels are regular        No follow-ups on file  Transitional Care Management Review:     Akbar Linton is a 56 y o  male here for TCM follow-up    During the TCM phone call patient stated:    TCM Call     Date and time call was made  2023 10:33 AM    Hospital care reviewed  Records reviewed    Patient [facility]ial[facility] at  [facility]    Date of Admission  23    Date of discharge  23    Diagnosis  Cardiothoracic Surgery    Disposition  Home    Current Symptoms  Cough; Weakness    Cough Severity  Mild    Weakness severity  Moderate      TCM Call     Post hospital issues  Reduced activity    Scheduled for follow up?   Yes    Patients specialists  Other (comment)    Other specialists names  Ambulatory Referral to [address] [place] [address] Ishmael Padilla    Other specialists contcat #  Cardiac Rehab    Did you obtain your prescribed medications  Yes    Do you need help managing your prescriptions or medications  No    I have advised the patient to call PCP with any new or worsening symptoms  KALEN Beck    Living Arrangements Spouse or Significiant other    Support System  Partner    Have you fallen in the last 12 months  No    Interperter language line needed  No    Counseling  Patient    Counseling topics  patient and family education; Importance of RX compliance    Comments  TCM already scheduled for patient on 03/08/23 at 3 40 pm with Анна Mansfield            HPI:     Patient is here today s/p AVR  His incisions are healing well  He has home health services twice a week  He has not been taking his metoprolol twice  Day due to it making him feel "sick"  He has been weighing himself but does not have a BP cuff  He is eating well  He continues on narcotic for pain management  He admits to having frequency with urination as well as some "pressure" in his bladder  He has no hematuria or fever present  The following portions of the patient's history were reviewed and updated as appropriate: allergies, current medications, past family history, past medical history, past social history, past surgical history and problem list      Review of Systems:     Review of Systems   Constitutional: Negative for appetite change, chills, diaphoresis, fatigue, fever and unexpected weight change  HENT: Negative for postnasal drip and sneezing  Eyes: Negative for visual disturbance  Respiratory: Negative for chest tightness and shortness of breath  Cardiovascular: Negative for chest pain, palpitations and leg swelling  Gastrointestinal: Negative for abdominal pain and blood in stool  Endocrine: Negative for cold intolerance, heat intolerance, polydipsia, polyphagia and polyuria  Genitourinary: Positive for frequency  Negative for difficulty urinating, dysuria and urgency  Musculoskeletal: Negative for arthralgias and myalgias  Skin: Positive for wound  Negative for rash  Neurological: Negative for dizziness, weakness, light-headedness and headaches  Hematological: Negative for adenopathy     Psychiatric/Behavioral: Negative for confusion, dysphoric mood and sleep disturbance  The patient is not nervous/anxious  Problem List:     Patient Active Problem List   Diagnosis   • Marijuana use   • Tobacco use   • Murmur   • Severe aortic stenosis   • Aortic valve stenosis   • Tobacco abuse   • Hyperlipidemia   • Coronary artery disease of native heart with stable angina pectoris (HCC)   • Schizophrenia ([address] )   • Bipolar 1 disorder (HCC)   • S/P CABG (coronary artery bypass graft)   • S/P AVR   • Thrombocytopenia ([facility])   • Leukocytosis   • Acute postoperative anemia due to expected blood loss        Objective:     /74 (BP Location: Left arm, Patient Position: Sitting, Cuff Size: Standard)   Pulse (!) 118   Temp 98 5 °F (36 9 °C) (Tympanic)   Resp 20   Ht 5' 6" (1 676 m)   Wt 81 4 kg (179 lb 6 4 oz)   SpO2 92%   BMI 28 96 kg/m²     Physical Exam  Constitutional:       Appearance: He is well-developed  HENT:      Head: Normocephalic and atraumatic  Eyes:      Conjunctiva/sclera: Conjunctivae normal       Pupils: Pupils are equal, round, and reactive to light  Cardiovascular:      Rate and Rhythm: Regular rhythm  Tachycardia present  Heart sounds: Normal heart sounds  Pulmonary:      Effort: Pulmonary effort is normal       Breath sounds: Normal breath sounds  Abdominal:      General: Bowel sounds are normal       Palpations: Abdomen is soft  Musculoskeletal:         General: Normal range of motion  Cervical back: Normal range of motion  Skin:     General: Skin is warm and dry  Comments: Mid sternal incision CDI, well approximated  2 chest tube sites healing well   Neurological:      Mental Status: He is alert and oriented to person, place, and time  Laboratory Results: I have personally reviewed the pertinent laboratory results/reports     Radiology/Other Diagnostic Testing Results: I have personally reviewed pertinent reports        XR chest portable    Result Date: 2/24/2023  CHEST INDICATION:   Post Open Heart Surgey  COMPARISON:  Chest x-ray 2/23/2023 EXAM PERFORMED/VIEWS:  XR CHEST PORTABLE FINDINGS:  ET tube, [place]-González line and nasogastric tube have been removed  Right IJ line tip overlies the SVC  Mediastinal drain remains  Status post sternotomy and cardiac valve replacement  Heart size is not well evaluated on this single portable AP view  Lung markings are crowded secondary to low lung volumes  Within limitations of this examination there is no focal airspace opacity to suggest pneumonia  No pneumothorax or pleural effusion  Osseous structures appear within normal limits for patient age  ET tube, [place]-González line and nasogastric tube have been removed  No active pulmonary disease on examination which is somewhat limited secondary to low lung volumes  Workstation performed: [id number]     JONATHON Anesthesia    Result Date: 2/23/2023  Milton Jameson MD     2/23/2023 12:48 PM Procedure Performed: JONATHON Anesthesia Start Time:  2/23/2023 8:52 AM Preanesthesia Checklist Patient identified, IV assessed, risks and benefits discussed, monitors and equipment assessed, procedure being performed at surgeon's request and anesthesia consent obtained  Procedure Diagnostic Indications for JONATHON:  assessment of ascending aorta and hemodynamic monitoring  Type of JONATHON: interventional JONATHON with real time guidance of intracardiac procedure  Images Saved: ultrasound permanent image saved  Physician Requesting Echo: Cuca Richter MD   Location performed: OR  Intubated  Bite block not placed  Heart visualized  Insertion of JONATHON Probe:  Atraumatic  Probe Type:  Multiplane  Modalities:  3D, color flow mapping, continuous wave Doppler and pulse wave Doppler  Echocardiographic and Doppler Measurements PREPROCEDURE LEFT VENTRICLE: Systolic Function: normal  Ejection Fraction: 50%  Cavity size: normal    Regional Wall Motion Abnormalities: mild HK of inferior wall at mid views ofLV   RIGHT VENTRICLE: Systolic Function: normal   Cavity size normal  No hypertrophy  AORTIC VALVE: Leaflets: trileaflet  Leaflet motions restricted and calcifications along RCC, LCC  Stenosis: severe  Mean Gradient: 17 mmHg  Peak Gradient: 35 mmHg  Area: 0 8 cm²  Regurgitation: mild-mod AI and mild  Pressure Half-time: 330 ms  MITRAL VALVE: Leaflets: normal  Leaflet Motions: normal  Regurgitation: trace  Stenosis: none  TRICUSPID VALVE: Leaflets: normal  Leaflet Motions: normal  Stenosis: none  Regurgitation: trace  PULMONIC VALVE: Leaflets: normal  Regurgitation: trace  Stenosis: none  ASCENDING AORTA: Size:  normal   Dissection not present  AORTIC ARCH: Size:  normal   dissection not present  Grade 3: atheroma protruding < 0 5 cm into lumen  DESCENDING AORTA: Size: normal   Dissection not present  Grade 3: atheroma protruding < 0 5 cm into lumen  RIGHT ATRIUM: Size:  normal  No spontaneous echo contrast  LEFT ATRIUM: Size: normal  No spontaneous echo contrast  LEFT ATRIAL APPENDAGE: Size: normal  No spontaneous echo contrast ATRIAL SEPTUM: Intra-atrial septal morphology: lipomatous hypertrophy and normal   VENTRICULAR SEPTUM: Intra-ventricular septum morphology: normal  EPIAORTIC: Plaque Thickness: 0-5 mm  OTHER FINDINGS: Pericardium:  normal  Pleural Effusion:  none  POSTPROCEDURE LEFT VENTRICLE: Unchanged   Systolic Function: normal  Ejection Fraction: 50 %  Cavity Size: dilated  Regional Wall Motion Abnormalities: interval improvement in inferior wall motion abnormalities  RIGHT VENTRICLE: Unchanged   Systolic Function: normal  Cavity Size: normal  AORTIC VALVE: Leaflets: #25 mm bioprosthetic aortic valve, well seated, no perivalvular leaks, no AI noted and bioprosthetic  Stenosis: none  Mean Gradient: 4 (7mmHg before chest closure, 4 mm Hg after chest closed) mmHg  Regurgitation: none  Valve Size: 25 mm  MITRAL VALVE: Unchanged   Leaflets: native  Regurgitation: trace  Stenosis: none  TRICUSPID VALVE: Unchanged    Leaflets: native  Regurgitation: trace  Stenosis: none  PULMONIC VALVE: Unchanged Leaflets: native  Regurgitation: trace  Stenosis: none  ATRIA: Unchanged   Left Atrial Appendage Ligate: No  Residual Flow in Left Atrial Appendage by Color Flow Doppler: No   AORTA: Unchanged   Dissection: Dissection not present  REMOVAL: Probe Removal: atraumatic  ECHOCARDIOGRAM COMMENTS: Pre CPB: AV annulus 25 mm, SoV 33 mm  STJ 38 mm Post CPB: well seated #25 mm bioprosthetic aortic valve  No perivalvular leaks, no AI noted  Mean grad 4 mmHg upon chest closure   XR chest portable ICU    Result Date: 2/23/2023  CHEST INDICATION:   S/P open heart  COMPARISON:  Chest CT 10/24/2022  EXAM PERFORMED/VIEWS:  XR CHEST PORTABLE ICU FINDINGS:  Right jugular catheter in right brachiocephalic vein  Right jugular pulmonary artery catheter in right pulmonary artery  ET tube 5 cm above the rome  NG tube in stomach  Normal heart size  CABG, AVR  Sternal wires well aligned  Temporary epicardial pacemaker wires  Two mediastinal drains  The lungs are clear  No pneumothorax or pleural effusion  Osseous structures appear within normal limits for patient age  Expected appearance after cardiac surgery  Workstation performed: [id number]     JONATHON intraop interventional w/realtime guidance of cardiac procedures    Result Date: 2/24/2023  This order contains the linked images for the JONATHON that was performed by the Anesthesiologist   Please see the  CARDIAC JONATHON ANESTHESIA procedure for results         Current Medications:     Outpatient Medications Prior to Visit   Medication Sig Dispense Refill   • acetaminophen (TYLENOL) 325 mg tablet Take 2 tablets (650 mg total) by mouth every 4 (four) hours as needed for mild pain, headaches or fever  0   • aspirin 325 mg tablet Take 1 tablet (325 mg total) by mouth daily Do not start before February 28, 2023  30 tablet 3   • atorvastatin (LIPITOR) 80 mg tablet Take 1 tablet (80 mg total) by mouth daily with dinner 30 tablet 2   • metoprolol tartrate (LOPRESSOR) 100 mg tablet Take 100 mg by mouth every 12 (twelve) hours  Pt taking 100mg daily  Indications: High Blood Pressure Disorder     • oxyCODONE (ROXICODONE) 5 immediate release tablet Take 2 5 mg every 6 hours as needed for moderate pain or take 5 mg every 8 hours as needed for severe pain 30 tablet 0   • pantoprazole (PROTONIX) 40 mg tablet Take 1 tablet (40 mg total) by mouth daily Do not start before February 28, 2023  30 tablet 0   • polyethylene glycol (MIRALAX) 17 g packet Take 17 g by mouth daily Do not start before February 28, 2023  510 g 0   • senna (SENOKOT) 8 6 mg Take 1 tablet (8 6 mg total) by mouth daily at bedtime as needed for constipation for up to 14 days 14 tablet 0   • docusate sodium (COLACE) 100 mg capsule Take 1 capsule (100 mg total) by mouth 2 (two) times a day (Patient not taking: Reported on 3/2/2023)  0   • metoprolol tartrate (LOPRESSOR) 100 mg tablet Take 1 tablet (100 mg total) by mouth once for 1 dose 90 tablet 3   • potassium chloride (K-DUR,KLOR-CON) 20 mEq tablet Take 1 tablet (20 mEq total) by mouth daily for 7 days Do not start before February 28, 2023  7 tablet 1   • torsemide 40 MG TABS Take 40 mg by mouth daily for 7 days Do not start before February 28, 2023  7 tablet 1     No facility-administered medications prior to visit         EDWARDO Crow  [facility]

## 2023-03-09 ENCOUNTER — TELEPHONE (OUTPATIENT)
Dept: INTERNAL MEDICINE CLINIC | Facility: CLINIC | Age: 58
End: 2023-03-09

## 2023-03-09 ENCOUNTER — HOME CARE VISIT (OUTPATIENT)
Dept: HOME HEALTH SERVICES | Facility: HOME HEALTHCARE | Age: 58
End: 2023-03-09

## 2023-03-09 VITALS
TEMPERATURE: 97.8 F | RESPIRATION RATE: 16 BRPM | OXYGEN SATURATION: 98 % | SYSTOLIC BLOOD PRESSURE: 116 MMHG | HEART RATE: 84 BPM | DIASTOLIC BLOOD PRESSURE: 60 MMHG

## 2023-03-09 LAB
BACTERIA UR QL AUTO: ABNORMAL /HPF
BILIRUB UR QL STRIP: NEGATIVE
CLARITY UR: CLEAR
COLOR UR: YELLOW
GLUCOSE UR STRIP-MCNC: NEGATIVE MG/DL
HGB UR QL STRIP.AUTO: NEGATIVE
KETONES UR STRIP-MCNC: NEGATIVE MG/DL
LEUKOCYTE ESTERASE UR QL STRIP: NEGATIVE
NITRITE UR QL STRIP: NEGATIVE
NON-SQ EPI CELLS URNS QL MICRO: ABNORMAL /HPF
PH UR STRIP.AUTO: 6 [PH]
PROT UR STRIP-MCNC: ABNORMAL MG/DL
RBC #/AREA URNS AUTO: ABNORMAL /HPF
SP GR UR STRIP.AUTO: 1.02 (ref 1–1.03)
UROBILINOGEN UR STRIP-ACNC: <2 MG/DL
WBC #/AREA URNS AUTO: ABNORMAL /HPF

## 2023-03-09 NOTE — TELEPHONE ENCOUNTER
Please ask patient if he has ongoing, worsening or recurrent symptoms, then he should go to the emergency room for further evaluation

## 2023-03-09 NOTE — TELEPHONE ENCOUNTER
----- Message from Haritha Cooper sent at 3/9/2023  7:47 AM EST -----  Urine was normal  I spoke to surgeon office last night, they will most likely call you today

## 2023-03-09 NOTE — TELEPHONE ENCOUNTER
Spoke with patient's significant other, she reports that patient is still feeling very nauseous on 100mg Metoprolol daily  Patient prescribed 100mg every 12 hours but is unable to tolerate  Patient HR 118bpm at PCP visit on 3/8     Advised patient to monitor symptoms and medication will be discussed at [facility] on 3/14  Patient was advised by PCP to ask Cardiologist about extended release metoprolol, patient is unsure if it will still make him nauseous or if there is another medication option      Please advise

## 2023-03-09 NOTE — TELEPHONE ENCOUNTER
spoke with patient significant other and she verbally understood       Patient was advised by PCP to ask Cardiologist about extended release metoprolol, patient is unsure if it will still make him nauseous or if there is another medication option    Please advise on medication

## 2023-03-09 NOTE — TELEPHONE ENCOUNTER
Patient's significant other Emily Mo   [phone] contacting office regarding the metoprolol still making him sick

## 2023-03-13 ENCOUNTER — HOME CARE VISIT (OUTPATIENT)
Dept: HOME HEALTH SERVICES | Facility: HOME HEALTHCARE | Age: 58
End: 2023-03-13

## 2023-03-14 ENCOUNTER — OFFICE VISIT (OUTPATIENT)
Dept: CARDIOLOGY CLINIC | Facility: CLINIC | Age: 58
End: 2023-03-14

## 2023-03-14 ENCOUNTER — HOME CARE VISIT (OUTPATIENT)
Dept: HOME HEALTH SERVICES | Facility: HOME HEALTHCARE | Age: 58
End: 2023-03-14

## 2023-03-14 VITALS
RESPIRATION RATE: 16 BRPM | OXYGEN SATURATION: 98 % | HEART RATE: 88 BPM | DIASTOLIC BLOOD PRESSURE: 76 MMHG | BODY MASS INDEX: 28.45 KG/M2 | WEIGHT: 177 LBS | SYSTOLIC BLOOD PRESSURE: 110 MMHG | HEIGHT: 66 IN

## 2023-03-14 DIAGNOSIS — R05.1 ACUTE COUGH: ICD-10-CM

## 2023-03-14 DIAGNOSIS — Z95.1 S/P CABG (CORONARY ARTERY BYPASS GRAFT): ICD-10-CM

## 2023-03-14 DIAGNOSIS — Z95.2 S/P AVR: ICD-10-CM

## 2023-03-14 DIAGNOSIS — I25.118 CORONARY ARTERY DISEASE OF NATIVE HEART WITH STABLE ANGINA PECTORIS, UNSPECIFIED VESSEL OR LESION TYPE (HCC): Primary | ICD-10-CM

## 2023-03-14 RX ORDER — SENNOSIDES 8.6 MG
8.6 TABLET ORAL 2 TIMES DAILY
Qty: 14 TABLET | Refills: 0
Start: 2023-03-14 | End: 2023-03-28

## 2023-03-14 RX ORDER — SENNOSIDES 8.6 MG
8.6 TABLET ORAL
Qty: 14 TABLET | Refills: 0
Start: 2023-03-14 | End: 2023-03-14

## 2023-03-14 RX ORDER — BENZONATATE 100 MG/1
100 CAPSULE ORAL 3 TIMES DAILY PRN
Qty: 20 CAPSULE | Refills: 0 | Status: SHIPPED | OUTPATIENT
Start: 2023-03-14

## 2023-03-14 RX ORDER — METOPROLOL SUCCINATE 100 MG/1
100 TABLET, EXTENDED RELEASE ORAL DAILY
Qty: 90 TABLET | Refills: 3 | Status: SHIPPED | OUTPATIENT
Start: 2023-03-14 | End: 2023-03-15

## 2023-03-14 NOTE — PROGRESS NOTES
[facility] [place] [id number]  [address] [facility],[address] [place] [address]  Cardiology Office Note    Sindy England 56 y o  male MRN: [id number]    03/14/23          Assessment/Plan:  1  S/p BioAVR  • Patient denies any shortness of breath  • Endorses postoperative constipation and coughing  • Tessalon Perles and bowel regimen ordered  2  CAD s/p CABG x1 (SVG to RPDA)  • Patient denies chest pain or anginal equivalent  • Continue aspirin 325 mg daily and atorvastatin 80 mg daily  • Patient notes fatigue on metoprolol tartrate 100 mg twice daily  He is currently taking 100 mg daily at home  Transition to Toprol- mg daily  3   Tobacco abuse  · Cessation advised, patient actively working towards cessation  Follow up: 3 months or sooner as needed    Recommend aggressive risk factor modification and therapeutic lifestyle changes  Low-salt, low-calorie, low-fat, low-cholesterol diet with regular exercise and to optimize weight  Discussed concepts of atherosclerosis, including signs and symptoms of cardiac disease  Previous studies were reviewed  Safety measures were reviewed  All questions and concerns addressed  Patient was advised to report any problems requiring medical attention  1  Coronary artery disease of native heart with stable angina pectoris, unspecified vessel or lesion type (HCC)  metoprolol succinate (TOPROL-XL) 100 mg 24 hr tablet      2  Acute cough  benzonatate (TESSALON PERLES) 100 mg capsule      3  S/P AVR  senna (SENOKOT) 8 6 mg    DISCONTINUED: senna (SENOKOT) 8 6 mg      4  S/P CABG (coronary artery bypass graft)  senna (SENOKOT) 8 6 mg    DISCONTINUED: senna (SENOKOT) 8 6 mg          HPI: Sindy England is a 56y o  year old male with PMH of severe AS s/p bio AVR, CAD, hyperlipidemia, hypertension, tobacco use, marijuana use, bipolar disorder, poor medication compliance who presents for hospital follow-up    Patient is known to   Laura Reardon  Patient underwent elective bioAVR and CABG x1 on 2/24/23  He did not require any blood products and had no significant complications from surgery  Patient overall appears to be recovering well  He denies any chest pain or shortness of breath  His incision site is almost fully healed with no evidence of infection  Patient notes that since surgery he has had coughing fits at night that can last for about an hour  He notes that the coughs are nonproductive  He states that he felt hot a few days ago however he was unable to check his temperature  He also notes that he has not been moving his bowels sufficiently at home and has a poor appetite  He does endorse flatus  Patient notes that he has significant decreased tobacco use and is aiming to stop entirely  He has also reduced his marijuana and coffee intake since discharge  The patient denies chest pain, dyspnea on exertion or rest, lower extremity edema, or palpitations and was instructed to call  the office or seek medical attention if any such symptoms develop  All medications reviewed and patient is tolerating medications without side effects  Social history:   Patient endorses a few cigarettes/week and marijuana use  He denies any alcohol use  EKG- 2/24/23 Sinus tachycardia  Patient Active Problem List   Diagnosis   • Marijuana use   • Tobacco use   • Murmur   • Tobacco abuse   • Hyperlipidemia   • Coronary artery disease of native heart with stable angina pectoris (HCC)   • [facility] )   • Bipolar 1 disorder (HCC)   • S/P CABG (coronary artery bypass graft)   • S/P AVR   • Thrombocytopenia (HCC)   • Leukocytosis   • Acute postoperative anemia due to expected blood loss       Allergies   Allergen Reactions   • Other      Pt states he is allergic to everything   States he doesn't have a list but can only take children doses of all medication           Current Outpatient Medications:   •  acetaminophen (TYLENOL) 325 mg tablet, Take 2 tablets (650 mg total) by mouth every 4 (four) hours as needed for mild pain, headaches or fever, Disp: , Rfl: 0  •  aspirin 325 mg tablet, Take 1 tablet (325 mg total) by mouth daily Do not start before February 28, 2023 , Disp: 30 tablet, Rfl: 3  •  atorvastatin (LIPITOR) 80 mg tablet, Take 1 tablet (80 mg total) by mouth daily with dinner, Disp: 30 tablet, Rfl: 2  •  benzonatate (TESSALON PERLES) 100 mg capsule, Take 1 capsule (100 mg total) by mouth 3 (three) times a day as needed for cough, Disp: 20 capsule, Rfl: 0  •  docusate sodium (COLACE) 100 mg capsule, Take 1 capsule (100 mg total) by mouth 2 (two) times a day, Disp: , Rfl: 0  •  metoprolol succinate (TOPROL-XL) 100 mg 24 hr tablet, Take 1 tablet (100 mg total) by mouth daily, Disp: 90 tablet, Rfl: 3  •  oxyCODONE (ROXICODONE) 5 immediate release tablet, Take 2 5 mg every 6 hours as needed for moderate pain or take 5 mg every 8 hours as needed for severe pain, Disp: 30 tablet, Rfl: 0  •  pantoprazole (PROTONIX) 40 mg tablet, Take 1 tablet (40 mg total) by mouth daily Do not start before February 28, 2023 , Disp: 30 tablet, Rfl: 0  •  polyethylene glycol (MIRALAX) 17 g packet, Take 17 g by mouth daily Do not start before February 28, 2023 , Disp: 510 g, Rfl: 0  •  senna (SENOKOT) 8 6 mg, Take 1 tablet (8 6 mg total) by mouth 2 (two) times a day for 14 days, Disp: 14 tablet, Rfl: 0    Past Medical History:   Diagnosis Date   • Diabetes mellitus ([facility] )    • Hyperlipidemia    • Hypertension    • Psychiatric disorder     bipolar   • 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[facility] )    • Syncope        Family History   Problem Relation Age of Onset   • Cancer Mother    • Hypertension Mother    • Diabetes Mother        Past Surgical History:   Procedure Laterality Date   • CARDIAC ASCENDING AORTOGRAM N/A 10/28/2022    Procedure: Cardiac ascending aortogram;  Surgeon: Jus Hirsch MD;  Location: [address] [place] [address] CATH LAB;   Service: Cardiology   • CARDIAC CATHETERIZATION Left 10/28/2022    Procedure: CARDIAC RHC/LHC; Surgeon: Hawa Khan MD;  Location[place] [facility]; Service: Cardiology   • CARDIAC CATHETERIZATION N/A 10/28/2022    Procedure: Cardiac Coronary Angiogram;  Surgeon: Hawa Khan MD;  Location: [address] [place] [address] CATH LAB; Service: Cardiology   • CARDIAC CATHETERIZATION Left 10/28/2022    Procedure: Cardiac Left Ventriculogram;  Surgeon: Hawa Khan MD;  Location: [facility];   Service: Cardiology   • LAPAROSCOPIC LYSIS INTESTINAL ADHESIONS     • SD RPLCMT AORTIC VALVE OPN W/STENTLESS TISSUE VALVE N/A 2/23/2023    Procedure: CORONARY ARTERY BYPASS GRAFT (CABG) 1 VESSEL GSV-->RCA, EVH RIGHT LEG,  WITH REPLACEMENT VALVE AORTIC (AVR) 25MM INSPIRIA RESILIA TISSUE VALVE;  Surgeon: Cindy Baez MD;  Location:  MAIN OR;  Service: Cardiac Surgery       Social History     Socioeconomic History   • Marital status: Single     Spouse name: Not on file   • Number of children: Not on file   • Years of education: Not on file   • Highest education level: Not on file   Occupational History   • Not on file   Tobacco Use   • Smoking status: Every Day     Packs/day: 0 50     Years: 45 00     Pack years: 22 50     Types: Cigarettes   • Smokeless tobacco: Never   • Tobacco comments:     Patient states that he is trying to cut down   Vaping Use   • Vaping Use: Never used   Substance and Sexual Activity   • Alcohol use: Not Currently   • Drug use: Yes     Types: Marijuana   • Sexual activity: Yes     Partners: Female   Other Topics Concern   • Not on file   Social History Narrative    Patient states he lives with his girlfriend      Social Determinants of Health     Financial Resource Strain: Not on file   Food Insecurity: No Food Insecurity   • Worried About Running Out of Food in the Last Year: Never true   • Ran Out of Food in the Last Year: Never true   Transportation Needs: Unmet Transportation Needs   • Lack of Transportation (Medical): Yes   • Lack of Transportation (Non-Medical): Yes   Physical Activity: Not on file   Stress: Not on file   Social Connections: Not on file   Intimate Partner Violence: Not on file   Housing Stability: Not on file       Review of symptoms:   Review of Systems   Constitutional: Negative for chills, diaphoresis and fever  Respiratory: Positive for cough  Negative for chest tightness and shortness of breath  Cardiovascular: Negative for chest pain, palpitations and leg swelling  Gastrointestinal: Positive for constipation  Negative for abdominal distention, blood in stool, nausea and vomiting  Genitourinary: Negative for difficulty urinating  Musculoskeletal: Negative for arthralgias and back pain  Neurological: Negative for dizziness, syncope, light-headedness and headaches  Psychiatric/Behavioral: Negative for agitation and confusion  The patient is not nervous/anxious  Vitals: /76 (BP Location: Left arm, Patient Position: Sitting, Cuff Size: Standard)   Pulse 88   Resp 16   Ht 5' 6" (1 676 m)   Wt 80 3 kg (177 lb)   SpO2 98%   BMI 28 57 kg/m²         Physical Exam:     Physical Exam  Vitals and nursing note reviewed  Constitutional:       General: He is not in acute distress  Appearance: He is well-developed  HENT:      Head: Normocephalic and atraumatic  Eyes:      Conjunctiva/sclera: Conjunctivae normal    Neck:      Vascular: No carotid bruit  Cardiovascular:      Rate and Rhythm: Normal rate and regular rhythm  Heart sounds: Normal heart sounds  No murmur heard  Pulmonary:      Effort: Pulmonary effort is normal  No respiratory distress  Breath sounds: Normal breath sounds  Abdominal:      Palpations: Abdomen is soft  Tenderness: There is no abdominal tenderness  Musculoskeletal:         General: No swelling  Cervical back: Neck supple  Right lower leg: No edema  Left lower leg: No edema     Skin:     General: Skin is warm and dry  Capillary Refill: Capillary refill takes less than 2 seconds  Comments: Midsternal incision C/D/I   Neurological:      Mental Status: He is alert and oriented to person, place, and time  Psychiatric:         Mood and Affect: Mood normal             Thank you for allowing me to participate in the care and evaluation of your patient  Should you have any questions, please feel free to contact me

## 2023-03-15 DIAGNOSIS — I25.118 CORONARY ARTERY DISEASE OF NATIVE HEART WITH STABLE ANGINA PECTORIS, UNSPECIFIED VESSEL OR LESION TYPE (HCC): ICD-10-CM

## 2023-03-15 RX ORDER — METOPROLOL SUCCINATE 100 MG/1
100 TABLET, EXTENDED RELEASE ORAL DAILY
Qty: 90 TABLET | Refills: 3 | Status: SHIPPED | OUTPATIENT
Start: 2023-03-15

## 2023-03-15 RX ORDER — METOPROLOL SUCCINATE 100 MG/1
TABLET, EXTENDED RELEASE ORAL
Qty: 90 TABLET | Refills: 3 | Status: SHIPPED | OUTPATIENT
Start: 2023-03-15 | End: 2023-03-15 | Stop reason: SDUPTHER

## 2023-03-16 ENCOUNTER — HOME CARE VISIT (OUTPATIENT)
Dept: HOME HEALTH SERVICES | Facility: HOME HEALTHCARE | Age: 58
End: 2023-03-16

## 2023-03-16 VITALS
HEART RATE: 76 BPM | SYSTOLIC BLOOD PRESSURE: 132 MMHG | DIASTOLIC BLOOD PRESSURE: 80 MMHG | RESPIRATION RATE: 18 BRPM | TEMPERATURE: 98 F | OXYGEN SATURATION: 98 %

## 2023-03-21 ENCOUNTER — TELEPHONE (OUTPATIENT)
Dept: INTERNAL MEDICINE CLINIC | Facility: CLINIC | Age: 58
End: 2023-03-21

## 2023-03-23 ENCOUNTER — TELEPHONE (OUTPATIENT)
Dept: INTERNAL MEDICINE CLINIC | Facility: CLINIC | Age: 58
End: 2023-03-23

## 2023-03-23 ENCOUNTER — TELEPHONE (OUTPATIENT)
Dept: CARDIOLOGY CLINIC | Facility: CLINIC | Age: 58
End: 2023-03-23

## 2023-03-23 ENCOUNTER — HOME CARE VISIT (OUTPATIENT)
Dept: HOME HEALTH SERVICES | Facility: HOME HEALTHCARE | Age: 58
End: 2023-03-23

## 2023-03-23 VITALS
RESPIRATION RATE: 18 BRPM | DIASTOLIC BLOOD PRESSURE: 80 MMHG | OXYGEN SATURATION: 98 % | TEMPERATURE: 98.8 F | SYSTOLIC BLOOD PRESSURE: 122 MMHG | HEART RATE: 120 BPM

## 2023-03-23 NOTE — TELEPHONE ENCOUNTER
Kari was at pt's home- visiting nurse  Constipation for over a wk  He's been home 2 to 2 1/2 wks  Good bowel sounds; passing gas    Taking double doses of Miralax and also taking Senekot    Please call pt on what he should do

## 2023-03-23 NOTE — TELEPHONE ENCOUNTER
Patient's [facility] reports that patient HR is at 120bpm     Patient reports he does not remember if he took Metoprolol  Advised patient to take medication as prescribed  Will check back in with patient this afternoon to ensure HR drops

## 2023-03-23 NOTE — TELEPHONE ENCOUNTER
[facility] called today regarding mutual patient May Lofton  The nurse I spoke with stated that his heart rate was at 120 and due to him being a new CABG patient she was concerned   Call transferred to [place] [facility]

## 2023-03-23 NOTE — TELEPHONE ENCOUNTER
Patient HR dropped 30min after medication administration  Advised patient to check HR within an hour and report back if any concerns        Patient confirmed and understood instructions

## 2023-03-24 ENCOUNTER — OFFICE VISIT (OUTPATIENT)
Dept: CARDIAC SURGERY | Facility: CLINIC | Age: 58
End: 2023-03-24

## 2023-03-24 ENCOUNTER — TELEPHONE (OUTPATIENT)
Dept: GASTROENTEROLOGY | Facility: CLINIC | Age: 58
End: 2023-03-24

## 2023-03-24 VITALS
DIASTOLIC BLOOD PRESSURE: 78 MMHG | HEART RATE: 101 BPM | BODY MASS INDEX: 28.7 KG/M2 | WEIGHT: 178.6 LBS | TEMPERATURE: 97.3 F | HEIGHT: 66 IN | OXYGEN SATURATION: 95 % | SYSTOLIC BLOOD PRESSURE: 117 MMHG

## 2023-03-24 DIAGNOSIS — Z95.2 S/P AVR: Primary | ICD-10-CM

## 2023-03-24 DIAGNOSIS — Z95.1 S/P CABG (CORONARY ARTERY BYPASS GRAFT): ICD-10-CM

## 2023-03-24 DIAGNOSIS — Z48.89 ENCOUNTER FOR POSTOPERATIVE CARE: ICD-10-CM

## 2023-03-24 NOTE — PROGRESS NOTES
POST OP FOLLOW UP VISIT    Procedure:  2/23/23  1  Aortic valve replacement with a 25 mm Granados Inspiris bioprosthetic valve  2  Coronary artery bypass grafting x 1 with saphenous vein graft to right posterior descending artery  History: Jennifer Mayo is a 56y o  year old male who presents to our office today for routine follow up care from elective aortic valve replacement and coronary artery bypass grafting  Patient tolerated procedure well  Post op course uncomplicated and patient discharged home on POD # 4  Patient has had routine f/u with PCP and Cardiology  Today patient reports he is tolerating frequent ambulation  He denies angina, lightheadedness palpitations or SOB  He reports his incision have healed will and he denies fever or chills  He is reporting constipation  He is not taking narcotics  He denies abdominal pain or bloating  He has been taking Colace and Miralax but is now out of Colace  HE states he has taken "everything" but burger snot give me an example of what constitutes "everything  He reports good appetite and his eating and drinking well, including fruits and vegetables  He states his PCP recommended an enema  He also reports migraines and body jerks and shaking  He states he quit smoking cigarettes completes and has reduced his use of marijuana  He states he has been consuming THC since his teenage years to control migraines and body sensations  Patient also reports experiencing a "pop" in his upper chest bilaterally,  near collar bones, 1 week after returning home from the hospital, after reaching upward to retrieve laundry from a dryer  He denies any current sternal instability or pain      Vital Signs:   Vitals:    03/24/23 1042 03/24/23 1045   BP: 113/79 117/78   BP Location: Left arm Right arm   Patient Position: Sitting Sitting   Cuff Size: Large Large   Pulse: 101    Temp: (!) 97 3 °F (36 3 °C)    TempSrc: Tympanic    SpO2: 95%    Weight: 81 kg (178 lb 9 6 oz) Height: 5' 6" (1 676 m)        Home Medications:   Prior to Admission medications    Medication Sig Start Date End Date Taking? Authorizing Provider   acetaminophen (TYLENOL) 325 mg tablet Take 2 tablets (650 mg total) by mouth every 4 (four) hours as needed for mild pain, headaches or fever 2/27/23  Yes Glory Dubon PA-C   aspirin 325 mg tablet Take 1 tablet (325 mg total) by mouth daily Do not start before February 28, 2023 2/28/23  Yes Glory Dubon PA-C   atorvastatin (LIPITOR) 80 mg tablet Take 1 tablet (80 mg total) by mouth daily with dinner 2/27/23  Yes Glory Dubon PA-C   benzonatate (TESSALON PERLES) 100 mg capsule Take 1 capsule (100 mg total) by mouth 3 (three) times a day as needed for cough 3/14/23  Yes IrenaaleEDWARDO Augustin   metoprolol succinate (TOPROL-XL) 100 mg 24 hr tablet Take 1 tablet (100 mg total) by mouth daily 3/15/23  Yes Kristina Parsons MD   polyethylene glycol (MIRALAX) 17 g packet Take 17 g by mouth daily Do not start before February 28, 2023  2/28/23 3/30/23 Yes Glory Dubon PA-C   docusate sodium (COLACE) 100 mg capsule Take 1 capsule (100 mg total) by mouth 2 (two) times a day  Patient not taking: Reported on 3/24/2023 2/27/23   Glory Dubon PA-C   oxyCODONE (ROXICODONE) 5 immediate release tablet Take 2 5 mg every 6 hours as needed for moderate pain or take 5 mg every 8 hours as needed for severe pain  Patient not taking: Reported on 3/24/2023 2/27/23   Glory Dubon PA-C   pantoprazole (PROTONIX) 40 mg tablet Take 1 tablet (40 mg total) by mouth daily Do not start before February 28, 2023 2/28/23   Glory Dubon PA-C   senna (SENOKOT) 8 6 mg Take 1 tablet (8 6 mg total) by mouth 2 (two) times a day for 14 days  Patient not taking: Reported on 3/24/2023 3/14/23 3/28/23  EDWARDO Pérez       Physical Exam:  General: Alert, oriented, animated, well developed, no acute distress  HEENT/NECK:  PERRLA  No jugular venous distention  Cardiac:Regular rate and rhythm, no murmurs rubs or gallops  Pulmonary:Breath sounds clear bilaterally  Abdomen:  Non-tender, Non-distended  Positive bowel sounds  Upper extremities: 2+ radial pulses; brisk capillary refill  Lower extremities: Extremities warm/dry  PT/DP pules 2+ bilaterally  No edema B/L  Incisions: Sternum is stable  Incision is clean, dry, and intact  Saphenectomy incision is clean, dry, and intact  Musculoskeletal: MAEE, stable gait  Neuro: Alert and oriented X 3  Sensation is grossly intact  No focal deficits  Skin: Warm/Dry, without rashes or lesions  Lab Results:     Lab Results   Component Value Date    HGBA1C 5 6 2023         Assessment:   Aortic stenosis, Non-Rheumatic, Coronary artery disease  S/P aortic valve replacement and coronary artery bypass grafting    Wesley Jacobs is 4 weeks post-op, making good progress in their recovery  Incisions are well-healed and the sternum is stable  Weight and VS are stable  Plan:   Medications reviewed with patient, associated questions answered and no changes made  Suggested Dulcolax tablets and suppository, otherwise, follow PCP advice and use enema for constipation  Benefits of participating in cardiac rehab have been discussed and patient is cleared to begin the outpatient  program      May resume driving  Increase activity as tolerated and maintain alifting restriction of no more than 25 lbs until 23, which is 12 weeks from the surgical date  No further follow up in our office is needed; call with questions or concerns  Patient should maintain routine follow-up with Cardiology and Primary Care for ongoing medical care  Patient verbalizes understanding of recommendations and all questions were answered to their satisfaction      patient is scheduled for colonoscopy in May    Jayden Lopez  3/24/23  11:15AM

## 2023-03-27 ENCOUNTER — TELEPHONE (OUTPATIENT)
Dept: INTERNAL MEDICINE CLINIC | Facility: CLINIC | Age: 58
End: 2023-03-27

## 2023-03-27 ENCOUNTER — HOME CARE VISIT (OUTPATIENT)
Dept: HOME HEALTH SERVICES | Facility: HOME HEALTHCARE | Age: 58
End: 2023-03-27

## 2023-03-30 ENCOUNTER — HOME CARE VISIT (OUTPATIENT)
Dept: HOME HEALTH SERVICES | Facility: HOME HEALTHCARE | Age: 58
End: 2023-03-30

## 2023-03-31 DIAGNOSIS — Z95.1 S/P CABG (CORONARY ARTERY BYPASS GRAFT): ICD-10-CM

## 2023-03-31 DIAGNOSIS — Z95.2 S/P AVR: ICD-10-CM

## 2023-03-31 RX ORDER — ACETAMINOPHEN 325 MG/1
650 TABLET ORAL EVERY 4 HOURS PRN
Qty: 180 TABLET | Refills: 3 | Status: SHIPPED | OUTPATIENT
Start: 2023-03-31

## 2023-03-31 NOTE — TELEPHONE ENCOUNTER
Name of Caller:  Ran Lopez   Call back Number: [phone]    Medication(s): acetaminophen (TYLENOL) 325 mg tablet   Are we prescribing provider?:    30 or 90 day supply: 90 days     Pharmacy name/number:  [facility]  [address]        Last or Next appt?:

## 2023-04-03 DIAGNOSIS — Z95.2 S/P AVR: ICD-10-CM

## 2023-04-03 DIAGNOSIS — Z95.1 S/P CABG (CORONARY ARTERY BYPASS GRAFT): ICD-10-CM

## 2023-04-03 RX ORDER — ASPIRIN 325 MG
325 TABLET ORAL DAILY
Qty: 30 TABLET | Refills: 3 | Status: SHIPPED | OUTPATIENT
Start: 2023-04-03

## 2023-04-03 NOTE — TELEPHONE ENCOUNTER
Patient needs new script for Aspirin 325 mg sent to [facility] in [facility]   Previously filled in the hospital

## 2023-04-04 ENCOUNTER — HOME CARE VISIT (OUTPATIENT)
Dept: HOME HEALTH SERVICES | Facility: HOME HEALTHCARE | Age: 58
End: 2023-04-04

## 2023-04-06 ENCOUNTER — HOME CARE VISIT (OUTPATIENT)
Dept: HOME HEALTH SERVICES | Facility: HOME HEALTHCARE | Age: 58
End: 2023-04-06

## 2023-04-07 ENCOUNTER — HOME CARE VISIT (OUTPATIENT)
Dept: HOME HEALTH SERVICES | Facility: HOME HEALTHCARE | Age: 58
End: 2023-04-07

## 2023-04-24 DIAGNOSIS — Z95.1 S/P CABG (CORONARY ARTERY BYPASS GRAFT): ICD-10-CM

## 2023-04-24 DIAGNOSIS — Z95.2 S/P AVR: ICD-10-CM

## 2023-04-24 RX ORDER — ATORVASTATIN CALCIUM 80 MG/1
80 TABLET, FILM COATED ORAL
Qty: 90 TABLET | Refills: 3 | Status: SHIPPED | OUTPATIENT
Start: 2023-04-24 | End: 2023-05-04 | Stop reason: SDUPTHER

## 2023-05-04 ENCOUNTER — OFFICE VISIT (OUTPATIENT)
Dept: CARDIOLOGY CLINIC | Facility: CLINIC | Age: 58
End: 2023-05-04

## 2023-05-04 VITALS
HEART RATE: 97 BPM | WEIGHT: 184 LBS | DIASTOLIC BLOOD PRESSURE: 80 MMHG | SYSTOLIC BLOOD PRESSURE: 140 MMHG | HEIGHT: 66 IN | OXYGEN SATURATION: 96 % | RESPIRATION RATE: 18 BRPM | BODY MASS INDEX: 29.57 KG/M2

## 2023-05-04 DIAGNOSIS — Z95.2 S/P AVR: ICD-10-CM

## 2023-05-04 DIAGNOSIS — Z95.1 S/P CABG (CORONARY ARTERY BYPASS GRAFT): ICD-10-CM

## 2023-05-04 DIAGNOSIS — I25.118 CORONARY ARTERY DISEASE OF NATIVE HEART WITH STABLE ANGINA PECTORIS, UNSPECIFIED VESSEL OR LESION TYPE (HCC): ICD-10-CM

## 2023-05-04 RX ORDER — ATORVASTATIN CALCIUM 40 MG/1
40 TABLET, FILM COATED ORAL
Qty: 30 TABLET | Refills: 3 | Status: SHIPPED | OUTPATIENT
Start: 2023-05-04

## 2023-05-04 RX ORDER — METOPROLOL SUCCINATE 50 MG/1
50 TABLET, EXTENDED RELEASE ORAL DAILY
Qty: 30 TABLET | Refills: 3 | Status: SHIPPED | OUTPATIENT
Start: 2023-05-04

## 2023-05-04 NOTE — LETTER
May 4, 2023     Patient: Rocio Menon  YOB: 1965  Date of Visit: 5/4/2023      To Whom it May Concern:    Rocio Menon is under my professional care  Shania Caldwell was seen in my office on 5/4/2023  Shania Caldwell is cleared to do light duty work on May 18, 2023  If you have any questions or concerns, please don't hesitate to call           Sincerely,          Shira Mills MD

## 2023-05-04 NOTE — PROGRESS NOTES
Cardiology Follow Up    Brady Cuevas  1965  [id number]  [facility] [place] [address] [address] [place] [address] [phone]    Discussion/Summary:  1  S/p AVR  2  CAD s/p CABG x 1  3  Tobacco abuse  4  Hypertension  5  Medication non compliance      Refilled atorvastatin  Patient reports nausea with all his medications  Will decrease atorvastatin to 40 mg daily  Will decrease metoprolol to 50 mg daily  Cont with ASA    He denies all complaints  No CP, SOB or tightness  Has been getting more active  Participates in rehab and states it is going well    Patient wants to return to work  Emphasized weight lifting restrictions as per cardiothoracic surgery with no more than 25 lbs till 5/18  Patient can resume light duty on 5/18  Encouraged tobacco cessation    He will inform us with onset of any cardiac symptoms  Previous studies were reviewed  Safety measures were reviewed  Questions were entertained and answered  Patient was advised to report any problems requiring medical attention  Follow-up with PCP and appropriate specialist and lab work as discussed  Return for follow up visit as scheduled or earlier, if needed  Thank you for allowing me to participate in the care and evaluation of your patient  Should you have any questions, please feel free to contact me  History of Present Illness:     Brady Cuevas is a 56 y o  male who presents for follow up for cardiovascular care  He is here for follow up  No new specific complaints  Has been doing well  Denies chest pain, chest pressure, shortness of breath, dizziness, lightheadedness, presyncope or syncope  Denies orthopnea, PND or lower limb edema        Patient Active Problem List   Diagnosis    Marijuana use    Tobacco use    Murmur    Tobacco abuse    Hyperlipidemia    Coronary artery disease of native heart with stable angina pectoris (HCC)    Schizophrenia ([place] [address] )    Bipolar 1 disorder (HCC)    S/P CABG (coronary artery bypass graft)    S/P AVR    Thrombocytopenia (HCC)    Leukocytosis    Acute postoperative anemia due to expected blood loss    Encounter for postoperative care     Past Medical History:   Diagnosis Date    Diabetes mellitus ([place] [address] )     Hyperlipidemia     Hypertension     Psychiatric disorder     bipolar    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[place] [address] )     Syncope      Social History     Socioeconomic History    Marital status: Single     Spouse name: Not on file    Number of children: Not on file    Years of education: Not on file    Highest education level: Not on file   Occupational History    Not on file   Tobacco Use    Smoking status: Every Day     Packs/day: 0 50     Years: 45 00     Pack years: 22 50     Types: Cigarettes    Smokeless tobacco: Never    Tobacco comments:     Patient states that he is trying to cut down   Vaping Use    Vaping Use: Never used   Substance and Sexual Activity    Alcohol use: Not Currently    Drug use: Yes     Types: Marijuana    Sexual activity: Yes     Partners: Female   Other Topics Concern    Not on file   Social History Narrative    Patient states he lives with his girlfriend      Social Determinants of Health     Financial Resource Strain: Not on file   Food Insecurity: No Food Insecurity    Worried About Running Out of Food in the Last Year: Never true    Art of Food in the Last Year: Never true   Transportation Needs: Unmet Transportation Needs    Lack of Transportation (Medical):  Yes    Lack of Transportation (Non-Medical): Yes   Physical Activity: Not on file   Stress: Not on file   Social Connections: Not on file   Intimate Partner Violence: Not on file   Housing Stability: Not on file      Family History   Problem Relation Age of Onset    Cancer Mother     Hypertension Mother     Diabetes Mother      Past Surgical History: Procedure Laterality Date    CARDIAC ASCENDING AORTOGRAM N/A 10/28/2022    Procedure: Cardiac ascending aortogram;  Surgeon: Darin Dailey MD;  [facility]; Service: Cardiology    CARDIAC CATHETERIZATION Left 10/28/2022    Procedure: CARDIAC RHC/LHC; Surgeon: Darin Dailey MD;  [facility]; Service: Cardiology    CARDIAC CATHETERIZATION N/A 10/28/2022    Procedure: Cardiac Coronary Angiogram;  Surgeon: Darin Dailey MD;  Location: [address] [place] [address] CATH LAB; Service: Cardiology    CARDIAC CATHETERIZATION Left 10/28/2022    Procedure: Cardiac Left Ventriculogram;  Surgeon: Darin Dailey MD;  [facility]; Service: Cardiology    LAPAROSCOPIC LYSIS INTESTINAL ADHESIONS      SD RPLCMT AORTIC VALVE OPN W/STENTLESS TISSUE VALVE N/A 2/23/2023    Procedure: CORONARY ARTERY BYPASS GRAFT (CABG) 1 VESSEL GSV-->RCA, EVH RIGHT LEG,  WITH REPLACEMENT VALVE AORTIC (AVR) 25MM INSPIRIA RESILIA TISSUE VALVE;  Surgeon: Gabby Johnson MD;  [facility];  Service: Cardiac Surgery       Current Outpatient Medications:     aspirin 325 mg tablet, Take 1 tablet (325 mg total) by mouth daily, Disp: 30 tablet, Rfl: 3    atorvastatin (LIPITOR) 40 mg tablet, Take 1 tablet (40 mg total) by mouth daily with dinner, Disp: 30 tablet, Rfl: 3    metoprolol succinate (TOPROL-XL) 50 mg 24 hr tablet, Take 1 tablet (50 mg total) by mouth daily, Disp: 30 tablet, Rfl: 3    acetaminophen (TYLENOL) 325 mg tablet, Take 2 tablets (650 mg total) by mouth every 4 (four) hours as needed for mild pain, headaches or fever (Patient not taking: Reported on 5/4/2023), Disp: 180 tablet, Rfl: 3    pantoprazole (PROTONIX) 40 mg tablet, Take 1 tablet (40 mg total) by mouth daily Do not start before February 28, 2023  (Patient not taking: Reported on 5/4/2023), Disp: 30 tablet, Rfl: 0  Allergies   Allergen Reactions    Other      Pt states he is allergic to everything  States he doesn't have a list but can only take children doses of all medication         Labs:  Lab Results   Component Value Date    WBC 12 45 (H) 02/26/2023    HGB 10 8 (L) 02/26/2023    HCT 34 4 (L) 02/26/2023    MCV 88 02/26/2023     (L) 02/26/2023     Lab Results   Component Value Date    GLUCOSE 119 02/23/2023    CALCIUM 8 9 02/26/2023    K 4 1 02/26/2023    CO2 28 02/26/2023     02/26/2023    BUN 21 02/26/2023    CREATININE 0 84 02/26/2023     Lab Results   Component Value Date    HGBA1C 5 6 02/13/2023     No results found for: CHOL  Lab Results   Component Value Date    HDL 40 02/13/2023    HDL 40 08/29/2022     Lab Results   Component Value Date    LDLCALC 172 (H) 02/13/2023    LDLCALC 167 (H) 08/29/2022     Lab Results   Component Value Date    TRIG 237 (H) 02/13/2023    TRIG 157 (H) 08/29/2022     No results found for: CHOLHDL    Review of Systems:  Review of Systems   Constitutional: Negative  Negative for activity change, appetite change, chills, fatigue and fever  Eyes: Negative for visual disturbance  Respiratory: Negative for chest tightness and shortness of breath  Cardiovascular: Negative for chest pain, palpitations and leg swelling  Gastrointestinal: Negative for nausea and vomiting  Musculoskeletal: Negative for arthralgias and back pain  Skin: Negative for color change and pallor  Neurological: Negative for dizziness, syncope, speech difficulty, weakness and light-headedness  Psychiatric/Behavioral: Negative for agitation and behavioral problems  All other systems reviewed and are negative  Physical Exam:  Physical Exam  Vitals and nursing note reviewed  Constitutional:       General: He is not in acute distress  Appearance: Normal appearance  He is not ill-appearing or diaphoretic  HENT:      Head: Normocephalic and atraumatic  Eyes:      Extraocular Movements: Extraocular movements intact     Cardiovascular:      Rate and Rhythm: Normal rate and Retention Suture Bite Size: 3 mm regular rhythm  Heart sounds: No murmur heard  No friction rub  No gallop  Pulmonary:      Effort: No respiratory distress  Breath sounds: Normal breath sounds  Abdominal:      General: There is no distension  Palpations: Abdomen is soft  Musculoskeletal:         General: No swelling  Normal range of motion  Skin:     General: Skin is warm and dry  Capillary Refill: Capillary refill takes less than 2 seconds  Coloration: Skin is not pale  Neurological:      General: No focal deficit present  Mental Status: He is alert and oriented to person, place, and time  Cranial Nerves: No cranial nerve deficit     Psychiatric:         Mood and Affect: Mood normal          Behavior: Behavior normal

## 2023-05-05 ENCOUNTER — TELEPHONE (OUTPATIENT)
Dept: CARDIOLOGY CLINIC | Facility: CLINIC | Age: 58
End: 2023-05-05

## 2023-05-05 NOTE — LETTER
May 9, 2023     Patient: Zeynep Liriano  YOB: 1965  Date of Visit: 5/5/2023      To Whom it May Concern:    Zeynep Liriano is under my professional care  Radha Freeman was seen in my office on 5/4/2023  Patient is cleared to return to work for light duty only  If you have any questions or concerns, please don't hesitate to call           Sincerely,            Levi Stewart MD

## 2023-05-05 NOTE — TELEPHONE ENCOUNTER
PT received letter yesterday to clear him back to work  PT requested to start light duty work today and go back to his full position starting 5/18  The letter that was made states that PT can start light duty work on 5/18  PT is requesting a new letter be made to indicate that he can start light duty work now and resume a full position on 5/18   PT requests a call back when letter is completed

## 2023-05-08 ENCOUNTER — TELEPHONE (OUTPATIENT)
Dept: CARDIOLOGY CLINIC | Facility: CLINIC | Age: 58
End: 2023-05-08

## 2023-05-08 NOTE — TELEPHONE ENCOUNTER
Letter given to patient right now   Patient want to know if he can get a second letter stating after 5/18/23 he will be able to be at work with no limitations         Please advise

## 2023-05-08 NOTE — TELEPHONE ENCOUNTER
Patient need letter to state he going to return to work only for light duty  As per last visit    Letter is documented in chart awaiting to be signed faxed to hospital

## 2023-05-10 ENCOUNTER — TELEPHONE (OUTPATIENT)
Dept: CARDIOLOGY CLINIC | Facility: CLINIC | Age: 58
End: 2023-05-10

## 2023-05-10 ENCOUNTER — TELEPHONE (OUTPATIENT)
Dept: GASTROENTEROLOGY | Facility: CLINIC | Age: 58
End: 2023-05-10

## 2023-05-10 NOTE — TELEPHONE ENCOUNTER
Patients GI provider:  Dr Osvaldo Reinoso    Number to return call: [phone]    Reason for call: Pt's girlfriend called because pt is sick and needs to reschedule his colonoscopy   Nothing available through the end of July    Scheduled procedure/appointment date if applicable: N/A

## 2023-05-10 NOTE — TELEPHONE ENCOUNTER
Received fax from [facility]  Patient daily report Cardiac Phase II  [facility] stated on report that patient came to CR with resting -130  Denies any symptoms  Patient is non compliant with his medications  Patient did not take his Metoprolol 100 mg since Friday  Patient was not allowed to exercise and was advised to take his meds  Scanned report into chart

## 2023-05-26 ENCOUNTER — HOSPITAL ENCOUNTER (EMERGENCY)
Facility: HOSPITAL | Age: 58
Discharge: HOME | End: 2023-05-26
Attending: EMERGENCY MEDICINE
Payer: COMMERCIAL

## 2023-05-26 VITALS
DIASTOLIC BLOOD PRESSURE: 80 MMHG | HEART RATE: 123 BPM | OXYGEN SATURATION: 96 % | TEMPERATURE: 97.9 F | RESPIRATION RATE: 18 BRPM | SYSTOLIC BLOOD PRESSURE: 144 MMHG

## 2023-05-26 DIAGNOSIS — H61.22 IMPACTED CERUMEN OF LEFT EAR: ICD-10-CM

## 2023-05-26 DIAGNOSIS — Z51.89 VISIT FOR WOUND CHECK: Primary | ICD-10-CM

## 2023-05-26 PROCEDURE — 99284 EMERGENCY DEPT VISIT MOD MDM: CPT

## 2023-05-29 NOTE — ED PROVIDER NOTES
History  Chief Complaint   Patient presents with   • Wound Check     Patient had open heart surgery 3 months ago and says his scar has been scabbing up and he noticed a pimple at the top of his incision  Wound check  Notes swelling on the superior portion of surgical incision site from recent open heart surgery  No systemic illness such as fever, chills, nausea, vomiting  Prior to Admission Medications   Prescriptions Last Dose Informant Patient Reported? Taking?   acetaminophen (TYLENOL) 325 mg tablet  Self No No   Sig: Take 2 tablets (650 mg total) by mouth every 4 (four) hours as needed for mild pain, headaches or fever   Patient not taking: Reported on 5/4/2023   aspirin 325 mg tablet  Self No No   Sig: Take 1 tablet (325 mg total) by mouth daily   atorvastatin (LIPITOR) 40 mg tablet   No No   Sig: Take 1 tablet (40 mg total) by mouth daily with dinner   metoprolol succinate (TOPROL-XL) 50 mg 24 hr tablet   No No   Sig: Take 1 tablet (50 mg total) by mouth daily   pantoprazole (PROTONIX) 40 mg tablet  Self No No   Sig: Take 1 tablet (40 mg total) by mouth daily Do not start before February 28, 2023  Patient not taking: Reported on 5/4/2023      Facility-Administered Medications: None       Past Medical History:   Diagnosis Date   • Diabetes mellitus ([facility] [address] )    • Hyperlipidemia    • Hypertension    • Psychiatric disorder     bipolar   • Schizoid personality disorder [facility])    • Syncope        Past Surgical History:   Procedure Laterality Date   • CARDIAC ASCENDING AORTOGRAM N/A 10/28/2022    Procedure: Cardiac ascending aortogram;  Surgeon: Edel Martins MD;  Location: [address] [place] [address] CATH LAB; Service: Cardiology   • CARDIAC CATHETERIZATION Left 10/28/2022    Procedure: CARDIAC RHC/LHC; Surgeon: Edel Martins MD;  [facility];   Service: Cardiology   • CARDIAC CATHETERIZATION N/A 10/28/2022    Procedure: Cardiac Coronary Angiogram;  Surgeon: Edel Martins MD;  Location: [facility] CATH LAB; Service: Cardiology   • CARDIAC CATHETERIZATION Left 10/28/2022    Procedure: Cardiac Left Ventriculogram;  Surgeon: Lorene Zamarripa MD;  [facility]; Service: Cardiology   • LAPAROSCOPIC LYSIS INTESTINAL ADHESIONS     • NE RPLCMT AORTIC VALVE OPN W/STENTLESS TISSUE VALVE N/A 2/23/2023    Procedure: CORONARY ARTERY BYPASS GRAFT (CABG) 1 VESSEL GSV-->RCA, EVH RIGHT LEG,  WITH REPLACEMENT VALVE AORTIC (AVR) 25MM INSPIRIA RESILIA TISSUE VALVE;  Surgeon: Karuna Oquendo MD;  Location:  MAIN OR;  Service: Cardiac Surgery       Family History   Problem Relation Age of Onset   • Cancer Mother    • Hypertension Mother    • Diabetes Mother      I have reviewed and agree with the history as documented  E-Cigarette/Vaping   • E-Cigarette Use Never User      E-Cigarette/Vaping Substances   • Nicotine Yes    • THC No    • CBD No    • Flavoring No    • Other No    • Unknown No      Social History     Tobacco Use   • Smoking status: Every Day     Packs/day: 0 50     Years: 45 00     Total pack years: 22 50     Types: Cigarettes   • Smokeless tobacco: Never   • Tobacco comments:     Patient states that he is trying to cut down   Vaping Use   • Vaping Use: Never used   Substance Use Topics   • Alcohol use: Not Currently   • Drug use: Yes     Types: Marijuana       Review of Systems   Skin: Positive for wound  Physical Exam  Physical Exam  Vitals and nursing note reviewed  Constitutional:       General: He is not in acute distress  Appearance: He is well-developed  He is not ill-appearing, toxic-appearing or diaphoretic  HENT:      Head: Normocephalic and atraumatic  Eyes:      General:         Right eye: No discharge  Left eye: No discharge  Conjunctiva/sclera: Conjunctivae normal       Pupils: Pupils are equal, round, and reactive to light  Neck:      Vascular: No JVD  Pulmonary:      Effort: No respiratory distress  Breath sounds:  No stridor  Comments: The surgical incision is very well appearing  At the superior end there is a [id number] area of swelling  There is no surrounding cellulitis, erythema, warmth, crepitus or fluctuance  Upon applying gentle pressure a tiny amount of purulence was expressed  Chest:      Chest wall: No tenderness  Musculoskeletal:         General: No deformity  Normal range of motion  Cervical back: Normal range of motion and neck supple  Skin:     General: Skin is warm and dry  Capillary Refill: Capillary refill takes less than 2 seconds  Coloration: Skin is not pale  Findings: No erythema or rash  Neurological:      Mental Status: He is alert and oriented to person, place, and time  Cranial Nerves: No cranial nerve deficit  Sensory: No sensory deficit  Motor: No abnormal muscle tone  Coordination: Coordination normal          Vital Signs  ED Triage Vitals [05/26/23 1308]   Temperature Pulse Respirations Blood Pressure SpO2   97 9 °F (36 6 °C) (!) 123 18 144/80 96 %      Temp Source Heart Rate Source Patient Position - Orthostatic VS BP Location FiO2 (%)   Temporal Monitor Sitting Left arm --      Pain Score       --           Vitals:    05/26/23 1308   BP: 144/80   Pulse: (!) 123   Patient Position - Orthostatic VS: Sitting         Visual Acuity      ED Medications  Medications - No data to display    Diagnostic Studies  Results Reviewed     None                 No orders to display              Procedures  Procedures         ED Course                                             Medical Decision Making  Visit for wound check: acute illness or injury  Risk  OTC drugs            Disposition  Final diagnoses:   Visit for wound check   Impacted cerumen of left ear     Time reflects when diagnosis was documented in both MDM as applicable and the Disposition within this note     Time User Action Codes Description Comment    5/26/2023  1:15 PM Matthew Quick Add [Z51 89] Visit for wound check     5/26/2023  1:15 PM Skyla Rajput [V93 91] Impacted cerumen of left ear       ED Disposition     ED Disposition   Discharge    Condition   Stable    Date/Time   Fri May 26, 2023  1:15 PM    Comment   Sandy Rodriguez discharge to home/self care  Follow-up Information    None         Discharge Medication List as of 5/26/2023  1:16 PM      START taking these medications    Details   carbamide peroxide (DEBROX) 6 5 % otic solution Administer 5 drops into ears 2 (two) times a day, Starting Fri 5/26/2023, Print         CONTINUE these medications which have NOT CHANGED    Details   acetaminophen (TYLENOL) 325 mg tablet Take 2 tablets (650 mg total) by mouth every 4 (four) hours as needed for mild pain, headaches or fever, Starting Fri 3/31/2023, Normal      aspirin 325 mg tablet Take 1 tablet (325 mg total) by mouth daily, Starting Mon 4/3/2023, Normal      atorvastatin (LIPITOR) 40 mg tablet Take 1 tablet (40 mg total) by mouth daily with dinner, Starting Thu 5/4/2023, Normal      metoprolol succinate (TOPROL-XL) 50 mg 24 hr tablet Take 1 tablet (50 mg total) by mouth daily, Starting Thu 5/4/2023, Normal      pantoprazole (PROTONIX) 40 mg tablet Take 1 tablet (40 mg total) by mouth daily Do not start before February 28, 2023 , Starting Tue 2/28/2023, Normal             No discharge procedures on file      PDMP Review       Value Time User    PDMP Reviewed  Yes 2/27/2023 10:22 AM Yuly Gunter PA-C          ED Provider  Electronically Signed by           Alexei Robles MD  05/29/23 3741

## 2023-06-08 ENCOUNTER — TELEPHONE (OUTPATIENT)
Age: 58
End: 2023-06-08

## 2023-06-08 NOTE — TELEPHONE ENCOUNTER
Patients girlfriend called complaining that patient eats but cannot keep anything down  Had heart surg  No considerable weight loss  States that no one is helping him even though they have mentioned this issue  Wanted referral for another PCP that takes their insurance - they moved and we are too far  Advise to call number on the back of their cards to find out who takes there insurance or call a practice close to them and ask  She believes it has to do with his medication  Advised that I would relay her message  Girlfrienjoann frustrated and hung up      [phone]

## 2023-06-20 ENCOUNTER — TELEPHONE (OUTPATIENT)
Age: 58
End: 2023-06-20

## 2023-06-20 ENCOUNTER — OFFICE VISIT (OUTPATIENT)
Dept: FAMILY MEDICINE CLINIC | Facility: CLINIC | Age: 58
End: 2023-06-20
Payer: COMMERCIAL

## 2023-06-20 VITALS
WEIGHT: 184.6 LBS | HEART RATE: 117 BPM | DIASTOLIC BLOOD PRESSURE: 86 MMHG | SYSTOLIC BLOOD PRESSURE: 132 MMHG | HEIGHT: 66 IN | TEMPERATURE: 97.9 F | OXYGEN SATURATION: 98 % | BODY MASS INDEX: 29.67 KG/M2

## 2023-06-20 DIAGNOSIS — R11.15 PERSISTENT VOMITING: Primary | ICD-10-CM

## 2023-06-20 DIAGNOSIS — R07.81 RIB PAIN ON LEFT SIDE: ICD-10-CM

## 2023-06-20 DIAGNOSIS — G43.919 INTRACTABLE MIGRAINE WITHOUT STATUS MIGRAINOSUS, UNSPECIFIED MIGRAINE TYPE: ICD-10-CM

## 2023-06-20 DIAGNOSIS — E78.5 HYPERLIPIDEMIA, UNSPECIFIED HYPERLIPIDEMIA TYPE: ICD-10-CM

## 2023-06-20 DIAGNOSIS — Z95.1 S/P CABG (CORONARY ARTERY BYPASS GRAFT): ICD-10-CM

## 2023-06-20 DIAGNOSIS — Z76.89 ESTABLISHING CARE WITH NEW DOCTOR, ENCOUNTER FOR: ICD-10-CM

## 2023-06-20 DIAGNOSIS — I10 PRIMARY HYPERTENSION: ICD-10-CM

## 2023-06-20 DIAGNOSIS — I25.118 CORONARY ARTERY DISEASE OF NATIVE ARTERY OF NATIVE HEART WITH STABLE ANGINA PECTORIS (HCC): ICD-10-CM

## 2023-06-20 PROCEDURE — 99204 OFFICE O/P NEW MOD 45 MIN: CPT | Performed by: NURSE PRACTITIONER

## 2023-06-20 RX ORDER — ONDANSETRON 4 MG/1
4 TABLET, ORALLY DISINTEGRATING ORAL EVERY 6 HOURS PRN
Qty: 20 TABLET | Refills: 0 | Status: SHIPPED | OUTPATIENT
Start: 2023-06-20

## 2023-06-20 NOTE — PROGRESS NOTES
Name: Joey Hernandez      : 1965      MRN: [id number]  Encounter Provider: Luz Nyhan, CRNP  Encounter Date: 2023   Encounter department: [facility] [address]     1  Persistent vomiting  Assessment & Plan:  Advised patient to attempt to re-start pantoprazole  Advised avoiding spicy, acidic foods  To increase hydration, small sips  Zofran as prescribed  To follow-up with GI  Orders:  -     Ambulatory Referral to Gastroenterology; Future  -     ondansetron (ZOFRAN-ODT) 4 mg disintegrating tablet; Take 1 tablet (4 mg total) by mouth every 6 (six) hours as needed for nausea or vomiting    2  Rib pain on left side  Comments:  Advised heat therapy, deep breathing exercises, to obtain x-rays as ordered  Advised to seek immediate care for SOB/CP  Orders:  -     XR ribs bilateral 4+ vw w pa chest; Future; Expected date: 2023    3  Intractable migraine without status migrainosus, unspecified migraine type  Assessment & Plan:  As per patient, has had migraines since he was a teenager  Does not wish to have medication management at this time  Is seeking to get medical marijuana card  Information of providers given to patient and significant other  Advised to increase hydration, get adequate rest and nutrition, avoid excess caffeine  Orders:  -     Vitamin D 25 hydroxy; Future  -     Ambulatory Referral to Pain Management; Future    4  Coronary artery disease of native artery of native heart with stable angina pectoris ([facility] [address] )    5  S/P CABG (coronary artery bypass graft)  Assessment & Plan:  Procedure occurred 2023  To continue follow-up with cardiac rehab as scheduled and cardiology as scheduled  6  Primary hypertension  Assessment & Plan:  Initial blood pressure elevated in office, repeat within normal range  As per patient, has not been taking medications due to persistent vomiting    Advised patient to take Zofran as prescribed, then attempt to take medications  Advised to follow-up with cardiology for further management/medication management  Orders:  -     CBC and differential; Future  -     Comprehensive metabolic panel; Future  -     Hemoglobin A1C; Future; Expected date: 06/20/2023  -     Lipid panel; Future  -     TSH, 3rd generation with Free T4 reflex; Future    7  Hyperlipidemia, unspecified hyperlipidemia type  -     Lipid panel; Future    8  Establishing care with new doctor, encounter for    BMI Counseling: Body mass index is 29 8 kg/m²  The BMI is above normal  Nutrition recommendations include decreasing portion sizes, encouraging healthy choices of fruits and vegetables, consuming healthier snacks, limiting drinks that contain sugar, moderation in carbohydrate intake, increasing intake of lean protein, reducing intake of saturated and trans fat and reducing intake of cholesterol  Exercise recommendations include exercising 3-5 times per week and strength training exercises  No pharmacotherapy was ordered  Rationale for BMI follow-up plan is due to patient being overweight or obese  Subjective      Patient presents to the office accompanied by significant other for establishment of care  Patient was previously seen at [facility]  Patient with history of CABG x's 1, procedure completed on 2/23/2023  Has been following with cardiology, having cardiac rehab  As per patient, ceased tobacco use day of surgery  Per patient, has had difficulty with keeping food and medication down  Notes increased vomiting  As per patient, symptoms began intermittently 2 weeks after CABG  been more persistent over the past 2 months  Patient states he is unable to keep medication down  Patient is tolerating liquids, but is unable to tolerate food  Patient denies vomiting blood, abdominal pain/distention, diarrhea, nausea  Patient is supposed to be on Protonix, but is not been taking medication    She denies unexplained weight loss weakness, syncope  Patient is seeking medical marijuana card for chronic migraines  As per patient, has had migraines since he was a teenager  Patient states he was on medication in the past, but marijuana has been more effective for his migraines  Patient also c/o left-sided rib pain  Pain began 2 days ago after stretching arms out  Denies fall or strike to chest   Pain is worse with movement  Patient denies chest pain, SOB  Review of Systems   Constitutional: Positive for appetite change  Negative for chills, fatigue and unexpected weight change  HENT: Negative for congestion, sore throat and trouble swallowing  Eyes: Negative for photophobia and visual disturbance  Respiratory: Negative for cough, chest tightness and shortness of breath  Cardiovascular: Negative for chest pain and palpitations  Gastrointestinal: Positive for vomiting  Negative for abdominal pain, blood in stool, constipation, diarrhea and nausea  Genitourinary: Negative for decreased urine volume, dysuria, flank pain, frequency, hematuria and urgency  Musculoskeletal: Negative for arthralgias and myalgias  Skin: Negative for color change and rash  Neurological: Negative for dizziness, syncope, weakness, light-headedness, numbness and headaches  Hematological: Negative for adenopathy  Does not bruise/bleed easily  Psychiatric/Behavioral: Negative for confusion  The patient is not nervous/anxious          Current Outpatient Medications on File Prior to Visit   Medication Sig   • acetaminophen (TYLENOL) 325 mg tablet Take 2 tablets (650 mg total) by mouth every 4 (four) hours as needed for mild pain, headaches or fever   • aspirin 325 mg tablet Take 1 tablet (325 mg total) by mouth daily (Patient not taking: Reported on 6/20/2023)   • atorvastatin (LIPITOR) 40 mg tablet Take 1 tablet (40 mg total) by mouth daily with dinner (Patient not taking: Reported on 6/20/2023)   • metoprolol "succinate (TOPROL-XL) 50 mg 24 hr tablet Take 1 tablet (50 mg total) by mouth daily (Patient not taking: Reported on 6/20/2023)   • pantoprazole (PROTONIX) 40 mg tablet Take 1 tablet (40 mg total) by mouth daily Do not start before February 28, 2023  (Patient not taking: Reported on 5/4/2023)   • [DISCONTINUED] carbamide peroxide (DEBROX) 6 5 % otic solution Administer 5 drops into ears 2 (two) times a day (Patient not taking: Reported on 6/20/2023)       Objective     /86 (BP Location: Left arm, Patient Position: Sitting)   Pulse (!) 117   Temp 97 9 °F (36 6 °C) (Tympanic)   Ht 5' 6\" (1 676 m)   Wt 83 7 kg (184 lb 9 6 oz)   SpO2 98%   BMI 29 80 kg/m²     Physical Exam  Vitals reviewed  Constitutional:       General: He is not in acute distress  Appearance: Normal appearance  He is not ill-appearing  HENT:      Head: Normocephalic and atraumatic  Right Ear: Tympanic membrane, ear canal and external ear normal       Left Ear: Tympanic membrane, ear canal and external ear normal       Nose: Nose normal       Mouth/Throat:      Mouth: Mucous membranes are moist       Pharynx: Oropharynx is clear  Eyes:      Conjunctiva/sclera: Conjunctivae normal       Pupils: Pupils are equal, round, and reactive to light  Neck:      Vascular: No carotid bruit  Cardiovascular:      Rate and Rhythm: Normal rate and regular rhythm  Pulses: Normal pulses  Heart sounds: Normal heart sounds  Pulmonary:      Effort: Pulmonary effort is normal       Breath sounds: Normal breath sounds  Chest:      Chest wall: Tenderness (to left lower rib area) present  No mass or crepitus  Abdominal:      General: Bowel sounds are normal  There is no distension  Palpations: Abdomen is soft  There is no mass  Tenderness: There is no abdominal tenderness  There is no right CVA tenderness, left CVA tenderness or rebound  Hernia: No hernia is present     Musculoskeletal:         General: Normal range " of motion  Cervical back: Normal range of motion and neck supple  Skin:     General: Skin is warm and dry  Capillary Refill: Capillary refill takes less than 2 seconds  Neurological:      General: No focal deficit present  Mental Status: He is alert and oriented to person, place, and time     Psychiatric:         Mood and Affect: Mood normal          Behavior: Behavior normal        DaiEDWARDO Kitchen

## 2023-06-20 NOTE — ASSESSMENT & PLAN NOTE
Advised patient to attempt to re-start pantoprazole  Advised avoiding spicy, acidic foods  To increase hydration, small sips  Zofran as prescribed   To follow-up with GI

## 2023-06-20 NOTE — ASSESSMENT & PLAN NOTE
Initial blood pressure elevated in office, repeat within normal range  As per patient, has not been taking medications due to persistent vomiting  Advised patient to take Zofran as prescribed, then attempt to take medications  Advised to follow-up with cardiology for further management/medication management

## 2023-06-20 NOTE — ASSESSMENT & PLAN NOTE
Procedure occurred 2/23/2023  To continue follow-up with cardiac rehab as scheduled and cardiology as scheduled

## 2023-06-20 NOTE — TELEPHONE ENCOUNTER
Patients GI provider:  Dr. Goldman    Number to return call: ( [phone]    Reason for call: Pt calling to request a sooner appt if possible    Scheduled procedure/appointment date if applicable: Appt 7/21/23

## 2023-06-20 NOTE — ASSESSMENT & PLAN NOTE
As per patient, has had migraines since he was a teenager  Does not wish to have medication management at this time  Is seeking to get medical marijuana card  Information of providers given to patient and significant other  Advised to increase hydration, get adequate rest and nutrition, avoid excess caffeine

## 2023-06-22 ENCOUNTER — TELEPHONE (OUTPATIENT)
Dept: FAMILY MEDICINE CLINIC | Facility: CLINIC | Age: 58
End: 2023-06-22

## 2023-06-22 DIAGNOSIS — R11.15 PERSISTENT VOMITING: Primary | ICD-10-CM

## 2023-06-22 RX ORDER — METOCLOPRAMIDE 10 MG/1
10 TABLET ORAL 3 TIMES DAILY
Qty: 60 TABLET | Refills: 0 | Status: SHIPPED | OUTPATIENT
Start: 2023-06-22

## 2023-06-22 NOTE — TELEPHONE ENCOUNTER
Spoke with patient's girlfriend Frances Espino- she stated that patient is still throwing up, not able to keep anything down, the medication is not working  They were able to schedule with a gastro doctor, but not until 07/21/2023  Patient's girlfriend wants to know if there is something else that we can do for him, is there a different medication? Should he go to the ER? I advised Frances Espino to try to give patient clear Pedialyte or Gatorade just to get some electrolytes in his system from all of the throwing up       Please advise, call Frances Espino, thank you!!

## 2023-06-22 NOTE — TELEPHONE ENCOUNTER
I will send in  a prescription for Reglan to see if it works better  He is to take the medication and wait at least 30 minutes before eating or drinking anything  He should be drinking electrolyte-rich fluids, small sips at a time  If he cannot stop vomiting or feels like he is dehydrated, he should seek care for IV hydration    Thank you

## 2023-06-22 NOTE — TELEPHONE ENCOUNTER
Called patient girlfriend Neetu Christopher and she is aware  Patient would like to have his records faxed over to Dr ELLINGTON pain med doctor so that they can have it before his apt       Will fax over information

## 2023-06-26 ENCOUNTER — TELEPHONE (OUTPATIENT)
Dept: FAMILY MEDICINE CLINIC | Facility: CLINIC | Age: 58
End: 2023-06-26

## 2023-06-26 NOTE — TELEPHONE ENCOUNTER
Patient's significant other called in to say the the patient has been throwing up and the medication is not helping him  Medication Metoclopramide  He's also still having the headaches  She asking if you can possibly see if you can get his appointment to the [facility] doctor? Please advise   Thanks

## 2023-06-27 NOTE — TELEPHONE ENCOUNTER
I reached out to Roman Conti from GI and she made me aware someone from the office will be contacting him

## 2023-06-27 NOTE — TELEPHONE ENCOUNTER
He has an appointment scheduled for 7/21  He is still vomitting and can't seem to keep anything down

## 2023-06-27 NOTE — TELEPHONE ENCOUNTER
Called and spoke with girlfriend and she said Thank you very much  She did ask can you send in something for his headaches that he is having  She said he is taking way to much advil, tylenol

## 2023-06-28 ENCOUNTER — TELEPHONE (OUTPATIENT)
Dept: GASTROENTEROLOGY | Facility: CLINIC | Age: 58
End: 2023-06-28

## 2023-06-28 DIAGNOSIS — G43.919 INTRACTABLE MIGRAINE WITHOUT STATUS MIGRAINOSUS, UNSPECIFIED MIGRAINE TYPE: Primary | ICD-10-CM

## 2023-06-28 RX ORDER — ACETAMINOPHEN 325 MG/1
650 TABLET ORAL EVERY 4 HOURS PRN
Qty: 180 TABLET | Refills: 3 | Status: SHIPPED | OUTPATIENT
Start: 2023-06-28

## 2023-06-28 NOTE — TELEPHONE ENCOUNTER
Patient girlfriend asked if you can send in the acetaminophen 325-1000 for him please because she couldn't find that dose yesterday and it would make it easier for her

## 2023-07-05 ENCOUNTER — HOSPITAL ENCOUNTER (OUTPATIENT)
Dept: RADIOLOGY | Facility: HOSPITAL | Age: 58
Discharge: HOME | End: 2023-07-05
Payer: COMMERCIAL

## 2023-07-05 ENCOUNTER — APPOINTMENT (OUTPATIENT)
Dept: LAB | Facility: HOSPITAL | Age: 58
End: 2023-07-05
Payer: COMMERCIAL

## 2023-07-05 ENCOUNTER — HOSPITAL ENCOUNTER (EMERGENCY)
Facility: HOSPITAL | Age: 58
Discharge: HOME | End: 2023-07-05
Attending: EMERGENCY MEDICINE
Payer: COMMERCIAL

## 2023-07-05 ENCOUNTER — APPOINTMENT (EMERGENCY)
Dept: CT IMAGING | Facility: HOSPITAL | Age: 58
End: 2023-07-05
Payer: COMMERCIAL

## 2023-07-05 VITALS
SYSTOLIC BLOOD PRESSURE: 136 MMHG | OXYGEN SATURATION: 93 % | TEMPERATURE: 98 F | DIASTOLIC BLOOD PRESSURE: 73 MMHG | HEART RATE: 109 BPM | RESPIRATION RATE: 18 BRPM

## 2023-07-05 DIAGNOSIS — I10 PRIMARY HYPERTENSION: ICD-10-CM

## 2023-07-05 DIAGNOSIS — R10.84 GENERALIZED ABDOMINAL PAIN: ICD-10-CM

## 2023-07-05 DIAGNOSIS — R11.2 NAUSEA & VOMITING: ICD-10-CM

## 2023-07-05 DIAGNOSIS — G43.919 INTRACTABLE MIGRAINE WITHOUT STATUS MIGRAINOSUS, UNSPECIFIED MIGRAINE TYPE: ICD-10-CM

## 2023-07-05 DIAGNOSIS — Z13.0 SCREENING FOR IRON DEFICIENCY ANEMIA: ICD-10-CM

## 2023-07-05 DIAGNOSIS — Z01.812 ENCOUNTER FOR PRE-OPERATIVE LABORATORY TESTING: ICD-10-CM

## 2023-07-05 DIAGNOSIS — R73.09 ELEVATED RANDOM BLOOD GLUCOSE LEVEL: ICD-10-CM

## 2023-07-05 DIAGNOSIS — R07.81 RIB PAIN ON LEFT SIDE: ICD-10-CM

## 2023-07-05 DIAGNOSIS — I35.0 NONRHEUMATIC AORTIC VALVE STENOSIS: ICD-10-CM

## 2023-07-05 DIAGNOSIS — I25.118 CORONARY ARTERY DISEASE OF NATIVE ARTERY OF NATIVE HEART WITH STABLE ANGINA PECTORIS (HCC): ICD-10-CM

## 2023-07-05 DIAGNOSIS — Z13.29 SCREENING FOR THYROID DISORDER: ICD-10-CM

## 2023-07-05 DIAGNOSIS — Z13.6 ENCOUNTER FOR SPECIAL SCREENING EXAMINATION FOR CARDIOVASCULAR DISORDER: ICD-10-CM

## 2023-07-05 DIAGNOSIS — E78.5 HYPERLIPIDEMIA, UNSPECIFIED HYPERLIPIDEMIA TYPE: ICD-10-CM

## 2023-07-05 DIAGNOSIS — K57.92 DIVERTICULITIS: Primary | ICD-10-CM

## 2023-07-05 DIAGNOSIS — R51.9 HEADACHE: ICD-10-CM

## 2023-07-05 LAB
25(OH)D3 SERPL-MCNC: 23.3 NG/ML (ref 30–100)
ALBUMIN SERPL BCP-MCNC: 4.1 G/DL (ref 3.5–5)
ALBUMIN SERPL BCP-MCNC: 4.7 G/DL (ref 3.5–5)
ALP SERPL-CCNC: 68 U/L (ref 34–104)
ALP SERPL-CCNC: 73 U/L (ref 34–104)
ALT SERPL W P-5'-P-CCNC: 20 U/L (ref 7–52)
ALT SERPL W P-5'-P-CCNC: 23 U/L (ref 7–52)
ANION GAP SERPL CALCULATED.3IONS-SCNC: 7 MMOL/L
ANION GAP SERPL CALCULATED.3IONS-SCNC: 9 MMOL/L
AST SERPL W P-5'-P-CCNC: 13 U/L (ref 13–39)
AST SERPL W P-5'-P-CCNC: 14 U/L (ref 13–39)
ATRIAL RATE: 133 BPM
BASOPHILS # BLD AUTO: 0.05 THOUSANDS/ÂΜL (ref 0–0.1)
BASOPHILS # BLD AUTO: 0.06 THOUSANDS/ÂΜL (ref 0–0.1)
BASOPHILS NFR BLD AUTO: 1 % (ref 0–1)
BASOPHILS NFR BLD AUTO: 1 % (ref 0–1)
BILIRUB SERPL-MCNC: 0.29 MG/DL (ref 0.2–1)
BILIRUB SERPL-MCNC: 0.31 MG/DL (ref 0.2–1)
BUN SERPL-MCNC: 17 MG/DL (ref 5–25)
BUN SERPL-MCNC: 18 MG/DL (ref 5–25)
CALCIUM SERPL-MCNC: 10.7 MG/DL (ref 8.4–10.2)
CALCIUM SERPL-MCNC: 9.7 MG/DL (ref 8.4–10.2)
CARDIAC TROPONIN I PNL SERPL HS: 4 NG/L
CHLORIDE SERPL-SCNC: 101 MMOL/L (ref 96–108)
CHLORIDE SERPL-SCNC: 102 MMOL/L (ref 96–108)
CHOLEST SERPL-MCNC: 259 MG/DL
CO2 SERPL-SCNC: 26 MMOL/L (ref 21–32)
CO2 SERPL-SCNC: 30 MMOL/L (ref 21–32)
CREAT SERPL-MCNC: 0.99 MG/DL (ref 0.6–1.3)
CREAT SERPL-MCNC: 1.03 MG/DL (ref 0.6–1.3)
EOSINOPHIL # BLD AUTO: 0.17 THOUSAND/ÂΜL (ref 0–0.61)
EOSINOPHIL # BLD AUTO: 0.19 THOUSAND/ÂΜL (ref 0–0.61)
EOSINOPHIL NFR BLD AUTO: 3 % (ref 0–6)
EOSINOPHIL NFR BLD AUTO: 4 % (ref 0–6)
ERYTHROCYTE [DISTWIDTH] IN BLOOD BY AUTOMATED COUNT: 13.8 % (ref 11.6–15.1)
ERYTHROCYTE [DISTWIDTH] IN BLOOD BY AUTOMATED COUNT: 13.9 % (ref 11.6–15.1)
GFR SERPL CREATININE-BSD FRML MDRD: 81 ML/MIN/1.73SQ M
GFR SERPL CREATININE-BSD FRML MDRD: 85 ML/MIN/1.73SQ M
GLUCOSE P FAST SERPL-MCNC: 118 MG/DL (ref 65–99)
GLUCOSE SERPL-MCNC: 110 MG/DL (ref 65–140)
HCT VFR BLD AUTO: 45.2 % (ref 36.5–49.3)
HCT VFR BLD AUTO: 50.4 % (ref 36.5–49.3)
HDLC SERPL-MCNC: 46 MG/DL
HGB BLD-MCNC: 14.9 G/DL (ref 12–17)
HGB BLD-MCNC: 16.2 G/DL (ref 12–17)
IMM GRANULOCYTES # BLD AUTO: 0.02 THOUSAND/UL (ref 0–0.2)
IMM GRANULOCYTES # BLD AUTO: 0.02 THOUSAND/UL (ref 0–0.2)
IMM GRANULOCYTES NFR BLD AUTO: 0 % (ref 0–2)
IMM GRANULOCYTES NFR BLD AUTO: 0 % (ref 0–2)
LDLC SERPL CALC-MCNC: 143 MG/DL (ref 0–100)
LIPASE SERPL-CCNC: 25 U/L (ref 11–82)
LYMPHOCYTES # BLD AUTO: 1.42 THOUSANDS/ÂΜL (ref 0.6–4.47)
LYMPHOCYTES # BLD AUTO: 1.43 THOUSANDS/ÂΜL (ref 0.6–4.47)
LYMPHOCYTES NFR BLD AUTO: 25 % (ref 14–44)
LYMPHOCYTES NFR BLD AUTO: 28 % (ref 14–44)
MCH RBC QN AUTO: 27 PG (ref 26.8–34.3)
MCH RBC QN AUTO: 27.2 PG (ref 26.8–34.3)
MCHC RBC AUTO-ENTMCNC: 32.1 G/DL (ref 31.4–37.4)
MCHC RBC AUTO-ENTMCNC: 33 G/DL (ref 31.4–37.4)
MCV RBC AUTO: 83 FL (ref 82–98)
MCV RBC AUTO: 84 FL (ref 82–98)
MONOCYTES # BLD AUTO: 0.57 THOUSAND/ÂΜL (ref 0.17–1.22)
MONOCYTES # BLD AUTO: 0.73 THOUSAND/ÂΜL (ref 0.17–1.22)
MONOCYTES NFR BLD AUTO: 11 % (ref 4–12)
MONOCYTES NFR BLD AUTO: 13 % (ref 4–12)
NEUTROPHILS # BLD AUTO: 2.82 THOUSANDS/ÂΜL (ref 1.85–7.62)
NEUTROPHILS # BLD AUTO: 3.23 THOUSANDS/ÂΜL (ref 1.85–7.62)
NEUTS SEG NFR BLD AUTO: 56 % (ref 43–75)
NEUTS SEG NFR BLD AUTO: 58 % (ref 43–75)
NONHDLC SERPL-MCNC: 213 MG/DL
NRBC BLD AUTO-RTO: 0 /100 WBCS
NRBC BLD AUTO-RTO: 0 /100 WBCS
P AXIS: 65 DEGREES
PLATELET # BLD AUTO: 222 THOUSANDS/UL (ref 149–390)
PLATELET # BLD AUTO: 232 THOUSANDS/UL (ref 149–390)
PMV BLD AUTO: 10.1 FL (ref 8.9–12.7)
PMV BLD AUTO: 10.6 FL (ref 8.9–12.7)
POTASSIUM SERPL-SCNC: 4.2 MMOL/L (ref 3.5–5.3)
POTASSIUM SERPL-SCNC: 5.3 MMOL/L (ref 3.5–5.3)
PR INTERVAL: 130 MS
PROT SERPL-MCNC: 7.8 G/DL (ref 6.4–8.4)
PROT SERPL-MCNC: 8.9 G/DL (ref 6.4–8.4)
QRS AXIS: 32 DEGREES
QRSD INTERVAL: 86 MS
QT INTERVAL: 292 MS
QTC INTERVAL: 434 MS
RBC # BLD AUTO: 5.48 MILLION/UL (ref 3.88–5.62)
RBC # BLD AUTO: 5.99 MILLION/UL (ref 3.88–5.62)
SODIUM SERPL-SCNC: 137 MMOL/L (ref 135–147)
SODIUM SERPL-SCNC: 138 MMOL/L (ref 135–147)
T WAVE AXIS: 82 DEGREES
TRIGL SERPL-MCNC: 349 MG/DL
TSH SERPL DL<=0.05 MIU/L-ACNC: 2.07 UIU/ML (ref 0.45–4.5)
VENTRICULAR RATE: 133 BPM
WBC # BLD AUTO: 5.09 THOUSAND/UL (ref 4.31–10.16)
WBC # BLD AUTO: 5.62 THOUSAND/UL (ref 4.31–10.16)

## 2023-07-05 PROCEDURE — 74177 CT ABD & PELVIS W/CONTRAST: CPT

## 2023-07-05 PROCEDURE — 80061 LIPID PANEL: CPT

## 2023-07-05 PROCEDURE — 82306 VITAMIN D 25 HYDROXY: CPT

## 2023-07-05 PROCEDURE — 83690 ASSAY OF LIPASE: CPT | Performed by: EMERGENCY MEDICINE

## 2023-07-05 PROCEDURE — 71111 X-RAY EXAM RIBS/CHEST4/> VWS: CPT

## 2023-07-05 PROCEDURE — 93005 ELECTROCARDIOGRAM TRACING: CPT

## 2023-07-05 PROCEDURE — 84443 ASSAY THYROID STIM HORMONE: CPT

## 2023-07-05 PROCEDURE — 85025 COMPLETE CBC W/AUTO DIFF WBC: CPT | Performed by: EMERGENCY MEDICINE

## 2023-07-05 PROCEDURE — 83036 HEMOGLOBIN GLYCOSYLATED A1C: CPT

## 2023-07-05 PROCEDURE — G1004 CDSM NDSC: HCPCS

## 2023-07-05 PROCEDURE — 85025 COMPLETE CBC W/AUTO DIFF WBC: CPT

## 2023-07-05 PROCEDURE — 84484 ASSAY OF TROPONIN QUANT: CPT | Performed by: EMERGENCY MEDICINE

## 2023-07-05 PROCEDURE — 36415 COLL VENOUS BLD VENIPUNCTURE: CPT

## 2023-07-05 PROCEDURE — 80053 COMPREHEN METABOLIC PANEL: CPT | Performed by: EMERGENCY MEDICINE

## 2023-07-05 PROCEDURE — 70450 CT HEAD/BRAIN W/O DYE: CPT

## 2023-07-05 PROCEDURE — 93010 ELECTROCARDIOGRAM REPORT: CPT | Performed by: INTERNAL MEDICINE

## 2023-07-05 PROCEDURE — 80053 COMPREHEN METABOLIC PANEL: CPT

## 2023-07-05 RX ORDER — ONDANSETRON 4 MG/1
4 TABLET, FILM COATED ORAL EVERY 6 HOURS PRN
Qty: 12 TABLET | Refills: 0 | Status: SHIPPED | OUTPATIENT
Start: 2023-07-05

## 2023-07-05 RX ORDER — MORPHINE SULFATE 4 MG/ML
4 INJECTION, SOLUTION INTRAMUSCULAR; INTRAVENOUS ONCE
Status: COMPLETED | OUTPATIENT
Start: 2023-07-05 | End: 2023-07-05

## 2023-07-05 RX ORDER — AMOXICILLIN AND CLAVULANATE POTASSIUM 875; 125 MG/1; MG/1
1 TABLET, FILM COATED ORAL EVERY 12 HOURS
Qty: 14 TABLET | Refills: 0 | Status: SHIPPED | OUTPATIENT
Start: 2023-07-05 | End: 2023-07-15

## 2023-07-05 RX ORDER — KETOROLAC TROMETHAMINE 30 MG/ML
15 INJECTION, SOLUTION INTRAMUSCULAR; INTRAVENOUS ONCE
Status: COMPLETED | OUTPATIENT
Start: 2023-07-05 | End: 2023-07-05

## 2023-07-05 RX ORDER — METOCLOPRAMIDE HYDROCHLORIDE 5 MG/ML
10 INJECTION INTRAMUSCULAR; INTRAVENOUS ONCE
Status: COMPLETED | OUTPATIENT
Start: 2023-07-05 | End: 2023-07-05

## 2023-07-05 RX ORDER — MAGNESIUM SULFATE HEPTAHYDRATE 40 MG/ML
2 INJECTION, SOLUTION INTRAVENOUS ONCE
Status: COMPLETED | OUTPATIENT
Start: 2023-07-05 | End: 2023-07-05

## 2023-07-05 RX ADMIN — MAGNESIUM SULFATE HEPTAHYDRATE 2 G: 40 INJECTION, SOLUTION INTRAVENOUS at 16:38

## 2023-07-05 RX ADMIN — SODIUM CHLORIDE 3 G: 9 INJECTION, SOLUTION INTRAVENOUS at 17:28

## 2023-07-05 RX ADMIN — IOHEXOL 100 ML: 350 INJECTION, SOLUTION INTRAVENOUS at 14:43

## 2023-07-05 RX ADMIN — KETOROLAC TROMETHAMINE 15 MG: 30 INJECTION, SOLUTION INTRAMUSCULAR at 14:21

## 2023-07-05 RX ADMIN — MORPHINE SULFATE 4 MG: 4 INJECTION INTRAVENOUS at 14:22

## 2023-07-05 RX ADMIN — METOCLOPRAMIDE 10 MG: 5 INJECTION, SOLUTION INTRAMUSCULAR; INTRAVENOUS at 14:22

## 2023-07-05 RX ADMIN — MORPHINE SULFATE 4 MG: 4 INJECTION INTRAVENOUS at 16:39

## 2023-07-05 RX ADMIN — SODIUM CHLORIDE 1000 ML: 0.9 INJECTION, SOLUTION INTRAVENOUS at 14:21

## 2023-07-05 NOTE — ED PROVIDER NOTES
History  Chief Complaint   Patient presents with   • Headache     Pt reports having "major migraine headaches which is causing me to have vertigo and celioscope vision" this has been going on for the period of 2 months. Reports vomiting episodes x3 months. Extensive cardiac history. 79-year-old male history of hypertension, diabetes, CAD status post CABG on aspirin presenting with multiple complaints. Patient primarily complaining of generalized migraine waxing and waning in intensity over the last few months. Insidious onset not maximal in onset not the worst headache of his life. Patient reports when the pain gets severe he occasionally has vision that he describes as looking through a kaleidoscope as well as nausea and episodes of nonbloody nonbilious emesis. Patient reports that he has been regularly using anti-inflammatories and Tylenol and now has left upper quadrant abdominal pain and has been having frequent nausea and vomiting. Reports decreased p.o. intake. Denies any chest pain shortness of breath. Denies any focal neurological changes such as motor or sensory deficits. Denies any known trauma or injury. Denies any other complaints. Chart reviewed. 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  Taking?   acetaminophen (TYLENOL) 325 mg tablet   No No   Sig: Take 2 tablets (650 mg total) by mouth every 4 (four) hours as needed for mild pain, headaches or fever   aspirin 325 mg tablet  Self No No   Sig: Take 1 tablet (325 mg total) by mouth daily   Patient not taking: Reported on 6/20/2023   atorvastatin (LIPITOR) 40 mg tablet   No No   Sig: Take 1 tablet (40 mg total) by mouth daily with dinner   Patient not taking: Reported on 6/20/2023   metoclopramide (Reglan) 10 mg tablet   No No   Sig: Take 1 tablet (10 mg total) by mouth 3 (three) times a day   metoprolol succinate (TOPROL-XL) 50 mg 24 hr tablet   No No   Sig: Take 1 tablet (50 mg total) by mouth daily   Patient not taking: Reported on 6/20/2023   ondansetron (ZOFRAN-ODT) 4 mg disintegrating tablet   No No   Sig: Take 1 tablet (4 mg total) by mouth every 6 (six) hours as needed for nausea or vomiting   pantoprazole (PROTONIX) 40 mg tablet  Self No No   Sig: Take 1 tablet (40 mg total) by mouth daily Do not start before February 28, 2023. Patient not taking: Reported on 5/4/2023      Facility-Administered Medications: None       Past Medical History:   Diagnosis Date   • Diabetes mellitus ([address])    • Hyperlipidemia    • Hypertension    • Psychiatric disorder     bipolar   • Schizoid personality disorder [facility])    • Syncope        Past Surgical History:   Procedure Laterality Date   • CARDIAC ASCENDING AORTOGRAM N/A 10/28/2022    Procedure: Cardiac ascending aortogram;  Surgeon: Brigid Levin MD;  Location: [address] [place] [address] CATH LAB; Service: Cardiology   • CARDIAC CATHETERIZATION Left 10/28/2022    Procedure: CARDIAC RHC/LHC; Surgeon: Brigid Levin MD;  Location: [facility]; Service: Cardiology   • CARDIAC CATHETERIZATION N/A 10/28/2022    Procedure: Cardiac Coronary Angiogram;  Surgeon: Brigid Levin MD;  Location: [address] [place] [address] CATH LAB; Service: Cardiology   • CARDIAC CATHETERIZATION Left 10/28/2022    Procedure: Cardiac Left Ventriculogram;  Surgeon: Brigid Levin MD;  Location: [facility] CARDI[facility] CATH LAB;   Service: Cardiology   • LAPAROSCOPIC LYSIS INTESTINAL ADHESIONS     • DC RPLCMT AORTIC VALVE OPN W/STENTLESS TISSUE VALVE N/A 2/23/2023    Procedure: CORONARY ARTERY BYPASS GRAFT (CABG) 1 VESSEL GSV-->RCA, EVH RIGHT LEG,  WITH REPLACEMENT VALVE AORTIC (AVR) 25MM INSPIRIA RESILIA TISSUE VALVE;  Surgeon: Jing Real Reese Schaffer MD;  Location: [facility];  Service: Cardiac Surgery       Family History   Problem Relation Age of Onset   • Cancer Mother    • Hypertension Mother    • Diabetes Mother      I have reviewed and agree with the history as documented. E-Cigarette/Vaping   • E-Cigarette Use Never User      E-Cigarette/Vaping Substances   • Nicotine Yes    • THC No    • CBD No    • Flavoring No    • Other No    • Unknown No      Social History     Tobacco Use   • Smoking status: Former     Packs/day: 0.50     Years: 45.00     Total pack years: 22.50     Types: Cigarettes     Quit date: 2023     Years since quittin.3   • Smokeless tobacco: Never   • Tobacco comments:     Patient states that he is trying to cut down   Vaping Use   • Vaping Use: Never used   Substance Use Topics   • Alcohol use: Not Currently   • Drug use: Yes     Types: Marijuana       Review of Systems   Constitutional: Negative for appetite change, chills, diaphoresis, fever and unexpected weight change. HENT: Negative for congestion and rhinorrhea. Eyes: Negative for photophobia and visual disturbance. Respiratory: Negative for cough, chest tightness and shortness of breath. Cardiovascular: Negative for chest pain, palpitations and leg swelling. Gastrointestinal: Positive for abdominal pain, nausea and vomiting. Negative for abdominal distention, blood in stool, constipation and diarrhea. Genitourinary: Negative for dysuria and hematuria. Musculoskeletal: Negative for back pain, joint swelling, neck pain and neck stiffness. Skin: Negative for color change, pallor, rash and wound. Neurological: Positive for headaches. Negative for dizziness, syncope, weakness and light-headedness. Psychiatric/Behavioral: Negative for agitation. All other systems reviewed and are negative. Physical Exam  Physical Exam  Vitals and nursing note reviewed. Constitutional:       General: He is not in acute distress. Appearance: Normal appearance. He is well-developed. He is not ill-appearing, toxic-appearing or diaphoretic. HENT:      Head: Normocephalic and atraumatic. Nose: Nose normal. No congestion or rhinorrhea. Mouth/Throat:      Mouth: Mucous membranes are moist.      Pharynx: Oropharynx is clear. No oropharyngeal exudate or posterior oropharyngeal erythema. Eyes:      General: No scleral icterus. Right eye: No discharge. Left eye: No discharge. Extraocular Movements: Extraocular movements intact. Conjunctiva/sclera: Conjunctivae normal.      Pupils: Pupils are equal, round, and reactive to light. Neck:      Vascular: No JVD. Trachea: No tracheal deviation. Comments: Supple. Normal range of motion. Cardiovascular:      Rate and Rhythm: Regular rhythm. Tachycardia present. Heart sounds: Normal heart sounds. No murmur heard. No friction rub. No gallop. Comments: Tachycardia to 110s and regular rhythm  Pulmonary:      Effort: Pulmonary effort is normal. No respiratory distress. Breath sounds: Normal breath sounds. No stridor. No wheezing or rales. Comments: Clear to auscultation bilaterally  Chest:      Chest wall: No tenderness. Abdominal:      General: Bowel sounds are normal. There is no distension. Palpations: Abdomen is soft. Tenderness: There is abdominal tenderness. There is guarding. There is no right CVA tenderness, left CVA tenderness or rebound. Comments: Epigastric and left upper quadrant abdominal tenderness with guarding. Otherwise soft and nondistended. Normal bowel sounds throughout   Musculoskeletal:         General: No swelling, tenderness, deformity or signs of injury. Normal range of motion. Cervical back: Normal range of motion and neck supple. No rigidity or tenderness. No muscular tenderness. Right lower leg: No edema. Left lower leg: No edema.    Lymphadenopathy:      Cervical: No cervical adenopathy. Skin:     General: Skin is warm and dry. Coloration: Skin is not pale. Findings: No erythema or rash. Neurological:      General: No focal deficit present. Mental Status: He is alert and oriented to person, place, and time. Mental status is at baseline. Cranial Nerves: No cranial nerve deficit. Sensory: No sensory deficit. Motor: No weakness or abnormal muscle tone. Coordination: Coordination normal.      Gait: Gait normal.      Comments: A&Ox3 to person, place, and time. CN 2-12 intact. Strength 5/5 throughout. Sensation intact throughout. Cerebellar exam including gait intact. Psychiatric:         Behavior: Behavior normal.         Thought Content:  Thought content normal.         Vital Signs  ED Triage Vitals   Temperature Pulse Respirations Blood Pressure SpO2   07/05/23 1232 07/05/23 1232 07/05/23 1232 07/05/23 1232 07/05/23 1232   97.8 °F (36.6 °C) (!) 130 18 158/88 96 %      Temp Source Heart Rate Source Patient Position - Orthostatic VS BP Location FiO2 (%)   07/05/23 1232 07/05/23 1232 07/05/23 1315 07/05/23 1315 --   Temporal Monitor Lying Right arm       Pain Score       07/05/23 1630       8           Vitals:    07/05/23 1630 07/05/23 1700 07/05/23 1738 07/05/23 1808   BP: 131/72 135/75 143/78 136/73   Pulse: (!) 109 104 104 (!) 109   Patient Position - Orthostatic VS: Sitting Sitting  Sitting         Visual Acuity  Visual Acuity    Flowsheet Row Most Recent Value   L Pupil Size (mm) 3   R Pupil Size (mm) 3          ED Medications  Medications   sodium chloride 0.9 % bolus 1,000 mL (0 mL Intravenous Stopped 7/5/23 1642)   ketorolac (TORADOL) injection 15 mg (15 mg Intravenous Given 7/5/23 1421)   morphine injection 4 mg (4 mg Intravenous Given 7/5/23 1422)   metoclopramide (REGLAN) injection 10 mg (10 mg Intravenous Given 7/5/23 1422)   iohexol (OMNIPAQUE) 350 MG/ML injection (SINGLE-DOSE) 100 mL (100 mL Intravenous Given 7/5/23 1443)   morphine injection 4 mg (4 mg Intravenous Given 7/5/23 1639)   magnesium sulfate 2 g/50 mL IVPB (premix) 2 g (0 g Intravenous Stopped 7/5/23 1728)   ampicillin-sulbactam (UNASYN) 3 g in sodium chloride 0.9 % 100 mL IVPB (0 g Intravenous Stopped 7/5/23 1758)       Diagnostic Studies  Results Reviewed     Procedure Component Value Units Date/Time    HS Troponin 0hr (reflex protocol) [[id number]]  (Normal) Collected: 07/05/23 1423    Lab Status: Final result Specimen: Blood from Arm, Right Updated: 07/05/23 1459     hs TnI 0hr 4 ng/L     Comprehensive metabolic panel [[id number]] Collected: 07/05/23 1423    Lab Status: Final result Specimen: Blood from Arm, Right Updated: 07/05/23 1455     Sodium 137 mmol/L      Potassium 4.2 mmol/L      Chloride 102 mmol/L      CO2 26 mmol/L      ANION GAP 9 mmol/L      BUN 18 mg/dL      Creatinine 0.99 mg/dL      Glucose 110 mg/dL      Calcium 9.7 mg/dL      AST 13 U/L      ALT 20 U/L      Alkaline Phosphatase 68 U/L      Total Protein 7.8 g/dL      Albumin 4.1 g/dL      Total Bilirubin 0.29 mg/dL      eGFR 85 ml/min/1.73sq m     Narrative:      [place] guidelines for Chronic Kidney Disease (CKD):   •  Stage 1 with normal or high GFR (GFR > 90 mL/min/1.73 square meters)  •  Stage 2 Mild CKD (GFR = 60-89 mL/min/1.73 square meters)  •  Stage 3A Moderate CKD (GFR = 45-59 mL/min/1.73 square meters)  •  Stage 3B Moderate CKD (GFR = 30-44 mL/min/1.73 square meters)  •  Stage 4 Severe CKD (GFR = 15-29 mL/min/1.73 square meters)  •  Stage 5 End Stage CKD (GFR <15 mL/min/1.73 square meters)  Note: GFR calculation is accurate only with a steady state creatinine    Lipase [[id number]]  (Normal) Collected: 07/05/23 1423    Lab Status: Final result Specimen: Blood from Arm, Right Updated: 07/05/23 1455     Lipase 25 u/L     CBC and differential [[id number]]  (Abnormal) Collected: 07/05/23 1423    Lab Status: Final result Specimen: Blood from Arm, Right Updated: 07/05/23 1432     WBC 5.62 Thousand/uL      RBC 5.48 Million/uL      Hemoglobin 14.9 g/dL      Hematocrit 45.2 %      MCV 83 fL      MCH 27.2 pg      MCHC 33.0 g/dL      RDW 13.9 %      MPV 10.6 fL      Platelets 116 Thousands/uL      nRBC 0 /100 WBCs      Neutrophils Relative 58 %      Immat GRANS % 0 %      Lymphocytes Relative 25 %      Monocytes Relative 13 %      Eosinophils Relative 3 %      Basophils Relative 1 %      Neutrophils Absolute 3.23 Thousands/µL      Immature Grans Absolute 0.02 Thousand/uL      Lymphocytes Absolute 1.42 Thousands/µL      Monocytes Absolute 0.73 Thousand/µL      Eosinophils Absolute 0.17 Thousand/µL      Basophils Absolute 0.05 Thousands/µL                  CT head without contrast   Final Result by Kim Valdez MD (07/05 1534)      No acute intracranial abnormality. Workstation performed: [id number]         CT abdomen pelvis with contrast   Final Result by Kim Valdez MD (07/05 1554)      1. Mild active inflammatory diverticulitis in the descending and sigmoid colon superimposed on complications of chronic recurrent diverticulitis including:   a) Qkernxclxd-sl-jrnyfxp colocolonic fistula and   b) Tubular tracts arising from the sigmoid projecting laterally toward the abdominal wall and superiorly toward the lateral conal fascia. No evident penetration of the transversus abdominis muscle. 2.  No acute perforation or abscess. The study was marked in [facility] for immediate notification.             Workstation performed: [id number]                    Procedures  Procedures         ED Course             HEART Risk Score    Flowsheet Row Most Recent Value   Heart Score Risk Calculator    History 0 Filed at: 07/05/2023 1600   ECG 1 Filed at: 07/05/2023 1600   Age 1 Filed at: 07/05/2023 1600   Risk Factors 1 Filed at: 07/05/2023 1600   Troponin 0 Filed at: 07/05/2023 1600   HEART Score 3 Filed at: 07/05/2023 1600                        SBIRT 22yo+    Flowsheet Row Most Recent Value   Initial Alcohol Screen: US AUDIT-C     1. How often do you have a drink containing alcohol? 0 Filed at: 07/05/2023 1258   2. How many drinks containing alcohol do you have on a typical day you are drinking? 0 Filed at: 07/05/2023 1258   3a. Male UNDER 65: How often do you have five or more drinks on one occasion? 0 Filed at: 07/05/2023 1258   3b. FEMALE Any Age, or MALE 65+: How often do you have 4 or more drinks on one occassion? 0 Filed at: 07/05/2023 1258   Audit-C Score 0 Filed at: 07/05/2023 1258   MARTINA: How many times in the past year have you. .. Used an illegal drug or used a prescription medication for non-medical reasons? Never Filed at: 07/05/2023 1258                    Medical Decision Making  61-year-old male history of hypertension, diabetes, CAD status post CABG on aspirin presenting with multiple complaints. Ongoing intermittent headache with regular Tylenol a Profen use. Suspect likely persistent NSAID use contributing to abdominal pain. Plan for cardiac evaluation including EKG and troponin. Plan for basic labs plus abdominal labs. Imaging of head and abdomen. Symptom management with IV pain and nausea medications plus fluids. Reassess. EKG interpreted by me with sinus tachycardia with nonspecific ST abnormality. Labs interpreted by me without significant acute process. Symptoms resolved after medications. CT imaging notable for mild acute diverticulitis and concern for fistulous. Discussed with general surgery who believes the fistula tracts are chronic and can follow-up outpatient. Given dose of IV antibiotics here and prescription sent to pharmacy. Patient tolerating p.o. Dietary recommendations. Discussed results and recommendations. Advised follow up PCP and general surgery. Medication recommendations. Given instructions and return precautions. Patient/family at bedside acknowledged understanding of all written and verbal instructions and return precautions. Discharged. Amount and/or Complexity of Data Reviewed  Labs: ordered. Radiology: ordered. Risk  Prescription drug management. Disposition  Final diagnoses:   Diverticulitis   Generalized abdominal pain   Nausea & vomiting   Headache     Time reflects when diagnosis was documented in both MDM as applicable and the Disposition within this note     Time User Action Codes Description Comment    7/5/2023  4:38 PM Jerrald Millers Add [K57.92] Diverticulitis     7/5/2023  4:39 PM Jerrald Millers Add [R10.84] Generalized abdominal pain     7/5/2023  4:39 PM Jerrald Millers Add [R11.2] Nausea & vomiting     7/5/2023  4:39 PM Jerrald Millers Add [R51.9] Headache       ED Disposition     ED Disposition   Discharge    Condition   Stable    Date/Time   Wed Jul 5, 2023  4:38 PM    Comment   Mary Jane Tiwari discharge to home/self care.                Follow-up Information     Follow up With Specialties Details Why Contact Info Additional [address] , [address] [facility] Medicine Schedule an appointment as soon as possible for a visit in 1 week  [address] Surgery Schedule an appointment as soon as possible for a visit in 1 week  [address] [facility] [address]  [facility] [address] [facility], [phone]          Discharge Medication List as of 7/5/2023  4:40 PM      START taking these medications    Details   amoxicillin-clavulanate (AUGMENTIN) 875-125 mg per tablet Take 1 tablet by mouth every 12 (twelve) hours for 7 days, Starting Wed 7/5/2023, Until Wed 7/12/2023, Normal      ondansetron (ZOFRAN) 4 mg tablet Take 1 tablet (4 mg total) by mouth every 6 (six) hours as needed for nausea or vomiting, Starting Wed 7/5/2023, Normal         CONTINUE these medications which have NOT CHANGED    Details   acetaminophen (TYLENOL) 325 mg tablet Take 2 tablets (650 mg total) by mouth every 4 (four) hours as needed for mild pain, headaches or fever, Starting Wed 6/28/2023, Normal      aspirin 325 mg tablet Take 1 tablet (325 mg total) by mouth daily, Starting Mon 4/3/2023, Normal      atorvastatin (LIPITOR) 40 mg tablet Take 1 tablet (40 mg total) by mouth daily with dinner, Starting Thu 5/4/2023, Normal      metoclopramide (Reglan) 10 mg tablet Take 1 tablet (10 mg total) by mouth 3 (three) times a day, Starting Thu 6/22/2023, Normal      metoprolol succinate (TOPROL-XL) 50 mg 24 hr tablet Take 1 tablet (50 mg total) by mouth daily, Starting Thu 5/4/2023, Normal      ondansetron (ZOFRAN-ODT) 4 mg disintegrating tablet Take 1 tablet (4 mg total) by mouth every 6 (six) hours as needed for nausea or vomiting, Starting Tue 6/20/2023, Normal      pantoprazole (PROTONIX) 40 mg tablet Take 1 tablet (40 mg total) by mouth daily Do not start before February 28, 2023., Starting Tue 2/28/2023, Normal             No discharge procedures on file.     PDMP Review       Value Time User    PDMP Reviewed  Yes 2/27/2023 10:22 AM Sudha Ramos PA-C          ED Provider  Electronically Signed by           Buddy Estevez MD  07/05/23 6067

## 2023-07-05 NOTE — DISCHARGE INSTRUCTIONS
Please follow up PCP and general surgery. Recommend brat or clear liquid diet. Recommend tylenol 650 mg and ibuprofen 600 mg every 6 hours as needed for pain. Please return for severe chest pain, significant shortness of breath, severely worsening symptoms, or any other concerning signs or symptoms. Please refer to the following documents for additional instructions and return precautions.

## 2023-07-07 ENCOUNTER — TELEPHONE (OUTPATIENT)
Dept: FAMILY MEDICINE CLINIC | Facility: CLINIC | Age: 58
End: 2023-07-07

## 2023-07-07 ENCOUNTER — TELEPHONE (OUTPATIENT)
Dept: PAIN MEDICINE | Facility: CLINIC | Age: 58
End: 2023-07-07

## 2023-07-07 LAB
EST. AVERAGE GLUCOSE BLD GHB EST-MCNC: 117 MG/DL
HBA1C MFR BLD: 5.7 %

## 2023-07-07 NOTE — TELEPHONE ENCOUNTER
Patient girlfriend called regarding her boyfriend in the hospital. The zofran and tylenol is not working. She was wondering would medical marijuana be good for him? He eats something and half hour later he vomits. He is going to see pain management and gastro.  Girlfriend just wanted to let PCP know what is going on

## 2023-07-07 NOTE — TELEPHONE ENCOUNTER
Caller: Patrica Yeager PT    Doctor: Dr Vania Ortega    Reason for call: LYFT for Consult     Call back#: [phone]

## 2023-07-11 RX ORDER — FEXOFENADINE HCL AND PSEUDOEPHEDRINE HCI 180; 240 MG/1; MG/1
1 TABLET, EXTENDED RELEASE ORAL DAILY
COMMUNITY
Start: 2023-05-13 | End: 2023-07-15

## 2023-07-12 ENCOUNTER — HOSPITAL ENCOUNTER (INPATIENT)
Facility: HOSPITAL | Age: 58
LOS: 3 days | Discharge: HOME | DRG: 244 | End: 2023-07-15
Attending: EMERGENCY MEDICINE | Admitting: FAMILY MEDICINE
Payer: COMMERCIAL

## 2023-07-12 ENCOUNTER — OFFICE VISIT (OUTPATIENT)
Dept: GASTROENTEROLOGY | Facility: CLINIC | Age: 58
End: 2023-07-12
Payer: COMMERCIAL

## 2023-07-12 ENCOUNTER — APPOINTMENT (EMERGENCY)
Dept: CT IMAGING | Facility: HOSPITAL | Age: 58
DRG: 244 | End: 2023-07-12
Payer: COMMERCIAL

## 2023-07-12 VITALS
DIASTOLIC BLOOD PRESSURE: 84 MMHG | WEIGHT: 192 LBS | SYSTOLIC BLOOD PRESSURE: 130 MMHG | HEIGHT: 66 IN | OXYGEN SATURATION: 98 % | HEART RATE: 120 BPM | BODY MASS INDEX: 30.86 KG/M2

## 2023-07-12 DIAGNOSIS — L98.8 FISTULA: ICD-10-CM

## 2023-07-12 DIAGNOSIS — R11.15 PERSISTENT VOMITING: ICD-10-CM

## 2023-07-12 DIAGNOSIS — K57.92 ACUTE DIVERTICULITIS: Primary | ICD-10-CM

## 2023-07-12 DIAGNOSIS — K57.92 DIVERTICULITIS: ICD-10-CM

## 2023-07-12 DIAGNOSIS — G43.919 INTRACTABLE MIGRAINE WITHOUT STATUS MIGRAINOSUS, UNSPECIFIED MIGRAINE TYPE: ICD-10-CM

## 2023-07-12 DIAGNOSIS — K57.32 DIVERTICULITIS OF LARGE INTESTINE WITHOUT BLEEDING, UNSPECIFIED COMPLICATION STATUS: Primary | ICD-10-CM

## 2023-07-12 LAB
ALBUMIN SERPL BCP-MCNC: 4.4 G/DL (ref 3.5–5)
ALP SERPL-CCNC: 65 U/L (ref 34–104)
ALT SERPL W P-5'-P-CCNC: 25 U/L (ref 7–52)
ANION GAP SERPL CALCULATED.3IONS-SCNC: 7 MMOL/L
APTT PPP: 28 SECONDS (ref 23–37)
AST SERPL W P-5'-P-CCNC: 15 U/L (ref 13–39)
ATRIAL RATE: 111 BPM
BASOPHILS # BLD AUTO: 0.06 THOUSANDS/ÂΜL (ref 0–0.1)
BASOPHILS NFR BLD AUTO: 1 % (ref 0–1)
BILIRUB SERPL-MCNC: 0.32 MG/DL (ref 0.2–1)
BILIRUB UR QL STRIP: NEGATIVE
BUN SERPL-MCNC: 12 MG/DL (ref 5–25)
CALCIUM SERPL-MCNC: 10.1 MG/DL (ref 8.4–10.2)
CHLORIDE SERPL-SCNC: 102 MMOL/L (ref 96–108)
CLARITY UR: CLEAR
CO2 SERPL-SCNC: 27 MMOL/L (ref 21–32)
COLOR UR: COLORLESS
CREAT SERPL-MCNC: 0.92 MG/DL (ref 0.6–1.3)
EOSINOPHIL # BLD AUTO: 0.17 THOUSAND/ÂΜL (ref 0–0.61)
EOSINOPHIL NFR BLD AUTO: 3 % (ref 0–6)
ERYTHROCYTE [DISTWIDTH] IN BLOOD BY AUTOMATED COUNT: 13.8 % (ref 11.6–15.1)
GFR SERPL CREATININE-BSD FRML MDRD: 93 ML/MIN/1.73SQ M
GLUCOSE SERPL-MCNC: 91 MG/DL (ref 65–140)
GLUCOSE UR STRIP-MCNC: NEGATIVE MG/DL
HCT VFR BLD AUTO: 45.7 % (ref 36.5–49.3)
HGB BLD-MCNC: 15.3 G/DL (ref 12–17)
HGB UR QL STRIP.AUTO: NEGATIVE
IMM GRANULOCYTES # BLD AUTO: 0.03 THOUSAND/UL (ref 0–0.2)
IMM GRANULOCYTES NFR BLD AUTO: 1 % (ref 0–2)
INR PPP: 0.95 (ref 0.84–1.19)
KETONES UR STRIP-MCNC: NEGATIVE MG/DL
LACTATE SERPL-SCNC: 1.1 MMOL/L (ref 0.5–2)
LEUKOCYTE ESTERASE UR QL STRIP: NEGATIVE
LIPASE SERPL-CCNC: 22 U/L (ref 11–82)
LYMPHOCYTES # BLD AUTO: 1.49 THOUSANDS/ÂΜL (ref 0.6–4.47)
LYMPHOCYTES NFR BLD AUTO: 24 % (ref 14–44)
MCH RBC QN AUTO: 27.1 PG (ref 26.8–34.3)
MCHC RBC AUTO-ENTMCNC: 33.5 G/DL (ref 31.4–37.4)
MCV RBC AUTO: 81 FL (ref 82–98)
MONOCYTES # BLD AUTO: 0.68 THOUSAND/ÂΜL (ref 0.17–1.22)
MONOCYTES NFR BLD AUTO: 11 % (ref 4–12)
NEUTROPHILS # BLD AUTO: 3.8 THOUSANDS/ÂΜL (ref 1.85–7.62)
NEUTS SEG NFR BLD AUTO: 60 % (ref 43–75)
NITRITE UR QL STRIP: NEGATIVE
NRBC BLD AUTO-RTO: 0 /100 WBCS
P AXIS: 61 DEGREES
PH UR STRIP.AUTO: 6 [PH]
PLATELET # BLD AUTO: 217 THOUSANDS/UL (ref 149–390)
PMV BLD AUTO: 10 FL (ref 8.9–12.7)
POTASSIUM SERPL-SCNC: 4.3 MMOL/L (ref 3.5–5.3)
PR INTERVAL: 144 MS
PROCALCITONIN SERPL-MCNC: 0.06 NG/ML
PROT SERPL-MCNC: 8.1 G/DL (ref 6.4–8.4)
PROT UR STRIP-MCNC: NEGATIVE MG/DL
PROTHROMBIN TIME: 12.5 SECONDS (ref 11.6–14.5)
QRS AXIS: 5 DEGREES
QRSD INTERVAL: 90 MS
QT INTERVAL: 318 MS
QTC INTERVAL: 432 MS
RBC # BLD AUTO: 5.65 MILLION/UL (ref 3.88–5.62)
SODIUM SERPL-SCNC: 136 MMOL/L (ref 135–147)
SP GR UR STRIP.AUTO: 1.01 (ref 1–1.03)
T WAVE AXIS: 69 DEGREES
UROBILINOGEN UR STRIP-ACNC: <2 MG/DL
VENTRICULAR RATE: 111 BPM
WBC # BLD AUTO: 6.23 THOUSAND/UL (ref 4.31–10.16)

## 2023-07-12 PROCEDURE — 99284 EMERGENCY DEPT VISIT MOD MDM: CPT

## 2023-07-12 PROCEDURE — 99285 EMERGENCY DEPT VISIT HI MDM: CPT | Performed by: EMERGENCY MEDICINE

## 2023-07-12 PROCEDURE — 84145 PROCALCITONIN (PCT): CPT | Performed by: EMERGENCY MEDICINE

## 2023-07-12 PROCEDURE — G1004 CDSM NDSC: HCPCS

## 2023-07-12 PROCEDURE — 93010 ELECTROCARDIOGRAM REPORT: CPT | Performed by: INTERNAL MEDICINE

## 2023-07-12 PROCEDURE — 85610 PROTHROMBIN TIME: CPT | Performed by: EMERGENCY MEDICINE

## 2023-07-12 PROCEDURE — 83690 ASSAY OF LIPASE: CPT | Performed by: EMERGENCY MEDICINE

## 2023-07-12 PROCEDURE — 99213 OFFICE O/P EST LOW 20 MIN: CPT | Performed by: PHYSICIAN ASSISTANT

## 2023-07-12 PROCEDURE — 96366 THER/PROPH/DIAG IV INF ADDON: CPT

## 2023-07-12 PROCEDURE — 80053 COMPREHEN METABOLIC PANEL: CPT | Performed by: EMERGENCY MEDICINE

## 2023-07-12 PROCEDURE — 96367 TX/PROPH/DG ADDL SEQ IV INF: CPT

## 2023-07-12 PROCEDURE — 74177 CT ABD & PELVIS W/CONTRAST: CPT

## 2023-07-12 PROCEDURE — 85025 COMPLETE CBC W/AUTO DIFF WBC: CPT | Performed by: EMERGENCY MEDICINE

## 2023-07-12 PROCEDURE — 36415 COLL VENOUS BLD VENIPUNCTURE: CPT | Performed by: EMERGENCY MEDICINE

## 2023-07-12 PROCEDURE — 87040 BLOOD CULTURE FOR BACTERIA: CPT | Performed by: EMERGENCY MEDICINE

## 2023-07-12 PROCEDURE — 99223 1ST HOSP IP/OBS HIGH 75: CPT | Performed by: FAMILY MEDICINE

## 2023-07-12 PROCEDURE — 93005 ELECTROCARDIOGRAM TRACING: CPT

## 2023-07-12 PROCEDURE — 96365 THER/PROPH/DIAG IV INF INIT: CPT

## 2023-07-12 PROCEDURE — 81003 URINALYSIS AUTO W/O SCOPE: CPT | Performed by: EMERGENCY MEDICINE

## 2023-07-12 PROCEDURE — 83605 ASSAY OF LACTIC ACID: CPT | Performed by: EMERGENCY MEDICINE

## 2023-07-12 PROCEDURE — 85730 THROMBOPLASTIN TIME PARTIAL: CPT | Performed by: EMERGENCY MEDICINE

## 2023-07-12 RX ORDER — CEFTRIAXONE 2 G/50ML
2000 INJECTION, SOLUTION INTRAVENOUS ONCE
Status: COMPLETED | OUTPATIENT
Start: 2023-07-12 | End: 2023-07-12

## 2023-07-12 RX ORDER — CEFTRIAXONE 2 G/50ML
2000 INJECTION, SOLUTION INTRAVENOUS EVERY 24 HOURS
Status: DISCONTINUED | OUTPATIENT
Start: 2023-07-13 | End: 2023-07-15 | Stop reason: HOSPADM

## 2023-07-12 RX ORDER — METRONIDAZOLE 500 MG/100ML
500 INJECTION, SOLUTION INTRAVENOUS EVERY 8 HOURS
Status: DISCONTINUED | OUTPATIENT
Start: 2023-07-12 | End: 2023-07-15 | Stop reason: HOSPADM

## 2023-07-12 RX ORDER — ACETAMINOPHEN 325 MG/1
975 TABLET ORAL EVERY 8 HOURS PRN
Status: DISCONTINUED | OUTPATIENT
Start: 2023-07-12 | End: 2023-07-15 | Stop reason: HOSPADM

## 2023-07-12 RX ORDER — SODIUM CHLORIDE, SODIUM GLUCONATE, SODIUM ACETATE, POTASSIUM CHLORIDE, MAGNESIUM CHLORIDE, SODIUM PHOSPHATE, DIBASIC, AND POTASSIUM PHOSPHATE .53; .5; .37; .037; .03; .012; .00082 G/100ML; G/100ML; G/100ML; G/100ML; G/100ML; G/100ML; G/100ML
100 INJECTION, SOLUTION INTRAVENOUS CONTINUOUS
Status: DISCONTINUED | OUTPATIENT
Start: 2023-07-12 | End: 2023-07-14

## 2023-07-12 RX ORDER — ASPIRIN 325 MG
325 TABLET ORAL DAILY
Status: DISCONTINUED | OUTPATIENT
Start: 2023-07-13 | End: 2023-07-15 | Stop reason: HOSPADM

## 2023-07-12 RX ORDER — ENOXAPARIN SODIUM 100 MG/ML
40 INJECTION SUBCUTANEOUS DAILY
Status: DISCONTINUED | OUTPATIENT
Start: 2023-07-13 | End: 2023-07-15 | Stop reason: HOSPADM

## 2023-07-12 RX ORDER — METOPROLOL SUCCINATE 50 MG/1
50 TABLET, EXTENDED RELEASE ORAL DAILY
Status: DISCONTINUED | OUTPATIENT
Start: 2023-07-13 | End: 2023-07-15 | Stop reason: HOSPADM

## 2023-07-12 RX ORDER — ONDANSETRON 2 MG/ML
4 INJECTION INTRAMUSCULAR; INTRAVENOUS EVERY 6 HOURS PRN
Status: DISCONTINUED | OUTPATIENT
Start: 2023-07-12 | End: 2023-07-15 | Stop reason: HOSPADM

## 2023-07-12 RX ORDER — ATORVASTATIN CALCIUM 40 MG/1
40 TABLET, FILM COATED ORAL
Status: DISCONTINUED | OUTPATIENT
Start: 2023-07-12 | End: 2023-07-15 | Stop reason: HOSPADM

## 2023-07-12 RX ADMIN — SODIUM CHLORIDE, SODIUM GLUCONATE, SODIUM ACETATE, POTASSIUM CHLORIDE, MAGNESIUM CHLORIDE, SODIUM PHOSPHATE, DIBASIC, AND POTASSIUM PHOSPHATE 100 ML/HR: .53; .5; .37; .037; .03; .012; .00082 INJECTION, SOLUTION INTRAVENOUS at 18:43

## 2023-07-12 RX ADMIN — IOHEXOL 100 ML: 350 INJECTION, SOLUTION INTRAVENOUS at 16:04

## 2023-07-12 RX ADMIN — ATORVASTATIN CALCIUM 40 MG: 40 TABLET, FILM COATED ORAL at 18:43

## 2023-07-12 RX ADMIN — METRONIDAZOLE 500 MG: 500 INJECTION, SOLUTION INTRAVENOUS at 21:46

## 2023-07-12 RX ADMIN — CEFTRIAXONE 2000 MG: 2 INJECTION, SOLUTION INTRAVENOUS at 13:40

## 2023-07-12 RX ADMIN — METRONIDAZOLE 500 MG: 500 INJECTION, SOLUTION INTRAVENOUS at 14:44

## 2023-07-12 RX ADMIN — ACETAMINOPHEN 975 MG: 325 TABLET ORAL at 20:20

## 2023-07-12 NOTE — ED PROVIDER NOTES
History  Chief Complaint   Patient presents with   • Abdominal Pain     Sent by GI for admit for divertic, failed outpt abx due to vomiting      55 y/o male presents to the ED for diverticulitis. Patient states that he was seen here 1 week ago for lower abd pain and nausea/ vomiting. States that he was diagnosed with diverticulitis and d/c home on abx. He states that he has not been able to keep medications down. He was seen at GI doctor's today and sent here for admission for IV abx. He states that he has had continued pain but denies any fever. Denies any other complaints. Has not tried anything for pain. History provided by:  Patient  Abdominal Pain  Pain location:  LLQ  Pain quality: sharp and stabbing    Pain radiates to:  Does not radiate  Pain severity:  Moderate  Onset quality:  Sudden  Timing:  Constant  Progression:  Worsening  Chronicity:  New  Context: not previous surgeries and not sick contacts    Relieved by:  None tried  Worsened by:  Nothing  Ineffective treatments:  None tried  Associated symptoms: nausea and vomiting    Associated symptoms: no chest pain, no chills, no cough, no diarrhea, no dysuria, no fever, no hematuria, no shortness of breath and no sore throat        Prior to Admission Medications   Prescriptions Last Dose Informant Patient Reported?  Taking?   acetaminophen (TYLENOL) 325 mg tablet  Self No No   Sig: Take 2 tablets (650 mg total) by mouth every 4 (four) hours as needed for mild pain, headaches or fever   amoxicillin-clavulanate (AUGMENTIN) 875-125 mg per tablet  Self No No   Sig: Take 1 tablet by mouth every 12 (twelve) hours for 7 days   aspirin (ECOTRIN) 325 mg EC tablet  Self Yes No   Sig: Take 325 mg by mouth daily   Patient not taking: Reported on 7/12/2023   aspirin 325 mg tablet  Self No No   Sig: Take 1 tablet (325 mg total) by mouth daily   atorvastatin (LIPITOR) 40 mg tablet  Self No No   Sig: Take 1 tablet (40 mg total) by mouth daily with dinner fexofenadine-pseudoephedrine (ALLEGRA-D 24) 180-240 MG per 24 hr tablet  Self Yes No   Sig: Take 1 tablet by mouth daily   metoclopramide (Reglan) 10 mg tablet  Self No No   Sig: Take 1 tablet (10 mg total) by mouth 3 (three) times a day   metoprolol succinate (TOPROL-XL) 50 mg 24 hr tablet  Self No No   Sig: Take 1 tablet (50 mg total) by mouth daily   ondansetron (ZOFRAN) 4 mg tablet  Self No No   Sig: Take 1 tablet (4 mg total) by mouth every 6 (six) hours as needed for nausea or vomiting   ondansetron (ZOFRAN-ODT) 4 mg disintegrating tablet  Self No No   Sig: Take 1 tablet (4 mg total) by mouth every 6 (six) hours as needed for nausea or vomiting   pantoprazole (PROTONIX) 40 mg tablet  Self No No   Sig: Take 1 tablet (40 mg total) by mouth daily Do not start before February 28, 2023. Patient not taking: Reported on 5/4/2023      Facility-Administered Medications: None       Past Medical History:   Diagnosis Date   • Diabetes mellitus ([address] [facility])    • Hyperlipidemia    • Hypertension    • Psychiatric disorder     bipolar   • Schizoid personality disorder [facility])    • Syncope        Past Surgical History:   Procedure Laterality Date   • CARDIAC ASCENDING AORTOGRAM N/A 10/28/2022    Procedure: Cardiac ascending aortogram;  Surgeon: Jamie Tate MD;  Location: [address] [place] [address] CATH LAB; Service: Cardiology   • CARDIAC CATHETERIZATION Left 10/28/2022    Procedure: CARDIAC RHC/LHC; Surgeon: Jamie Tate MD;  Location: [facility] CARDIAC CATH LAB; Service: Cardiology   • CARDIAC CATHETERIZATION N/A 10/28/2022    Procedure: Cardiac Coronary Angiogram;  Surgeon: Jamie Tate MD;  Location: [address] [place] [address] CATH LAB; Service: Cardiology   • CARDIAC CATHETERIZATION Left 10/28/2022    Procedure: Cardiac Left Ventriculogram;  Surgeon: Jamie Tate MD;  Location: [facility] CARDIAC CATH LAB;   Service: Cardiology   • LAPAROSCOPIC LYSIS INTESTINAL ADHESIONS     • ID RPLCMT AORTIC VALVE OPN W/STENTLESS TISSUE VALVE N/A 2/23/2023 Procedure: CORONARY ARTERY BYPASS GRAFT (CABG) 1 VESSEL GSV-->RCA, EVH RIGHT LEG,  WITH REPLACEMENT VALVE AORTIC (AVR) 25MM INSPIRIA RESILIA TISSUE VALVE;  Surgeon: Rafa Calvert MD;  Location: [facility] MAIN [facility];  Service: Cardiac Surgery       Family History   Problem Relation Age of Onset   • Cancer Mother    • Hypertension Mother    • Diabetes Mother      I have reviewed and agree with the history as documented. E-Cigarette/Vaping   • E-Cigarette Use Never User      E-Cigarette/Vaping Substances   • Nicotine Yes    • THC No    • CBD No    • Flavoring No    • Other No    • Unknown No      Social History     Tobacco Use   • Smoking status: Former     Packs/day: 0.50     Years: 45.00     Total pack years: 22.50     Types: Cigarettes     Quit date: 2023     Years since quittin.3   • Smokeless tobacco: Never   • Tobacco comments:     Patient states that he is trying to cut down   Vaping Use   • Vaping Use: Never used   Substance Use Topics   • Alcohol use: Not Currently   • Drug use: Yes     Types: Marijuana       Review of Systems   Constitutional: Negative for chills and fever. HENT: Negative for congestion, ear pain and sore throat. Eyes: Negative for pain and visual disturbance. Respiratory: Negative for cough, shortness of breath and wheezing. Cardiovascular: Negative for chest pain and leg swelling. Gastrointestinal: Positive for abdominal pain, nausea and vomiting. Negative for diarrhea. Genitourinary: Negative for dysuria, frequency, hematuria and urgency. Musculoskeletal: Negative for neck pain and neck stiffness. Skin: Negative for rash and wound. Neurological: Negative for weakness, numbness and headaches. Psychiatric/Behavioral: Negative for agitation and confusion. All other systems reviewed and are negative. Physical Exam  Physical Exam  Vitals and nursing note reviewed. Constitutional:       Appearance: He is well-developed.    HENT:      Head: Normocephalic and atraumatic. Eyes:      Pupils: Pupils are equal, round, and reactive to light. Cardiovascular:      Rate and Rhythm: Normal rate and regular rhythm. Pulmonary:      Effort: Pulmonary effort is normal.      Breath sounds: Normal breath sounds. Abdominal:      General: Bowel sounds are normal.      Palpations: Abdomen is soft. Tenderness: There is abdominal tenderness in the left lower quadrant. Musculoskeletal:         General: Normal range of motion. Cervical back: Normal range of motion and neck supple. Skin:     General: Skin is warm and dry. Neurological:      General: No focal deficit present. Mental Status: He is alert and oriented to person, place, and time.       Comments: No focal deficits         Vital Signs  ED Triage Vitals   Temperature Pulse Respirations Blood Pressure SpO2   07/12/23 1218 07/12/23 1218 07/12/23 1218 07/12/23 1218 07/12/23 1218   98.5 °F (36.9 °C) (!) 120 20 151/99 99 %      Temp Source Heart Rate Source Patient Position - Orthostatic VS BP Location FiO2 (%)   07/12/23 1218 07/12/23 1218 07/12/23 1218 07/12/23 1218 --   Temporal Monitor Sitting Left arm       Pain Score       07/12/23 2020       5           Vitals:    07/13/23 0515 07/13/23 0545 07/13/23 1058 07/13/23 1100   BP: 121/82 121/82 113/83 149/89   Pulse: (!) 106 96  105   Patient Position - Orthostatic VS: Sitting Sitting  Sitting         Visual Acuity      ED Medications  Medications   metroNIDAZOLE (FLAGYL) IVPB (premix) 500 mg 100 mL (0 mg Intravenous Stopped 7/13/23 0635)   atorvastatin (LIPITOR) tablet 40 mg (40 mg Oral Given 7/12/23 1843)   aspirin tablet 325 mg (325 mg Oral Given 7/13/23 1100)   metoprolol succinate (TOPROL-XL) 24 hr tablet 50 mg (50 mg Oral Given 7/13/23 1058)   ondansetron (ZOFRAN) injection 4 mg (4 mg Intravenous Given 7/13/23 0101)   multi-electrolyte (PLASMALYTE-A/ISOLYTE-S PH 7.4) IV solution (100 mL/hr Intravenous New Bag 7/12/23 1843)   enoxaparin (LOVENOX) subcutaneous injection 40 mg (40 mg Subcutaneous Given 7/13/23 1058)   cefTRIAXone (ROCEPHIN) IVPB (premix in dextrose) 2,000 mg 50 mL (has no administration in time range)   acetaminophen (TYLENOL) tablet 975 mg (975 mg Oral Given 7/13/23 1058)   cefTRIAXone (ROCEPHIN) IVPB (premix in dextrose) 2,000 mg 50 mL (0 mg Intravenous Stopped 7/12/23 1441)   iohexol (OMNIPAQUE) 350 MG/ML injection (SINGLE-DOSE) 100 mL (100 mL Intravenous Given 7/12/23 1604)       Diagnostic Studies  Results Reviewed     Procedure Component Value Units Date/Time    Basic metabolic panel [[id number]] Collected: 07/13/23 0413    Lab Status: Final result Specimen: Blood from Arm, Left Updated: 07/13/23 0436     Sodium 135 mmol/L      Potassium 3.9 mmol/L      Chloride 102 mmol/L      CO2 26 mmol/L      ANION GAP 7 mmol/L      BUN 12 mg/dL      Creatinine 0.91 mg/dL      Glucose 111 mg/dL      Calcium 9.6 mg/dL      eGFR 94 ml/min/1.73sq m     Narrative:      [place] guidelines for Chronic Kidney Disease (CKD):   •  Stage 1 with normal or high GFR (GFR > 90 mL/min/1.73 square meters)  •  Stage 2 Mild CKD (GFR = 60-89 mL/min/1.73 square meters)  •  Stage 3A Moderate CKD (GFR = 45-59 mL/min/1.73 square meters)  •  Stage 3B Moderate CKD (GFR = 30-44 mL/min/1.73 square meters)  •  Stage 4 Severe CKD (GFR = 15-29 mL/min/1.73 square meters)  •  Stage 5 End Stage CKD (GFR <15 mL/min/1.73 square meters)  Note: GFR calculation is accurate only with a steady state creatinine    Magnesium [[id number]]  (Normal) Collected: 07/13/23 0413    Lab Status: Final result Specimen: Blood from Arm, Left Updated: 07/13/23 0436     Magnesium 2.2 mg/dL     CBC (With Platelets) [[id number]]  (Abnormal) Collected: 07/13/23 0413    Lab Status: Final result Specimen: Blood from Arm, Left Updated: 07/13/23 0418     WBC 7.02 Thousand/uL      RBC 5.60 Million/uL      Hemoglobin 15.2 g/dL      Hematocrit 45.6 %      MCV 81 fL      MCH 27.1 pg      MCHC 33.3 g/dL      RDW 14.0 %      Platelets 885 Thousands/uL      MPV 9.7 fL     Blood culture #1 [[id number]] Collected: 07/12/23 1336    Lab Status: Preliminary result Specimen: Blood from Arm, Right Updated: 07/12/23 1901     Blood Culture Received in Microbiology Lab. Culture in Progress. Blood culture #2 [[id number]] Collected: 07/12/23 1331    Lab Status: Preliminary result Specimen: Blood from Arm, Left Updated: 07/12/23 1901     Blood Culture Received in Microbiology Lab. Culture in Progress.     UA w Reflex to Microscopic w Reflex to Culture [[id number]] Collected: 07/12/23 1446    Lab Status: Final result Specimen: Urine, Clean Catch Updated: 07/12/23 1456     Color, UA Colorless     Clarity, UA Clear     Specific Gravity, UA 1.006     pH, UA 6.0     Leukocytes, UA Negative     Nitrite, UA Negative     Protein, UA Negative mg/dl      Glucose, UA Negative mg/dl      Ketones, UA Negative mg/dl      Urobilinogen, UA <2.0 mg/dl      Bilirubin, UA Negative     Occult Blood, UA Negative    Procalcitonin [[id number]]  (Normal) Collected: 07/12/23 1331    Lab Status: Final result Specimen: Blood from Arm, Left Updated: 07/12/23 1411     Procalcitonin 0.06 ng/ml     Comprehensive metabolic panel [[id number]] Collected: 07/12/23 1331    Lab Status: Final result Specimen: Blood from Arm, Left Updated: 07/12/23 1358     Sodium 136 mmol/L      Potassium 4.3 mmol/L      Chloride 102 mmol/L      CO2 27 mmol/L      ANION GAP 7 mmol/L      BUN 12 mg/dL      Creatinine 0.92 mg/dL      Glucose 91 mg/dL      Calcium 10.1 mg/dL      AST 15 U/L      ALT 25 U/L      Alkaline Phosphatase 65 U/L      Total Protein 8.1 g/dL      Albumin 4.4 g/dL      Total Bilirubin 0.32 mg/dL      eGFR 93 ml/min/1.73sq m     Narrative:      [place] guidelines for Chronic Kidney Disease (CKD):   •  Stage 1 with normal or high GFR (GFR > 90 mL/min/1.73 square meters)  •  Stage 2 Mild CKD (GFR = 60-89 mL/min/1.73 square meters)  •  Stage 3A Moderate CKD (GFR = 45-59 mL/min/1.73 square meters)  •  Stage 3B Moderate CKD (GFR = 30-44 mL/min/1.73 square meters)  •  Stage 4 Severe CKD (GFR = 15-29 mL/min/1.73 square meters)  •  Stage 5 End Stage CKD (GFR <15 mL/min/1.73 square meters)  Note: GFR calculation is accurate only with a steady state creatinine    Lactic acid [[id number]]  (Normal) Collected: 07/12/23 1331    Lab Status: Final result Specimen: Blood from Arm, Left Updated: 07/12/23 1358     LACTIC ACID 1.1 mmol/L     Narrative:      Result may be elevated if tourniquet was used during collection.     Lipase [[id number]]  (Normal) Collected: 07/12/23 1331    Lab Status: Final result Specimen: Blood from Arm, Left Updated: 07/12/23 1358     Lipase 22 u/L     Protime-INR [[id number]]  (Normal) Collected: 07/12/23 1331    Lab Status: Final result Specimen: Blood from Arm, Left Updated: 07/12/23 1358     Protime 12.5 seconds      INR 0.95    APTT [[id number]]  (Normal) Collected: 07/12/23 1331    Lab Status: Final result Specimen: Blood from Arm, Left Updated: 07/12/23 1358     PTT 28 seconds     CBC and differential [[id number]]  (Abnormal) Collected: 07/12/23 1331    Lab Status: Final result Specimen: Blood from Arm, Left Updated: 07/12/23 1342     WBC 6.23 Thousand/uL      RBC 5.65 Million/uL      Hemoglobin 15.3 g/dL      Hematocrit 45.7 %      MCV 81 fL      MCH 27.1 pg      MCHC 33.5 g/dL      RDW 13.8 %      MPV 10.0 fL      Platelets 751 Thousands/uL      nRBC 0 /100 WBCs      Neutrophils Relative 60 %      Immat GRANS % 1 %      Lymphocytes Relative 24 %      Monocytes Relative 11 %      Eosinophils Relative 3 %      Basophils Relative 1 %      Neutrophils Absolute 3.80 Thousands/µL      Immature Grans Absolute 0.03 Thousand/uL      Lymphocytes Absolute 1.49 Thousands/µL      Monocytes Absolute 0.68 Thousand/µL      Eosinophils Absolute 0.17 Thousand/µL      Basophils Absolute 0.06 Thousands/µL                  CT abdomen pelvis with contrast   Final Result by Lyell Goltz, MD (07/12 1640)      Persistent mild active inflammatory diverticulitis of the distal descending colon and proximal sigmoid colon. No evidence of perforation or intra-abdominal abscess. 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 Workstation performed: [id number]                    Procedures  Procedures         ED Course  ED Course as of 07/13/23 1120   Wed Jul 12, 2023   1305 Seen here last week and diagnosed with diverticultitis - sent home with antibiotics. Unable to keep it down due to nausea and vomiting. Saw GI doc today and sent for admission for iv abx. Left lower abd pain intermittent x months - has been unchanged. SBIRT 20yo+    Flowsheet Row Most Recent Value   Initial Alcohol Screen: US AUDIT-C     1. How often do you have a drink containing alcohol? 0 Filed at: 07/12/2023 1219   2. How many drinks containing alcohol do you have on a typical day you are drinking? 0 Filed at: 07/12/2023 1219   3a. Male UNDER 65: How often do you have five or more drinks on one occasion? 0 Filed at: 07/12/2023 1219   3b. FEMALE Any Age, or MALE 65+: How often do you have 4 or more drinks on one occassion? 0 Filed at: 07/12/2023 1219   Audit-C Score 0 Filed at: 07/12/2023 1219   MARTINA: How many times in the past year have you. .. Used an illegal drug or used a prescription medication for non-medical reasons? Never Filed at: 07/12/2023 1219                    Medical Decision Making  56-year-old male with diverticulitis-we will get labs and repeat CT scan to assess for surgical complication. Will admit for IV antibiotics. Diverticulitis: acute illness or injury  Amount and/or Complexity of Data Reviewed  Labs: ordered. Radiology: ordered. Risk  Prescription drug management. Decision regarding hospitalization.           Disposition  Final diagnoses: Diverticulitis     Time reflects when diagnosis was documented in both MDM as applicable and the Disposition within this note     Time User Action Codes Description Comment    7/12/2023  6:27 PM Farooq Sexton Add [K57.92] Acute diverticulitis     7/12/2023  6:57 PM Dayami Graves Add [K57.92] Diverticulitis     7/13/2023 11:12 AM Zygmunt Schlatter, Ana Add [L98.8] Fistula       ED Disposition     ED Disposition   Admit    Condition   Stable    Date/Time   Wed Jul 12, 2023  6:26 PM    Comment   Case was discussed with LUCERO and the patient's admission status was agreed to be Admission Status: observation status to the service of Dr. Shannon Fong . Follow-up Information    None         Patient's Medications   Discharge Prescriptions    No medications on file       No discharge procedures on file.     PDMP Review       Value Time User    PDMP Reviewed  Yes 2/27/2023 10:22 AM Beba Luther PA-C          ED Provider  Electronically Signed by           Xochilt Suarez DO  07/13/23 1121

## 2023-07-12 NOTE — PROGRESS NOTES
[facility] - Outpatient Follow-up Note  Vince Owen 56 y.o. male MRN: [id number]  Encounter: [id number]          ASSESSMENT AND PLAN:      1. Diverticulitis of large intestine without bleeding, unspecified complication status  2. Persistent vomiting  He notes 2 months of left sided abdominal pain, vomiting, headaches, dizziness, changes in vision and diarrhea  He went to the ER on 7/5  CT showed   1. Mild active inflammatory diverticulitis in the descending and sigmoid colon superimposed on complications of chronic recurrent diverticulitis including:  a) Lgeowheqof-vt-znmrvow colocolonic fistula and  b) Tubular tracts arising from the sigmoid projecting laterally toward the abdominal wall and superiorly toward the lateral conal fascia. No evident penetration of the transversus abdominis muscle. Labs were essentially unremarkable and so he was D/C with Augmentin    Unfortunately, he has been unable to keep his antibiotics down as he vomits every time he takes them  He is also vomiting his heart medications    I will send him back to the ER for admission  He will need bowel rest and IV antibiotic therapy    He will need a colonoscopy in the next few weeks  He will likely require surgical intervention hopefully after the acute infection is improved    ______________________________________________________________________    SUBJECTIVE: 51-year-old male with a history of aortic stenosis and coronary artery disease status post aortic valve replacement and coronary artery bypass grafting in February of this year who presents for evaluation of abdominal pain and vomiting. He reports that the symptom has been increasingly problematic over the past 2 months. This is also associated with headaches and change in vision. He was discharged from the hospital at the end of February of this year after his heart surgery.   He notes that since that time he has been what he feels is slightly constipated. He reports that he previously had 3-4 bowel movements a day but now has 1 at most.  He also admits that his symptoms seem to coincide with the timing of him stopping marijuana use. Because of persistent vomiting he went to the emergency room on July 5. 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 As his labs were essentially unremarkable and he was feeling better after hydration and IV medication he was discharged home with a prescription for Augmentin. He reports that he is trying to take the medication as prescribed but every time he takes that he vomits the medication back up. He is also vomiting his heart medication. He reports that his abdominal pain remains unchanged. The pain is diffuse throughout the abdomen but greatest in the left abdomen. He is noting small amounts of blood in his stool. The patient denies ever being diagnosed with diverticulitis in the past.  He reports that he has had pain similar to this many years ago. He reports that this was in the early 2000's. At that time he was diagnosed with what sounds like epiploic appendagitis. He underwent a laparoscopic surgery for this but denies that any of his intestines had to be operated on. He essentially has felt well since that time until recently. He has never had a colonoscopy. REVIEW OF SYSTEMS IS OTHERWISE NEGATIVE.       Historical Information   Past Medical History:   Diagnosis Date   • Diabetes mellitus ([address])    • Hyperlipidemia    • Hypertension    • Psychiatric disorder     bipolar   • 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[address])    • Syncope Past Surgical History:   Procedure Laterality Date   • CARDIAC ASCENDING AORTOGRAM N/A 10/28/2022    Procedure: Cardiac ascending aortogram;  Surgeon: Tali Oneal MD;  [facility]: [facility]; Service: Cardiology   • CARDIAC CATHETERIZATION Left 10/28/2022    Procedure: CARDIAC RHC/LHC; Surgeon: Tali Oneal MD;  [facility]: [facility]; Service: Cardiology   • CARDIAC CATHETERIZATION N/A 10/28/2022    Procedure: Cardiac Coronary Angiogram;  Surgeon: Tali Oneal MD;  Location: [address] [place] [address] CATH LAB; Service: Cardiology   • CARDIAC CATHETERIZATION Left 10/28/2022    Procedure: Cardiac Left Ventriculogram;  Surgeon: Tali Oneal MD;  Location: [facility];   Service: Cardiology   • LAPAROSCOPIC LYSIS INTESTINAL ADHESIONS     • RI RPLCMT AORTIC VALVE OPN W/STENTLESS TISSUE VALVE N/A 2023    Procedure: CORONARY ARTERY BYPASS GRAFT (CABG) 1 VESSEL GSV-->RCA, EVH RIGHT LEG,  WITH REPLACEMENT VALVE AORTIC (AVR) 25MM INSPIRIA RESILIA TISSUE VALVE;  Surgeon: Zeynep Newell MD;  Location: [facility];  Service: Cardiac Surgery     Social History   Social History     Substance and Sexual Activity   Alcohol Use Not Currently     Social History     Substance and Sexual Activity   Drug Use Yes   • Types: Marijuana     Social History     Tobacco Use   Smoking Status Former   • Packs/day: 0.50   • Years: 45.00   • Total pack years: 22.50   • Types: Cigarettes   • Quit date: 2023   • Years since quittin.3   Smokeless Tobacco Never   Tobacco Comments    Patient states that he is trying to cut down     Family History   Problem Relation Age of Onset   • Cancer Mother    • Hypertension Mother    • Diabetes Mother        Meds/Allergies       Current Outpatient Medications:   •  acetaminophen (TYLENOL) 325 mg tablet  •  amoxicillin-clavulanate (AUGMENTIN) 875-125 mg per tablet  •  aspirin 325 mg tablet  •  atorvastatin (LIPITOR) 40 mg tablet  • fexofenadine-pseudoephedrine (ALLEGRA-D 24) 180-240 MG per 24 hr tablet  •  metoclopramide (Reglan) 10 mg tablet  •  metoprolol succinate (TOPROL-XL) 50 mg 24 hr tablet  •  ondansetron (ZOFRAN) 4 mg tablet  •  ondansetron (ZOFRAN-ODT) 4 mg disintegrating tablet  •  aspirin (ECOTRIN) 325 mg EC tablet  •  pantoprazole (PROTONIX) 40 mg tablet    Allergies   Allergen Reactions   • Neomycin-Polymyxin-Hc Abdominal Pain   • Other      Pt states he is allergic to everything. States he doesn't have a list but can only take children doses of all medication             Objective     Blood pressure 130/84, pulse (!) 120, height 5' 6" (1.676 m), weight 87.1 kg (192 lb), SpO2 98 %. Body mass index is 30.99 kg/m². PHYSICAL EXAM:      General Appearance:   Alert, cooperative, no distress   HEENT:   Normocephalic, atraumatic, anicteric.     Neck:  Supple, symmetrical, trachea midline   Lungs:   Clear to auscultation bilaterally; no rales, rhonchi or wheezing; respirations unlabored    Heart[de-identified]   Regular rate and rhythm; no murmur, rub, or gallop. Abdomen:   Soft, tender to palpation left abdomen, non-distended; normal bowel sounds; no masses, no organomegaly    Genitalia:   Deferred    Rectal:   Deferred    Extremities:  No cyanosis, clubbing or edema    Pulses:  2+ and symmetric    Skin:  No jaundice, rashes, or lesions    Lymph nodes:  No palpable cervical lymphadenopathy        Lab Results:   No visits with results within 1 Day(s) from this visit.    Latest known visit with results is:   Admission on 07/05/2023, Discharged on 07/05/2023   Component Date Value   • Ventricular Rate 07/05/2023 133    • Atrial Rate 07/05/2023 133    • OK Interval 07/05/2023 130    • QRSD Interval 07/05/2023 86    • QT Interval 07/05/2023 292    • QTC Interval 07/05/2023 434    • P Axis 07/05/2023 65    • QRS Axis 07/05/2023 32    • T Wave Axis 07/05/2023 82    • WBC 07/05/2023 5.62    • RBC 07/05/2023 5.48    • Hemoglobin 07/05/2023 14.9    • Hematocrit 07/05/2023 45.2    • MCV 07/05/2023 83    • MCH 07/05/2023 27.2    • MCHC 07/05/2023 33.0    • RDW 07/05/2023 13.9    • MPV 07/05/2023 10.6    • Platelets 56/82/8534 222    • nRBC 07/05/2023 0    • Neutrophils Relative 07/05/2023 58    • Immat GRANS % 07/05/2023 0    • Lymphocytes Relative 07/05/2023 25    • Monocytes Relative 07/05/2023 13 (H)    • Eosinophils Relative 07/05/2023 3    • Basophils Relative 07/05/2023 1    • Neutrophils Absolute 07/05/2023 3.23    • Immature Grans Absolute 07/05/2023 0.02    • Lymphocytes Absolute 07/05/2023 1.42    • Monocytes Absolute 07/05/2023 0.73    • Eosinophils Absolute 07/05/2023 0.17    • Basophils Absolute 07/05/2023 0.05    • Sodium 07/05/2023 137    • Potassium 07/05/2023 4.2    • Chloride 07/05/2023 102    • CO2 07/05/2023 26    • ANION GAP 07/05/2023 9    • BUN 07/05/2023 18    • Creatinine 07/05/2023 0.99    • Glucose 07/05/2023 110    • Calcium 07/05/2023 9.7    • AST 07/05/2023 13    • ALT 07/05/2023 20    • Alkaline Phosphatase 07/05/2023 68    • Total Protein 07/05/2023 7.8    • Albumin 07/05/2023 4.1    • Total Bilirubin 07/05/2023 0.29    • eGFR 07/05/2023 85    • Lipase 07/05/2023 25    • hs TnI 0hr 07/05/2023 4          Radiology Results:   XR ribs bilateral 4+ vw w pa chest    Result Date: 7/7/2023  Narrative: BILATERAL RIBS AND CHEST INDICATION:   R07.81: Pleurodynia. Left rib pain. No injury. Colton Bahena a pop 1 week after surgery. COMPARISON: CXR 2/24/2023 and abdomen CT 7/5/2023. VIEWS:  XR RIBS BILATERAL 4+ VW W PA CHEST DUAL ENERGY SUBTRACTION. FINDINGS: No acute displaced rib fracture or pathologic bone lesion. No pneumothorax or pleural effusion/hemothorax. Lungs clear. Soft tissues normal. Cardiomediastinal silhouette normal. CABG, AVR. Impression: No acute displaced rib fracture or pathologic bone lesion. No acute cardiopulmonary disease.  Workstation performed: [id number]     CT abdomen pelvis with contrast    Result Date: 7/5/2023  Narrative: CT ABDOMEN AND PELVIS WITH IV CONTRAST INDICATION:   Left upper quadrant abdominal pain and guarding. . COMPARISON:  None. TECHNIQUE:  CT examination of the abdomen and pelvis was performed. Multiplanar 2D reformatted images were created from the source data. This examination, like all CT scans performed in the [facility], was performed utilizing techniques to minimize radiation dose exposure, including the use of iterative reconstruction and automated exposure control. Radiation dose length product (DLP) for this visit:  785 mGy-cm IV Contrast:  100 mL of iohexol (OMNIPAQUE) Enteric Contrast:  Enteric contrast was not administered. FINDINGS: ABDOMEN LOWER CHEST: Incompletely characterized aortic valvular prosthesis versus valvular calcifications. Atelectasis dependently in both lungs. No acute findings in the visualized portions of the lower chest. LIVER/BILIARY TREE: Benign cyst at the right dome. No suspicious masses or biliary ductal dilatation. Liver size and contour are normal. GALLBLADDER:  No calcified gallstones. No pericholecystic inflammatory change. SPLEEN:  Unremarkable. PANCREAS:  Unremarkable. ADRENAL GLANDS:  Unremarkable. KIDNEYS/URETERS:  Unremarkable. No hydronephrosis. STOMACH AND BOWEL: Stomach appears normal. No dilated or inflamed loops of small bowel. Colonic diverticulosis involving the transverse, descending, and sigmoid segments. There is inflammatory stranding around the distal descending and proximal sigmoid colon. Additionally, there is a gas filled tract connecting the mesenteric side of the descending colon to the sigmoid colon compatible with colocolonic fistula (2/117-132; 601/67). There may actually be 2 distal connections of the sigmoid (2/131). There is a tract shooting laterally from the descending colon towards the abdominal wall fascia (2/114) which does not appear to penetrate into the abdominal wall musculature.  From the same origin, there is a blunt ending tract projecting superiorly (601/68) which appears to terminate at the lateral conal fascia. APPENDIX: A normal appendix was visualized. ABDOMINOPELVIC CAVITY: No free air outside of the after mentioned fistula. No encapsulated collections. No ascites. No mesenteric, retroperitoneal, or pelvic sidewall lymphadenopathy. VESSELS:  Unremarkable for patient's age. PELVIS REPRODUCTIVE ORGANS:  Unremarkable for patient's age. URINARY BLADDER:  Unremarkable. ABDOMINAL WALL/INGUINAL REGIONS:  Unremarkable. OSSEOUS STRUCTURES: Lower sternotomy appears well aligned. Visualized wires are intact. No acute fracture or destructive osseous lesion. Impression: 1. Mild active inflammatory diverticulitis in the descending and sigmoid colon superimposed on complications of chronic recurrent diverticulitis including: a) Ehycnovhny-pu-icrgzkf colocolonic fistula and b) Tubular tracts arising from the sigmoid projecting laterally toward the abdominal wall and superiorly toward the lateral conal fascia. No evident penetration of the transversus abdominis muscle. 2.  No acute perforation or abscess. The study was marked in [facility] for immediate notification. Workstation performed: [id number]     CT head without contrast    Result Date: 7/5/2023  Narrative: CT BRAIN - WITHOUT CONTRAST INDICATION:   Long-term refractory migraine associated with vertigo. COMPARISON: 8/5/2019. TECHNIQUE:  CT examination of the brain was performed. Multiplanar 2D reformatted images were created from the source data. Radiation dose length product (DLP) for this visit:  806 mGy-cm . This examination, like all CT scans performed in the [facility], was performed utilizing techniques to minimize radiation dose exposure, including the use of iterative reconstruction and automated exposure control. IMAGE QUALITY:  Diagnostic.  FINDINGS: PARENCHYMA: Subtle, mild, patchy periventricular and subcortical white matter hypodensity consistent with chronic microangiopathic changes. No acute infarct. No parenchymal hemorrhage. No mass. VENTRICLES AND EXTRA-AXIAL SPACES:  Normal for the patient's age. VISUALIZED ORBITS: Normal visualized orbits. PARANASAL SINUSES: Normal visualized paranasal sinuses. CALVARIUM AND EXTRACRANIAL SOFT TISSUES:  Normal.     Impression: No acute intracranial abnormality.  Workstation performed: [id number]

## 2023-07-13 LAB
ANION GAP SERPL CALCULATED.3IONS-SCNC: 7 MMOL/L
BUN SERPL-MCNC: 12 MG/DL (ref 5–25)
CALCIUM SERPL-MCNC: 9.6 MG/DL (ref 8.4–10.2)
CHLORIDE SERPL-SCNC: 102 MMOL/L (ref 96–108)
CO2 SERPL-SCNC: 26 MMOL/L (ref 21–32)
CREAT SERPL-MCNC: 0.91 MG/DL (ref 0.6–1.3)
ERYTHROCYTE [DISTWIDTH] IN BLOOD BY AUTOMATED COUNT: 14 % (ref 11.6–15.1)
GFR SERPL CREATININE-BSD FRML MDRD: 94 ML/MIN/1.73SQ M
GLUCOSE SERPL-MCNC: 111 MG/DL (ref 65–140)
HCT VFR BLD AUTO: 45.6 % (ref 36.5–49.3)
HGB BLD-MCNC: 15.2 G/DL (ref 12–17)
MAGNESIUM SERPL-MCNC: 2.2 MG/DL (ref 1.9–2.7)
MCH RBC QN AUTO: 27.1 PG (ref 26.8–34.3)
MCHC RBC AUTO-ENTMCNC: 33.3 G/DL (ref 31.4–37.4)
MCV RBC AUTO: 81 FL (ref 82–98)
PLATELET # BLD AUTO: 204 THOUSANDS/UL (ref 149–390)
PMV BLD AUTO: 9.7 FL (ref 8.9–12.7)
POTASSIUM SERPL-SCNC: 3.9 MMOL/L (ref 3.5–5.3)
RBC # BLD AUTO: 5.6 MILLION/UL (ref 3.88–5.62)
SODIUM SERPL-SCNC: 135 MMOL/L (ref 135–147)
WBC # BLD AUTO: 7.02 THOUSAND/UL (ref 4.31–10.16)

## 2023-07-13 PROCEDURE — 83735 ASSAY OF MAGNESIUM: CPT | Performed by: FAMILY MEDICINE

## 2023-07-13 PROCEDURE — 99232 SBSQ HOSP IP/OBS MODERATE 35: CPT | Performed by: INTERNAL MEDICINE

## 2023-07-13 PROCEDURE — 99222 1ST HOSP IP/OBS MODERATE 55: CPT | Performed by: PHYSICIAN ASSISTANT

## 2023-07-13 PROCEDURE — 85027 COMPLETE CBC AUTOMATED: CPT | Performed by: FAMILY MEDICINE

## 2023-07-13 PROCEDURE — 36415 COLL VENOUS BLD VENIPUNCTURE: CPT | Performed by: FAMILY MEDICINE

## 2023-07-13 PROCEDURE — 80048 BASIC METABOLIC PNL TOTAL CA: CPT | Performed by: FAMILY MEDICINE

## 2023-07-13 RX ADMIN — ONDANSETRON 4 MG: 2 INJECTION INTRAMUSCULAR; INTRAVENOUS at 16:32

## 2023-07-13 RX ADMIN — ACETAMINOPHEN 975 MG: 325 TABLET ORAL at 10:58

## 2023-07-13 RX ADMIN — ASPIRIN 325 MG ORAL TABLET 325 MG: 325 PILL ORAL at 11:00

## 2023-07-13 RX ADMIN — ENOXAPARIN SODIUM 40 MG: 40 INJECTION SUBCUTANEOUS at 10:58

## 2023-07-13 RX ADMIN — METRONIDAZOLE 500 MG: 500 INJECTION, SOLUTION INTRAVENOUS at 22:00

## 2023-07-13 RX ADMIN — METOPROLOL SUCCINATE 50 MG: 50 TABLET, EXTENDED RELEASE ORAL at 10:58

## 2023-07-13 RX ADMIN — ONDANSETRON 4 MG: 2 INJECTION INTRAMUSCULAR; INTRAVENOUS at 01:01

## 2023-07-13 RX ADMIN — SODIUM CHLORIDE, SODIUM GLUCONATE, SODIUM ACETATE, POTASSIUM CHLORIDE, MAGNESIUM CHLORIDE, SODIUM PHOSPHATE, DIBASIC, AND POTASSIUM PHOSPHATE 100 ML/HR: .53; .5; .37; .037; .03; .012; .00082 INJECTION, SOLUTION INTRAVENOUS at 19:43

## 2023-07-13 RX ADMIN — ATORVASTATIN CALCIUM 40 MG: 40 TABLET, FILM COATED ORAL at 16:30

## 2023-07-13 RX ADMIN — CEFTRIAXONE 2000 MG: 2 INJECTION, SOLUTION INTRAVENOUS at 14:50

## 2023-07-13 RX ADMIN — METRONIDAZOLE 500 MG: 500 INJECTION, SOLUTION INTRAVENOUS at 05:42

## 2023-07-13 RX ADMIN — METRONIDAZOLE 500 MG: 500 INJECTION, SOLUTION INTRAVENOUS at 12:39

## 2023-07-13 NOTE — H&P
[address]  H&P  Name: Muna Martin 56 y.o. male I MRN: [id number]  Unit/Bed#: ED 09 I Date of Admission: 7/12/2023   Date of Service: 7/12/2023 I Hospital Day: 0      Assessment/Plan   * Acute diverticulitis  Assessment & Plan  · Patient presents with intractable nausea vomiting and inability to tolerate p.o. meds which were given to him as outpatient for diverticulitis by gastroenterology. Patient had last CAT scan about a week ago which showed mild diverticulitis and a CAT scan done today shows similar findings  · Due to patient's inability to take p.o. meds, start on IV ceftriaxone and Flagyl  · Follow-up blood cultures  · Gastroenterology consultation  · N.p.o. except for p.o. meds  · Adequate pain control at this time  · Normal lactic and procalcitonin levels    Primary hypertension  Assessment & Plan  /85 (BP Location: Right arm)   Pulse 99   Temp 98.5 °F (36.9 °C) (Temporal)   Resp 20   SpO2 93%     Continue home meds    S/P AVR  Assessment & Plan  · Continue chest pain  · Continue aspirin beta-blockers    S/P CABG (coronary artery bypass graft)  Assessment & Plan  · Recently done in February at [facility]  · No active chest pain  · Continue aspirin, statin, beta-blockers       VTE Pharmacologic Prophylaxis: VTE Score: 3 Moderate Risk (Score 3-4) - Pharmacological DVT Prophylaxis Ordered: enoxaparin (Lovenox). Code Status: Level 1 - Full Code   Discussion with family: No family at bedside. call will be made in AM.     Anticipated Length of Stay: Patient will be admitted on an inpatient basis with an anticipated length of stay of greater than 2 midnights secondary to need for treatement of diverticulitis with IV medications.     Total Time Spent on Date of Encounter in care of patient: 65 minutes This time was spent on one or more of the following: performing physical exam; counseling and coordination of care; obtaining or reviewing history; documenting in the medical record; reviewing/ordering tests, medications or procedures; communicating with other healthcare professionals and discussing with patient's family/caregivers. Chief Complaint: abd pain, inability to tolerate PO meds    History of Present Illness:  Maribel Guerra is a 56 y.o. male with a PMH of AVR, CABG who presents with abd pain and inability to tolerate PO meds. He was given PO antibiotics as OP for diverticulitis but he has not been able to take and was sent to the ER by GI for IV antibiotics. CT scan was done in the ER which shows similar findings of diverticulitis from last CT. Currently pt says he feels better. abd pain is 2/10, non radiating and worsens with palpations. No fever CP cough or sob or lightheadedness  C/o global headache 8/10, non radiating, no visual disturbance, no photo or phonophobia    Review of Systems:  Review of Systems    Past Medical and Surgical History:   Past Medical History:   Diagnosis Date   • Diabetes mellitus ([address])    • Hyperlipidemia    • Hypertension    • Psychiatric disorder     bipolar   • 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[address])    • Syncope        Past Surgical History:   Procedure Laterality Date   • CARDIAC ASCENDING AORTOGRAM N/A 10/28/2022    Procedure: Cardiac ascending aortogram;  Surgeon: Poonam Montez MD;  [facility]; Service: Cardiology   • CARDIAC CATHETERIZATION Left 10/28/2022    Procedure: CARDIAC RHC/LHC; Surgeon: Poonam Montez MD;  [facility]; Service: Cardiology   • CARDIAC CATHETERIZATION N/A 10/28/2022    Procedure: Cardiac Coronary Angiogram;  Surgeon: Poonam Montez MD;  Location: [address] [place] [address] CATH LAB; Service: Cardiology   • CARDIAC CATHETERIZATION Left 10/28/2022    Procedure: Cardiac Left Ventriculogram;  Surgeon: Poonam Montez MD;  [facility]: [facility];   Service: Cardiology   • LAPAROSCOPIC LYSIS INTESTINAL ADHESIONS     • NC RPLCMT AORTIC VALVE OPN W/STENTLESS TISSUE VALVE N/A 2/23/2023    Procedure: CORONARY ARTERY BYPASS GRAFT (CABG) 1 VESSEL GSV-->RCA, EVH RIGHT LEG,  WITH REPLACEMENT VALVE AORTIC (AVR) 25MM INSPIRIA RESILIA TISSUE VALVE;  Surgeon: Namita Howell MD;  [facility]: [facility];  Service: Cardiac Surgery       Meds/Allergies:  Prior to Admission medications    Medication Sig Start Date End Date Taking? Authorizing Provider   acetaminophen (TYLENOL) 325 mg tablet Take 2 tablets (650 mg total) by mouth every 4 (four) hours as needed for mild pain, headaches or fever 6/28/23   EDWARDO Carvajal   amoxicillin-clavulanate (AUGMENTIN) 875-125 mg per tablet Take 1 tablet by mouth every 12 (twelve) hours for 7 days 7/5/23 7/12/23  Felicity Quintana MD   aspirin (ECOTRIN) 325 mg EC tablet Take 325 mg by mouth daily  Patient not taking: Reported on 7/12/2023 5/2/23   Historical Provider, MD   aspirin 325 mg tablet Take 1 tablet (325 mg total) by mouth daily 4/3/23   Billy Johnson MD   atorvastatin (LIPITOR) 40 mg tablet Take 1 tablet (40 mg total) by mouth daily with dinner 5/4/23   Billy Johnson MD   fexofenadine-pseudoephedrine (ALLEGRA-D 24) 180-240 MG per 24 hr tablet Take 1 tablet by mouth daily 5/13/23   Historical Provider, MD   metoclopramide (Reglan) 10 mg tablet Take 1 tablet (10 mg total) by mouth 3 (three) times a day 6/22/23   EDWARDO Carvajal   metoprolol succinate (TOPROL-XL) 50 mg 24 hr tablet Take 1 tablet (50 mg total) by mouth daily 5/4/23   Billy Johnson MD   ondansetron (ZOFRAN) 4 mg tablet Take 1 tablet (4 mg total) by mouth every 6 (six) hours as needed for nausea or vomiting 7/5/23   Felicity Quintana MD   ondansetron (ZOFRAN-ODT) 4 mg disintegrating tablet Take 1 tablet (4 mg total) by mouth every 6 (six) hours as needed for nausea or vomiting 6/20/23   EDWARDO Carvajal   pantoprazole (PROTONIX) 40 mg tablet Take 1 tablet (40 mg total) by mouth daily Do not start before February 28, 2023.   Patient not taking: Reported on 5/4/2023 23   Ankita Franco PA-C     I have reviewed home medications with patient personally. Allergies: Allergies   Allergen Reactions   • Neomycin-Polymyxin-Hc Abdominal Pain   • Other      Pt states he is allergic to everything.  States he doesn't have a list but can only take children doses of all medication         Social History:  Marital Status: Single     Substance Use History:   Social History     Substance and Sexual Activity   Alcohol Use Not Currently     Social History     Tobacco Use   Smoking Status Former   • Packs/day: 0.50   • Years: 45.00   • Total pack years: 22.50   • Types: Cigarettes   • Quit date: 2023   • Years since quittin.3   Smokeless Tobacco Never   Tobacco Comments    Patient states that he is trying to cut down     Social History     Substance and Sexual Activity   Drug Use Yes   • Types: Marijuana       Family History:  Family History   Problem Relation Age of Onset   • Cancer Mother    • Hypertension Mother    • Diabetes Mother        Physical Exam:     Vitals:   Blood Pressure: 132/85 (23)  Pulse: 99 (23)  Temperature: 98.5 °F (36.9 °C) (23)  Temp Source: Temporal (23)  Respirations: 20 (23)  SpO2: 93 % (23)    Physical Exam General- Awake, alert and oriented x 3, looks comfortable  HEENT- Normocephalic, atraumatic, oral mucosa- moist  Neck- Supple, No carotid bruit, no JVD  CVS- Normal S1/ S2, Regular rate and rhythm, (+) systolic 2/6 murmur, No edema  Respiratory system- B/L clear breath sounds, no wheezing  Abdomen- Soft, mildly distended, (+) periumbilical and lower quadrant tenderness, Bowel sound- present 4 quads  Genitourinary- No suprapubic tenderness, No CVA tenderness  Skin- No new bruise or rash  Musculoskeletal- No gross deformity  Psych- No acute psychosis  CNS- CN II- XII grossly intact, No acute focal neurologic deficit noted      Additional Data:     Lab Results:  Results from last 7 days   Lab Units 07/12/23  1331   WBC Thousand/uL 6.23   HEMOGLOBIN g/dL 15.3   HEMATOCRIT % 45.7   PLATELETS Thousands/uL 217   NEUTROS PCT % 60   LYMPHS PCT % 24   MONOS PCT % 11   EOS PCT % 3     Results from last 7 days   Lab Units 07/12/23  1331   SODIUM mmol/L 136   POTASSIUM mmol/L 4.3   CHLORIDE mmol/L 102   CO2 mmol/L 27   BUN mg/dL 12   CREATININE mg/dL 0.92   ANION GAP mmol/L 7   CALCIUM mg/dL 10.1   ALBUMIN g/dL 4.4   TOTAL BILIRUBIN mg/dL 0.32   ALK PHOS U/L 65   ALT U/L 25   AST U/L 15   GLUCOSE RANDOM mg/dL 91     Results from last 7 days   Lab Units 07/12/23  1331   INR  0.95             Results from last 7 days   Lab Units 07/12/23  1331   LACTIC ACID mmol/L 1.1   PROCALCITONIN ng/ml 0.06       Lines/Drains:  Invasive Devices     Peripheral Intravenous Line  Duration           Peripheral IV 07/12/23 Left;Ventral (anterior) Forearm <1 day    Peripheral IV 07/12/23 Right;Ventral (anterior) Forearm <1 day                    Imaging: Reviewed radiology reports from this admission including: abdominal/pelvic CT  CT abdomen pelvis with contrast   Final Result by Myke Doyle MD (07/12 1640)      Persistent mild active inflammatory diverticulitis of the distal descending colon and proximal sigmoid colon. No evidence of perforation or intra-abdominal abscess. Stable fvecnaftlo-lj-wvxocfq colocolonic fistula and tubular tracts arising from the distal descending colon. Workstation performed: [id number]             EKG and Other Studies Reviewed on Admission:   EKG: Sinus tachycardia  Otherwise normal ECG  When compared with ECG of 05-JUL-2023 12:34,  · Minimal criteria for Inferior infarct are no longer Present    ** Please Note: This note has been constructed using a voice recognition system.  **

## 2023-07-13 NOTE — UTILIZATION REVIEW
Initial Clinical Review    Admission: Date/Time/Statement:   Admission Orders (From admission, onward)     Ordered        07/12/23 1833  Inpatient Admission  Once                      Orders Placed This Encounter   Procedures   • Inpatient Admission     Standing Status:   Standing     Number of Occurrences:   1     Order Specific Question:   Level of Care     Answer:   Med Surg [16]     Order Specific Question:   Estimated length of stay     Answer:   More than 2 Midnights     Order Specific Question:   Certification     Answer:   I certify that inpatient services are medically necessary for this patient for a duration of greater than two midnights. See H&P and MD Progress Notes for additional information about the patient's course of treatment. ED Arrival Information     Expected   7/12/2023     Arrival   7/12/2023 12:09    Acuity   Urgent            Means of arrival   Walk-In    Escorted by   Self    Service   Hospitalist    Admission type   Emergency            Arrival complaint   complicated diverticulitis              Chief Complaint   Patient presents with   • Abdominal Pain     Sent by GI for admit for divertic, failed outpt abx due to vomiting        Initial Presentation: 56 y.o. male to ED from home , w/ abd pain and unable to tolerate po meds . Given po abx as OP for diverticulitis . Sent to ED by GI for admission for IV abx . CT   shows similar findings of diverticulitis from last CT. PMH of AVR, CABG. Admitted IP status w/ acute diverticulitis plan for IV ceftriaxone, flagyl , f/u BC , GI consult , NPO , pain control . HTN cont home meds. S/p AVR cont asa, BB . CABG cont asa, statin , BB . PE: mildly distended, (+) periumbilical and lower quadrant tenderness    Date: 7/13  Day 2:  continues to have persistent nausea, not tolerating oral route. Patient does have tenderness, especially in the lower abdomen. Cont IV abx . Keep NPO . Cont pain control . + abd tenderness , mm dry .      ED Triage Vitals   Temperature Pulse Respirations Blood Pressure SpO2   07/12/23 1218 07/12/23 1218 07/12/23 1218 07/12/23 1218 07/12/23 1218   98.5 °F (36.9 °C) (!) 120 20 151/99 99 %      Temp Source Heart Rate Source Patient Position - Orthostatic VS BP Location FiO2 (%)   07/12/23 1218 07/12/23 1218 07/12/23 1218 07/12/23 1218 --   Temporal Monitor Sitting Left arm       Pain Score       07/12/23 2020       5          Wt Readings from Last 1 Encounters:   07/12/23 87.1 kg (192 lb)     Additional Vital Signs:   07/13/23 0545 -- 96 19 121/82 97 93 % None (Room air) Sitting   07/13/23 0515 -- 106 Abnormal  20 121/82 -- 92 % None (Room air) Sitting   07/13/23 0300 -- 100 21 143/84 103 92 % None (Room air) Sitting   07/13/23 0130 -- 101 20 130/80 96 91 % None (Room air) Sitting   07/13/23 0100 -- 114 Abnormal  18 134/82 99 95 % None (Room air) Sitting   07/13/23 0000 -- 101 20 134/91 109 93 % None (Room air) Sitting   07/12/23 2300 -- 101 17 126/78 98 91 % None (Room air) Sitting   07/12/23 2145 -- 104 20 140/85 108 94 % None (Room air) Sitting   07/12/23 2100 -- 99 20 132/85 104 93 % None (Room air) Sitting   07/12/23 2000 -- 105 20 143/89 111 93 % None (Room air) Sitting   07/12/23 1930 -- 102 20 149/94 117 96 % None (Room air) Sitting   07/12/23 1920 -- -- -- -- -- 96 % None (Room air) --   07/12/23 1900 -- 106 Abnormal  19 129/92 108 95 % None (Room air) Sitting   07/12/23 1830 -- 108 Abnormal  22 130/81 99 95 % -- --   07/12/23 1800 -- 102 20 135/83 105 95 % -- --   07/12/23 1730 -- 107 Abnormal  14 138/90 110 96 % -- --   07/12/23 1700 -- 93 20 142/77 102 94 % -- --   07/12/23 1630 -- 98 22 143/90 109 94 % -- --   07/12/23 1615 -- 101 17 147/85 109 94 % -- --   07/12/23 1530 -- 104 22 142/89 111 94 % -- --   07/12/23 1500 -- 104 21 135/89 107 96 % -- --   07/12/23 1330 -- 108 Abnormal  15 136/89 107 96 % --        Pertinent Labs/Diagnostic Test Results:   7/12 EKG Sinus tachycardia    CT abdomen pelvis with contrast Final Result by Lucien Kim MD (07/12 1640)      Persistent mild active inflammatory diverticulitis of the distal descending colon and proximal sigmoid colon. No evidence of perforation or intra-abdominal abscess. Stable [id number] colocolonic fistula and tubular tracts arising from the distal descending colon.       Workstation performed: [id number]               Results from last 7 days   Lab Units 07/13/23  0413 07/12/23  1331   WBC Thousand/uL 7.02 6.23   HEMOGLOBIN g/dL 15.2 15.3   HEMATOCRIT % 45.6 45.7   PLATELETS Thousands/uL 204 217   NEUTROS ABS Thousands/µL  --  3.80         Results from last 7 days   Lab Units 07/13/23  0413 07/12/23  1331   SODIUM mmol/L 135 136   POTASSIUM mmol/L 3.9 4.3   CHLORIDE mmol/L 102 102   CO2 mmol/L 26 27   ANION GAP mmol/L 7 7   BUN mg/dL 12 12   CREATININE mg/dL 0.91 0.92   EGFR ml/min/1.73sq m 94 93   CALCIUM mg/dL 9.6 10.1   MAGNESIUM mg/dL 2.2  --      Results from last 7 days   Lab Units 07/12/23  1331   AST U/L 15   ALT U/L 25   ALK PHOS U/L 65   TOTAL PROTEIN g/dL 8.1   ALBUMIN g/dL 4.4   TOTAL BILIRUBIN mg/dL 0.32     Results from last 7 days   Lab Units 07/13/23  0413 07/12/23  1331   GLUCOSE RANDOM mg/dL 111 91       Results from last 7 days   Lab Units 07/12/23  1331   PROTIME seconds 12.5   INR  0.95   PTT seconds 28     Results from last 7 days   Lab Units 07/12/23  1331   PROCALCITONIN ng/ml 0.06     Results from last 7 days   Lab Units 07/12/23  1331   LACTIC ACID mmol/L 1.1     Results from last 7 days   Lab Units 07/12/23  1331   LIPASE u/L 22     Results from last 7 days   Lab Units 07/12/23  1446   CLARITY UA  Clear   COLOR UA  Colorless   SPEC GRAV UA  1.006   PH UA  6.0   GLUCOSE UA mg/dl Negative   KETONES UA mg/dl Negative   BLOOD UA  Negative   PROTEIN UA mg/dl Negative   NITRITE UA  Negative   BILIRUBIN UA  Negative   UROBILINOGEN UA (BE) mg/dl <2.0   LEUKOCYTES UA  Negative     Results from last 7 days   Lab Units 07/12/23  1336 07/12/23  1331   BLOOD CULTURE  Received in Microbiology Lab. Culture in Progress. Received in Microbiology Lab. Culture in Progress.      ED Treatment:   Medication Administration from 07/12/2023 1122 to 07/13/2023 1039       Date/Time Order Dose Route Action     07/12/2023 1340 EDT cefTRIAXone (ROCEPHIN) IVPB (premix in dextrose) 2,000 mg 50 mL 2,000 mg Intravenous New Bag     07/13/2023 0542 EDT metroNIDAZOLE (FLAGYL) IVPB (premix) 500 mg 100 mL 500 mg Intravenous New Bag     07/12/2023 2146 EDT metroNIDAZOLE (FLAGYL) IVPB (premix) 500 mg 100 mL 500 mg Intravenous New Bag     07/12/2023 1444 EDT metroNIDAZOLE (FLAGYL) IVPB (premix) 500 mg 100 mL 500 mg Intravenous New Bag     07/12/2023 1843 EDT atorvastatin (LIPITOR) tablet 40 mg 40 mg Oral Given     07/13/2023 0101 EDT ondansetron (ZOFRAN) injection 4 mg 4 mg Intravenous Given     07/12/2023 1843 EDT multi-electrolyte (PLASMALYTE-A/ISOLYTE-S PH 7.4) IV solution 100 mL/hr Intravenous New Bag     07/12/2023 2020 EDT acetaminophen (TYLENOL) tablet 975 mg 975 mg Oral Given        Past Medical History:   Diagnosis Date   • Diabetes mellitus ([address])    • Hyperlipidemia    • Hypertension    • Psychiatric disorder     bipolar   • 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[address])    • Syncope      Present on Admission:  • Acute diverticulitis  • Primary hypertension      Admitting Diagnosis: Abdominal pain [R10.9]  Age/Sex: 56 y.o. male  Admission Orders:  Scheduled Medications:  aspirin, 325 mg, Oral, Daily  atorvastatin, 40 mg, Oral, Daily With Dinner  cefTRIAXone, 2,000 mg, Intravenous, Q24H  enoxaparin, 40 mg, Subcutaneous, Daily  metoprolol succinate, 50 mg, Oral, Daily  metroNIDAZOLE, 500 mg, Intravenous, Q8H      Continuous IV Infusions:  multi-electrolyte, 100 mL/hr, Intravenous, Continuous      PRN Meds:  acetaminophen, 975 mg, Oral, Q8H PRN  ondansetron, 4 mg, Intravenous, Q6H PRN        IP CONSULT TO GASTROENTEROLOGY    Network Utilization Review Department  ATTENTION: Please call with any questions or concerns to [phone] and carefully listen to the prompts so that you are directed to the right person. All voicemails are confidential.  Tuan Kevin all requests for admission clinical reviews, approved or denied determinations and any other requests to dedicated fax number below belonging to the campus where the patient is receiving treatment.  List of dedicated fax numbers for the Facilities:  [place] DENIALS (Administrative/Medical Necessity) [phone] [address] [facility] (Maternity/NICU/Pediatrics) [phone]   [address] [place] [address] [phone]   [facility] [address] [facility] [phone]   [address] [facility] [address] [facility] [address] [phone]   [address] [facility] [address] [phone]

## 2023-07-13 NOTE — CONSULTS
Consultation - [address] [place] [address] [facility]  Bessie January 56 y.o. male MRN: [id number]  Unit/Bed#: ED 09 Encounter: [id number]      Inpatient consult to gastroenterology  Consult performed by: Marcelina Mckeon PA-C  Consult ordered by: Eder Atwood MD          Reason for Consult / Principal Problem: Acute diverticulitis    HPI: Bessie Barraza is a 56y.o. year old male who presents with a past medical history significant for coronary artery disease status post CABG in February 2023, aortic stenosis status post aortic valve replacement, and hypertension. Patient was actually referred from the outpatient GI clinic yesterday to the emergency department due to acute diverticulitis. Patient reports that since February he has been suffering on and off with change in bowels, abdominal pain, and vomiting. He reports he had been previously having 2-3 bowel movements daily. He reports that right now he is having about 1 bowel movement daily. He denies any melena or rectal bleeding. He reports that he was supposed to have a colonoscopy in the past but he could not tolerate the bowel prep so he canceled this procedure. At the present time patient is still reporting episodes of nausea and vomiting. He reports that his pain at present time is about a 2 out of 10. On July 5, 2023 patient was evaluated in the emergency department due to abdominal pain. 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 Patient was given antibiotics and discharged home. When patient was evaluated in the GI clinic yesterday patient unfortunately has not been able to tolerate p.o. antibiotics so he was sent for IV antibiotics.     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 Patient has not been evaluated by general surgery. Patient's white count remained stable. Patient reports that he is now currently receiving IV antibiotics and he is still reporting nausea. REVIEW OF SYSTEMS:     CONSTITUTIONAL: Denies any fever, chills, or rigors. Good appetite, and no recent weight loss. HEENT: No earache or tinnitus. Denies hearing loss or visual disturbances. CARDIOVASCULAR: No chest pain or palpitations. RESPIRATORY: Denies any cough, hemoptysis, shortness of breath or dyspnea on exertion. GASTROINTESTINAL: As noted in the History of Present Illness. GENITOURINARY: No problems with urination. Denies any hematuria or dysuria. NEUROLOGIC: No dizziness or vertigo, denies headaches. MUSCULOSKELETAL: Denies any muscle or joint pain. SKIN: Denies skin rashes or itching. ENDOCRINE: Denies excessive thirst. Denies intolerance to heat or cold. PSYCHOSOCIAL: Denies depression or anxiety. Denies any recent memory loss. Historical Information   Past Medical History:   Diagnosis Date   • Diabetes mellitus ([address])    • Hyperlipidemia    • Hypertension    • Psychiatric disorder     bipolar   • Schizoid personality disorder [facility])    • Syncope      Past Surgical History:   Procedure Laterality Date   • CARDIAC ASCENDING AORTOGRAM N/A 10/28/2022    Procedure: Cardiac ascending aortogram;  Surgeon: Darius Petersen MD;  Location: [facility]ARD[facility] CATH LAB; Service: Cardiology   • CARDIAC CATHETERIZATION Left 10/28/2022    Procedure: CARDIAC RHC/LHC; Surgeon: Darius Petersen MD;  Location: [facility]; Service: Cardiology   • CARDIAC CATHETERIZATION N/A 10/28/2022    Procedure: Cardiac Coronary Angiogram;  Surgeon: Darius Petersen MD;  Location: [address] [place] [address] CATH LAB;   Service: Cardiology   • CARDIAC CATHETERIZATION Left 10/28/2022    Procedure: Cardiac Left Ventriculogram;  Surgeon: Jennifer Cruz MD;  Location: [address] [place] [address] CATH LAB;   Service: Cardiology   • LAPAROSCOPIC LYSIS INTESTINAL ADHESIONS     • OR RPLCMT AORTIC VALVE OPN W/STENTLESS TISSUE VALVE N/A 2023    Procedure: CORONARY ARTERY BYPASS GRAFT (CABG) 1 VESSEL GSV-->RCA, EVH RIGHT LEG,  WITH REPLACEMENT VALVE AORTIC (AVR) 25MM INSPIRIA RESILIA TISSUE VALVE;  Surgeon: Christi Lee MD;  Location: [facility] MAIN [facility];  Service: Cardiac Surgery     Social History   Social History     Substance and Sexual Activity   Alcohol Use Not Currently     Social History     Substance and Sexual Activity   Drug Use Yes   • Types: Marijuana     Social History     Tobacco Use   Smoking Status Former   • Packs/day: 0.50   • Years: 45.00   • Total pack years: 22.50   • Types: Cigarettes   • Quit date: 2023   • Years since quittin.3   Smokeless Tobacco Never   Tobacco Comments    Patient states that he is trying to cut down     Family History   Problem Relation Age of Onset   • Cancer Mother    • Hypertension Mother    • Diabetes Mother        Meds/Allergies     (Not in a hospital admission)    Current Facility-Administered Medications   Medication Dose Route Frequency   • acetaminophen (TYLENOL) tablet 975 mg  975 mg Oral Q8H PRN   • aspirin tablet 325 mg  325 mg Oral Daily   • atorvastatin (LIPITOR) tablet 40 mg  40 mg Oral Daily With Dinner   • cefTRIAXone (ROCEPHIN) IVPB (premix in dextrose) 2,000 mg 50 mL  2,000 mg Intravenous Q24H   • enoxaparin (LOVENOX) subcutaneous injection 40 mg  40 mg Subcutaneous Daily   • metoprolol succinate (TOPROL-XL) 24 hr tablet 50 mg  50 mg Oral Daily   • metroNIDAZOLE (FLAGYL) IVPB (premix) 500 mg 100 mL  500 mg Intravenous Q8H   • multi-electrolyte (PLASMALYTE-A/ISOLYTE-S PH 7.4) IV solution  100 mL/hr Intravenous Continuous   • ondansetron (ZOFRAN) injection 4 mg  4 mg Intravenous Q6H PRN       Allergies   Allergen Reactions   • Neomycin-Polymyxin-Hc Abdominal Pain   • Other      Pt states he is allergic to everything. States he doesn't have a list but can only take children doses of all medication         Objective   Blood pressure 149/89, pulse 105, temperature 98.5 °F (36.9 °C), temperature source Temporal, resp. rate 22, SpO2 94 %. Intake/Output Summary (Last 24 hours) at 7/13/2023 1146  Last data filed at 7/13/2023 7314  Gross per 24 hour   Intake 350 ml   Output 450 ml   Net -100 ml         PHYSICAL EXAM:      General Appearance:   Alert, cooperative, no distress, appears stated age    HEENT:   Normocephalic, atraumatic, anicteric.     Neck:  Supple, symmetrical, trachea midline, no adenopathy;    thyroid: no enlargement/tenderness/nodules; no carotid  bruit or JVD    Lungs:   Clear to auscultation bilaterally; no rales, rhonchi or wheezing; respirations unlabored    Heart[de-identified]   S1 and S2 normal; regular rate and rhythm; no murmur, rub, or gallop.    Abdomen:   Soft, non-tender, non-distended; normal bowel sounds; no masses, no organomegaly    Genitalia:   Deferred    Rectal:   Deferred    Extremities:  No cyanosis, clubbing or edema    Pulses:  2+ and symmetric all extremities    Skin:  Skin color, texture, turgor normal, no rashes or lesions    Lymph nodes:  No palpable cervical, axillary or inguinal lymphadenopathy        Lab Results:   Admission on 07/12/2023   Component Date Value   • WBC 07/12/2023 6.23    • RBC 07/12/2023 5.65 (H)    • Hemoglobin 07/12/2023 15.3    • Hematocrit 07/12/2023 45.7    • MCV 07/12/2023 81 (L)    • MCH 07/12/2023 27.1    • MCHC 07/12/2023 33.5    • RDW 07/12/2023 13.8    • MPV 07/12/2023 10.0    • Platelets 54/28/7593 217    • nRBC 07/12/2023 0    • Neutrophils Relative 07/12/2023 60    • Immat GRANS % 07/12/2023 1    • Lymphocytes Relative 07/12/2023 24    • Monocytes Relative 07/12/2023 11    • Eosinophils Relative 07/12/2023 3    • Basophils Relative 07/12/2023 1    • Neutrophils Absolute 07/12/2023 3.80    • Immature Grans Absolute 07/12/2023 0.03    • Lymphocytes Absolute 07/12/2023 1.49    • Monocytes Absolute 07/12/2023 0.68    • Eosinophils Absolute 07/12/2023 0.17    • Basophils Absolute 07/12/2023 0.06    • Sodium 07/12/2023 136    • Potassium 07/12/2023 4.3    • Chloride 07/12/2023 102    • CO2 07/12/2023 27    • ANION GAP 07/12/2023 7    • BUN 07/12/2023 12    • Creatinine 07/12/2023 0.92    • Glucose 07/12/2023 91    • Calcium 07/12/2023 10.1    • AST 07/12/2023 15    • ALT 07/12/2023 25    • Alkaline Phosphatase 07/12/2023 65    • Total Protein 07/12/2023 8.1    • Albumin 07/12/2023 4.4    • Total Bilirubin 07/12/2023 0.32    • eGFR 07/12/2023 93    • LACTIC ACID 07/12/2023 1.1    • Procalcitonin 07/12/2023 0.06    • Protime 07/12/2023 12.5    • INR 07/12/2023 0.95    • PTT 07/12/2023 28    • Blood Culture 07/12/2023 Received in Microbiology Lab. Culture in Progress. • Blood Culture 07/12/2023 Received in Microbiology Lab. Culture in Progress.     • Color, UA 07/12/2023 Colorless    • Clarity, UA 07/12/2023 Clear    • Specific Gravity, UA 07/12/2023 1.006    • pH, UA 07/12/2023 6.0    • Leukocytes, UA 07/12/2023 Negative    • Nitrite, UA 07/12/2023 Negative    • Protein, UA 07/12/2023 Negative    • Glucose, UA 07/12/2023 Negative    • Ketones, UA 07/12/2023 Negative    • Urobilinogen, UA 07/12/2023 <2.0    • Bilirubin, UA 07/12/2023 Negative    • Occult Blood, UA 07/12/2023 Negative    • Lipase 07/12/2023 22    • Ventricular Rate 07/12/2023 111    • Atrial Rate 07/12/2023 111    • MO Interval 07/12/2023 144    • QRSD Interval 07/12/2023 90    • QT Interval 07/12/2023 318    • QTC Interval 07/12/2023 432    • P Axis 07/12/2023 61    • QRS Axis 07/12/2023 5    • T Wave Axis 07/12/2023 69    • Sodium 07/13/2023 135    • Potassium 07/13/2023 3.9    • Chloride 07/13/2023 102    • CO2 07/13/2023 26    • ANION GAP 07/13/2023 7    • BUN 07/13/2023 12    • Creatinine 07/13/2023 0.91    • Glucose 07/13/2023 111    • Calcium 07/13/2023 9.6    • eGFR 07/13/2023 94    • Magnesium 07/13/2023 2.2    • WBC 07/13/2023 7.02    • RBC 07/13/2023 5.60    • Hemoglobin 07/13/2023 15.2    • Hematocrit 07/13/2023 45.6    • MCV 07/13/2023 81 (L)    • MCH 07/13/2023 27.1    • MCHC 07/13/2023 33.3    • RDW 07/13/2023 14.0    • Platelets 46/73/8115 204    • MPV 07/13/2023 9.7        Imaging Studies: I have personally reviewed pertinent imaging studies. 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 Patient was unable to tolerate p.o. antibiotics. -CTAP from 7/12/2023 showed persistent mild left inflammatory diverticulitis of the distal descending colon and proximal sigmoid colon. No evidence of perforation or intra-abdominal abscess. 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  -White blood cell count is stable at 7.02. Patient's hemoglobin level remained stable. -Patient has never had a colonoscopy in the past.  -Patient is currently n.p.o. Will allow clear liquid diet.  -Antiemetics and pain control  -Serial abdominal exams  -Continue IV antibiotics  -Patient should have general surgical evaluation.  -No plans for colonoscopy at this time as patient is an acute attack of diverticulitis. We will plan several weeks. Patient will be seen and examined by Dr. Tam Rivers. All key medical decision reviewed by Dr. Tam Rivers.     Radha Blackmon PA-C  3/08/6920,32:37 AM

## 2023-07-13 NOTE — CONSULTS
Consult- General Surgery   Maira Disla 56 y.o. male MRN: [id number]  Unit/Bed#: ED 09 Encounter: [id number]      Assessment/Plan     Maira Disla is a 56 y.o. 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 AVSS, no leukocytosis, remainder of labs within normal limits. CTAP: Persistent mild active inflammatory diverticulitis of the distal descending colon and proximal sigmoid colon. No evidence of perforation or intra-abdominal abscess.     Stable zusulslqjx-dw-hghuvhx colocolonic fistula and tubular tracts arising from the distal descending colon. Plan:  • No acute surgical intervention indicated at this time. We will manage acute diverticulitis conservatively with bowel rest, IV antibiotics, IV fluids. Patient will need to follow-up with general surgery outpatient to discuss surgical intervention. • N.p.o.  • IV fluids  • Continue IV Rocephin Flagyl for intra-abdominal infection  • Monitor abdominal exam, labs, vitals  PRN pain medication and anti-emetics  Encourage ambulation  DVT ppx: heparin  Incentive spirometry 10 times/hour while awake  Continue home medications as prescribed   • Remainder of care per primary team-Slim  • General surgery will continue to follow. Tiger text with any questions. History of Present Illness     HPI:  Maira Disla is a 56 y.o. male with a PMH of diabetes, hypertension, hyperlipidemia, bipolar disorder, schizoid personality disorder, CABG and aortic valve replacement in February 2023 who presents with persistent left lower quadrant abdominal pain. Patient reports pain began several months ago shortly after his cardiac procedure he also has had persistent nausea and vomiting for the last several months. He denies ever seeking care for this pain and nausea however he presented to the ED last week and was diagnosed with acute diverticulitis and treated outpatient with oral antibiotics.   He previously had a an appointment with GI set up for yesterday at which time they told him to return to the ED for persistent symptoms despite a week of antibiotic therapy. He reports persistent left lower quadrant and suprapubic abdominal pain as well as nausea. Denies fever or chills at home but states he feels weak and fatigued. Passing flatus and last bowel movement was this morning. He does report intermittent hematochezia over the last 2 months. Constipation, no diarrhea. He reports stools are thin for the last several months. The patient denies CP, SOB, palpitations, headache, fever, chills, unintentional weight loss, night sweats, diarrhea. Patient admits to smoking marijuana, denies tobacco use, denies illicit drug use, admits to social alcohol use. No surgical history of the abdomen. Review of Systems   Constitutional: Positive for appetite change and fatigue. Negative for chills and fever. HENT: Negative for ear pain and sore throat. Eyes: Negative for pain and visual disturbance. Respiratory: Negative for cough and shortness of breath. Cardiovascular: Negative for chest pain and palpitations. Gastrointestinal: Positive for abdominal pain (LLQ), blood in stool, constipation (thin stools for several months), nausea and vomiting. Negative for diarrhea. Genitourinary: Negative for dysuria and hematuria. Musculoskeletal: Negative for arthralgias and back pain. Skin: Negative for color change and rash. Neurological: Negative for seizures and syncope. All other systems reviewed and are negative.       Historical Information   Past Medical History:   Diagnosis Date   • Diabetes mellitus ([address])    • Hyperlipidemia    • Hypertension    • Psychiatric disorder     bipolar   • Schizoid personality disorder [facility])    • Syncope      Past Surgical History:   Procedure Laterality Date   • CARDIAC ASCENDING AORTOGRAM N/A 10/28/2022    Procedure: Cardiac ascending aortogram;  Surgeon: Amber Hilton MD; Location: [facility]; Service: Cardiology   • CARDIAC CATHETERIZATION Left 10/28/2022    Procedure: CARDIAC RHC/LHC; Surgeon: Jamie Tate MD;  Location: [facility]; Service: Cardiology   • CARDIAC CATHETERIZATION N/A 10/28/2022    Procedure: Cardiac Coronary Angiogram;  Surgeon: Jamie Tate MD;  Location: [address] [place] [address] CATH LAB; Service: Cardiology   • CARDIAC CATHETERIZATION Left 10/28/2022    Procedure: Cardiac Left Ventriculogram;  Surgeon: Jamie Tate MD;  Location: [facility]; Service: Cardiology   • LAPAROSCOPIC LYSIS INTESTINAL ADHESIONS     • OH RPLCMT AORTIC VALVE OPN W/STENTLESS TISSUE VALVE N/A 2023    Procedure: CORONARY ARTERY BYPASS GRAFT (CABG) 1 VESSEL GSV-->RCA, EVH RIGHT LEG,  WITH REPLACEMENT VALVE AORTIC (AVR) 25MM INSPIRIA RESILIA TISSUE VALVE;  Surgeon: Jaguar Malhotra MD;  Location:  MAIN OR;  Service: Cardiac Surgery     Social History   Social History     Substance and Sexual Activity   Alcohol Use Not Currently     Social History     Substance and Sexual Activity   Drug Use Yes   • Types: Marijuana     Social History     Tobacco Use   Smoking Status Former   • Packs/day: 0.50   • Years: 45.00   • Total pack years: 22.50   • Types: Cigarettes   • Quit date: 2023   • Years since quittin.3   Smokeless Tobacco Never   Tobacco Comments    Patient states that he is trying to cut down     Family History: non-contributory    Meds/Allergies   all medications and allergies reviewed  Allergies   Allergen Reactions   • Neomycin-Polymyxin-Hc Abdominal Pain   • Other      Pt states he is allergic to everything.  States he doesn't have a list but can only take children doses of all medication         Objective   First Vitals:   Blood Pressure: 151/99 (23 1218)  Pulse: (!) 120 (23 1218)  Temperature: 98.5 °F (36.9 °C) (23 1218)  Temp Source: Temporal (23)  Respirations: 20 (23)  SpO2: 99 % (07/12/23 1218)    Current Vitals:   Blood Pressure: 149/89 (07/13/23 1100)  Pulse: 105 (07/13/23 1100)  Temperature: 98.5 °F (36.9 °C) (07/12/23 1218)  Temp Source: Temporal (07/12/23 1218)  Respirations: 22 (07/13/23 1100)  SpO2: 94 % (07/13/23 1100)      Intake/Output Summary (Last 24 hours) at 7/13/2023 1130  Last data filed at 7/13/2023 0868  Gross per 24 hour   Intake 350 ml   Output 450 ml   Net -100 ml       Invasive Devices     Peripheral Intravenous Line  Duration           Peripheral IV 07/12/23 Left;Ventral (anterior) Forearm <1 day    Peripheral IV 07/12/23 Right;Ventral (anterior) Forearm <1 day                Physical Exam  Vitals reviewed. Constitutional:       General: He is not in acute distress. Appearance: Normal appearance. He is obese. He is not ill-appearing or toxic-appearing. HENT:      Head: Normocephalic and atraumatic. Right Ear: External ear normal.      Left Ear: External ear normal.      Nose: Nose normal.      Mouth/Throat:      Mouth: Mucous membranes are moist.   Eyes:      General: No scleral icterus. Right eye: No discharge. Left eye: No discharge. Conjunctiva/sclera: Conjunctivae normal.   Cardiovascular:      Rate and Rhythm: Normal rate and regular rhythm. Pulmonary:      Effort: Pulmonary effort is normal. No respiratory distress. Abdominal:      General: There is no distension. Palpations: Abdomen is soft. Tenderness: There is abdominal tenderness (LLQ and suprapubic tenderness). There is no guarding. Musculoskeletal:         General: Normal range of motion. Cervical back: Normal range of motion. Skin:     General: Skin is warm. Coloration: Skin is not jaundiced. Neurological:      General: No focal deficit present. Mental Status: He is alert and oriented to person, place, and time. Psychiatric:         Mood and Affect: Mood normal.         Behavior: Behavior normal.         Thought Content:  Thought content normal.         Judgment: Judgment normal.         Lab Results: I have personally reviewed pertinent lab results.     Recent Results (from the past 36 hour(s))   ECG 12 lead    Collection Time: 07/12/23  1:27 PM   Result Value Ref Range    Ventricular Rate 111 BPM    Atrial Rate 111 BPM    FL Interval 144 ms    QRSD Interval 90 ms    QT Interval 318 ms    QTC Interval 432 ms    P Axis 61 degrees    QRS Axis 5 degrees    T Wave Axis 69 degrees   CBC and differential    Collection Time: 07/12/23  1:31 PM   Result Value Ref Range    WBC 6.23 4.31 - 10.16 Thousand/uL    RBC 5.65 (H) 3.88 - 5.62 Million/uL    Hemoglobin 15.3 12.0 - 17.0 g/dL    Hematocrit 45.7 36.5 - 49.3 %    MCV 81 (L) 82 - 98 fL    MCH 27.1 26.8 - 34.3 pg    MCHC 33.5 31.4 - 37.4 g/dL    RDW 13.8 11.6 - 15.1 %    MPV 10.0 8.9 - 12.7 fL    Platelets 624 751 - 148 Thousands/uL    nRBC 0 /100 WBCs    Neutrophils Relative 60 43 - 75 %    Immat GRANS % 1 0 - 2 %    Lymphocytes Relative 24 14 - 44 %    Monocytes Relative 11 4 - 12 %    Eosinophils Relative 3 0 - 6 %    Basophils Relative 1 0 - 1 %    Neutrophils Absolute 3.80 1.85 - 7.62 Thousands/µL    Immature Grans Absolute 0.03 0.00 - 0.20 Thousand/uL    Lymphocytes Absolute 1.49 0.60 - 4.47 Thousands/µL    Monocytes Absolute 0.68 0.17 - 1.22 Thousand/µL    Eosinophils Absolute 0.17 0.00 - 0.61 Thousand/µL    Basophils Absolute 0.06 0.00 - 0.10 Thousands/µL   Comprehensive metabolic panel    Collection Time: 07/12/23  1:31 PM   Result Value Ref Range    Sodium 136 135 - 147 mmol/L    Potassium 4.3 3.5 - 5.3 mmol/L    Chloride 102 96 - 108 mmol/L    CO2 27 21 - 32 mmol/L    ANION GAP 7 mmol/L    BUN 12 5 - 25 mg/dL    Creatinine 0.92 0.60 - 1.30 mg/dL    Glucose 91 65 - 140 mg/dL    Calcium 10.1 8.4 - 10.2 mg/dL    AST 15 13 - 39 U/L    ALT 25 7 - 52 U/L    Alkaline Phosphatase 65 34 - 104 U/L    Total Protein 8.1 6.4 - 8.4 g/dL    Albumin 4.4 3.5 - 5.0 g/dL    Total Bilirubin 0.32 0.20 - 1.00 mg/dL eGFR 93 ml/min/1.73sq m   Lactic acid    Collection Time: 07/12/23  1:31 PM   Result Value Ref Range    LACTIC ACID 1.1 0.5 - 2.0 mmol/L   Procalcitonin    Collection Time: 07/12/23  1:31 PM   Result Value Ref Range    Procalcitonin 0.06 <=0.25 ng/ml   Protime-INR    Collection Time: 07/12/23  1:31 PM   Result Value Ref Range    Protime 12.5 11.6 - 14.5 seconds    INR 0.95 0.84 - 1.19   APTT    Collection Time: 07/12/23  1:31 PM   Result Value Ref Range    PTT 28 23 - 37 seconds   Blood culture #2    Collection Time: 07/12/23  1:31 PM    Specimen: Arm, Left; Blood   Result Value Ref Range    Blood Culture Received in Microbiology Lab. Culture in Progress. Lipase    Collection Time: 07/12/23  1:31 PM   Result Value Ref Range    Lipase 22 11 - 82 u/L   Blood culture #1    Collection Time: 07/12/23  1:36 PM    Specimen: Arm, Right; Blood   Result Value Ref Range    Blood Culture Received in Microbiology Lab. Culture in Progress.     UA w Reflex to Microscopic w Reflex to Culture    Collection Time: 07/12/23  2:46 PM    Specimen: Urine, Clean Catch   Result Value Ref Range    Color, UA Colorless     Clarity, UA Clear     Specific Gravity, UA 1.006 1.003 - 1.030    pH, UA 6.0 4.5, 5.0, 5.5, 6.0, 6.5, 7.0, 7.5, 8.0    Leukocytes, UA Negative Negative    Nitrite, UA Negative Negative    Protein, UA Negative Negative mg/dl    Glucose, UA Negative Negative mg/dl    Ketones, UA Negative Negative mg/dl    Urobilinogen, UA <2.0 <2.0 mg/dl mg/dl    Bilirubin, UA Negative Negative    Occult Blood, UA Negative Negative   Basic metabolic panel    Collection Time: 07/13/23  4:13 AM   Result Value Ref Range    Sodium 135 135 - 147 mmol/L    Potassium 3.9 3.5 - 5.3 mmol/L    Chloride 102 96 - 108 mmol/L    CO2 26 21 - 32 mmol/L    ANION GAP 7 mmol/L    BUN 12 5 - 25 mg/dL    Creatinine 0.91 0.60 - 1.30 mg/dL    Glucose 111 65 - 140 mg/dL    Calcium 9.6 8.4 - 10.2 mg/dL    eGFR 94 ml/min/1.73sq m   Magnesium    Collection Time: 07/13/23  4:13 AM   Result Value Ref Range    Magnesium 2.2 1.9 - 2.7 mg/dL   CBC (With Platelets)    Collection Time: 07/13/23  4:13 AM   Result Value Ref Range    WBC 7.02 4.31 - 10.16 Thousand/uL    RBC 5.60 3.88 - 5.62 Million/uL    Hemoglobin 15.2 12.0 - 17.0 g/dL    Hematocrit 45.6 36.5 - 49.3 %    MCV 81 (L) 82 - 98 fL    MCH 27.1 26.8 - 34.3 pg    MCHC 33.3 31.4 - 37.4 g/dL    RDW 14.0 11.6 - 15.1 %    Platelets 418 268 - 969 Thousands/uL    MPV 9.7 8.9 - 12.7 fL     Imaging: I have personally reviewed pertinent reports. CT abdomen pelvis with contrast    Result Date: 7/12/2023  Impression: Persistent mild active inflammatory diverticulitis of the distal descending colon and proximal sigmoid colon. No evidence of perforation or intra-abdominal abscess. Stable [id number] colocolonic fistula and tubular tracts arising from the distal descending colon. Workstation performed: [id number]       EKG, Pathology, and Other Studies: I have personally reviewed pertinent reports.

## 2023-07-13 NOTE — ASSESSMENT & PLAN NOTE
· Recently done in February at [facility]  · No active chest pain  · Continue aspirin, statin, beta-blockers

## 2023-07-13 NOTE — ASSESSMENT & PLAN NOTE
· Patient presents with intractable nausea vomiting and inability to tolerate p.o. meds which were given to him as outpatient for diverticulitis by gastroenterology.   Patient had last CAT scan about a week ago which showed mild diverticulitis and a CAT scan done today shows similar findings  · Due to patient's inability to take p.o. meds, start on IV antibiotics  · Continue ceftriaxone 1 g daily and Flagyl 500 mg IV every 8 hours  · Follow-up blood cultures  · Gastroenterology recommendations appreciated  · N.p.o. except for p.o. meds  · Adequate pain control at this time  · Normal lactic and procalcitonin levels

## 2023-07-13 NOTE — PLAN OF CARE
Problem: PAIN - ADULT  Goal: Verbalizes/displays adequate comfort level or baseline comfort level  Description: Interventions:  - Encourage patient to monitor pain and request assistance  - Assess pain using appropriate pain scale  - Administer analgesics based on type and severity of pain and evaluate response  - Implement non-pharmacological measures as appropriate and evaluate response  - Consider cultural and social influences on pain and pain management  - Notify physician/advanced practitioner if interventions unsuccessful or patient reports new pain  Outcome: Progressing     Problem: INFECTION - ADULT  Goal: Absence or prevention of progression during hospitalization  Description: INTERVENTIONS:  - Assess and monitor for signs and symptoms of infection  - Monitor lab/diagnostic results  - Monitor all insertion sites, i.e. indwelling lines, tubes, and drains  - Monitor endotracheal if appropriate and nasal secretions for changes in amount and color  - [place] appropriate cooling/warming therapies per order  - Administer medications as ordered  - Instruct and encourage patient and family to use good hand hygiene technique  - Identify and instruct in appropriate isolation precautions for identified infection/condition  Outcome: Progressing  Goal: Absence of fever/infection during neutropenic period  Description: INTERVENTIONS:  - Monitor WBC    Outcome: Progressing     Problem: SAFETY ADULT  Goal: Patient will remain free of falls  Description: INTERVENTIONS:  - Educate patient/family on patient safety including physical limitations  - Instruct patient to call for assistance with activity   - Consult OT/PT to assist with strengthening/mobility   - Keep Call bell within reach  - Keep bed low and locked with side rails adjusted as appropriate  - Keep care items and personal belongings within reach  - Initiate and maintain comfort rounds  - Make Fall Risk Sign visible to staff  - Offer Toileting every 2 Hours, in advance of need  - Initiate/Maintain bed alarm  - Obtain necessary fall risk management equipment  - Apply yellow socks and bracelet for high fall risk patients  - Consider moving patient to room near nurses station  Outcome: Progressing  Goal: Maintain or return to baseline ADL function  Description: INTERVENTIONS:  -  Assess patient's ability to carry out ADLs; assess patient's baseline for ADL function and identify physical deficits which impact ability to perform ADLs (bathing, care of mouth/teeth, toileting, grooming, dressing, etc.)  - Assess/evaluate cause of self-care deficits   - Assess range of motion  - Assess patient's mobility; develop plan if impaired  - Assess patient's need for assistive devices and provide as appropriate  - Encourage maximum independence but intervene and supervise when necessary  - Involve family in performance of ADLs  - Assess for home care needs following discharge   - Consider OT consult to assist with ADL evaluation and planning for discharge  - Provide patient education as appropriate  Outcome: Progressing  Goal: Maintains/Returns to pre admission functional level  Description: INTERVENTIONS:  - Perform BMAT or MOVE assessment daily.   - Set and communicate daily mobility goal to care team and patient/family/caregiver. - Collaborate with rehabilitation services on mobility goals if consulted  - Perform Range of Motion 3 times a day. - Reposition patient every 2 hours.   - Dangle patient 3 times a day  - Stand patient 3 times a day  - Ambulate patient 3 times a day  - Out of bed to chair 3 times a day   - Out of bed for meals 3 times a day  - Out of bed for toileting  - Record patient progress and toleration of activity level   Outcome: Progressing     Problem: DISCHARGE PLANNING  Goal: Discharge to home or other facility with appropriate resources  Description: INTERVENTIONS:  - Identify barriers to discharge w/patient and caregiver  - Arrange for needed discharge resources and transportation as appropriate  - Identify discharge learning needs (meds, wound care, etc.)  - Arrange for interpretive services to assist at discharge as needed  - Refer to Case Management Department for coordinating discharge planning if the patient needs post-hospital services based on physician/advanced practitioner order or complex needs related to functional status, cognitive ability, or social support system  Outcome: Progressing     Problem: Knowledge Deficit  Goal: Patient/family/caregiver demonstrates understanding of disease process, treatment plan, medications, and discharge instructions  Description: Complete learning assessment and assess knowledge base.   Interventions:  - Provide teaching at level of understanding  - Provide teaching via preferred learning methods  Outcome: Progressing

## 2023-07-13 NOTE — ASSESSMENT & PLAN NOTE
· Patient presents with intractable nausea vomiting and inability to tolerate p.o. meds which were given to him as outpatient for diverticulitis by gastroenterology.   Patient had last CAT scan about a week ago which showed mild diverticulitis and a CAT scan done today shows similar findings  · Due to patient's inability to take p.o. meds, start on IV ceftriaxone and Flagyl  · Follow-up blood cultures  · Gastroenterology consultation  · N.p.o. except for p.o. meds  · Adequate pain control at this time  · Normal lactic and procalcitonin levels

## 2023-07-13 NOTE — ASSESSMENT & PLAN NOTE
/85 (BP Location: Right arm)   Pulse 99   Temp 98.5 °F (36.9 °C) (Temporal)   Resp 20   SpO2 93%     Continue home meds

## 2023-07-13 NOTE — PROGRESS NOTES
[address]  Progress Note  Name: Oracio Ray  MRN: [id number]  Unit/Bed#: ED 09 I Date of Admission: 7/12/2023   Date of Service: 7/13/2023 I Hospital Day: 1    Assessment/Plan   Primary hypertension  Assessment & Plan  /82 (BP Location: Right arm)   Pulse 96   Temp 98.5 °F (36.9 °C) (Temporal)   Resp 19   SpO2 93%     Continue metoprolol succinate 50 mg daily with holding parameters    S/P AVR  Assessment & Plan    · Continue aspirin beta-blockers    S/P CABG (coronary artery bypass graft)  Assessment & Plan  · Recently done in February at [facility]  · No active chest pain  · Continue aspirin, statin, beta-blockers    * Acute diverticulitis  Assessment & Plan  · Patient presents with intractable nausea vomiting and inability to tolerate p.o. meds which were given to him as outpatient for diverticulitis by gastroenterology. Patient had last CAT scan about a week ago which showed mild diverticulitis and a CAT scan done today shows similar findings  · Due to patient's inability to take p.o. meds, start on IV antibiotics  · Continue ceftriaxone 1 g daily and Flagyl 500 mg IV every 8 hours  · Follow-up blood cultures  · Gastroenterology recommendations appreciated  · N.p.o. except for p.o. meds  · Adequate pain control at this time  · Normal lactic and procalcitonin levels               VTE Pharmacologic Prophylaxis: VTE Score: 3 Moderate Risk (Score 3-4) - Pharmacological DVT Prophylaxis Ordered: enoxaparin (Lovenox). Patient Centered Rounds: I performed bedside rounds with nursing staff today.   Discussions with Specialists or Other Care Team Provider: yes    Education and Discussions with Family / Patient: yes    Total Time Spent on Date of Encounter in care of patient: 35 minutes This time was spent on one or more of the following: performing physical exam; counseling and coordination of care; obtaining or reviewing history; documenting in the medical record; reviewing/ordering tests, medications or procedures; communicating with other healthcare professionals and discussing with patient's family/caregivers. Current Length of Stay: 1 day(s)  Current Patient Status: Inpatient   Certification Statement: The patient will continue to require additional inpatient hospital stay due to Ongoing medical management  Discharge Plan: Anticipate discharge in 48-72 hrs to home. Code Status: Level 1 - Full Code    Subjective:   Patient was seen this morning, he continues to have persistent nausea, not tolerating oral route. Patient does have tenderness, especially in the lower abdomen. Denies any more bowel movements since yesterday. Objective:     Vitals:   Temp (24hrs), Av.5 °F (36.9 °C), Min:98.5 °F (36.9 °C), Max:98.5 °F (36.9 °C)    Temp:  [98.5 °F (36.9 °C)] 98.5 °F (36.9 °C)  HR:  [] 96  Resp:  [14-22] 19  BP: (121-151)/(77-99) 121/82  SpO2:  [91 %-99 %] 93 %  There is no height or weight on file to calculate BMI. Input and Output Summary (last 24 hours): Intake/Output Summary (Last 24 hours) at 2023 1032  Last data filed at 2023 1439  Gross per 24 hour   Intake 350 ml   Output 450 ml   Net -100 ml       Physical Exam:   Physical Exam  Vitals reviewed. Constitutional:       General: He is not in acute distress. Appearance: He is ill-appearing. He is not toxic-appearing or diaphoretic. HENT:      Head: Normocephalic. Nose: Nose normal.      Mouth/Throat:      Mouth: Mucous membranes are dry. Eyes:      General: No scleral icterus. Cardiovascular:      Rate and Rhythm: Normal rate. Pulmonary:      Breath sounds: Normal breath sounds. Abdominal:      General: There is no distension. Tenderness: There is abdominal tenderness. Musculoskeletal:      Right lower leg: No edema. Left lower leg: No edema. Neurological:      Mental Status: He is alert. Mental status is at baseline.    Psychiatric:         Mood and Affect: Mood normal.          Additional Data:     Labs:  Results from last 7 days   Lab Units 07/13/23  0413 07/12/23  1331   WBC Thousand/uL 7.02 6.23   HEMOGLOBIN g/dL 15.2 15.3   HEMATOCRIT % 45.6 45.7   PLATELETS Thousands/uL 204 217   NEUTROS PCT %  --  60   LYMPHS PCT %  --  24   MONOS PCT %  --  11   EOS PCT %  --  3     Results from last 7 days   Lab Units 07/13/23  0413 07/12/23  1331   SODIUM mmol/L 135 136   POTASSIUM mmol/L 3.9 4.3   CHLORIDE mmol/L 102 102   CO2 mmol/L 26 27   BUN mg/dL 12 12   CREATININE mg/dL 0.91 0.92   ANION GAP mmol/L 7 7   CALCIUM mg/dL 9.6 10.1   ALBUMIN g/dL  --  4.4   TOTAL BILIRUBIN mg/dL  --  0.32   ALK PHOS U/L  --  65   ALT U/L  --  25   AST U/L  --  15   GLUCOSE RANDOM mg/dL 111 91     Results from last 7 days   Lab Units 07/12/23  1331   INR  0.95             Results from last 7 days   Lab Units 07/12/23  1331   LACTIC ACID mmol/L 1.1   PROCALCITONIN ng/ml 0.06       Lines/Drains:  Invasive Devices     Peripheral Intravenous Line  Duration           Peripheral IV 07/12/23 Left;Ventral (anterior) Forearm <1 day    Peripheral IV 07/12/23 Right;Ventral (anterior) Forearm <1 day                      Imaging: Reviewed radiology reports from this admission including: abdominal/pelvic CT    Recent Cultures (last 7 days):   Results from last 7 days   Lab Units 07/12/23  1336 07/12/23  1331   BLOOD CULTURE  Received in Microbiology Lab. Culture in Progress. Received in Microbiology Lab. Culture in Progress.        Last 24 Hours Medication List:   Current Facility-Administered Medications   Medication Dose Route Frequency Provider Last Rate   • acetaminophen  975 mg Oral Q8H PRN Helena Lynch MD     • aspirin  325 mg Oral Daily Helena Lynch MD     • atorvastatin  40 mg Oral Daily With Xavier Coleman MD     • cefTRIAXone  2,000 mg Intravenous Q24H Helena Lynch MD     • enoxaparin  40 mg Subcutaneous Daily Helena Lynch MD     • metoprolol succinate  50 mg Oral Daily Helena Lynch MD • metroNIDAZOLE  500 mg Intravenous Q8H Eliz Lemus DO Stopped (07/13/23 8251)   • multi-electrolyte  100 mL/hr Intravenous Continuous Manpreet Christy  mL/hr (07/12/23 9891)   • ondansetron  4 mg Intravenous Q6H PRN Manpreet Christy MD          Today, Patient Was Seen By: Lobito Oliveira MD    **Please Note: This note may have been constructed using a voice recognition system. **

## 2023-07-13 NOTE — ASSESSMENT & PLAN NOTE
/82 (BP Location: Right arm)   Pulse 96   Temp 98.5 °F (36.9 °C) (Temporal)   Resp 19   SpO2 93%     Continue metoprolol succinate 50 mg daily with holding parameters

## 2023-07-13 NOTE — ASSESSMENT & PLAN NOTE
· Recently done in February at [address]  · No active chest pain  · Continue aspirin, statin, beta-blockers

## 2023-07-13 NOTE — UTILIZATION REVIEW
NOTIFICATION OF INPATIENT ADMISSION   AUTHORIZATION REQUEST   SERVICING FACILITY:   [address] [place] [address] [address]  Tax ID: [id number]  NPI: [id number] ATTENDING PROVIDER:  Attending Name and NPI#: Muniraezequiel Osiel [place] [[id number]]  Address: Ainsley Mcintyre[address]  Phone: [phone]     ADMISSION INFORMATION:  Place of Service: [address] [place] [address]  Place of Service Code: 21  Inpatient Admission Date/Time: 7/12/23  6:33 PM  Discharge Date/Time: No discharge date for patient encounter. Admitting Diagnosis Code/Description:  Abdominal pain [R10.9]     UTILIZATION REVIEW CONTACT:  Susy Mendez Utilization   Network Utilization Review Department  Phone: [phone]  Fax [phone]  Email: [email or web address]  Contact for approvals/pending authorizations, clinical reviews, and discharge. PHYSICIAN ADVISORY SERVICES:  Medical Necessity Denial & Pqhm[facility] Review  Phone: [phone]  Fax: [phone]  Email: [email or web address]

## 2023-07-14 LAB
ANION GAP SERPL CALCULATED.3IONS-SCNC: 7 MMOL/L
BASOPHILS # BLD AUTO: 0.05 THOUSANDS/ÂΜL (ref 0–0.1)
BASOPHILS NFR BLD AUTO: 1 % (ref 0–1)
BUN SERPL-MCNC: 13 MG/DL (ref 5–25)
CALCIUM SERPL-MCNC: 9 MG/DL (ref 8.4–10.2)
CHLORIDE SERPL-SCNC: 103 MMOL/L (ref 96–108)
CO2 SERPL-SCNC: 27 MMOL/L (ref 21–32)
CREAT SERPL-MCNC: 0.95 MG/DL (ref 0.6–1.3)
EOSINOPHIL # BLD AUTO: 0.13 THOUSAND/ÂΜL (ref 0–0.61)
EOSINOPHIL NFR BLD AUTO: 2 % (ref 0–6)
ERYTHROCYTE [DISTWIDTH] IN BLOOD BY AUTOMATED COUNT: 13.7 % (ref 11.6–15.1)
GFR SERPL CREATININE-BSD FRML MDRD: 89 ML/MIN/1.73SQ M
GLUCOSE SERPL-MCNC: 87 MG/DL (ref 65–140)
HCT VFR BLD AUTO: 40.5 % (ref 36.5–49.3)
HGB BLD-MCNC: 13.5 G/DL (ref 12–17)
IMM GRANULOCYTES # BLD AUTO: 0.02 THOUSAND/UL (ref 0–0.2)
IMM GRANULOCYTES NFR BLD AUTO: 0 % (ref 0–2)
LYMPHOCYTES # BLD AUTO: 1.51 THOUSANDS/ÂΜL (ref 0.6–4.47)
LYMPHOCYTES NFR BLD AUTO: 27 % (ref 14–44)
MAGNESIUM SERPL-MCNC: 2.2 MG/DL (ref 1.9–2.7)
MCH RBC QN AUTO: 27.1 PG (ref 26.8–34.3)
MCHC RBC AUTO-ENTMCNC: 33.3 G/DL (ref 31.4–37.4)
MCV RBC AUTO: 81 FL (ref 82–98)
MONOCYTES # BLD AUTO: 0.67 THOUSAND/ÂΜL (ref 0.17–1.22)
MONOCYTES NFR BLD AUTO: 12 % (ref 4–12)
NEUTROPHILS # BLD AUTO: 3.15 THOUSANDS/ÂΜL (ref 1.85–7.62)
NEUTS SEG NFR BLD AUTO: 58 % (ref 43–75)
NRBC BLD AUTO-RTO: 0 /100 WBCS
PLATELET # BLD AUTO: 199 THOUSANDS/UL (ref 149–390)
PMV BLD AUTO: 10.6 FL (ref 8.9–12.7)
POTASSIUM SERPL-SCNC: 3.9 MMOL/L (ref 3.5–5.3)
RBC # BLD AUTO: 4.98 MILLION/UL (ref 3.88–5.62)
SODIUM SERPL-SCNC: 137 MMOL/L (ref 135–147)
WBC # BLD AUTO: 5.53 THOUSAND/UL (ref 4.31–10.16)

## 2023-07-14 PROCEDURE — 99232 SBSQ HOSP IP/OBS MODERATE 35: CPT | Performed by: INTERNAL MEDICINE

## 2023-07-14 PROCEDURE — 80048 BASIC METABOLIC PNL TOTAL CA: CPT | Performed by: INTERNAL MEDICINE

## 2023-07-14 PROCEDURE — 83735 ASSAY OF MAGNESIUM: CPT | Performed by: INTERNAL MEDICINE

## 2023-07-14 PROCEDURE — 85025 COMPLETE CBC W/AUTO DIFF WBC: CPT | Performed by: INTERNAL MEDICINE

## 2023-07-14 RX ORDER — SODIUM CHLORIDE, SODIUM GLUCONATE, SODIUM ACETATE, POTASSIUM CHLORIDE, MAGNESIUM CHLORIDE, SODIUM PHOSPHATE, DIBASIC, AND POTASSIUM PHOSPHATE .53; .5; .37; .037; .03; .012; .00082 G/100ML; G/100ML; G/100ML; G/100ML; G/100ML; G/100ML; G/100ML
75 INJECTION, SOLUTION INTRAVENOUS CONTINUOUS
Status: DISCONTINUED | OUTPATIENT
Start: 2023-07-14 | End: 2023-07-15 | Stop reason: HOSPADM

## 2023-07-14 RX ADMIN — ACETAMINOPHEN 975 MG: 325 TABLET ORAL at 08:25

## 2023-07-14 RX ADMIN — CEFTRIAXONE 2000 MG: 2 INJECTION, SOLUTION INTRAVENOUS at 14:45

## 2023-07-14 RX ADMIN — ASPIRIN 325 MG ORAL TABLET 325 MG: 325 PILL ORAL at 08:25

## 2023-07-14 RX ADMIN — METRONIDAZOLE 500 MG: 500 INJECTION, SOLUTION INTRAVENOUS at 21:36

## 2023-07-14 RX ADMIN — ATORVASTATIN CALCIUM 40 MG: 40 TABLET, FILM COATED ORAL at 17:01

## 2023-07-14 RX ADMIN — METOPROLOL SUCCINATE 50 MG: 50 TABLET, EXTENDED RELEASE ORAL at 08:25

## 2023-07-14 RX ADMIN — METRONIDAZOLE 500 MG: 500 INJECTION, SOLUTION INTRAVENOUS at 05:22

## 2023-07-14 RX ADMIN — METRONIDAZOLE 500 MG: 500 INJECTION, SOLUTION INTRAVENOUS at 14:44

## 2023-07-14 RX ADMIN — SODIUM CHLORIDE, SODIUM GLUCONATE, SODIUM ACETATE, POTASSIUM CHLORIDE, MAGNESIUM CHLORIDE, SODIUM PHOSPHATE, DIBASIC, AND POTASSIUM PHOSPHATE 100 ML/HR: .53; .5; .37; .037; .03; .012; .00082 INJECTION, SOLUTION INTRAVENOUS at 08:29

## 2023-07-14 RX ADMIN — ENOXAPARIN SODIUM 40 MG: 40 INJECTION SUBCUTANEOUS at 08:25

## 2023-07-14 NOTE — ASSESSMENT & PLAN NOTE
· Recently done in February at [address]  · No active chest pain  · Continue aspirin, statin, beta-blockers

## 2023-07-14 NOTE — PLAN OF CARE
Problem: PAIN - ADULT  Goal: Verbalizes/displays adequate comfort level or baseline comfort level  Description: Interventions:  - Encourage patient to monitor pain and request assistance  - Assess pain using appropriate pain scale  - Administer analgesics based on type and severity of pain and evaluate response  - Implement non-pharmacological measures as appropriate and evaluate response  - Consider cultural and social influences on pain and pain management  - Notify physician/advanced practitioner if interventions unsuccessful or patient reports new pain  Outcome: Progressing     Problem: INFECTION - ADULT  Goal: Absence or prevention of progression during hospitalization  Description: INTERVENTIONS:  - Assess and monitor for signs and symptoms of infection  - Monitor lab/diagnostic results  - Monitor all insertion sites, i.e. indwelling lines, tubes, and drains  - Monitor endotracheal if appropriate and nasal secretions for changes in amount and color  - [place] appropriate cooling/warming therapies per order  - Administer medications as ordered  - Instruct and encourage patient and family to use good hand hygiene technique  - Identify and instruct in appropriate isolation precautions for identified infection/condition  Outcome: Progressing

## 2023-07-14 NOTE — PROGRESS NOTES
Progress Note - General Surgery   Diane Mathis 56 y.o. male MRN: [id number]  Unit/Bed#: -01 Encounter: [id number]    Assessment/Plan  Diane Mathis is a 56 y.o. male     Acute diverticulitis with colocolonic fistula   AVSS, WBC 5.53, Hb 13.5  +flatus/BM, no further abdominal pain, tolerated clear liquids this AM, abdomen soft, non-distended, non-tender to palpation     Advance diet as tolerated to low residue diet. Informed patient to be judicious with intake. If patient able to tolerate solid diet, ok for discharge home from surgical perspective with outpatient follow up   IV ceftriaxone/flagyl for antimicrobial coverage, transition to PO to complete course upon discharge   Serial abdominal exams  Trend labs, monitor vitals  Pain control/antiemetics PRN  SLIM primary, General Surgery will sign off at this time. Please contact if any further questions or concerns arise     Subjective/Objective    Subjective: No acute events overnight     Objective:     Blood pressure 135/82, pulse 104, temperature 98.3 °F (36.8 °C), resp. rate 18, height 5' 6" (1.676 m), weight 85 kg (187 lb 6.3 oz), SpO2 93 %. ,Body mass index is 30.25 kg/m².       Intake/Output Summary (Last 24 hours) at 7/14/2023 1041  Last data filed at 7/14/2023 0820  Gross per 24 hour   Intake 2350 ml   Output 500 ml   Net 1850 ml       Invasive Devices     Peripheral Intravenous Line  Duration           Peripheral IV 07/12/23 Left;Ventral (anterior) Forearm 1 day    Peripheral IV 07/12/23 Right;Ventral (anterior) Forearm 1 day                Physical Exam: /82   Pulse 104   Temp 98.3 °F (36.8 °C)   Resp 18   Ht 5' 6" (1.676 m)   Wt 85 kg (187 lb 6.3 oz)   SpO2 93%   BMI 30.25 kg/m²   General appearance: alert and oriented, in no acute distress  Lungs: clear to auscultation bilaterally  Heart: regular rate and rhythm, S1, S2 normal, no murmur, click, rub or gallop  Abdomen: soft, non-distended, non-tender to palpation, normoactive bowel sounds  Extremities: extremities normal, warm and well-perfused; no cyanosis, clubbing, or edema    Lab, Imaging and other studies:I have personally reviewed pertinent lab results.      VTE Pharmacologic Prophylaxis: Enoxaparin (Lovenox)  VTE Mechanical Prophylaxis: sequential compression device    Recent Results (from the past 36 hour(s))   Basic metabolic panel    Collection Time: 07/13/23  4:13 AM   Result Value Ref Range    Sodium 135 135 - 147 mmol/L    Potassium 3.9 3.5 - 5.3 mmol/L    Chloride 102 96 - 108 mmol/L    CO2 26 21 - 32 mmol/L    ANION GAP 7 mmol/L    BUN 12 5 - 25 mg/dL    Creatinine 0.91 0.60 - 1.30 mg/dL    Glucose 111 65 - 140 mg/dL    Calcium 9.6 8.4 - 10.2 mg/dL    eGFR 94 ml/min/1.73sq m   Magnesium    Collection Time: 07/13/23  4:13 AM   Result Value Ref Range    Magnesium 2.2 1.9 - 2.7 mg/dL   CBC (With Platelets)    Collection Time: 07/13/23  4:13 AM   Result Value Ref Range    WBC 7.02 4.31 - 10.16 Thousand/uL    RBC 5.60 3.88 - 5.62 Million/uL    Hemoglobin 15.2 12.0 - 17.0 g/dL    Hematocrit 45.6 36.5 - 49.3 %    MCV 81 (L) 82 - 98 fL    MCH 27.1 26.8 - 34.3 pg    MCHC 33.3 31.4 - 37.4 g/dL    RDW 14.0 11.6 - 15.1 %    Platelets 548 251 - 609 Thousands/uL    MPV 9.7 8.9 - 12.7 fL   CBC and differential    Collection Time: 07/14/23  5:10 AM   Result Value Ref Range    WBC 5.53 4.31 - 10.16 Thousand/uL    RBC 4.98 3.88 - 5.62 Million/uL    Hemoglobin 13.5 12.0 - 17.0 g/dL    Hematocrit 40.5 36.5 - 49.3 %    MCV 81 (L) 82 - 98 fL    MCH 27.1 26.8 - 34.3 pg    MCHC 33.3 31.4 - 37.4 g/dL    RDW 13.7 11.6 - 15.1 %    MPV 10.6 8.9 - 12.7 fL    Platelets 123 846 - 944 Thousands/uL    nRBC 0 /100 WBCs    Neutrophils Relative 58 43 - 75 %    Immat GRANS % 0 0 - 2 %    Lymphocytes Relative 27 14 - 44 %    Monocytes Relative 12 4 - 12 %    Eosinophils Relative 2 0 - 6 %    Basophils Relative 1 0 - 1 %    Neutrophils Absolute 3.15 1.85 - 7.62 Thousands/µL    Immature Grans Absolute 0.02 0.00 - 0.20 Thousand/uL    Lymphocytes Absolute 1.51 0.60 - 4.47 Thousands/µL    Monocytes Absolute 0.67 0.17 - 1.22 Thousand/µL    Eosinophils Absolute 0.13 0.00 - 0.61 Thousand/µL    Basophils Absolute 0.05 0.00 - 0.10 Thousands/µL   Basic metabolic panel    Collection Time: 07/14/23  5:10 AM   Result Value Ref Range    Sodium 137 135 - 147 mmol/L    Potassium 3.9 3.5 - 5.3 mmol/L    Chloride 103 96 - 108 mmol/L    CO2 27 21 - 32 mmol/L    ANION GAP 7 mmol/L    BUN 13 5 - 25 mg/dL    Creatinine 0.95 0.60 - 1.30 mg/dL    Glucose 87 65 - 140 mg/dL    Calcium 9.0 8.4 - 10.2 mg/dL    eGFR 89 ml/min/1.73sq m   Magnesium    Collection Time: 07/14/23  5:10 AM   Result Value Ref Range    Magnesium 2.2 1.9 - 2.7 mg/dL

## 2023-07-14 NOTE — PROGRESS NOTES
Progress Note  Chana Charles 56 y.o. male MRN: [id number]  Unit/Bed#: -01 Encounter: [id number]    Subjective:  Patient sitting up in the chair reporting significant improvement in symptoms. Patient reports his abdominal pain is gone. He did tolerate a clear liquid diet. He has had multiple bowel movements this morning. Objective:     Vitals:   Vitals:    07/14/23 0717   BP: 135/82   Pulse:    Resp: 18   Temp: 98.3 °F (36.8 °C)   SpO2:    ,Body mass index is 30.25 kg/m².       Intake/Output Summary (Last 24 hours) at 7/14/2023 1213  Last data filed at 7/14/2023 0820  Gross per 24 hour   Intake 2350 ml   Output 500 ml   Net 1850 ml       Physical Exam:     General Appearance: Alert, appears stated age and cooperative  Lungs: Clear to auscultation bilaterally, no rales or rhonchi, no labored breathing/accessory muscle use  Heart: Regular rate and rhythm, S1, S2 normal, no murmur, click, rub or gallop  Abdomen: Soft, non-tender, non-distended; bowel sounds normal; no masses or no organomegaly  Extremities: No cyanosis, edema    Invasive Devices     Peripheral Intravenous Line  Duration           Peripheral IV 07/12/23 Left;Ventral (anterior) Forearm 1 day    Peripheral IV 07/12/23 Right;Ventral (anterior) Forearm 1 day                Lab Results:  Admission on 07/12/2023   Component Date Value   • WBC 07/12/2023 6.23    • RBC 07/12/2023 5.65 (H)    • Hemoglobin 07/12/2023 15.3    • Hematocrit 07/12/2023 45.7    • MCV 07/12/2023 81 (L)    • MCH 07/12/2023 27.1    • MCHC 07/12/2023 33.5    • RDW 07/12/2023 13.8    • MPV 07/12/2023 10.0    • Platelets 08/45/4511 217    • nRBC 07/12/2023 0    • Neutrophils Relative 07/12/2023 60    • Immat GRANS % 07/12/2023 1    • Lymphocytes Relative 07/12/2023 24    • Monocytes Relative 07/12/2023 11    • Eosinophils Relative 07/12/2023 3    • Basophils Relative 07/12/2023 1    • Neutrophils Absolute 07/12/2023 3.80    • Immature Grans Absolute 07/12/2023 0.03    • Lymphocytes Absolute 07/12/2023 1.49    • Monocytes Absolute 07/12/2023 0.68    • Eosinophils Absolute 07/12/2023 0.17    • Basophils Absolute 07/12/2023 0.06    • Sodium 07/12/2023 136    • Potassium 07/12/2023 4.3    • Chloride 07/12/2023 102    • CO2 07/12/2023 27    • ANION GAP 07/12/2023 7    • BUN 07/12/2023 12    • Creatinine 07/12/2023 0.92    • Glucose 07/12/2023 91    • Calcium 07/12/2023 10.1    • AST 07/12/2023 15    • ALT 07/12/2023 25    • Alkaline Phosphatase 07/12/2023 65    • Total Protein 07/12/2023 8.1    • Albumin 07/12/2023 4.4    • Total Bilirubin 07/12/2023 0.32    • eGFR 07/12/2023 93    • LACTIC ACID 07/12/2023 1.1    • Procalcitonin 07/12/2023 0.06    • Protime 07/12/2023 12.5    • INR 07/12/2023 0.95    • PTT 07/12/2023 28    • Blood Culture 07/12/2023 No Growth at 24 hrs. • Blood Culture 07/12/2023 No Growth at 24 hrs.     • Color, UA 07/12/2023 Colorless    • Clarity, UA 07/12/2023 Clear    • Specific Gravity, UA 07/12/2023 1.006    • pH, UA 07/12/2023 6.0    • Leukocytes, UA 07/12/2023 Negative    • Nitrite, UA 07/12/2023 Negative    • Protein, UA 07/12/2023 Negative    • Glucose, UA 07/12/2023 Negative    • Ketones, UA 07/12/2023 Negative    • Urobilinogen, UA 07/12/2023 <2.0    • Bilirubin, UA 07/12/2023 Negative    • Occult Blood, UA 07/12/2023 Negative    • Lipase 07/12/2023 22    • Ventricular Rate 07/12/2023 111    • Atrial Rate 07/12/2023 111    • OR Interval 07/12/2023 144    • QRSD Interval 07/12/2023 90    • QT Interval 07/12/2023 318    • QTC Interval 07/12/2023 432    • P Axis 07/12/2023 61    • QRS Axis 07/12/2023 5    • T Wave Axis 07/12/2023 69    • Sodium 07/13/2023 135    • Potassium 07/13/2023 3.9    • Chloride 07/13/2023 102    • CO2 07/13/2023 26    • ANION GAP 07/13/2023 7    • BUN 07/13/2023 12    • Creatinine 07/13/2023 0.91    • Glucose 07/13/2023 111    • Calcium 07/13/2023 9.6    • eGFR 07/13/2023 94    • Magnesium 07/13/2023 2.2    • WBC 07/13/2023 7.02    • RBC 07/13/2023 5.60    • Hemoglobin 07/13/2023 15.2    • Hematocrit 07/13/2023 45.6    • MCV 07/13/2023 81 (L)    • MCH 07/13/2023 27.1    • MCHC 07/13/2023 33.3    • RDW 07/13/2023 14.0    • Platelets 92/10/2597 204    • MPV 07/13/2023 9.7    • WBC 07/14/2023 5.53    • RBC 07/14/2023 4.98    • Hemoglobin 07/14/2023 13.5    • Hematocrit 07/14/2023 40.5    • MCV 07/14/2023 81 (L)    • MCH 07/14/2023 27.1    • MCHC 07/14/2023 33.3    • RDW 07/14/2023 13.7    • MPV 07/14/2023 10.6    • Platelets 11/59/0171 199    • nRBC 07/14/2023 0    • Neutrophils Relative 07/14/2023 58    • Immat GRANS % 07/14/2023 0    • Lymphocytes Relative 07/14/2023 27    • Monocytes Relative 07/14/2023 12    • Eosinophils Relative 07/14/2023 2    • Basophils Relative 07/14/2023 1    • Neutrophils Absolute 07/14/2023 3.15    • Immature Grans Absolute 07/14/2023 0.02    • Lymphocytes Absolute 07/14/2023 1.51    • Monocytes Absolute 07/14/2023 0.67    • Eosinophils Absolute 07/14/2023 0.13    • Basophils Absolute 07/14/2023 0.05    • Sodium 07/14/2023 137    • Potassium 07/14/2023 3.9    • Chloride 07/14/2023 103    • CO2 07/14/2023 27    • ANION GAP 07/14/2023 7    • BUN 07/14/2023 13    • Creatinine 07/14/2023 0.95    • Glucose 07/14/2023 87    • Calcium 07/14/2023 9.0    • eGFR 07/14/2023 89    • Magnesium 07/14/2023 2.2        Imaging Studies:   I have personally reviewed pertinent imaging studies. CT abdomen pelvis with contrast    Result Date: 7/12/2023  Narrative: CT ABDOMEN AND PELVIS WITH IV CONTRAST INDICATION:   LLQ abdominal pain lower abd pain. COMPARISON: CT abdomen pelvis 7/5/2023. TECHNIQUE:  CT examination of the abdomen and pelvis was performed. Multiplanar 2D reformatted images were created from the source data.  This examination, like all CT scans performed in the [facility], was performed utilizing techniques to minimize radiation dose exposure, including the use of iterative reconstruction and automated exposure control. Radiation dose length product (DLP) for this visit:  647 mGy-cm IV Contrast:  100 mL of iohexol (OMNIPAQUE) Enteric Contrast:  Enteric contrast was not administered. FINDINGS: ABDOMEN LOWER CHEST: No clinically significant abnormality identified in the visualized lower chest. LIVER/BILIARY TREE:  There are one or more hepatic simple cyst(s) present. No CT evidence of suspicious solid hepatic mass. Normal hepatic contours. No biliary dilatation. GALLBLADDER:  No calcified gallstones. No pericholecystic inflammatory change. SPLEEN:  Unremarkable. PANCREAS:  Unremarkable. ADRENAL GLANDS:  Unremarkable. KIDNEYS/URETERS:  Unremarkable. No hydronephrosis. STOMACH AND BOWEL: Persistent bowel wall thickening of the distal descending colon and proximal sigmoid colon with mild pericolonic inflammatory fat stranding, consistent with persistent mild active inflammatory diverticulitis. No evidence of perforation  or intra-abdominal abscess. Stable soft tissue tract connecting the descending colon to the sigmoid colon compatible with a colocolonic fistula (series 601 images 70-71). Stable soft tissue tubular tract extending from the lateral aspect of the descending colon to the abdominal wall fascia (series 601 images 67-69). Stable soft tissue tubular gas-filled blind-ending tract from the same location along the lateral descending colon projecting superiorly terminating at the lateral conal fascia (series 601 images 73-78). No evident penetration of the transverse abdominis muscle. APPENDIX: A normal appendix was visualized. ABDOMINOPELVIC CAVITY:  No ascites. No pneumoperitoneum. No lymphadenopathy. VESSELS: Atherosclerotic changes are present. No evidence of aneurysm. PELVIS REPRODUCTIVE ORGANS:  Unremarkable for patient's age. URINARY BLADDER:  Unremarkable. ABDOMINAL WALL/INGUINAL REGIONS: There is a small fat-containing umbilical hernia.  OSSEOUS STRUCTURES:  No acute fracture or destructive osseous lesion. Impression: Persistent mild active inflammatory diverticulitis of the distal descending colon and proximal sigmoid colon. No evidence of perforation or intra-abdominal abscess. Stable xjxtzugkpr-qq-lumkvgd colocolonic fistula and tubular tracts arising from the distal descending colon. Workstation performed: [id number]     XR ribs bilateral 4+ vw w pa chest    Result Date: 7/7/2023  Narrative: BILATERAL RIBS AND CHEST INDICATION:   R07.81: Pleurodynia. Left rib pain. No injury. Roberts Pastel a pop 1 week after surgery. COMPARISON: CXR 2/24/2023 and abdomen CT 7/5/2023. VIEWS:  XR RIBS BILATERAL 4+ VW W PA CHEST DUAL ENERGY SUBTRACTION. FINDINGS: No acute displaced rib fracture or pathologic bone lesion. No pneumothorax or pleural effusion/hemothorax. Lungs clear. Soft tissues normal. Cardiomediastinal silhouette normal. CABG, AVR. Impression: No acute displaced rib fracture or pathologic bone lesion. No acute cardiopulmonary disease. Workstation performed: [id number]     CT abdomen pelvis with contrast    Result Date: 7/5/2023  Narrative: CT ABDOMEN AND PELVIS WITH IV CONTRAST INDICATION:   Left upper quadrant abdominal pain and guarding. . COMPARISON:  None. TECHNIQUE:  CT examination of the abdomen and pelvis was performed. Multiplanar 2D reformatted images were created from the source data. This examination, like all CT scans performed in the [facility], was performed utilizing techniques to minimize radiation dose exposure, including the use of iterative reconstruction and automated exposure control. Radiation dose length product (DLP) for this visit:  785 mGy-cm IV Contrast:  100 mL of iohexol (OMNIPAQUE) Enteric Contrast:  Enteric contrast was not administered. FINDINGS: ABDOMEN LOWER CHEST: Incompletely characterized aortic valvular prosthesis versus valvular calcifications. Atelectasis dependently in both lungs.  No acute findings in the visualized portions of the lower chest. LIVER/BILIARY TREE: Benign cyst at the right dome. No suspicious masses or biliary ductal dilatation. Liver size and contour are normal. GALLBLADDER:  No calcified gallstones. No pericholecystic inflammatory change. SPLEEN:  Unremarkable. PANCREAS:  Unremarkable. ADRENAL GLANDS:  Unremarkable. KIDNEYS/URETERS:  Unremarkable. No hydronephrosis. STOMACH AND BOWEL: Stomach appears normal. No dilated or inflamed loops of small bowel. Colonic diverticulosis involving the transverse, descending, and sigmoid segments. There is inflammatory stranding around the distal descending and proximal sigmoid colon. Additionally, there is a gas filled tract connecting the mesenteric side of the descending colon to the sigmoid colon compatible with colocolonic fistula (2/117-132; 601/67). There may actually be 2 distal connections of the sigmoid (2/131). There is a tract shooting laterally from the descending colon towards the abdominal wall fascia (2/114) which does not appear to penetrate into the abdominal wall musculature. From the same origin, there is a blunt ending tract projecting superiorly (601/68) which appears to terminate at the lateral conal fascia. APPENDIX: A normal appendix was visualized. ABDOMINOPELVIC CAVITY: No free air outside of the after mentioned fistula. No encapsulated collections. No ascites. No mesenteric, retroperitoneal, or pelvic sidewall lymphadenopathy. VESSELS:  Unremarkable for patient's age. PELVIS REPRODUCTIVE ORGANS:  Unremarkable for patient's age. URINARY BLADDER:  Unremarkable. ABDOMINAL WALL/INGUINAL REGIONS:  Unremarkable. OSSEOUS STRUCTURES: Lower sternotomy appears well aligned. Visualized wires are intact. No acute fracture or destructive osseous lesion. Impression: 1.   Mild active inflammatory diverticulitis in the descending and sigmoid colon superimposed on complications of chronic recurrent diverticulitis including: a) Vbrqfsaocm-fd-wybvpmq colocolonic fistula and b) Tubular tracts arising from the sigmoid projecting laterally toward the abdominal wall and superiorly toward the lateral conal fascia. No evident penetration of the transversus abdominis muscle. 2.  No acute perforation or abscess. The study was marked in [facility] for immediate notification. Workstation performed: [id number]     CT head without contrast    Result Date: 7/5/2023  Narrative: CT BRAIN - WITHOUT CONTRAST INDICATION:   Long-term refractory migraine associated with vertigo. COMPARISON: 8/5/2019. TECHNIQUE:  CT examination of the brain was performed. Multiplanar 2D reformatted images were created from the source data. Radiation dose length product (DLP) for this visit:  806 mGy-cm . This examination, like all CT scans performed in the [facility], was performed utilizing techniques to minimize radiation dose exposure, including the use of iterative reconstruction and automated exposure control. IMAGE QUALITY:  Diagnostic. FINDINGS: PARENCHYMA: Subtle, mild, patchy periventricular and subcortical white matter hypodensity consistent with chronic microangiopathic changes. No acute infarct. No parenchymal hemorrhage. No mass. VENTRICLES AND EXTRA-AXIAL SPACES:  Normal for the patient's age. VISUALIZED ORBITS: Normal visualized orbits. PARANASAL SINUSES: Normal visualized paranasal sinuses. CALVARIUM AND EXTRACRANIAL SOFT TISSUES:  Normal.     Impression: No acute intracranial abnormality. 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 Patient was unable to tolerate p.o. antibiotics. -CTAP from 7/12/2023 showed persistent mild left inflammatory diverticulitis of the distal descending colon and proximal sigmoid colon.   No evidence of perforation or intra-abdominal abscess. 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  -Patient has never had a colonoscopy in the past.  -Will advance to full liquid diet.  -Antiemetics and pain control  -Serial abdominal exams  -Continue IV antibiotics; transition to oral at time of discharge. -General surgery evaluation reviewed.  -No plans for colonoscopy at this time as patient is an acute attack of diverticulitis. We will plan several weeks. Hopeful discharge in 24 hours. Patient be seen and examined by Dr. Magali Villanueva. GI will sign off please reconsult as needed.     Jesus Alonso PA-C  7/14/2023,12:13 PM

## 2023-07-14 NOTE — ASSESSMENT & PLAN NOTE
· Patient presented with intractable nausea vomiting and inability to tolerate p.o. meds which were given to him as outpatient for diverticulitis by gastroenterology.   Patient had last CAT scan about a week ago which showed mild diverticulitis and a CAT scan done showed similar findings  · Due to patient's inability to take p.o. meds, start on IV antibiotics  · Continue ceftriaxone 1 g daily and Flagyl 500 mg IV every 8 hours  · Follow-up blood cultures-NGTD   · Gastroenterology recommendations appreciated  · Advanced diet to clear liquids   · Adequate pain control at this time  · Normal lactic and procalcitonin levels

## 2023-07-14 NOTE — CASE MANAGEMENT
Case Management Assessment & Discharge Planning Note    Patient name Ja Houston  Location [address] [facility] 425/-17 MRN [id number]  : 1965 Date 2023       Current Admission Date: 2023  Current Admission Diagnosis:Acute diverticulitis   Patient Active Problem List    Diagnosis Date Noted   • Acute diverticulitis 2023   • Intractable migraine without status migrainosus 2023   • Persistent vomiting 2023   • Primary hypertension 2023   • Encounter for postoperative care 2023   • Thrombocytopenia ([address]) 2023   • Leukocytosis 2023   • Acute postoperative anemia due to expected blood loss 2023   • Schizophrenia ([address]) 2023   • Bipolar 1 disorder ([address]) 2023   • S/P CABG (coronary artery bypass graft) 2023   • S/P AVR 2023   • Hyperlipidemia 12/15/2022   • Coronary artery disease of native heart with stable angina pectoris ([address]) 12/15/2022   • Tobacco abuse    • Marijuana use 2022   • Tobacco use 2022   • Murmur 2022      LOS (days): 2  Geometric Mean LOS (GMLOS) (days): 2.60  Days to GMLOS:0.7     OBJECTIVE:    Risk of Unplanned Readmission Score: 14.18         Current admission status: Inpatient       Preferred Pharmacy:   [facility] [address] [place] [address]  Phone: [phone] Fax: [phone]    Primary Care Provider: EDWARDO Dominguez    Primary Insurance: Carrie Callejas  Secondary Insurance:     ASSESSMENT:  Nahomi [employer]     [place], [address] Other   Primary Phone: [phone] (Mobile)               Advance Directives  Does patient have a [address]?: Yes  Does patient have Advance Directives?: Yes  Advance Directives: Power of  for health care  Primary Contact: Octavio Smith [phone]         Readmission Root Cause  30 Day Readmission: No    Patient Information  Admitted from[de-identified] Home  Mental Status: Alert  During Assessment patient was accompanied by: Not accompanied during assessment  Assessment information provided by[de-identified] Patient  Primary Caregiver: Self  Support Systems: Spouse/significant other  [place] of Residence: [address] [place] [address] do you live in?: [address] [facility] entry access options.  Select all that apply.: No steps to enter home  Type of Current Residence: Other (Comment) (Lives in a hotel)  In the last 12 months, was there a time when you were not able to pay the mortgage or rent on time?: No  In the last 12 months, how many places have you lived?: 1  In the last 12 months, was there a time when you did not have a steady place to sleep or slept in a shelter (including now)?: No  Homeless/housing insecurity resource given?: No  Living Arrangements: Lives w/ Spouse/significant other  Is patient a ?: No    Activities of Daily Living Prior to Admission  Functional Status: Independent  Completes ADLs independently?: Yes  Ambulates independently?: Yes  Does patient use assisted devices?: No  Does patient currently own DME?: Yes  What DME does the patient currently own?: [employer]  Does patient have a history of Outpatient Therapy (PT/OT)?: No  Does the patient have a history of Short-Term Rehab?: No  Does patient have a history of HHC?: Yes ([facility])  Does patient currently have [address]?: No         Patient Information Continued  Income Source: SSI/SSD  Does patient have prescription coverage?: Yes  Within the past 12 months, you worried that your food would run out before you got the money to buy more.: Never true  Within the past 12 months, the food you bought just didn't last and you didn't have money to get more.: Never true  Food insecurity resource given?: No  Does patient receive dialysis treatments?: No         Means of Transportation  Means of Transport to Appts[de-identified] Zeyad Maureen Spaulding  In the past 12 months, has lack of transportation kept you from medical appointments or from getting medications?: Yes  In the past 12 months, has lack of transportation kept you from meetings, work, or from getting things needed for daily living?: Yes  Was application for public transport provided?: N/A (Already uses public transport)        DISCHARGE DETAILS:    Discharge planning discussed with[de-identified] Pt at bedside  [place] of Choice: Yes  Comments - [place] of Choice: CM met with pt at bedside and introduced self/role. Pt is alert and oriented x3 able to make his needs known and encouaged to do so. Pt states that he is independent with all of his ADLs and IADLs. Pt has a hx with [facility] however not current with any [facility]NA. Pt is aware and encouraged to seek CM for any questions or concenrs. CM continiues to follow.   CM contacted family/caregiver?: No- see comments (Pt declined states he will update his s/o)  Were Treatment Team discharge recommendations reviewed with patient/caregiver?: Yes  Did patient/caregiver verbalize understanding of patient care needs?: Yes  Were patient/caregiver advised of the risks associated with not following Treatment Team discharge recommendations?: Yes    Contacts  Reason/Outcome: Continuity of Care, Emergency Contact, Discharge [address]         [facility] the [facility] No    DME Referral Provided  Referral made for DME?: No    Other Referral/Resources/Interventions Provided:  Interventions: None Indicated  Referral Comments: No referrals made    Would you like to participate in our [address] service program?  : No - Declined    Treatment Team Recommendation: Home  Discharge Destination Plan[de-identified] Home  Transport at Discharge : [address]

## 2023-07-14 NOTE — ASSESSMENT & PLAN NOTE
/82   Pulse 104   Temp 98.3 °F (36.8 °C)   Resp 18   Ht 5' 6" (1.676 m)   Wt 85 kg (187 lb 6.3 oz)   SpO2 93%   BMI 30.25 kg/m²     Continue metoprolol succinate 50 mg daily with holding parameters

## 2023-07-14 NOTE — PLAN OF CARE
Problem: PAIN - ADULT  Goal: Verbalizes/displays adequate comfort level or baseline comfort level  Description: Interventions:  - Encourage patient to monitor pain and request assistance  - Assess pain using appropriate pain scale  - Administer analgesics based on type and severity of pain and evaluate response  - Implement non-pharmacological measures as appropriate and evaluate response  - Consider cultural and social influences on pain and pain management  - Notify physician/advanced practitioner if interventions unsuccessful or patient reports new pain  Outcome: Progressing     Problem: INFECTION - ADULT  Goal: Absence or prevention of progression during hospitalization  Description: INTERVENTIONS:  - Assess and monitor for signs and symptoms of infection  - Monitor lab/diagnostic results  - Monitor all insertion sites, i.e. indwelling lines, tubes, and drains  - Monitor endotracheal if appropriate and nasal secretions for changes in amount and color  - Gilbert appropriate cooling/warming therapies per order  - Administer medications as ordered  - Instruct and encourage patient and family to use good hand hygiene technique  - Identify and instruct in appropriate isolation precautions for identified infection/condition  Outcome: Progressing     Problem: SAFETY ADULT  Goal: Patient will remain free of falls  Description: INTERVENTIONS:  - Educate patient/family on patient safety including physical limitations  - Instruct patient to call for assistance with activity   - Consult OT/PT to assist with strengthening/mobility   - Keep Call bell within reach  - Keep bed low and locked with side rails adjusted as appropriate  - Keep care items and personal belongings within reach  - Initiate and maintain comfort rounds  - Make Fall Risk Sign visible to staff  - Offer Toileting every 2 Hours, in advance of need  - Initiate/Maintain alarms  - Obtain necessary fall risk management equipment  - Apply yellow socks and bracelet for high fall risk patients  - Consider moving patient to room near nurses station  Outcome: Progressing

## 2023-07-14 NOTE — PROGRESS NOTES
[address]  Progress Note  Name: Heather Walsh  MRN: [id number]  Unit/Bed#: -01 I Date of Admission: 7/12/2023   Date of Service: 7/14/2023 I Hospital Day: 2    Assessment/Plan   Primary hypertension  Assessment & Plan  /82   Pulse 104   Temp 98.3 °F (36.8 °C)   Resp 18   Ht 5' 6" (1.676 m)   Wt 85 kg (187 lb 6.3 oz)   SpO2 93%   BMI 30.25 kg/m²     Continue metoprolol succinate 50 mg daily with holding parameters    S/P AVR  Assessment & Plan    · Continue aspirin beta-blockers    S/P CABG (coronary artery bypass graft)  Assessment & Plan  · Recently done in February at [facility]  · No active chest pain  · Continue aspirin, statin, beta-blockers    * Acute diverticulitis  Assessment & Plan  · Patient presented with intractable nausea vomiting and inability to tolerate p.o. meds which were given to him as outpatient for diverticulitis by gastroenterology. Patient had last CAT scan about a week ago which showed mild diverticulitis and a CAT scan done showed similar findings  · Due to patient's inability to take p.o. meds, start on IV antibiotics  · Continue ceftriaxone 1 g daily and Flagyl 500 mg IV every 8 hours  · Follow-up blood cultures-NGTD   · Gastroenterology recommendations appreciated  · Advanced diet to clear liquids   · Adequate pain control at this time  · Normal lactic and procalcitonin levels               VTE Pharmacologic Prophylaxis: VTE Score: 3 Moderate Risk (Score 3-4) - Pharmacological DVT Prophylaxis Ordered: enoxaparin (Lovenox). Patient Centered Rounds: I performed bedside rounds with nursing staff today.   Discussions with Specialists or Other Care Team Provider: yes     Education and Discussions with Family / Patient: yes     Total Time Spent on Date of Encounter in care of patient: 35 minutes This time was spent on one or more of the following: performing physical exam; counseling and coordination of care; obtaining or reviewing history; documenting in the medical record; reviewing/ordering tests, medications or procedures; communicating with other healthcare professionals and discussing with patient's family/caregivers. Current Length of Stay: 2 day(s)  Current Patient Status: Inpatient   Certification Statement: The patient will continue to require additional inpatient hospital stay due to ongoing medical manangent   Discharge Plan: Anticipate discharge in 24-48 hrs to home. Code Status: Level 1 - Full Code    Subjective:   Patient was seen this morning at bedside, he denies any abdominal pain, he was tolerating clear liquids without difficulty. No other complaints. Objective:     Vitals:   Temp (24hrs), Av.2 °F (36.8 °C), Min:97.4 °F (36.3 °C), Max:98.6 °F (37 °C)    Temp:  [97.4 °F (36.3 °C)-98.6 °F (37 °C)] 98.3 °F (36.8 °C)  HR:  [100-105] 104  Resp:  [14-20] 18  BP: (120-135)/(78-91) 135/82  SpO2:  [91 %-96 %] 93 %  Body mass index is 30.25 kg/m². Input and Output Summary (last 24 hours): Intake/Output Summary (Last 24 hours) at 2023 1121  Last data filed at 2023 0820  Gross per 24 hour   Intake 2350 ml   Output 500 ml   Net 1850 ml       Physical Exam:   Physical Exam  Vitals reviewed. Constitutional:       General: He is not in acute distress. Appearance: He is not toxic-appearing or diaphoretic. HENT:      Head: Normocephalic. Nose: Nose normal.      Mouth/Throat:      Mouth: Mucous membranes are moist.   Eyes:      General: No scleral icterus. Cardiovascular:      Rate and Rhythm: Normal rate. Pulmonary:      Breath sounds: Normal breath sounds. Abdominal:      General: There is no distension. Tenderness: There is no abdominal tenderness. Musculoskeletal:      Right lower leg: No edema. Left lower leg: No edema. Skin:     General: Skin is warm. Neurological:      Mental Status: He is alert. Mental status is at baseline.    Psychiatric:         Mood and Affect: Mood normal.         Additional Data:     Labs:  Results from last 7 days   Lab Units 07/14/23  0510   WBC Thousand/uL 5.53   HEMOGLOBIN g/dL 13.5   HEMATOCRIT % 40.5   PLATELETS Thousands/uL 199   NEUTROS PCT % 58   LYMPHS PCT % 27   MONOS PCT % 12   EOS PCT % 2     Results from last 7 days   Lab Units 07/14/23  0510 07/13/23  0413 07/12/23  1331   SODIUM mmol/L 137   < > 136   POTASSIUM mmol/L 3.9   < > 4.3   CHLORIDE mmol/L 103   < > 102   CO2 mmol/L 27   < > 27   BUN mg/dL 13   < > 12   CREATININE mg/dL 0.95   < > 0.92   ANION GAP mmol/L 7   < > 7   CALCIUM mg/dL 9.0   < > 10.1   ALBUMIN g/dL  --   --  4.4   TOTAL BILIRUBIN mg/dL  --   --  0.32   ALK PHOS U/L  --   --  65   ALT U/L  --   --  25   AST U/L  --   --  15   GLUCOSE RANDOM mg/dL 87   < > 91    < > = values in this interval not displayed. Results from last 7 days   Lab Units 07/12/23  1331   INR  0.95             Results from last 7 days   Lab Units 07/12/23  1331   LACTIC ACID mmol/L 1.1   PROCALCITONIN ng/ml 0.06       Lines/Drains:  Invasive Devices     Peripheral Intravenous Line  Duration           Peripheral IV 07/12/23 Left;Ventral (anterior) Forearm 1 day    Peripheral IV 07/12/23 Right;Ventral (anterior) Forearm 1 day                      Imaging: Reviewed radiology reports from this admission including: abdominal/pelvic CT    Recent Cultures (last 7 days):   Results from last 7 days   Lab Units 07/12/23  1336 07/12/23  1331   BLOOD CULTURE  No Growth at 24 hrs. No Growth at 24 hrs.        Last 24 Hours Medication List:   Current Facility-Administered Medications   Medication Dose Route Frequency Provider Last Rate   • acetaminophen  975 mg Oral Q8H PRN Tien Rankin MD     • aspirin  325 mg Oral Daily Tien Rankin MD     • atorvastatin  40 mg Oral Daily With Dave Anderson MD     • cefTRIAXone  2,000 mg Intravenous Q24H Tien Rankin  mL/hr at 07/14/23 0522   • enoxaparin  40 mg Subcutaneous Daily Tien Rankin MD     • metoprolol succinate  50 mg Oral Daily Corrinne Bihari, MD     • metroNIDAZOLE  500 mg Intravenous Q8H Nubia Mendez,  mg (07/14/23 0522)   • multi-electrolyte  100 mL/hr Intravenous Continuous Corrinne Bihari,  mL/hr (07/14/23 0829)   • ondansetron  4 mg Intravenous Q6H PRN Corrinne Bihari, MD          Today, Patient Was Seen By: Marlo Stearns MD    **Please Note: This note may have been constructed using a voice recognition system. **

## 2023-07-15 VITALS
HEART RATE: 98 BPM | DIASTOLIC BLOOD PRESSURE: 95 MMHG | OXYGEN SATURATION: 90 % | HEIGHT: 66 IN | TEMPERATURE: 97.5 F | BODY MASS INDEX: 30.12 KG/M2 | SYSTOLIC BLOOD PRESSURE: 143 MMHG | RESPIRATION RATE: 16 BRPM | WEIGHT: 187.39 LBS

## 2023-07-15 PROCEDURE — 99239 HOSP IP/OBS DSCHRG MGMT >30: CPT | Performed by: FAMILY MEDICINE

## 2023-07-15 RX ORDER — CEPHALEXIN 500 MG/1
500 CAPSULE ORAL EVERY 6 HOURS SCHEDULED
Qty: 44 CAPSULE | Refills: 0 | Status: SHIPPED | OUTPATIENT
Start: 2023-07-15 | End: 2023-07-26

## 2023-07-15 RX ORDER — METRONIDAZOLE 500 MG/1
500 TABLET ORAL EVERY 8 HOURS SCHEDULED
Qty: 33 TABLET | Refills: 0 | Status: SHIPPED | OUTPATIENT
Start: 2023-07-15 | End: 2023-07-26

## 2023-07-15 RX ORDER — ACETAMINOPHEN 325 MG/1
650 TABLET ORAL EVERY 6 HOURS PRN
Qty: 180 TABLET | Refills: 0 | Status: SHIPPED | OUTPATIENT
Start: 2023-07-15

## 2023-07-15 RX ORDER — SACCHAROMYCES BOULARDII 250 MG
250 CAPSULE ORAL 2 TIMES DAILY
Qty: 20 CAPSULE | Refills: 0 | Status: SHIPPED | OUTPATIENT
Start: 2023-07-15 | End: 2023-07-25

## 2023-07-15 RX ADMIN — METRONIDAZOLE 500 MG: 500 INJECTION, SOLUTION INTRAVENOUS at 05:45

## 2023-07-15 RX ADMIN — ASPIRIN 325 MG ORAL TABLET 325 MG: 325 PILL ORAL at 09:49

## 2023-07-15 RX ADMIN — SODIUM CHLORIDE, SODIUM GLUCONATE, SODIUM ACETATE, POTASSIUM CHLORIDE, MAGNESIUM CHLORIDE, SODIUM PHOSPHATE, DIBASIC, AND POTASSIUM PHOSPHATE 75 ML/HR: .53; .5; .37; .037; .03; .012; .00082 INJECTION, SOLUTION INTRAVENOUS at 01:32

## 2023-07-15 RX ADMIN — ENOXAPARIN SODIUM 40 MG: 40 INJECTION SUBCUTANEOUS at 09:49

## 2023-07-15 RX ADMIN — METOPROLOL SUCCINATE 50 MG: 50 TABLET, EXTENDED RELEASE ORAL at 09:49

## 2023-07-15 NOTE — PLAN OF CARE
Problem: PAIN - ADULT  Goal: Verbalizes/displays adequate comfort level or baseline comfort level  Description: Interventions:  - Encourage patient to monitor pain and request assistance  - Assess pain using appropriate pain scale  - Administer analgesics based on type and severity of pain and evaluate response  - Implement non-pharmacological measures as appropriate and evaluate response  - Consider cultural and social influences on pain and pain management  - Notify physician/advanced practitioner if interventions unsuccessful or patient reports new pain  Outcome: Progressing     Problem: SAFETY ADULT  Goal: Patient will remain free of falls  Description: INTERVENTIONS:  - Educate patient/family on patient safety including physical limitations  - Instruct patient to call for assistance with activity   - Consult OT/PT to assist with strengthening/mobility   - Keep Call bell within reach  - Keep bed low and locked with side rails adjusted as appropriate  - Keep care items and personal belongings within reach  - Initiate and maintain comfort rounds  - Make Fall Risk Sign visible to staff  - Offer Toileting every 2 Hours, in advance of need  - Initiate/Maintain bedalarm  - Obtain necessary fall risk management equipment:   - Apply yellow socks and bracelet for high fall risk patients  - Consider moving patient to room near nurses station  Outcome: Progressing     Problem: DISCHARGE PLANNING  Goal: Discharge to home or other facility with appropriate resources  Description: INTERVENTIONS:  - Identify barriers to discharge w/patient and caregiver  - Arrange for needed discharge resources and transportation as appropriate  - Identify discharge learning needs (meds, wound care, etc.)  - Arrange for interpretive services to assist at discharge as needed  - Refer to Case Management Department for coordinating discharge planning if the patient needs post-hospital services based on physician/advanced practitioner order or complex needs related to functional status, cognitive ability, or social support system  Outcome: Progressing     Problem: Knowledge Deficit  Goal: Patient/family/caregiver demonstrates understanding of disease process, treatment plan, medications, and discharge instructions  Description: Complete learning assessment and assess knowledge base. Interventions:  - Provide teaching at level of understanding  - Provide teaching via preferred learning methods  Outcome: Progressing     Problem: Nutrition/Hydration-ADULT  Goal: Nutrient/Hydration intake appropriate for improving, restoring or maintaining nutritional needs  Description: Monitor and assess patient's nutrition/hydration status for malnutrition. Collaborate with interdisciplinary team and initiate plan and interventions as ordered. Monitor patient's weight and dietary intake as ordered or per policy. Utilize nutrition screening tool and intervene as necessary. Determine patient's food preferences and provide high-protein, high-caloric foods as appropriate.      INTERVENTIONS:  - Monitor oral intake, urinary output, labs, and treatment plans  - Assess nutrition and hydration status and recommend course of action  - Evaluate amount of meals eaten  - Assist patient with eating if necessary   - Allow adequate time for meals  - Recommend/ encourage appropriate diets, oral nutritional supplements, and vitamin/mineral supplements  - Order, calculate, and assess calorie counts as needed  - Recommend, monitor, and adjust tube feedings and TPN/PPN based on assessed needs  - Assess need for intravenous fluids  - Provide specific nutrition/hydration education as appropriate  - Include patient/family/caregiver in decisions related to nutrition  Outcome: Progressing

## 2023-07-15 NOTE — PLAN OF CARE
Problem: PAIN - ADULT  Goal: Verbalizes/displays adequate comfort level or baseline comfort level  Description: Interventions:  - Encourage patient to monitor pain and request assistance  - Assess pain using appropriate pain scale  - Administer analgesics based on type and severity of pain and evaluate response  - Implement non-pharmacological measures as appropriate and evaluate response  - Consider cultural and social influences on pain and pain management  - Notify physician/advanced practitioner if interventions unsuccessful or patient reports new pain  Outcome: Progressing     Problem: INFECTION - ADULT  Goal: Absence or prevention of progression during hospitalization  Description: INTERVENTIONS:  - Assess and monitor for signs and symptoms of infection  - Monitor lab/diagnostic results  - Monitor all insertion sites, i.e. indwelling lines, tubes, and drains  - Monitor endotracheal if appropriate and nasal secretions for changes in amount and color  - Higgins appropriate cooling/warming therapies per order  - Administer medications as ordered  - Instruct and encourage patient and family to use good hand hygiene technique  - Identify and instruct in appropriate isolation precautions for identified infection/condition  Outcome: Progressing  Goal: Absence of fever/infection during neutropenic period  Description: INTERVENTIONS:  - Monitor WBC    Outcome: Progressing     Problem: SAFETY ADULT  Goal: Patient will remain free of falls  Description: INTERVENTIONS:  - Educate patient/family on patient safety including physical limitations  - Instruct patient to call for assistance with activity   - Consult OT/PT to assist with strengthening/mobility   - Keep Call bell within reach  - Keep bed low and locked with side rails adjusted as appropriate  - Keep care items and personal belongings within reach  - Initiate and maintain comfort rounds  - Make Fall Risk Sign visible to staff  - Offer Toileting every  Hours, in advance of need  - Initiate/Maintain alarm  - Obtain necessary fall risk management equipment:   - Apply yellow socks and bracelet for high fall risk patients  - Consider moving patient to room near nurses station  Outcome: Progressing  Goal: Maintain or return to baseline ADL function  Description: INTERVENTIONS:  -  Assess patient's ability to carry out ADLs; assess patient's baseline for ADL function and identify physical deficits which impact ability to perform ADLs (bathing, care of mouth/teeth, toileting, grooming, dressing, etc.)  - Assess/evaluate cause of self-care deficits   - Assess range of motion  - Assess patient's mobility; develop plan if impaired  - Assess patient's need for assistive devices and provide as appropriate  - Encourage maximum independence but intervene and supervise when necessary  - Involve family in performance of ADLs  - Assess for home care needs following discharge   - Consider OT consult to assist with ADL evaluation and planning for discharge  - Provide patient education as appropriate  Outcome: Progressing  Goal: Maintains/Returns to pre admission functional level  Description: INTERVENTIONS:  - Perform BMAT or MOVE assessment daily.   - Set and communicate daily mobility goal to care team and patient/family/caregiver. - Collaborate with rehabilitation services on mobility goals if consulted  - Perform Range of Motion  times a day. - Reposition patient every  hours.   - Dangle patient  times a day  - Stand patient  times a day  - Ambulate patient  times a day  - Out of bed to chair  times a day   - Out of bed for pop times a day  - Out of bed for toileting  - Record patient progress and toleration of activity level   Outcome: Progressing     Problem: DISCHARGE PLANNING  Goal: Discharge to home or other facility with appropriate resources  Description: INTERVENTIONS:  - Identify barriers to discharge w/patient and caregiver  - Arrange for needed discharge resources and transportation as appropriate  - Identify discharge learning needs (meds, wound care, etc.)  - Arrange for interpretive services to assist at discharge as needed  - Refer to Case Management Department for coordinating discharge planning if the patient needs post-hospital services based on physician/advanced practitioner order or complex needs related to functional status, cognitive ability, or social support system  Outcome: Progressing     Problem: Knowledge Deficit  Goal: Patient/family/caregiver demonstrates understanding of disease process, treatment plan, medications, and discharge instructions  Description: Complete learning assessment and assess knowledge base. Interventions:  - Provide teaching at level of understanding  - Provide teaching via preferred learning methods  Outcome: Progressing     Problem: Nutrition/Hydration-ADULT  Goal: Nutrient/Hydration intake appropriate for improving, restoring or maintaining nutritional needs  Description: Monitor and assess patient's nutrition/hydration status for malnutrition. Collaborate with interdisciplinary team and initiate plan and interventions as ordered. Monitor patient's weight and dietary intake as ordered or per policy. Utilize nutrition screening tool and intervene as necessary. Determine patient's food preferences and provide high-protein, high-caloric foods as appropriate.      INTERVENTIONS:  - Monitor oral intake, urinary output, labs, and treatment plans  - Assess nutrition and hydration status and recommend course of action  - Evaluate amount of meals eaten  - Assist patient with eating if necessary   - Allow adequate time for meals  - Recommend/ encourage appropriate diets, oral nutritional supplements, and vitamin/mineral supplements  - Order, calculate, and assess calorie counts as needed  - Recommend, monitor, and adjust tube feedings and TPN/PPN based on assessed needs  - Assess need for intravenous fluids  - Provide specific nutrition/hydration education as appropriate  - Include patient/family/caregiver in decisions related to nutrition  Outcome: Progressing

## 2023-07-15 NOTE — ASSESSMENT & PLAN NOTE
C/c : presented with intractable nausea vomiting and inability to tolerate p.o. meds which were given to him as outpatient for diverticulitis by gastroenterology. Patient had last CAT scan about a week ago which showed mild diverticulitis and a CAT scan done showed similar findings  · Due to patient's inability to take p.o. meds, start on IV antibiotics  · Continue ceftriaxone 1 g daily and Flagyl 500 mg IV every 8 hours -> transition to oral meds to complete 14-day course. · Follow-up blood cultures-NGTD   · Gastroenterology signed off. · Tolerated full liquids, continue same and transition to soft diet at home.   · Adequate pain control at this time  · Normal lactic and procalcitonin levels

## 2023-07-15 NOTE — NURSING NOTE
Attempted AM blood draw; unable to obtain at this time.  Due to pain at attempt site, allowing pt rest.

## 2023-07-15 NOTE — DISCHARGE SUMMARY
[address]  Discharge- [facility] 1965, 56 y.o. male MRN: [id number]  Unit/Bed#: -[facility] Encounter: [id number]  Primary Care Provider: EDWARDO Peres   Date and time admitted to hospital: 7/12/2023 12:42 PM    * Acute diverticulitis  Assessment & Plan  C/c : presented with intractable nausea vomiting and inability to tolerate p.o. meds which were given to him as outpatient for diverticulitis by gastroenterology. Patient had last CAT scan about a week ago which showed mild diverticulitis and a CAT scan done showed similar findings  · Due to patient's inability to take p.o. meds, start on IV antibiotics  · Continue ceftriaxone 1 g daily and Flagyl 500 mg IV every 8 hours -> transition to oral meds to complete 14-day course. · Follow-up blood cultures-NGTD   · Gastroenterology signed off. · Tolerated full liquids, continue same and transition to soft diet at home. · Adequate pain control at this time  · Normal lactic and procalcitonin levels    S/P CABG (coronary artery bypass graft)  Assessment & Plan  · Recently done in February at [address] [place] [address]  · No active chest pain  · Continue aspirin, statin, beta-blockers    S/P AVR  Assessment & Plan    · Continue aspirin beta-blockers    Primary hypertension  Assessment & Plan  /95   Pulse 98   Temp 97.5 °F (36.4 °C)   Resp 16   Ht 5' 6" (1.676 m)   Wt 85 kg (187 lb 6.3 oz)   SpO2 90%   BMI 30.25 kg/m²     Continue metoprolol succinate 50 mg daily       Discharging Physician / Practitioner: Joy Bright MD  PCP: Luiz Fontana, [facility]  Admission Date:   Admission Orders (From admission, onward)     Ordered        07/12/23 1833  Inpatient Admission  Once                      Discharge Date: 07/15/23    Disposition:      · home    Reason for Admission: abdominal pain    Discharge Diagnoses:     Please see assessment and plan section above for further details regarding discharge diagnoses.      Medical Problems Resolved Problems  Date Reviewed: 7/14/2023   None         Consultations During Hospital Stay:  · GI    Medication Adjustments and Discharge Medications:  · Summary of Medication Adjustments made as a result of this hospitalization: see med rec  · Medication Dosing Tapers - Please refer to Discharge Medication List for details on any medication dosing tapers (if applicable to patient). · Medications being temporarily held (include recommended restart time): see med rec  · Discharge Medication List: See after visit summary for reconciled discharge medications. Diet Recommendations at Discharge:  Diet -        Diet Orders   (From admission, onward)             Start     Ordered    07/14/23 1028  Diet GI; Lo Fiber/Lo Residue; Full Liquid  Diet effective now        References:    Adult Nutrition Support Algorithm    RD Therapeutic Diet Order Protocol   Question Answer Comment   Diet Type GI    GI Lo Fiber/Lo Residue    Other Restriction(s): Full Liquid    RD to adjust diet per protocol? Yes        07/14/23 1027                  Hospital Course: Tavo Novak is a 56 y.o. male patient who originally presented to the hospital on 7/12/2023 with underlying history of CAD s/p CABG/history of AVR presented to the ED with symptoms of abdominal pain and inability to tolerate oral meds. He was being given oral antibiotics as outpatient for diverticulitis. · CT consistent with mild diverticulitis. Has been receiving IV ceftriaxone/Flagyl during hospitalization, being transitioned to oral meds to complete 14-day course. Blood cultures negative. Lactate normal/procalcitonin levels normal.  GI was consulted, they signed off. Patient has been tolerating full liquids without any symptoms of nausea vomiting abdominal pain. Transition to soft diet at home. · Above plan of care discussed with patient. He verbalizes understanding and is in agreement. DC home.     Condition at Discharge: fair     Discharge Day Visit / Exam:     Vitals: Blood Pressure: 143/95 (07/15/23 0714)  Pulse: 98 (07/15/23 0714)  Temperature: 97.5 °F (36.4 °C) (07/15/23 0714)  Temp Source: Oral (07/13/23 2323)  Respirations: 16 (07/15/23 0714)  Height: 5' 6" (167.6 cm) (07/13/23 2323)  Weight - Scale: 85 kg (187 lb 6.3 oz) (07/13/23 2323)  SpO2: 90 % (07/15/23 0714)  Exam:   Physical Exam  Constitutional:       General: He is not in acute distress. Appearance: He is obese. He is not toxic-appearing. HENT:      Mouth/Throat:      Mouth: Mucous membranes are moist.      Pharynx: Oropharynx is clear. Cardiovascular:      Rate and Rhythm: Normal rate and regular rhythm. Pulses: Normal pulses. Pulmonary:      Effort: Pulmonary effort is normal. No respiratory distress. Breath sounds: Normal breath sounds. Abdominal:      General: Abdomen is flat. Bowel sounds are normal.      Palpations: Abdomen is soft. Tenderness: There is no abdominal tenderness. Neurological:      General: No focal deficit present. Mental Status: He is alert and oriented to person, place, and time. Psychiatric:         Mood and Affect: Mood normal.           Goals of Care Discussions:  · Code Status at Discharge: Level 1 - Full Code      Discharge instructions/Information to patient and family:   See after visit summary section titled Discharge Instructions for information provided to patient and family. Discharge Statement:  I spent 40 minutes discharging the patient. This time was spent on the day of discharge. I had direct contact with the patient on the day of discharge. Greater than 50% of the total time was spent examining patient, answering all patient questions, arranging and discussing plan of care with patient as well as directly providing post-discharge instructions. Additional time then spent on discharge activities.     ** Please Note: This note has been constructed using a voice recognition system **

## 2023-07-15 NOTE — ASSESSMENT & PLAN NOTE
· Recently done in February at [address]  · No active chest pain  · Continue aspirin, statin, beta-blockers

## 2023-07-15 NOTE — ASSESSMENT & PLAN NOTE
/95   Pulse 98   Temp 97.5 °F (36.4 °C)   Resp 16   Ht 5' 6" (1.676 m)   Wt 85 kg (187 lb 6.3 oz)   SpO2 90%   BMI 30.25 kg/m²     Continue metoprolol succinate 50 mg daily

## 2023-07-17 ENCOUNTER — TRANSITIONAL CARE MANAGEMENT (OUTPATIENT)
Dept: FAMILY MEDICINE CLINIC | Facility: CLINIC | Age: 58
End: 2023-07-17

## 2023-07-17 DIAGNOSIS — Z71.89 COORDINATION OF COMPLEX CARE: Primary | ICD-10-CM

## 2023-07-17 LAB
BACTERIA BLD CULT: NORMAL
BACTERIA BLD CULT: NORMAL

## 2023-07-20 ENCOUNTER — OFFICE VISIT (OUTPATIENT)
Dept: GASTROENTEROLOGY | Facility: CLINIC | Age: 58
End: 2023-07-20
Payer: COMMERCIAL

## 2023-07-20 VITALS
HEART RATE: 111 BPM | TEMPERATURE: 98 F | DIASTOLIC BLOOD PRESSURE: 83 MMHG | HEIGHT: 66 IN | RESPIRATION RATE: 18 BRPM | SYSTOLIC BLOOD PRESSURE: 115 MMHG | OXYGEN SATURATION: 97 % | BODY MASS INDEX: 30.63 KG/M2 | WEIGHT: 190.6 LBS

## 2023-07-20 DIAGNOSIS — K57.32 DIVERTICULITIS OF LARGE INTESTINE WITHOUT BLEEDING, UNSPECIFIED COMPLICATION STATUS: Primary | ICD-10-CM

## 2023-07-20 DIAGNOSIS — R11.15 PERSISTENT VOMITING: ICD-10-CM

## 2023-07-20 PROCEDURE — 99213 OFFICE O/P EST LOW 20 MIN: CPT | Performed by: PHYSICIAN ASSISTANT

## 2023-07-20 RX ORDER — METOCLOPRAMIDE 10 MG/1
10 TABLET ORAL 4 TIMES DAILY
Qty: 120 TABLET | Refills: 3 | Status: SHIPPED | OUTPATIENT
Start: 2023-07-20

## 2023-07-20 NOTE — PATIENT INSTRUCTIONS
Scheduled date of EGD/colonoscopy (as of today):9/28/23  Physician performing EGD/colonoscopy:Susi  Location of EGD/colonoscopy:[place]  Desired bowel prep reviewed with patient:Josie/Miralax  Instructions reviewed with patient by:Indra gonzalez  Clearances:   none

## 2023-07-20 NOTE — PROGRESS NOTES
[facility] - Outpatient Follow-up Note  Charito Sorenson 56 y.o. male MRN: [id number]  Encounter: [id number]          ASSESSMENT AND PLAN:      1. Diverticulitis of large intestine without bleeding, unspecified complication status  2. Persistent vomiting  Complicated by 2 fistulas  He is s/p 2 day hospitalization  He is still not tolerating oral antibiotics  Zofran is not effective in controlling his vomiting  Will switch to Reglan QID  Given comprehensive list of foods that he is likely to tolerate vs not  He will need an EGD and Colonoscopy  He will f/u with surgery although I advised they may want him to see colorectal in [place] campus    His vomiting started when he stopped smoking marijuana  He is going to see about getting a medical card which is accepatable    ______________________________________________________________________    SUBJECTIVE: 42-year-old male with a recent diagnosis of complicated diverticulitis who presents for hospital follow-up. He initially presented to the office on July 12 for follow-up of an emergency room visit where he was diagnosed with diverticulitis. He presented to the emergency room because of persistent vomiting and a headache. 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 There was no abscess or perforation noted. Although he was not having significant pain he was not tolerating the oral antibiotics as every time he took them he vomited. Because of this he was sent to the emergency room for admission and IV antibiotics.   He was hospitalized for 3 days and was feeling better on the day of discharge. He was tolerating liquids. Unfortunately, when he went home and started taking oral antibiotics he began vomiting again. He reports that predictably every time he eats he will vomit within 1/2-hour. He is also not keeping down his heart medications. He is taking Zofran with minimal to no relief. He denies any hematemesis. He is still having frequent soft stools but no diarrhea. He denies any rectal bleeding. He has had no fevers or chills. He continues to complain of a persistent headache. Interestingly, he reports that all of his symptoms began after stopping smoking marijuana approximately 2 months ago. He is going to look into getting a medical marijuana card. REVIEW OF SYSTEMS IS OTHERWISE NEGATIVE. Historical Information   Past Medical History:   Diagnosis Date   • Diabetes mellitus ([address])    • Hyperlipidemia    • Hypertension    • Psychiatric disorder     bipolar   • Schizoid personality disorder [facility])    • Syncope      Past Surgical History:   Procedure Laterality Date   • CARDIAC ASCENDING AORTOGRAM N/A 10/28/2022    Procedure: Cardiac ascending aortogram;  Surgeon: Angela Pires MD;  Location: [facility]; Service: Cardiology   • CARDIAC CATHETERIZATION Left 10/28/2022    Procedure: CARDIAC RHC/LHC; Surgeon: Angela Pires MD;  Location: [facility]; Service: Cardiology   • CARDIAC CATHETERIZATION N/A 10/28/2022    Procedure: Cardiac Coronary Angiogram;  Surgeon: Angela Pires MD;  Location: [address] [place] [address] CATH LAB; Service: Cardiology   • CARDIAC CATHETERIZATION Left 10/28/2022    Procedure: Cardiac Left Ventriculogram;  Surgeon: Angela Pries MD;  Location: [facility];   Service: Cardiology   • LAPAROSCOPIC LYSIS INTESTINAL ADHESIONS     • [place] RPLCMT AORTIC VALVE OPN W/STENTLESS TISSUE VALVE N/A 2/23/2023    Procedure: CORONARY ARTERY BYPASS GRAFT (CABG) 1 VESSEL GSV-->RCA, EVH RIGHT LEG,  WITH REPLACEMENT VALVE AORTIC (AVR) 25MM INSPIRIA RESILIA TISSUE VALVE;  Surgeon: Zeynep Newell MD;  Location: [facility];  Service: Cardiac Surgery     Social History   Social History     Substance and Sexual Activity   Alcohol Use Not Currently     Social History     Substance and Sexual Activity   Drug Use Yes   • Types: Marijuana     Social History     Tobacco Use   Smoking Status Former   • Packs/day: 0.50   • Years: 45.00   • Total pack years: 22.50   • Types: Cigarettes   • Quit date: 2023   • Years since quittin.4   • Passive exposure: Current   Smokeless Tobacco Never   Tobacco Comments    Patient states that he is trying to cut down     Family History   Problem Relation Age of Onset   • Cancer Mother    • Hypertension Mother    • Diabetes Mother        Meds/Allergies       Current Outpatient Medications:   •  acetaminophen (TYLENOL) 325 mg tablet  •  aspirin 325 mg tablet  •  atorvastatin (LIPITOR) 40 mg tablet  •  cephalexin (KEFLEX) 500 mg capsule  •  metoclopramide (Reglan) 10 mg tablet  •  metoprolol succinate (TOPROL-XL) 50 mg 24 hr tablet  •  metroNIDAZOLE (FLAGYL) 500 mg tablet  •  ondansetron (ZOFRAN) 4 mg tablet  •  saccharomyces boulardii (FLORASTOR) 250 mg capsule    Allergies   Allergen Reactions   • Neomycin-Polymyxin-Hc Abdominal Pain   • Other      Pt states he is allergic to everything. States he doesn't have a list but can only take children doses of all medication             Objective     Blood pressure 115/83, pulse (!) 111, temperature 98 °F (36.7 °C), temperature source Temporal, resp. rate 18, height 5' 6" (1.676 m), weight 86.5 kg (190 lb 9.6 oz), SpO2 97 %. Body mass index is 30.76 kg/m².       PHYSICAL EXAM:      General Appearance:   Alert, cooperative, no distress   HEENT:   Normocephalic, atraumatic, anicteric.     Neck:  Supple, symmetrical, trachea midline   Lungs:   Clear to auscultation bilaterally; no rales, rhonchi or wheezing; respirations unlabored  Heart[de-identified]   Regular rate and rhythm; no murmur, rub, or gallop. Abdomen:   Soft, non-tender, non-distended; normal bowel sounds; no masses, no organomegaly    Genitalia:   Deferred    Rectal:   Deferred    Extremities:  No cyanosis, clubbing or edema    Pulses:  2+ and symmetric    Skin:  No jaundice, rashes, or lesions    Lymph nodes:  No palpable cervical lymphadenopathy        Lab Results:   No visits with results within 1 Day(s) from this visit.    Latest known visit with results is:   Admission on 07/12/2023, Discharged on 07/15/2023   Component Date Value   • WBC 07/12/2023 6.23    • RBC 07/12/2023 5.65 (H)    • Hemoglobin 07/12/2023 15.3    • Hematocrit 07/12/2023 45.7    • MCV 07/12/2023 81 (L)    • MCH 07/12/2023 27.1    • MCHC 07/12/2023 33.5    • RDW 07/12/2023 13.8    • MPV 07/12/2023 10.0    • Platelets 85/76/3269 217    • nRBC 07/12/2023 0    • Neutrophils Relative 07/12/2023 60    • Immat GRANS % 07/12/2023 1    • Lymphocytes Relative 07/12/2023 24    • Monocytes Relative 07/12/2023 11    • Eosinophils Relative 07/12/2023 3    • Basophils Relative 07/12/2023 1    • Neutrophils Absolute 07/12/2023 3.80    • Immature Grans Absolute 07/12/2023 0.03    • Lymphocytes Absolute 07/12/2023 1.49    • Monocytes Absolute 07/12/2023 0.68    • Eosinophils Absolute 07/12/2023 0.17    • Basophils Absolute 07/12/2023 0.06    • Sodium 07/12/2023 136    • Potassium 07/12/2023 4.3    • Chloride 07/12/2023 102    • CO2 07/12/2023 27    • ANION GAP 07/12/2023 7    • BUN 07/12/2023 12    • Creatinine 07/12/2023 0.92    • Glucose 07/12/2023 91    • Calcium 07/12/2023 10.1    • AST 07/12/2023 15    • ALT 07/12/2023 25    • Alkaline Phosphatase 07/12/2023 65    • Total Protein 07/12/2023 8.1    • Albumin 07/12/2023 4.4    • Total Bilirubin 07/12/2023 0.32    • eGFR 07/12/2023 93    • LACTIC ACID 07/12/2023 1.1    • Procalcitonin 07/12/2023 0.06    • Protime 07/12/2023 12.5    • INR 07/12/2023 0.95    • PTT 07/12/2023 28    • Blood Culture 07/12/2023 No Growth After 5 Days. • Blood Culture 07/12/2023 No Growth After 5 Days.     • Color, UA 07/12/2023 Colorless    • Clarity, UA 07/12/2023 Clear    • Specific Gravity, UA 07/12/2023 1.006    • pH, UA 07/12/2023 6.0    • Leukocytes, UA 07/12/2023 Negative    • Nitrite, UA 07/12/2023 Negative    • Protein, UA 07/12/2023 Negative    • Glucose, UA 07/12/2023 Negative    • Ketones, UA 07/12/2023 Negative    • Urobilinogen, UA 07/12/2023 <2.0    • Bilirubin, UA 07/12/2023 Negative    • Occult Blood, UA 07/12/2023 Negative    • Lipase 07/12/2023 22    • Ventricular Rate 07/12/2023 111    • Atrial Rate 07/12/2023 111    • CA Interval 07/12/2023 144    • QRSD Interval 07/12/2023 90    • QT Interval 07/12/2023 318    • QTC Interval 07/12/2023 432    • P Axis 07/12/2023 61    • QRS Axis 07/12/2023 5    • T Wave Axis 07/12/2023 69    • Sodium 07/13/2023 135    • Potassium 07/13/2023 3.9    • Chloride 07/13/2023 102    • CO2 07/13/2023 26    • ANION GAP 07/13/2023 7    • BUN 07/13/2023 12    • Creatinine 07/13/2023 0.91    • Glucose 07/13/2023 111    • Calcium 07/13/2023 9.6    • eGFR 07/13/2023 94    • Magnesium 07/13/2023 2.2    • WBC 07/13/2023 7.02    • RBC 07/13/2023 5.60    • Hemoglobin 07/13/2023 15.2    • Hematocrit 07/13/2023 45.6    • MCV 07/13/2023 81 (L)    • MCH 07/13/2023 27.1    • MCHC 07/13/2023 33.3    • RDW 07/13/2023 14.0    • Platelets 51/96/8594 204    • MPV 07/13/2023 9.7    • WBC 07/14/2023 5.53    • RBC 07/14/2023 4.98    • Hemoglobin 07/14/2023 13.5    • Hematocrit 07/14/2023 40.5    • MCV 07/14/2023 81 (L)    • MCH 07/14/2023 27.1    • MCHC 07/14/2023 33.3    • RDW 07/14/2023 13.7    • MPV 07/14/2023 10.6    • Platelets 25/98/0486 199    • nRBC 07/14/2023 0    • Neutrophils Relative 07/14/2023 58    • Immat GRANS % 07/14/2023 0    • Lymphocytes Relative 07/14/2023 27    • Monocytes Relative 07/14/2023 12    • Eosinophils Relative 07/14/2023 2    • Basophils Relative 07/14/2023 1    • Neutrophils Absolute 07/14/2023 3.15    • Immature Grans Absolute 07/14/2023 0.02    • Lymphocytes Absolute 07/14/2023 1.51    • Monocytes Absolute 07/14/2023 0.67    • Eosinophils Absolute 07/14/2023 0.13    • Basophils Absolute 07/14/2023 0.05    • Sodium 07/14/2023 137    • Potassium 07/14/2023 3.9    • Chloride 07/14/2023 103    • CO2 07/14/2023 27    • ANION GAP 07/14/2023 7    • BUN 07/14/2023 13    • Creatinine 07/14/2023 0.95    • Glucose 07/14/2023 87    • Calcium 07/14/2023 9.0    • eGFR 07/14/2023 89    • Magnesium 07/14/2023 2.2          Radiology Results:   CT abdomen pelvis with contrast    Result Date: 7/12/2023  Narrative: CT ABDOMEN AND PELVIS WITH IV CONTRAST INDICATION:   LLQ abdominal pain lower abd pain. COMPARISON: CT abdomen pelvis 7/5/2023. TECHNIQUE:  CT examination of the abdomen and pelvis was performed. Multiplanar 2D reformatted images were created from the source data. This examination, like all CT scans performed in the [facility], was performed utilizing techniques to minimize radiation dose exposure, including the use of iterative reconstruction and automated exposure control. Radiation dose length product (DLP) for this visit:  647 mGy-cm IV Contrast:  100 mL of iohexol (OMNIPAQUE) Enteric Contrast:  Enteric contrast was not administered. FINDINGS: ABDOMEN LOWER CHEST: No clinically significant abnormality identified in the visualized lower chest. LIVER/BILIARY TREE:  There are one or more hepatic simple cyst(s) present. No CT evidence of suspicious solid hepatic mass. Normal hepatic contours. No biliary dilatation. GALLBLADDER:  No calcified gallstones. No pericholecystic inflammatory change. SPLEEN:  Unremarkable. PANCREAS:  Unremarkable. ADRENAL GLANDS:  Unremarkable. KIDNEYS/URETERS:  Unremarkable. No hydronephrosis.  STOMACH AND BOWEL: Persistent bowel wall thickening of the distal descending colon and proximal sigmoid colon with mild pericolonic inflammatory fat stranding, consistent with persistent mild active inflammatory diverticulitis. No evidence of perforation  or intra-abdominal abscess. Stable soft tissue tract connecting the descending colon to the sigmoid colon compatible with a colocolonic fistula (series 601 images 70-71). Stable soft tissue tubular tract extending from the lateral aspect of the descending colon to the abdominal wall fascia (series 601 images 67-69). Stable soft tissue tubular gas-filled blind-ending tract from the same location along the lateral descending colon projecting superiorly terminating at the lateral conal fascia (series 601 images 73-78). No evident penetration of the transverse abdominis muscle. APPENDIX: A normal appendix was visualized. ABDOMINOPELVIC CAVITY:  No ascites. No pneumoperitoneum. No lymphadenopathy. VESSELS: Atherosclerotic changes are present. No evidence of aneurysm. PELVIS REPRODUCTIVE ORGANS:  Unremarkable for patient's age. URINARY BLADDER:  Unremarkable. ABDOMINAL WALL/INGUINAL REGIONS: There is a small fat-containing umbilical hernia. OSSEOUS STRUCTURES:  No acute fracture or destructive osseous lesion. Impression: Persistent mild active inflammatory diverticulitis of the distal descending colon and proximal sigmoid colon. No evidence of perforation or intra-abdominal abscess. Stable hkhetugecl-kk-zghjolh colocolonic fistula and tubular tracts arising from the distal descending colon. Workstation performed: [id number]     XR ribs bilateral 4+ vw w pa chest    Result Date: 7/7/2023  Narrative: BILATERAL RIBS AND CHEST INDICATION:   R07.81: Pleurodynia. Left rib pain. No injury. Mahesh Creamer a pop 1 week after surgery. COMPARISON: CXR 2/24/2023 and abdomen CT 7/5/2023. VIEWS:  XR RIBS BILATERAL 4+ VW W PA CHEST DUAL ENERGY SUBTRACTION. FINDINGS: No acute displaced rib fracture or pathologic bone lesion. No pneumothorax or pleural effusion/hemothorax. Lungs clear.  Soft tissues normal. Cardiomediastinal silhouette normal. CABG, AVR. Impression: No acute displaced rib fracture or pathologic bone lesion. No acute cardiopulmonary disease. Workstation performed: [id number]     CT abdomen pelvis with contrast    Result Date: 7/5/2023  Narrative: CT ABDOMEN AND PELVIS WITH IV CONTRAST INDICATION:   Left upper quadrant abdominal pain and guarding. . COMPARISON:  None. TECHNIQUE:  CT examination of the abdomen and pelvis was performed. Multiplanar 2D reformatted images were created from the source data. This examination, like all CT scans performed in the [place], was performed utilizing techniques to minimize radiation dose exposure, including the use of iterative reconstruction and automated exposure control. Radiation dose length product (DLP) for this visit:  785 mGy-cm IV Contrast:  100 mL of iohexol (OMNIPAQUE) Enteric Contrast:  Enteric contrast was not administered. FINDINGS: ABDOMEN LOWER CHEST: Incompletely characterized aortic valvular prosthesis versus valvular calcifications. Atelectasis dependently in both lungs. No acute findings in the visualized portions of the lower chest. LIVER/BILIARY TREE: Benign cyst at the right dome. No suspicious masses or biliary ductal dilatation. Liver size and contour are normal. GALLBLADDER:  No calcified gallstones. No pericholecystic inflammatory change. SPLEEN:  Unremarkable. PANCREAS:  Unremarkable. ADRENAL GLANDS:  Unremarkable. KIDNEYS/URETERS:  Unremarkable. No hydronephrosis. STOMACH AND BOWEL: Stomach appears normal. No dilated or inflamed loops of small bowel. Colonic diverticulosis involving the transverse, descending, and sigmoid segments. There is inflammatory stranding around the distal descending and proximal sigmoid colon. Additionally, there is a gas filled tract connecting the mesenteric side of the descending colon to the sigmoid colon compatible with colocolonic fistula (2/117-132; 601/67).  There may actually be 2 distal connections of the sigmoid (2/131). There is a tract shooting laterally from the descending colon towards the abdominal wall fascia (2/114) which does not appear to penetrate into the abdominal wall musculature. From the same origin, there is a blunt ending tract projecting superiorly (601/68) which appears to terminate at the lateral conal fascia. APPENDIX: A normal appendix was visualized. ABDOMINOPELVIC CAVITY: No free air outside of the after mentioned fistula. No encapsulated collections. No ascites. No mesenteric, retroperitoneal, or pelvic sidewall lymphadenopathy. VESSELS:  Unremarkable for patient's age. PELVIS REPRODUCTIVE ORGANS:  Unremarkable for patient's age. URINARY BLADDER:  Unremarkable. ABDOMINAL WALL/INGUINAL REGIONS:  Unremarkable. OSSEOUS STRUCTURES: Lower sternotomy appears well aligned. Visualized wires are intact. No acute fracture or destructive osseous lesion. Impression: 1. Mild active inflammatory diverticulitis in the descending and sigmoid colon superimposed on complications of chronic recurrent diverticulitis including: a) Ucijbwvdlj-cq-azykavq colocolonic fistula and b) Tubular tracts arising from the sigmoid projecting laterally toward the abdominal wall and superiorly toward the lateral conal fascia. No evident penetration of the transversus abdominis muscle. 2.  No acute perforation or abscess. The study was marked in [facility] for immediate notification. Workstation performed: [id number]     CT head without contrast    Result Date: 7/5/2023  Narrative: CT BRAIN - WITHOUT CONTRAST INDICATION:   Long-term refractory migraine associated with vertigo. COMPARISON: 8/5/2019. TECHNIQUE:  CT examination of the brain was performed. Multiplanar 2D reformatted images were created from the source data. Radiation dose length product (DLP) for this visit:  806 mGy-cm .   This examination, like all CT scans performed in the [facility], was performed utilizing techniques to minimize radiation dose exposure, including the use of iterative reconstruction and automated exposure control. IMAGE QUALITY:  Diagnostic. FINDINGS: PARENCHYMA: Subtle, mild, patchy periventricular and subcortical white matter hypodensity consistent with chronic microangiopathic changes. No acute infarct. No parenchymal hemorrhage. No mass. VENTRICLES AND EXTRA-AXIAL SPACES:  Normal for the patient's age. VISUALIZED ORBITS: Normal visualized orbits. PARANASAL SINUSES: Normal visualized paranasal sinuses. CALVARIUM AND EXTRACRANIAL SOFT TISSUES:  Normal.     Impression: No acute intracranial abnormality.  Workstation performed: [id number]

## 2023-07-26 ENCOUNTER — OFFICE VISIT (OUTPATIENT)
Dept: FAMILY MEDICINE CLINIC | Facility: CLINIC | Age: 58
End: 2023-07-26
Payer: COMMERCIAL

## 2023-07-26 VITALS
WEIGHT: 189 LBS | SYSTOLIC BLOOD PRESSURE: 118 MMHG | HEIGHT: 66 IN | BODY MASS INDEX: 30.37 KG/M2 | HEART RATE: 108 BPM | OXYGEN SATURATION: 97 % | DIASTOLIC BLOOD PRESSURE: 88 MMHG

## 2023-07-26 DIAGNOSIS — R11.15 PERSISTENT VOMITING: Primary | ICD-10-CM

## 2023-07-26 DIAGNOSIS — R73.03 PREDIABETES: ICD-10-CM

## 2023-07-26 DIAGNOSIS — K57.32 DIVERTICULITIS OF LARGE INTESTINE WITHOUT BLEEDING, UNSPECIFIED COMPLICATION STATUS: ICD-10-CM

## 2023-07-26 DIAGNOSIS — E78.2 MIXED HYPERLIPIDEMIA: ICD-10-CM

## 2023-07-26 DIAGNOSIS — E55.9 VITAMIN D INSUFFICIENCY: ICD-10-CM

## 2023-07-26 PROCEDURE — 99495 TRANSJ CARE MGMT MOD F2F 14D: CPT | Performed by: NURSE PRACTITIONER

## 2023-07-26 NOTE — ASSESSMENT & PLAN NOTE
Recent lipid panel reviewed with patient. At this time, due to persistent vomiting, is not feasible to start p.o. statins. We will have patient continue with further GI work-up. If patient is granted a medical marijuana card, and symptoms of persistent vomiting improves, will revisit initiation of statin and lipid control.

## 2023-07-26 NOTE — ASSESSMENT & PLAN NOTE
Patient continues to have persistent vomiting despite medications. Zofran was ineffective, was switched to Reglan, but notes this is also ineffective. Patient offered rectal suppository for nausea and vomiting, but is declining at this time. As per patient's significant other, is attempting to maintain hydration and smaller, more frequent meals throughout the day for nutrition. Is seeking medical marijuana card to help with symptoms, which is acceptable.

## 2023-07-26 NOTE — PROGRESS NOTES
FAMILY MEDICINE TRANSITION OF CARE OFFICE VISIT  [facility]    NAME: Antoine Henderson  AGE: 56 y.o. SEX: male  : 1965   MRN: [id number]    DATE: 2023  TIME: 12:00 PM    Assessment and Plan         Problem List Items Addressed This Visit        Digestive    Persistent vomiting - Primary     Patient continues to have persistent vomiting despite medications. Zofran was ineffective, was switched to Reglan, but notes this is also ineffective. Patient offered rectal suppository for nausea and vomiting, but is declining at this time. As per patient's significant other, is attempting to maintain hydration and smaller, more frequent meals throughout the day for nutrition. Is seeking medical marijuana card to help with symptoms, which is acceptable. Diverticulitis of large intestine without bleeding     Hospitalized  - 7/15. Was treated with IV antibiotics and sent home with PO antibiotics to finish course. Patient notes intolerance to p.o. medications at this time. Per patient, is taking antibiotics, but ends up vomiting. Rectal suppository for nausea and vomiting offered to patient, but patient declined. Patient has already had follow-up with GI, has upcoming follow-up with general surgery. To continue with follow-up appointments as scheduled. Other    Hyperlipidemia     Recent lipid panel reviewed with patient. At this time, due to persistent vomiting, is not feasible to start p.o. statins. We will have patient continue with further GI work-up. If patient is granted a medical marijuana card, and symptoms of persistent vomiting improves, will revisit initiation of statin and lipid control.          Prediabetes    Vitamin D insufficiency       Return to office in: 3 months    Transitional Care Management Review   Antoine Henderson is a 56 y.o. male here for TCM follow-up    During the TCM phone call patient stated:    TCM Call     Date and time call was made  7/17/2023  4:39 PM    Hospital care reviewed  Records reviewed    Patient was hospitialized at  [id number]    Date of Admission  07/12/23    Date of discharge  07/15/23    Diagnosis  Acute diverticulitis    Disposition  Home    Current Symptoms  Cough; Weakness    Cough Severity  Mild    Weakness severity  Moderate      TCM Call     Post hospital issues  Reduced activity    Scheduled for follow up? Yes    Patients specialists  Other (comment)    Other specialists names  Ambulatory Referral to [facility] Curiel    Other specialists contcat #  Cardiac Rehab    Did you obtain your prescribed medications  Yes    Do you need help managing your prescriptions or medications  No    I have advised the patient to call PCP with any new or worsening symptoms  KALEN John    Living Arrangements  Spouse or Significiant other    Support System  Partner    Have you fallen in the last 12 months  No    Interperter language line needed  No    Counseling  Patient    Counseling topics  patient and family education; Importance of RX compliance    Comments  TCM already scheduled for patient on 03/08/23 at 3.40 pm with Keron Harris. History of Present Illness     Patient presents to the office accompanied by significant other for transition of care visit. Was originally seen in ED on 7/5, diagnosed with diverticulitis, and given p.o. medications. Patient ended up following up with GI on 7/12 and was found that he was not tolerating p.o. medications, so was sent to the hospital for admission and IV antibiotics. Patient was hospitalized 7/12 - 7/15, given ceftriaxone and Flagyl IV. Was discharged home with p.o. medications to complete course. Patient states he takes his antibiotics, but then vomits. Patient continues with continuous vomiting. Failed Zofran and Reglan. Patient is attempting to seek medical marijuana card to alleviate multiple symptoms, which is acceptable.   Patient states he is still hydrating, denies decreased urine output. Patient significant other states she is giving him smaller, more frequent meals, tempting to get foods high in nutritional value. The following portions of the patient's history were reviewed and updated as appropriate: allergies, current medications, past family history, past medical history, past social history, past surgical history and problem list.    Review of Systems   Review of Systems   Constitutional: Negative for chills, fever and unexpected weight change. HENT: Negative for sore throat and trouble swallowing. Eyes: Negative for photophobia and visual disturbance. Respiratory: Negative for cough and shortness of breath. Cardiovascular: Negative for chest pain and palpitations. Gastrointestinal: Positive for abdominal pain, nausea and vomiting. Genitourinary: Negative for decreased urine volume, dysuria, frequency, hematuria and urgency. Musculoskeletal: Negative for arthralgias and myalgias. Skin: Negative for color change and rash. Neurological: Negative for dizziness, weakness, light-headedness and headaches. Psychiatric/Behavioral: Negative for confusion.        Active Problem List     Patient Active Problem List   Diagnosis   • Marijuana use   • Tobacco use   • Murmur   • Tobacco abuse   • Hyperlipidemia   • Coronary artery disease of native heart with stable angina pectoris (HCC)   • Schizophrenia ([address])   • Bipolar 1 disorder (HCC)   • S/P CABG (coronary artery bypass graft)   • S/P AVR   • Thrombocytopenia (HCC)   • Leukocytosis   • Acute postoperative anemia due to expected blood loss   • Encounter for postoperative care   • Intractable migraine without status migrainosus   • Persistent vomiting   • Primary hypertension   • Diverticulitis of large intestine without bleeding   • Prediabetes   • Vitamin D insufficiency       Objective   /88   Pulse (!) 108   Ht 5' 6" (1.676 m)   Wt 85.7 kg (189 lb)   SpO2 97%   BMI 30.51 kg/m² Physical Exam  Vitals reviewed. Constitutional:       General: He is not in acute distress. Appearance: Normal appearance. He is not ill-appearing. HENT:      Head: Normocephalic and atraumatic. Right Ear: Tympanic membrane, ear canal and external ear normal.      Left Ear: Tympanic membrane, ear canal and external ear normal.      Nose: Nose normal.      Mouth/Throat:      Mouth: Mucous membranes are moist.      Pharynx: Oropharynx is clear. Eyes:      Conjunctiva/sclera: Conjunctivae normal.      Pupils: Pupils are equal, round, and reactive to light. Cardiovascular:      Rate and Rhythm: Normal rate and regular rhythm. Pulses: Normal pulses. Heart sounds: Normal heart sounds. Pulmonary:      Effort: Pulmonary effort is normal.      Breath sounds: Normal breath sounds. Abdominal:      General: Bowel sounds are increased. Palpations: Abdomen is soft. Tenderness: There is abdominal tenderness in the left lower quadrant. There is no guarding or rebound. Musculoskeletal:         General: Normal range of motion. Cervical back: Normal range of motion and neck supple. Skin:     General: Skin is warm and dry. Capillary Refill: Capillary refill takes less than 2 seconds. Coloration: Skin is not pale. Neurological:      General: No focal deficit present. Mental Status: He is alert and oriented to person, place, and time.    Psychiatric:         Mood and Affect: Mood normal.         Behavior: Behavior normal.         Pertinent Laboratory/Diagnostic Studies:  CBC:   Lab Results   Component Value Date/Time    WBC 5.53 07/14/2023 05:10 AM    RBC 4.98 07/14/2023 05:10 AM    HGB 13.5 07/14/2023 05:10 AM    HCT 40.5 07/14/2023 05:10 AM    MCV 81 (L) 07/14/2023 05:10 AM    MCH 27.1 07/14/2023 05:10 AM    MCHC 33.3 07/14/2023 05:10 AM    RDW 13.7 07/14/2023 05:10 AM    MPV 10.6 07/14/2023 05:10 AM     07/14/2023 05:10 AM    NRBC 0 07/14/2023 05:10 AM NEUTOPHILPCT 58 07/14/2023 05:10 AM    LYMPHOPCT 27 07/14/2023 05:10 AM    MONOPCT 12 07/14/2023 05:10 AM    EOSPCT 2 07/14/2023 05:10 AM    BASOPCT 1 07/14/2023 05:10 AM    NEUTROABS 3.15 07/14/2023 05:10 AM    LYMPHSABS 1.51 07/14/2023 05:10 AM    MONOSABS 0.67 07/14/2023 05:10 AM    EOSABS 0.13 07/14/2023 05:10 AM     Chemistry Profile:   Lab Results   Component Value Date/Time    K 3.9 07/14/2023 05:10 AM     07/14/2023 05:10 AM    CO2 27 07/14/2023 05:10 AM    CO2 28 02/23/2023 12:53 PM    BUN 13 07/14/2023 05:10 AM    CREATININE 0.95 07/14/2023 05:10 AM    GLUC 87 07/14/2023 05:10 AM    GLUF 118 (H) 07/05/2023 09:37 AM    GLUCOSE 119 02/23/2023 12:53 PM    CALCIUM 9.0 07/14/2023 05:10 AM    MG 2.2 07/14/2023 05:10 AM    AST 15 07/12/2023 01:31 PM    ALT 25 07/12/2023 01:31 PM    ALKPHOS 65 07/12/2023 01:31 PM    EGFR 89 07/14/2023 05:10 AM     Coagulation Studies:   Lab Results   Component Value Date/Time    PROTIME 12.5 07/12/2023 01:31 PM    INR 0.95 07/12/2023 01:31 PM    PTT 28 07/12/2023 01:31 PM     Hepatology:   Lab Results   Component Value Date/Time    LIPASE 22 07/12/2023 01:31 PM     Endocrine Studies:   Lab Results   Component Value Date/Time    HGBA1C 5.7 (H) 07/05/2023 09:37 AM    PUY2NYWJEOBI 2.074 07/05/2023 09:37 AM    TRIG 349 (H) 07/05/2023 09:37 AM    CHOLESTEROL 259 (H) 07/05/2023 09:37 AM    HDL 46 07/05/2023 09:37 AM    LDLCALC 143 (H) 07/05/2023 09:37 AM    NONHDLC 213 07/05/2023 09:37 AM    [id number] 23.3 (L) 07/05/2023 09:37 AM     Iron Studies: No results found for: "LABIRON", "IRON", "TIBC", "FERRITIN"  Health Maintenance:   Lab Results   Component Value Date/Time    HEPCAB Non-reactive 08/29/2022 02:30 PM     Urinalysis:   Lab Results   Component Value Date/Time    COLORU Colorless 07/12/2023 02:46 PM    CLARITYU Clear 07/12/2023 02:46 PM    SPECGRAV 1.006 07/12/2023 02:46 PM    PHUR 6.0 07/12/2023 02:46 PM    LEUKOCYTESUR Negative 07/12/2023 02:46 PM    NITRITE Negative 07/12/2023 02:46 PM    GLUCOSEU Negative 07/12/2023 02:46 PM    KETONESU Negative 07/12/2023 02:46 PM    BILIRUBINUR Negative 07/12/2023 02:46 PM    BLOODU Negative 07/12/2023 02:46 PM      Urine Micro:   Lab Results   Component Value Date/Time    RBCUA 1-2 03/08/2023 04:45 PM    WBCUA 2-4 (A) 03/08/2023 04:45 PM    EPIS Occasional 03/08/2023 04:45 PM    BACTERIA None Seen 03/08/2023 04:45 PM       Imaging: CT abdomen pelvis with contrast    Result Date: 7/12/2023  Narrative: CT ABDOMEN AND PELVIS WITH IV CONTRAST INDICATION:   LLQ abdominal pain lower abd pain. COMPARISON: CT abdomen pelvis 7/5/2023. TECHNIQUE:  CT examination of the abdomen and pelvis was performed. Multiplanar 2D reformatted images were created from the source data. This examination, like all CT scans performed in the [facility], was performed utilizing techniques to minimize radiation dose exposure, including the use of iterative reconstruction and automated exposure control. Radiation dose length product (DLP) for this visit:  647 mGy-cm IV Contrast:  100 mL of iohexol (OMNIPAQUE) Enteric Contrast:  Enteric contrast was not administered. FINDINGS: ABDOMEN LOWER CHEST: No clinically significant abnormality identified in the visualized lower chest. LIVER/BILIARY TREE:  There are one or more hepatic simple cyst(s) present. No CT evidence of suspicious solid hepatic mass. Normal hepatic contours. No biliary dilatation. GALLBLADDER:  No calcified gallstones. No pericholecystic inflammatory change. SPLEEN:  Unremarkable. PANCREAS:  Unremarkable. ADRENAL GLANDS:  Unremarkable. KIDNEYS/URETERS:  Unremarkable. No hydronephrosis. STOMACH AND BOWEL: Persistent bowel wall thickening of the distal descending colon and proximal sigmoid colon with mild pericolonic inflammatory fat stranding, consistent with persistent mild active inflammatory diverticulitis. No evidence of perforation  or intra-abdominal abscess.  Stable soft tissue tract connecting the descending colon to the sigmoid colon compatible with a colocolonic fistula (series 601 images 70-71). Stable soft tissue tubular tract extending from the lateral aspect of the descending colon to the abdominal wall fascia (series 601 images 67-69). Stable soft tissue tubular gas-filled blind-ending tract from the same location along the lateral descending colon projecting superiorly terminating at the lateral conal fascia (series 601 images 73-78). No evident penetration of the transverse abdominis muscle. APPENDIX: A normal appendix was visualized. ABDOMINOPELVIC CAVITY:  No ascites. No pneumoperitoneum. No lymphadenopathy. VESSELS: Atherosclerotic changes are present. No evidence of aneurysm. PELVIS REPRODUCTIVE ORGANS:  Unremarkable for patient's age. URINARY BLADDER:  Unremarkable. ABDOMINAL WALL/INGUINAL REGIONS: There is a small fat-containing umbilical hernia. OSSEOUS STRUCTURES:  No acute fracture or destructive osseous lesion. Impression: Persistent mild active inflammatory diverticulitis of the distal descending colon and proximal sigmoid colon. No evidence of perforation or intra-abdominal abscess. Stable eqepodemtt-hy-zkdfvld colocolonic fistula and tubular tracts arising from the distal descending colon. Workstation performed: [id number]     XR ribs bilateral 4+ vw w pa chest    Result Date: 7/7/2023  Narrative: BILATERAL RIBS AND CHEST INDICATION:   R07.81: Pleurodynia. Left rib pain. No injury. Wyn Nickel a pop 1 week after surgery. COMPARISON: CXR 2/24/2023 and abdomen CT 7/5/2023. VIEWS:  XR RIBS BILATERAL 4+ VW W PA CHEST DUAL ENERGY SUBTRACTION. FINDINGS: No acute displaced rib fracture or pathologic bone lesion. No pneumothorax or pleural effusion/hemothorax. Lungs clear. Soft tissues normal. Cardiomediastinal silhouette normal. CABG, AVR. Impression: No acute displaced rib fracture or pathologic bone lesion. No acute cardiopulmonary disease.  Workstation performed: [id number]     CT abdomen pelvis with contrast    Result Date: 7/5/2023  Narrative: CT ABDOMEN AND PELVIS WITH IV CONTRAST INDICATION:   Left upper quadrant abdominal pain and guarding. . COMPARISON:  None. TECHNIQUE:  CT examination of the abdomen and pelvis was performed. Multiplanar 2D reformatted images were created from the source data. This examination, like all CT scans performed in the [facility], was performed utilizing techniques to minimize radiation dose exposure, including the use of iterative reconstruction and automated exposure control. Radiation dose length product (DLP) for this visit:  785 mGy-cm IV Contrast:  100 mL of iohexol (OMNIPAQUE) Enteric Contrast:  Enteric contrast was not administered. FINDINGS: ABDOMEN LOWER CHEST: Incompletely characterized aortic valvular prosthesis versus valvular calcifications. Atelectasis dependently in both lungs. No acute findings in the visualized portions of the lower chest. LIVER/BILIARY TREE: Benign cyst at the right dome. No suspicious masses or biliary ductal dilatation. Liver size and contour are normal. GALLBLADDER:  No calcified gallstones. No pericholecystic inflammatory change. SPLEEN:  Unremarkable. PANCREAS:  Unremarkable. ADRENAL GLANDS:  Unremarkable. KIDNEYS/URETERS:  Unremarkable. No hydronephrosis. STOMACH AND BOWEL: Stomach appears normal. No dilated or inflamed loops of small bowel. Colonic diverticulosis involving the transverse, descending, and sigmoid segments. There is inflammatory stranding around the distal descending and proximal sigmoid colon. Additionally, there is a gas filled tract connecting the mesenteric side of the descending colon to the sigmoid colon compatible with colocolonic fistula (2/117-132; 601/67). There may actually be 2 distal connections of the sigmoid (2/131).  There is a tract shooting laterally from the descending colon towards the abdominal wall fascia (2/114) which does not appear to penetrate into the abdominal wall musculature. From the same origin, there is a blunt ending tract projecting superiorly (601/68) which appears to terminate at the lateral conal fascia. APPENDIX: A normal appendix was visualized. ABDOMINOPELVIC CAVITY: No free air outside of the after mentioned fistula. No encapsulated collections. No ascites. No mesenteric, retroperitoneal, or pelvic sidewall lymphadenopathy. VESSELS:  Unremarkable for patient's age. PELVIS REPRODUCTIVE ORGANS:  Unremarkable for patient's age. URINARY BLADDER:  Unremarkable. ABDOMINAL WALL/INGUINAL REGIONS:  Unremarkable. OSSEOUS STRUCTURES: Lower sternotomy appears well aligned. Visualized wires are intact. No acute fracture or destructive osseous lesion. Impression: 1. Mild active inflammatory diverticulitis in the descending and sigmoid colon superimposed on complications of chronic recurrent diverticulitis including: a) Chedzgogob-bz-qkybyny colocolonic fistula and b) Tubular tracts arising from the sigmoid projecting laterally toward the abdominal wall and superiorly toward the lateral conal fascia. No evident penetration of the transversus abdominis muscle. 2.  No acute perforation or abscess. The study was marked in [facility] for immediate notification. Workstation performed: [id number]     CT head without contrast    Result Date: 7/5/2023  Narrative: CT BRAIN - WITHOUT CONTRAST INDICATION:   Long-term refractory migraine associated with vertigo. COMPARISON: 8/5/2019. TECHNIQUE:  CT examination of the brain was performed. Multiplanar 2D reformatted images were created from the source data. Radiation dose length product (DLP) for this visit:  806 mGy-cm . This examination, like all CT scans performed in the [facility], was performed utilizing techniques to minimize radiation dose exposure, including the use of iterative reconstruction and automated exposure control. IMAGE QUALITY:  Diagnostic.  FINDINGS: PARENCHYMA: Subtle, mild, patchy periventricular and subcortical white matter hypodensity consistent with chronic microangiopathic changes. No acute infarct. No parenchymal hemorrhage. No mass. VENTRICLES AND EXTRA-AXIAL SPACES:  Normal for the patient's age. VISUALIZED ORBITS: Normal visualized orbits. PARANASAL SINUSES: Normal visualized paranasal sinuses. CALVARIUM AND EXTRACRANIAL SOFT TISSUES:  Normal.     Impression: No acute intracranial abnormality.  Workstation performed: [id number]     Current Medications     Current Outpatient Medications:   •  acetaminophen (TYLENOL) 325 mg tablet, Take 2 tablets (650 mg total) by mouth every 6 (six) hours as needed for mild pain, headaches or fever, Disp: 180 tablet, Rfl: 0  •  aspirin 325 mg tablet, Take 1 tablet (325 mg total) by mouth daily, Disp: 30 tablet, Rfl: 3  •  atorvastatin (LIPITOR) 40 mg tablet, Take 1 tablet (40 mg total) by mouth daily with dinner, Disp: 30 tablet, Rfl: 3  •  cephalexin (KEFLEX) 500 mg capsule, Take 1 capsule (500 mg total) by mouth every 6 (six) hours for 11 days, Disp: 44 capsule, Rfl: 0  •  metoclopramide (Reglan) 10 mg tablet, Take 1 tablet (10 mg total) by mouth 4 (four) times a day, Disp: 120 tablet, Rfl: 3  •  metoprolol succinate (TOPROL-XL) 50 mg 24 hr tablet, Take 1 tablet (50 mg total) by mouth daily, Disp: 30 tablet, Rfl: 3  •  metroNIDAZOLE (FLAGYL) 500 mg tablet, Take 1 tablet (500 mg total) by mouth every 8 (eight) hours for 11 days, Disp: 33 tablet, Rfl: 0  •  ondansetron (ZOFRAN) 4 mg tablet, Take 1 tablet (4 mg total) by mouth every 6 (six) hours as needed for nausea or vomiting, Disp: 12 tablet, Rfl: 0    Sneha Matute, [address] [facility]

## 2023-07-26 NOTE — ASSESSMENT & PLAN NOTE
Hospitalized 7/12 - 7/15. Was treated with IV antibiotics and sent home with PO antibiotics to finish course. Patient notes intolerance to p.o. medications at this time. Per patient, is taking antibiotics, but ends up vomiting. Rectal suppository for nausea and vomiting offered to patient, but patient declined. Patient has already had follow-up with GI, has upcoming follow-up with general surgery. To continue with follow-up appointments as scheduled.

## 2023-07-28 ENCOUNTER — PATIENT OUTREACH (OUTPATIENT)
Dept: FAMILY MEDICINE CLINIC | Facility: CLINIC | Age: 58
End: 2023-07-28

## 2023-07-28 NOTE — PROGRESS NOTES
Complex Care Management Note:  Inbasket notification for complex outpatient care management received as identified via HRR report. Patient is know to me from prior outreach attempts latest in November 2022. Chart review completed. Case does not meets screening criteria for complex care management at this time.  Referral closed and I've removed myself from care team.

## 2023-08-01 ENCOUNTER — CONSULT (OUTPATIENT)
Dept: SURGERY | Facility: CLINIC | Age: 58
End: 2023-08-01
Payer: COMMERCIAL

## 2023-08-01 VITALS
SYSTOLIC BLOOD PRESSURE: 128 MMHG | HEIGHT: 66 IN | DIASTOLIC BLOOD PRESSURE: 80 MMHG | OXYGEN SATURATION: 96 % | RESPIRATION RATE: 18 BRPM | WEIGHT: 193 LBS | BODY MASS INDEX: 31.02 KG/M2 | HEART RATE: 107 BPM | TEMPERATURE: 98.5 F

## 2023-08-01 DIAGNOSIS — F31.9 BIPOLAR 1 DISORDER (HCC): ICD-10-CM

## 2023-08-01 DIAGNOSIS — I25.118 CORONARY ARTERY DISEASE OF NATIVE ARTERY OF NATIVE HEART WITH STABLE ANGINA PECTORIS (HCC): ICD-10-CM

## 2023-08-01 DIAGNOSIS — F20.9 SCHIZOPHRENIA, UNSPECIFIED TYPE (HCC): ICD-10-CM

## 2023-08-01 DIAGNOSIS — K57.32 DIVERTICULITIS OF LARGE INTESTINE WITHOUT BLEEDING, UNSPECIFIED COMPLICATION STATUS: Primary | ICD-10-CM

## 2023-08-01 DIAGNOSIS — I10 PRIMARY HYPERTENSION: ICD-10-CM

## 2023-08-01 PROCEDURE — 99214 OFFICE O/P EST MOD 30 MIN: CPT | Performed by: SURGERY

## 2023-08-01 NOTE — PROGRESS NOTES
CT Abd/Pelvis:    Assessment/Plan:     1. Diverticulitis of large intestine without bleeding, unspecified complication status    2. Coronary artery disease of native artery of native heart with stable angina pectoris ([address])    3. Primary hypertension    4. Bipolar 1 disorder ([address])    5. Schizophrenia, unspecified type ([address])      Will need cardiac clearance preop for colon resection. I can facilitate his colonoscopy prior to the first available date with GI but he will need to keep his appointment with them for EGD. I explained that the colon resection will not cure his nausea because it does not seem to be related to his diverticulitis. Explained the risks and benefits of colonoscopy not limited to bleeding, perforation or other colon injury, reactions to medications. Consent signed. He is a bit pressured today and had a difficult time listening to the informed consent and advice offered. After his followup call to the office (see telephone encounter) I made his PCP aware of concerns. Subjective:      Patient ID: Femi Pearce is a 56 y.o. male. Triage Notes:    Mr Simi Jin is following up after a hospitalization for diverticulitis. He has had recurrent diverticulitis and at least two hospitalizations for it. He is going 2 - 4 times per day and its diarrhea. Has a lot of perianal irritation. Seeing some blood. Only using vaseline for that. Has been having nausea and vomiting for the past 3 months since discontinuing regular marijuana use. Is working on getting medical marijuana. He has seen GI.     9/28 scheduled for EGD/colonoscopy     S/p CABG in February. On asa 325mg.        The following portions of the patient's history were reviewed and updated as appropriate: allergies, current medications, past family history, past medical history, past social history, past surgical history and problem list.    Review of Systems   Constitutional: Negative for appetite change, chills, fever and unexpected weight change. HENT: Negative for hearing loss, sore throat, trouble swallowing and voice change. Eyes: Negative for visual disturbance. Respiratory: Negative for cough, chest tightness, shortness of breath and wheezing. Cardiovascular: Negative for chest pain and palpitations. Gastrointestinal: Positive for diarrhea and nausea. Negative for abdominal distention and blood in stool. Endocrine: Negative for cold intolerance and heat intolerance. Genitourinary: Negative for difficulty urinating. Skin: Negative for color change and rash. Neurological: Negative for dizziness, speech difficulty, light-headedness and numbness. Hematological: Does not bruise/bleed easily. Psychiatric/Behavioral: Positive for agitation. Negative for confusion, hallucinations and suicidal ideas. The patient is hyperactive. Objective:      /80 (BP Location: Right arm, Patient Position: Sitting, Cuff Size: Standard)   Pulse (!) 107   Temp 98.5 °F (36.9 °C)   Resp 18   Ht 5' 6" (1.676 m)   Wt 87.5 kg (193 lb)   SpO2 96%   BMI 31.15 kg/m²     Below is the patient's most recent value for Albumin, ALT, AST, BUN, Calcium, Chloride, Cholesterol, CO2, Creatinine, GFR, Glucose, HDL, Hematocrit, Hemoglobin, Hemoglobin A1C, LDL, Magnesium, Phosphorus, Platelets, Potassium, PSA, Sodium, Triglycerides, and WBC. Lab Results   Component Value Date    ALT 25 07/12/2023    AST 15 07/12/2023    BUN 13 07/14/2023    CALCIUM 9.0 07/14/2023     07/14/2023    CO2 27 07/14/2023    CREATININE 0.95 07/14/2023    HDL 46 07/05/2023    HCT 40.5 07/14/2023    HGB 13.5 07/14/2023    HGBA1C 5.7 (H) 07/05/2023    MG 2.2 07/14/2023     07/14/2023    K 3.9 07/14/2023    TRIG 349 (H) 07/05/2023    WBC 5.53 07/14/2023     Note: for a comprehensive list of the patient's lab results, access the Results Review activity. Physical Exam  Vitals reviewed. Constitutional:       General: He is not in acute distress. Appearance: Normal appearance. He is not ill-appearing. HENT:      Head: Normocephalic and atraumatic. Right Ear: Tympanic membrane, ear canal and external ear normal.      Left Ear: Tympanic membrane, ear canal and external ear normal.      Nose: Nose normal.      Mouth/Throat:      Mouth: Mucous membranes are moist.      Pharynx: Oropharynx is clear. Eyes:      Conjunctiva/sclera: Conjunctivae normal.      Pupils: Pupils are equal, round, and reactive to light. Cardiovascular:      Rate and Rhythm: Normal rate and regular rhythm. Pulses: Normal pulses. Heart sounds: Normal heart sounds. Pulmonary:      Effort: Pulmonary effort is normal.      Breath sounds: Normal breath sounds. Abdominal:      General: Bowel sounds are increased. Palpations: Abdomen is soft. Tenderness: There is abdominal tenderness in the left lower quadrant. There is no guarding or rebound. Musculoskeletal:         General: Normal range of motion. Cervical back: Normal range of motion and neck supple. Skin:     General: Skin is warm and dry. Capillary Refill: Capillary refill takes less than 2 seconds. Coloration: Skin is not pale. Neurological:      General: No focal deficit present. Mental Status: He is alert and oriented to person, place, and time. Psychiatric:         Mood and Affect: Mood normal.         Behavior: Behavior normal.      Comments: Speech appears slightly pressured.  Difficult to interrupt stream of thought             Procedures

## 2023-08-08 ENCOUNTER — TELEPHONE (OUTPATIENT)
Dept: SURGERY | Facility: CLINIC | Age: 58
End: 2023-08-08

## 2023-08-08 NOTE — TELEPHONE ENCOUNTER
Patients significant other Yanira Velasquez called leaving me a message to call back to see what we can do about scheduling his surgery sooner. I called her back and as we were discussing patients options. The patient became loud and irate yelling that if we do not get him scheduled sooner that he will commit suicide because he is tired of feeling the way he is feeling vomiting everyday, diarrhea everyday, pain and discomfort everyday. I asked him to calm down and lets see what we can do but he continued scream and yell that he will commit suicide. I spoke with Dr. Marine Caceres and this is a follow up telephone encounter to document in chart so that Dr. Marine Caceres can proceed.

## 2023-08-09 ENCOUNTER — TELEPHONE (OUTPATIENT)
Dept: FAMILY MEDICINE CLINIC | Facility: CLINIC | Age: 58
End: 2023-08-09

## 2023-08-09 NOTE — TELEPHONE ENCOUNTER
Spoke with patient's significant other Rose. Is there anything we can do at all to move his colonoscopy up sooner? Patient is in absolute distress with the constant diarrhea and throwing up. He is fed up with having to keep waiting and waiting and waiting to feel better when he needs help now. He is contemplating suicide due to the agony he is experiencing and it's worrisome. I do see that there are a few more open encounters on him from different departments if you'd like to review them. Please advise and I can call Racquel Pryor. Thank you!

## 2023-08-09 NOTE — TELEPHONE ENCOUNTER
----- Message from Roslyn Gottlieb, [address] [place] [address] sent at 8/8/2023  1:48 PM EDT -----  Good afternoon,    Can the patient be called to schedule an appointment for mental health evaluation. He made threats to hurt himself at a recent appointment with surgery and they voiced concerns. If the patient is indeed having active SI/HI, he needs ED evaluation for placement/resources.     Thank you,  Parth Guillen

## 2023-08-09 NOTE — TELEPHONE ENCOUNTER
Called patient and number on file is his significant other Tra Huertas. I left a voice mail for her to give us a call back so that we can check up on Cleo Mixon and she how he is feeling today.  I id stress it was urgent

## 2023-08-15 ENCOUNTER — OFFICE VISIT (OUTPATIENT)
Dept: FAMILY MEDICINE CLINIC | Facility: CLINIC | Age: 58
End: 2023-08-15
Payer: COMMERCIAL

## 2023-08-15 VITALS
OXYGEN SATURATION: 98 % | SYSTOLIC BLOOD PRESSURE: 140 MMHG | HEIGHT: 66 IN | WEIGHT: 195 LBS | DIASTOLIC BLOOD PRESSURE: 82 MMHG | HEART RATE: 119 BPM | BODY MASS INDEX: 31.34 KG/M2 | TEMPERATURE: 98.2 F

## 2023-08-15 DIAGNOSIS — F43.0 STRESS REACTION CAUSING MIXED DISTURBANCE OF EMOTION AND CONDUCT: ICD-10-CM

## 2023-08-15 DIAGNOSIS — Z59.9 FINANCIAL DIFFICULTIES: ICD-10-CM

## 2023-08-15 DIAGNOSIS — F06.31 DEPRESSION DUE TO PHYSICAL ILLNESS: Primary | ICD-10-CM

## 2023-08-15 DIAGNOSIS — K57.32 DIVERTICULITIS OF LARGE INTESTINE WITHOUT BLEEDING, UNSPECIFIED COMPLICATION STATUS: ICD-10-CM

## 2023-08-15 DIAGNOSIS — R11.15 PERSISTENT VOMITING: ICD-10-CM

## 2023-08-15 PROCEDURE — 99214 OFFICE O/P EST MOD 30 MIN: CPT | Performed by: NURSE PRACTITIONER

## 2023-08-15 SDOH — ECONOMIC STABILITY - INCOME SECURITY: PROBLEM RELATED TO HOUSING AND ECONOMIC CIRCUMSTANCES, UNSPECIFIED: Z59.9

## 2023-08-15 NOTE — ASSESSMENT & PLAN NOTE
Long discussion had with patient and patient's significant other regarding self-harm. Patient denies active plan of SI/HI. Has passive thoughts, but as per patient, has never and will never act on them. Patient offered referrals to psychiatry and behavioral health, but is declining at this time. Patient wishes to get resolution regarding health issues and does not feel behavioral therapy will be effective at this time. Patient contracts for safety at home. Patient and patient's significant other made aware of resources if patient presents with crisis. Advised to also go to ED for crisis evaluation.

## 2023-08-15 NOTE — PATIENT INSTRUCTIONS
Stress   AMBULATORY CARE:   Stress  is a feeling of tension or strain related to the events and pressures of everyday life. Learn to cope and control your stress to help you function in a healthy way. Stress can be caused by many different things, including any of the following:  Loss of a loved one or a job    Life events, such as having a baby, buying a house, or getting a divorce    Medical conditions, such as an acute or long-term illness or a new diagnosis    Common symptoms of too much stress include the following:   Emotional:      Crying    Anxiety or nervousness    Easily upset    Edgy, angry, or impatient    Feeling overwhelmed    Physical:      Headaches    Tiredness    Feeling restless    Sleep problems    Heartburn    Mental:      Trouble thinking clearly or making decisions    Memory loss or forgetfulness    Constant worry    Social:      Substance abuse    Overeating    Isolation or withdrawal from others    Decreased desire for sexual intimacy    Feeling bitter, resentful, or impatient with others    Call 911 for any of the following: You feel like hurting yourself or someone else. You feel you are overwhelmed and can no longer handle things by yourself. Contact your healthcare provider if:   You have trouble coping with your stress. Your symptoms cause problems in your relationships. You feel depressed. You have trouble controlling your anger. You have started to use alcohol, illegal drugs, or prescription medicines, or you increase your current use. You have questions or concerns about your condition or care. Ways to manage your stress:  Learn what causes you stress. Not all stress can be avoided. Instead, change how you cope with stress by doing any of the following:  Learn relaxation techniques, such as yoga, meditation, or listening to music. Take at least 30 minutes a day to do something you enjoy. This may include taking a bath or reading a book.     Do deep breathing exercises during times of increased stress. Sit up straight and take a slow, deep breath in through your nose. Then breathe out slowly through your mouth. Take twice as long to breathe out as you do when you breathe in. Repeat this a few times until you feel calmer or more focused. Set realistic goals for yourself. Make a list of tasks and prioritize them. Focus on one task at a time. Talk to someone about things that upset you. Talk to a trusted friend, family member, or support group. Try to stop yourself when you think negative, angry, or discouraging thoughts. Take time to exercise. Start slowly, such as walking 1 to 2 blocks each day. Stretch and relax your muscles often. Ask about the best exercise plan for you. Eat a variety of healthy foods. Healthy foods include fruits, vegetables, whole-grain breads, low-fat dairy products, beans, lean meats, and fish. Follow up with your doctor as directed:  Write down your questions so you remember to ask them during your visits. © Copyright [employer] 2022 Information is for End User's use only and may not be sold, redistributed or otherwise used for commercial purposes. The above information is an  only. It is not intended as medical advice for individual conditions or treatments. Talk to your doctor, nurse or pharmacist before following any medical regimen to see if it is safe and effective for you.

## 2023-08-15 NOTE — ASSESSMENT & PLAN NOTE
Patient has not had significant weight loss and tolerating small sips of liquids and some food. EGD and colonoscopy scheduled for 9/28. Patient is interested in having appointments moved closer, but needs cardiac clearance prior. Has cardiology appointment in 2 days.

## 2023-08-15 NOTE — PROGRESS NOTES
Name: Meme Cleaning      : 1965      MRN: [id number]  Encounter Provider: EDWARDO Chairez  Encounter Date: 8/15/2023   Encounter department: [address] [place] [address] [address]     1. Depression due to physical illness  Assessment & Plan:  Long discussion had with patient and patient's significant other regarding self-harm. Patient denies active plan of SI/HI. Has passive thoughts, but as per patient, has never and will never act on them. Patient offered referrals to psychiatry and behavioral health, but is declining at this time. Patient wishes to get resolution regarding health issues and does not feel behavioral therapy will be effective at this time. Patient contracts for safety at home. Patient and patient's significant other made aware of resources if patient presents with crisis. Advised to also go to ED for crisis evaluation. 2. Stress reaction causing mixed disturbance of emotion and conduct  Comments:  Patient voices concerns regarding health and financial issues, denies active plans of SI. Discussed self-care and continued support of friends/family    3. Financial difficulties  Comments: Will refer to social work to help assist.  Orders:  -     Ambulatory Referral to Social Work Care Management Program; Future    4. Diverticulitis of large intestine without bleeding, unspecified complication status  Assessment & Plan:  Needs cardiac clearance prior to possible surgery. Patient has appoint with cardiologist in 2 days. 5. Persistent vomiting  Assessment & Plan:  Patient has not had significant weight loss and tolerating small sips of liquids and some food. EGD and colonoscopy scheduled for . Patient is interested in having appointments moved closer, but needs cardiac clearance prior. Has cardiology appointment in 2 days.              Subjective      Patient presents to the office accompanied with significant other for evaluation regarding recent threats of self-harm. Patient states he has had multiple health and financial issues recently, and has "grown tired of it all."  Patient states he is having difficulty with affording rent, may need to find new housing. Patient states he is not been able to work since October 2022 and still never received financial support/disabilty after his initial surgery. Patient notes frustration over cyclic vomiting that has been occurring over the past 5 months. Has EGD and colonoscopy scheduled for September, but wishes to have this moved up. Patient needs cardiac clearance prior to any additional surgical procedures. Has appointment in 2 days to see cardiologist.    As per patient, notes coping mechanism for increased stress is to be verbal.  As per patient, makes comments regarding ending his life, but has never acted on this and states he will never act on it. Patient denies active plans or past attempts at his life. Patient notes he has the support of his significant other and has a daughter and 3 grandchildren to keep living for. Patient states he has had mental health issues for 20-30 years, has had BHT/psychiatry treatment in the past, but found it to be ineffective. Patient voices frustration regarding recent physical illnesses and financial/social issues. She denies visual/auditory hallucinations, homicidal ideations, impulsive acts. Review of Systems   Constitutional: Negative for chills, fever and unexpected weight change. HENT: Negative for sore throat and trouble swallowing. Eyes: Negative for photophobia and visual disturbance. Respiratory: Negative for cough and shortness of breath. Cardiovascular: Negative for chest pain and palpitations. Gastrointestinal: Positive for vomiting (recurrent). Negative for abdominal pain, blood in stool and constipation. Genitourinary: Negative for decreased urine volume.    Musculoskeletal: Negative for arthralgias and myalgias. Skin: Negative for color change and rash. Neurological: Negative for dizziness, seizures, weakness, light-headedness, numbness and headaches. Psychiatric/Behavioral: Positive for dysphoric mood and suicidal ideas (passive). Negative for agitation, confusion, hallucinations, self-injury and sleep disturbance. The patient is not nervous/anxious. Current Outpatient Medications on File Prior to Visit   Medication Sig   • acetaminophen (TYLENOL) 325 mg tablet Take 2 tablets (650 mg total) by mouth every 6 (six) hours as needed for mild pain, headaches or fever   • aspirin 325 mg tablet Take 1 tablet (325 mg total) by mouth daily   • atorvastatin (LIPITOR) 40 mg tablet Take 1 tablet (40 mg total) by mouth daily with dinner   • metoclopramide (Reglan) 10 mg tablet Take 1 tablet (10 mg total) by mouth 4 (four) times a day   • metoprolol succinate (TOPROL-XL) 50 mg 24 hr tablet Take 1 tablet (50 mg total) by mouth daily   • ondansetron (ZOFRAN) 4 mg tablet Take 1 tablet (4 mg total) by mouth every 6 (six) hours as needed for nausea or vomiting       Objective     /82 (BP Location: Left arm, Patient Position: Sitting, Cuff Size: Standard)   Pulse (!) 119   Temp 98.2 °F (36.8 °C) (Tympanic)   Ht 5' 6" (1.676 m)   Wt 88.5 kg (195 lb)   SpO2 98%   BMI 31.47 kg/m²     Physical Exam  Vitals reviewed. Constitutional:       General: He is not in acute distress. Appearance: Normal appearance. He is not ill-appearing. HENT:      Head: Normocephalic and atraumatic. Right Ear: Tympanic membrane, ear canal and external ear normal.      Left Ear: Tympanic membrane, ear canal and external ear normal.      Nose: Nose normal.      Mouth/Throat:      Mouth: Mucous membranes are moist.      Pharynx: Oropharynx is clear. Eyes:      Conjunctiva/sclera: Conjunctivae normal.      Pupils: Pupils are equal, round, and reactive to light. Cardiovascular:      Rate and Rhythm: Regular rhythm. Tachycardia present. Pulses: Normal pulses. Heart sounds: Normal heart sounds. No murmur heard. Pulmonary:      Effort: Pulmonary effort is normal.      Breath sounds: Normal breath sounds. Abdominal:      General: Bowel sounds are normal.      Palpations: Abdomen is soft. Tenderness: There is no abdominal tenderness. Musculoskeletal:         General: Normal range of motion. Cervical back: Normal range of motion and neck supple. Skin:     General: Skin is warm and dry. Capillary Refill: Capillary refill takes less than 2 seconds. Neurological:      General: No focal deficit present. Mental Status: He is alert and oriented to person, place, and time. Psychiatric:         Attention and Perception: Attention and perception normal. He is attentive. He does not perceive auditory or visual hallucinations. Mood and Affect: Mood and affect normal.         Speech: Speech normal. Speech is not rapid and pressured, delayed or tangential.         Behavior: Behavior normal. Behavior is cooperative. Thought Content: Thought content normal. Thought content is not paranoid or delusional. Thought content does not include homicidal or suicidal plan. Cognition and Memory: Cognition and memory normal.         Judgment: Judgment normal. Judgment is not impulsive or inappropriate.        [id number] [facility]

## 2023-08-17 ENCOUNTER — PATIENT OUTREACH (OUTPATIENT)
Dept: FAMILY MEDICINE CLINIC | Facility: CLINIC | Age: 58
End: 2023-08-17

## 2023-08-17 ENCOUNTER — OFFICE VISIT (OUTPATIENT)
Dept: CARDIOLOGY CLINIC | Facility: CLINIC | Age: 58
End: 2023-08-17
Payer: COMMERCIAL

## 2023-08-17 VITALS
WEIGHT: 196 LBS | OXYGEN SATURATION: 96 % | HEIGHT: 66 IN | SYSTOLIC BLOOD PRESSURE: 130 MMHG | DIASTOLIC BLOOD PRESSURE: 96 MMHG | RESPIRATION RATE: 16 BRPM | HEART RATE: 118 BPM | BODY MASS INDEX: 31.5 KG/M2

## 2023-08-17 DIAGNOSIS — I25.10 CORONARY ARTERY DISEASE INVOLVING NATIVE CORONARY ARTERY OF NATIVE HEART WITHOUT ANGINA PECTORIS: Primary | ICD-10-CM

## 2023-08-17 DIAGNOSIS — Z95.1 S/P CABG (CORONARY ARTERY BYPASS GRAFT): ICD-10-CM

## 2023-08-17 DIAGNOSIS — Z95.2 HISTORY OF AORTIC VALVE REPLACEMENT: ICD-10-CM

## 2023-08-17 PROCEDURE — 99213 OFFICE O/P EST LOW 20 MIN: CPT | Performed by: INTERNAL MEDICINE

## 2023-08-17 NOTE — PROGRESS NOTES
BONI PERRY received referral for financial concerns. BONI PERRY reviewed patient's chart. He has [employer] for [employer], and is currently living at [facility] with his S/O. Patient reports struggling with the cost of his rent, depression-however refusing [id number] [facility] assistance at time of office visit, and finances, possible looking into new housing. Patient reports not having the ability to work since October 2022 due to a disability as well. BONI PERRY reached out to patient whom is interested in further discussing with [facility], however is at a doctor's visit. He states that he will return call to [facility]. BONI PERRY informed him that if he is unable to, [facility] will f/u on Monday.

## 2023-08-17 NOTE — PROGRESS NOTES
Cardiology Follow Up    Stoney Nicole  1965  [id number]  [facility] [address] [address] [phone]    1. Coronary artery disease involving native coronary artery of native heart without angina pectoris    2. History of aortic valve replacement    3. S/P CABG (coronary artery bypass graft)          Interval History: 61-year-old patient of Dr. Radha Wong who had aortic valve replacement and coronary bypass. He was having symptoms of exertional chest heaviness and he had an exertional episode of syncope. Since then he has had no further exertional chest heaviness or syncope. Unfortunately, he is now plagued with diarrhea, constipation, and diverticulitis. Apparently surgery and colonoscopy are anticipated.     Patient Active Problem List   Diagnosis   • Marijuana use   • Tobacco use   • Murmur   • Tobacco abuse   • Hyperlipidemia   • Coronary artery disease involving native coronary artery of native heart without angina pectoris   • Schizophrenia ([address] )   • Bipolar 1 disorder (HCC)   • S/P CABG (coronary artery bypass graft)   • History of aortic valve replacement   • Thrombocytopenia (HCC)   • Leukocytosis   • Acute postoperative anemia due to expected blood loss   • Encounter for postoperative care   • Intractable migraine without status migrainosus   • Persistent vomiting   • Primary hypertension   • Diverticulitis of large intestine without bleeding   • Prediabetes   • Vitamin D insufficiency   • Depression due to physical illness     Past Medical History:   Diagnosis Date   • Diabetes mellitus ([address])    • Diverticulitis of colon    • Hyperlipidemia    • Hypertension    • Psychiatric disorder     bipolar   • 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[address])    • Syncope      Social History     Socioeconomic History   • Marital status: Single     Spouse name: Not on file   • Number of children: Not on file   • Years of education: Not on file   • Highest education level: Not on file   Occupational History   • Not on file   Tobacco Use   • Smoking status: Former     Packs/day: 0.50     Years: 45.00     Total pack years: 22.50     Types: Cigarettes     Quit date: 2023     Years since quittin.4     Passive exposure: Current   • Smokeless tobacco: Never   • Tobacco comments:     Patient states that he is trying to cut down   Vaping Use   • Vaping Use: Some days   • Substances: Nicotine   Substance and Sexual Activity   • Alcohol use: Not Currently   • Drug use: Yes     Types: Marijuana   • Sexual activity: Yes     Partners: Female   Other Topics Concern   • Not on file   Social History Narrative    Patient states he lives with his girlfriend      Social Determinants of Health     Financial Resource Strain: Not on file   Food Insecurity: No Food Insecurity (2023)    Hunger Vital Sign    • Worried About Running Out of Food in the Last Year: Never true    • Ran Out of Food in the Last Year: Never true   Transportation Needs: Unmet Transportation Needs (2023)    PRAPARE - Transportation    • Lack of Transportation (Medical): Yes    • Lack of Transportation (Non-Medical): Yes   Physical Activity: Not on file   Stress: Not on file   Social Connections: Not on file   Intimate Partner Violence: Not on file   Housing Stability: Low Risk  (2023)    Housing Stability Vital Sign    • Unable to Pay for Housing in the Last Year: No    • Number of Places Lived in the Last Year: 1    • Unstable Housing in the Last Year: No      Family History   Problem Relation Age of Onset   • Cancer Mother    • Hypertension Mother    • Diabetes Mother      Past Surgical History:   Procedure Laterality Date   • CARDIAC ASCENDING AORTOGRAM N/A 10/28/2022    Procedure: Cardiac ascending aortogram;  Surgeon: Sarah Downing MD;  Location: [address] [place] [address] CATH LAB;   Service: Cardiology   • CARDIAC CATHETERIZATION Left 10/28/2022    Procedure: CARDIAC RHC/LHC; Surgeon: Merry Vasquez MD;  [facility]; Service: Cardiology   • CARDIAC CATHETERIZATION N/A 10/28/2022    Procedure: Cardiac Coronary Angiogram;  Surgeon: Merry Vasquez MD;  Location: [address] [place] [address] CATH LAB; Service: Cardiology   • CARDIAC CATHETERIZATION Left 10/28/2022    Procedure: Cardiac Left Ventriculogram;  Surgeon: Merry Vasquez MD;  [facility];   Service: Cardiology   • LAPAROSCOPIC LYSIS INTESTINAL ADHESIONS     • KS RPLCMT AORTIC VALVE OPN W/STENTLESS TISSUE VALVE N/A 02/23/2023    Procedure: CORONARY ARTERY BYPASS GRAFT (CABG) 1 VESSEL GSV-->RCA, EVH RIGHT LEG,  WITH REPLACEMENT VALVE AORTIC (AVR) 25MM INSPIRIA RESILIA TISSUE VALVE;  Surgeon: Elliott Panchal MD;  Location: [facility];  Service: Cardiac Surgery   • TONSILECTOMY AND ADNOIDECTOMY     • WISDOM TOOTH EXTRACTION         Current Outpatient Medications:   •  acetaminophen (TYLENOL) 325 mg tablet, Take 2 tablets (650 mg total) by mouth every 6 (six) hours as needed for mild pain, headaches or fever (Patient not taking: Reported on 8/17/2023), Disp: 180 tablet, Rfl: 0  •  aspirin 325 mg tablet, Take 1 tablet (325 mg total) by mouth daily (Patient not taking: Reported on 8/17/2023), Disp: 30 tablet, Rfl: 3  •  atorvastatin (LIPITOR) 40 mg tablet, Take 1 tablet (40 mg total) by mouth daily with dinner (Patient not taking: Reported on 8/17/2023), Disp: 30 tablet, Rfl: 3  •  metoclopramide (Reglan) 10 mg tablet, Take 1 tablet (10 mg total) by mouth 4 (four) times a day (Patient not taking: Reported on 8/17/2023), Disp: 120 tablet, Rfl: 3  •  metoprolol succinate (TOPROL-XL) 50 mg 24 hr tablet, Take 1 tablet (50 mg total) by mouth daily (Patient not taking: Reported on 8/17/2023), Disp: 30 tablet, Rfl: 3  •  ondansetron (ZOFRAN) 4 mg tablet, Take 1 tablet (4 mg total) by mouth every 6 (six) hours as needed for nausea or vomiting (Patient not taking: Reported on 8/17/2023), Disp: 12 tablet, Rfl: 0  Allergies   Allergen Reactions   • Neomycin-Polymyxin-Hc Abdominal Pain   • Other      Pt states he is allergic to everything. States he doesn't have a list but can only take children doses of all medication         Labs:  Admission on 07/12/2023, Discharged on 07/15/2023   Component Date Value   • WBC 07/12/2023 6.23    • RBC 07/12/2023 5.65 (H)    • Hemoglobin 07/12/2023 15.3    • Hematocrit 07/12/2023 45.7    • MCV 07/12/2023 81 (L)    • MCH 07/12/2023 27.1    • MCHC 07/12/2023 33.5    • RDW 07/12/2023 13.8    • MPV 07/12/2023 10.0    • Platelets 07/15/4717 217    • nRBC 07/12/2023 0    • Neutrophils Relative 07/12/2023 60    • Immat GRANS % 07/12/2023 1    • Lymphocytes Relative 07/12/2023 24    • Monocytes Relative 07/12/2023 11    • Eosinophils Relative 07/12/2023 3    • Basophils Relative 07/12/2023 1    • Neutrophils Absolute 07/12/2023 3.80    • Immature Grans Absolute 07/12/2023 0.03    • Lymphocytes Absolute 07/12/2023 1.49    • Monocytes Absolute 07/12/2023 0.68    • Eosinophils Absolute 07/12/2023 0.17    • Basophils Absolute 07/12/2023 0.06    • Sodium 07/12/2023 136    • Potassium 07/12/2023 4.3    • Chloride 07/12/2023 102    • CO2 07/12/2023 27    • ANION GAP 07/12/2023 7    • BUN 07/12/2023 12    • Creatinine 07/12/2023 0.92    • Glucose 07/12/2023 91    • Calcium 07/12/2023 10.1    • AST 07/12/2023 15    • ALT 07/12/2023 25    • Alkaline Phosphatase 07/12/2023 65    • Total Protein 07/12/2023 8.1    • Albumin 07/12/2023 4.4    • Total Bilirubin 07/12/2023 0.32    • eGFR 07/12/2023 93    • LACTIC ACID 07/12/2023 1.1    • Procalcitonin 07/12/2023 0.06    • Protime 07/12/2023 12.5    • INR 07/12/2023 0.95    • PTT 07/12/2023 28    • Blood Culture 07/12/2023 No Growth After 5 Days. • Blood Culture 07/12/2023 No Growth After 5 Days.     • Color, UA 07/12/2023 Colorless    • Clarity, UA 07/12/2023 Clear    • Specific Gravity, UA 07/12/2023 1.006 • pH, UA 07/12/2023 6.0    • Leukocytes, UA 07/12/2023 Negative    • Nitrite, UA 07/12/2023 Negative    • Protein, UA 07/12/2023 Negative    • Glucose, UA 07/12/2023 Negative    • Ketones, UA 07/12/2023 Negative    • Urobilinogen, UA 07/12/2023 <2.0    • Bilirubin, UA 07/12/2023 Negative    • Occult Blood, UA 07/12/2023 Negative    • Lipase 07/12/2023 22    • Ventricular Rate 07/12/2023 111    • Atrial Rate 07/12/2023 111    • TX Interval 07/12/2023 144    • QRSD Interval 07/12/2023 90    • QT Interval 07/12/2023 318    • QTC Interval 07/12/2023 432    • P Axis 07/12/2023 61    • QRS Axis 07/12/2023 5    • T Wave Axis 07/12/2023 69    • Sodium 07/13/2023 135    • Potassium 07/13/2023 3.9    • Chloride 07/13/2023 102    • CO2 07/13/2023 26    • ANION GAP 07/13/2023 7    • BUN 07/13/2023 12    • Creatinine 07/13/2023 0.91    • Glucose 07/13/2023 111    • Calcium 07/13/2023 9.6    • eGFR 07/13/2023 94    • Magnesium 07/13/2023 2.2    • WBC 07/13/2023 7.02    • RBC 07/13/2023 5.60    • Hemoglobin 07/13/2023 15.2    • Hematocrit 07/13/2023 45.6    • MCV 07/13/2023 81 (L)    • MCH 07/13/2023 27.1    • MCHC 07/13/2023 33.3    • RDW 07/13/2023 14.0    • Platelets 37/14/6178 204    • MPV 07/13/2023 9.7    • WBC 07/14/2023 5.53    • RBC 07/14/2023 4.98    • Hemoglobin 07/14/2023 13.5    • Hematocrit 07/14/2023 40.5    • MCV 07/14/2023 81 (L)    • MCH 07/14/2023 27.1    • MCHC 07/14/2023 33.3    • RDW 07/14/2023 13.7    • MPV 07/14/2023 10.6    • Platelets 31/95/4785 199    • nRBC 07/14/2023 0    • Neutrophils Relative 07/14/2023 58    • Immat GRANS % 07/14/2023 0    • Lymphocytes Relative 07/14/2023 27    • Monocytes Relative 07/14/2023 12    • Eosinophils Relative 07/14/2023 2    • Basophils Relative 07/14/2023 1    • Neutrophils Absolute 07/14/2023 3.15    • Immature Grans Absolute 07/14/2023 0.02    • Lymphocytes Absolute 07/14/2023 1.51    • Monocytes Absolute 07/14/2023 0.67    • Eosinophils Absolute 07/14/2023 0.13 • Basophils Absolute 07/14/2023 0.05    • Sodium 07/14/2023 137    • Potassium 07/14/2023 3.9    • Chloride 07/14/2023 103    • CO2 07/14/2023 27    • ANION GAP 07/14/2023 7    • BUN 07/14/2023 13    • Creatinine 07/14/2023 0.95    • Glucose 07/14/2023 87    • Calcium 07/14/2023 9.0    • eGFR 07/14/2023 89    • Magnesium 07/14/2023 2.2    Admission on 07/05/2023, Discharged on 07/05/2023   Component Date Value   • Ventricular Rate 07/05/2023 133    • Atrial Rate 07/05/2023 133    • FL Interval 07/05/2023 130    • QRSD Interval 07/05/2023 86    • QT Interval 07/05/2023 292    • QTC Interval 07/05/2023 434    • P Axis 07/05/2023 65    • QRS Axis 07/05/2023 32    • T Wave Axis 07/05/2023 82    • WBC 07/05/2023 5.62    • RBC 07/05/2023 5.48    • Hemoglobin 07/05/2023 14.9    • Hematocrit 07/05/2023 45.2    • MCV 07/05/2023 83    • MCH 07/05/2023 27.2    • MCHC 07/05/2023 33.0    • RDW 07/05/2023 13.9    • MPV 07/05/2023 10.6    • Platelets 14/37/7253 222    • nRBC 07/05/2023 0    • Neutrophils Relative 07/05/2023 58    • Immat GRANS % 07/05/2023 0    • Lymphocytes Relative 07/05/2023 25    • Monocytes Relative 07/05/2023 13 (H)    • Eosinophils Relative 07/05/2023 3    • Basophils Relative 07/05/2023 1    • Neutrophils Absolute 07/05/2023 3.23    • Immature Grans Absolute 07/05/2023 0.02    • Lymphocytes Absolute 07/05/2023 1.42    • Monocytes Absolute 07/05/2023 0.73    • Eosinophils Absolute 07/05/2023 0.17    • Basophils Absolute 07/05/2023 0.05    • Sodium 07/05/2023 137    • Potassium 07/05/2023 4.2    • Chloride 07/05/2023 102    • CO2 07/05/2023 26    • ANION GAP 07/05/2023 9    • BUN 07/05/2023 18    • Creatinine 07/05/2023 0.99    • Glucose 07/05/2023 110    • Calcium 07/05/2023 9.7    • AST 07/05/2023 13    • ALT 07/05/2023 20    • Alkaline Phosphatase 07/05/2023 68    • Total Protein 07/05/2023 7.8    • Albumin 07/05/2023 4.1    • Total Bilirubin 07/05/2023 0.29    • eGFR 07/05/2023 85    • Lipase 07/05/2023 25    • hs TnI 0hr 07/05/2023 4    Appointment on 07/05/2023   Component Date Value   • Vit D, 25-Hydroxy 07/05/2023 23.3 (L)    • WBC 07/05/2023 5.09    • RBC 07/05/2023 5.99 (H)    • Hemoglobin 07/05/2023 16.2    • Hematocrit 07/05/2023 50.4 (H)    • MCV 07/05/2023 84    • MCH 07/05/2023 27.0    • MCHC 07/05/2023 32.1    • RDW 07/05/2023 13.8    • MPV 07/05/2023 10.1    • Platelets 04/65/5945 232    • nRBC 07/05/2023 0    • Neutrophils Relative 07/05/2023 56    • Immat GRANS % 07/05/2023 0    • Lymphocytes Relative 07/05/2023 28    • Monocytes Relative 07/05/2023 11    • Eosinophils Relative 07/05/2023 4    • Basophils Relative 07/05/2023 1    • Neutrophils Absolute 07/05/2023 2.82    • Immature Grans Absolute 07/05/2023 0.02    • Lymphocytes Absolute 07/05/2023 1.43    • Monocytes Absolute 07/05/2023 0.57    • Eosinophils Absolute 07/05/2023 0.19    • Basophils Absolute 07/05/2023 0.06    • Sodium 07/05/2023 138    • Potassium 07/05/2023 5.3    • Chloride 07/05/2023 101    • CO2 07/05/2023 30    • ANION GAP 07/05/2023 7    • BUN 07/05/2023 17    • Creatinine 07/05/2023 1.03    • Glucose, Fasting 07/05/2023 118 (H)    • Calcium 07/05/2023 10.7 (H)    • AST 07/05/2023 14    • ALT 07/05/2023 23    • Alkaline Phosphatase 07/05/2023 73    • Total Protein 07/05/2023 8.9 (H)    • Albumin 07/05/2023 4.7    • Total Bilirubin 07/05/2023 0.31    • eGFR 07/05/2023 81    • Cholesterol 07/05/2023 259 (H)    • Triglycerides 07/05/2023 349 (H)    • HDL, Direct 07/05/2023 46    • LDL Calculated 07/05/2023 143 (H)    • Non-HDL-Chol (CHOL-HDL) 07/05/2023 213    • Hemoglobin A1C 07/05/2023 5.7 (H)    • EAG 07/05/2023 117    • TSH 3RD GENERATON 07/05/2023 2.074      Imaging: No results found. Review of Systems:  Review of Systems    Physical Exam:  130/96. Heart rate 85 and regular. Lungs clear. Rhythm regular. Grade 2 short systolic ejection murmur at base. Discussion/Summary:    1.   Status post coronary bypass and aortic valve replacement    Recommendations:    1. Continue current medications  2. Patient is "cleared" for colonoscopy and possible bowel surgery  3.   Follow-up 1 year      Jasmina Winn MD

## 2023-08-22 ENCOUNTER — VBI (OUTPATIENT)
Dept: ADMINISTRATIVE | Facility: OTHER | Age: 58
End: 2023-08-22

## 2023-08-23 ENCOUNTER — PATIENT OUTREACH (OUTPATIENT)
Dept: FAMILY MEDICINE CLINIC | Facility: CLINIC | Age: 58
End: 2023-08-23

## 2023-08-23 NOTE — PROGRESS NOTES
OPCM SW attempted to f/u with patient regarding initial referral for financial concerns. Patient's number listed is his S/O's cell phone. OPCM SW left a brief message for Natasha Kelsey to return call when able.

## 2023-09-01 ENCOUNTER — PATIENT OUTREACH (OUTPATIENT)
Dept: FAMILY MEDICINE CLINIC | Facility: CLINIC | Age: 58
End: 2023-09-01

## 2023-09-01 NOTE — PROGRESS NOTES
OPCM SW attempted to f/u with patient. Patient expressed several concerns, specifically regarding housing issues. OPCM SW attempted to discuss [facility], however patient states that "they do not do anything and I would have to wait 1-2 years for them to maybe do something". OPCM SW informed patient that for permanent housing, most programs do have a wait list to this extent. OPCM SW offered to discussed shelters available. Patient increasingly became frustrated telling OPCM SW that those are only available in the winter time and that food pantries are also open in the winter time. OPCM SW attempted to discuss programs that are not only warming stations and that are available at this time, however patient felt that "this is becoming a waste of time" and prematurely ended the phone call. If patient expresses a change in interest and would like to be connected with available resources, please re-consult.

## 2023-09-28 ENCOUNTER — ANESTHESIA EVENT (OUTPATIENT)
Dept: GASTROENTEROLOGY | Facility: HOSPITAL | Age: 58
End: 2023-09-28

## 2023-09-28 ENCOUNTER — HOSPITAL ENCOUNTER (OUTPATIENT)
Dept: GASTROENTEROLOGY | Facility: HOSPITAL | Age: 58
Setting detail: OUTPATIENT SURGERY
End: 2023-09-28
Payer: COMMERCIAL

## 2023-09-28 ENCOUNTER — ANESTHESIA (OUTPATIENT)
Dept: GASTROENTEROLOGY | Facility: HOSPITAL | Age: 58
End: 2023-09-28

## 2023-09-28 VITALS
BODY MASS INDEX: 32.14 KG/M2 | TEMPERATURE: 97.9 F | HEART RATE: 100 BPM | SYSTOLIC BLOOD PRESSURE: 139 MMHG | HEIGHT: 66 IN | OXYGEN SATURATION: 95 % | RESPIRATION RATE: 20 BRPM | WEIGHT: 199.96 LBS | DIASTOLIC BLOOD PRESSURE: 87 MMHG

## 2023-09-28 DIAGNOSIS — K57.32 DIVERTICULITIS OF LARGE INTESTINE WITHOUT BLEEDING, UNSPECIFIED COMPLICATION STATUS: ICD-10-CM

## 2023-09-28 DIAGNOSIS — R11.15 PERSISTENT VOMITING: ICD-10-CM

## 2023-09-28 PROCEDURE — 88305 TISSUE EXAM BY PATHOLOGIST: CPT | Performed by: PATHOLOGY

## 2023-09-28 RX ORDER — FENTANYL CITRATE 50 UG/ML
INJECTION, SOLUTION INTRAMUSCULAR; INTRAVENOUS AS NEEDED
Status: DISCONTINUED | OUTPATIENT
Start: 2023-09-28 | End: 2023-09-28

## 2023-09-28 RX ORDER — SODIUM CHLORIDE, SODIUM LACTATE, POTASSIUM CHLORIDE, CALCIUM CHLORIDE 600; 310; 30; 20 MG/100ML; MG/100ML; MG/100ML; MG/100ML
INJECTION, SOLUTION INTRAVENOUS CONTINUOUS PRN
Status: DISCONTINUED | OUTPATIENT
Start: 2023-09-28 | End: 2023-09-28

## 2023-09-28 RX ORDER — PROPOFOL 10 MG/ML
INJECTION, EMULSION INTRAVENOUS AS NEEDED
Status: DISCONTINUED | OUTPATIENT
Start: 2023-09-28 | End: 2023-09-28

## 2023-09-28 RX ORDER — LIDOCAINE HYDROCHLORIDE 20 MG/ML
INJECTION, SOLUTION EPIDURAL; INFILTRATION; INTRACAUDAL; PERINEURAL AS NEEDED
Status: DISCONTINUED | OUTPATIENT
Start: 2023-09-28 | End: 2023-09-28

## 2023-09-28 RX ADMIN — PROPOFOL 40 MG: 10 INJECTION, EMULSION INTRAVENOUS at 14:02

## 2023-09-28 RX ADMIN — FENTANYL CITRATE 50 MCG: 50 INJECTION INTRAMUSCULAR; INTRAVENOUS at 13:38

## 2023-09-28 RX ADMIN — SODIUM CHLORIDE, SODIUM LACTATE, POTASSIUM CHLORIDE, AND CALCIUM CHLORIDE: .6; .31; .03; .02 INJECTION, SOLUTION INTRAVENOUS at 13:37

## 2023-09-28 RX ADMIN — PROPOFOL 20 MG: 10 INJECTION, EMULSION INTRAVENOUS at 13:53

## 2023-09-28 RX ADMIN — PROPOFOL 20 MG: 10 INJECTION, EMULSION INTRAVENOUS at 13:43

## 2023-09-28 RX ADMIN — PROPOFOL 80 MG: 10 INJECTION, EMULSION INTRAVENOUS at 13:41

## 2023-09-28 RX ADMIN — PROPOFOL 40 MG: 10 INJECTION, EMULSION INTRAVENOUS at 13:56

## 2023-09-28 RX ADMIN — PROPOFOL 50 MG: 10 INJECTION, EMULSION INTRAVENOUS at 13:44

## 2023-09-28 RX ADMIN — PROPOFOL 50 MG: 10 INJECTION, EMULSION INTRAVENOUS at 13:46

## 2023-09-28 RX ADMIN — LIDOCAINE HYDROCHLORIDE 100 MG: 20 INJECTION, SOLUTION EPIDURAL; INFILTRATION; INTRACAUDAL; PERINEURAL at 13:41

## 2023-09-28 NOTE — ANESTHESIA PREPROCEDURE EVALUATION
Procedure:  COLONOSCOPY  EGD    Relevant Problems   CARDIO   (+) Coronary artery disease involving native coronary artery of native heart without angina pectoris   (+) History of aortic valve replacement   (+) Hyperlipidemia   (+) Intractable migraine without status migrainosus   (+) Murmur   (+) Primary hypertension      HEMATOLOGY   (+) Acute postoperative anemia due to expected blood loss   (+) Thrombocytopenia (HCC)      NEURO/PSYCH   (+) Depression due to physical illness   (+) Intractable migraine without status migrainosus   (+) Schizophrenia (HCC)      Other   (+) Bipolar 1 disorder (HCC)   (+) S/P CABG (coronary artery bypass graft)   (+) Tobacco abuse   (+) Tobacco use       Diabetes mellitus ([address])     • Diverticulitis of colon     • Hyperlipidemia     • Hypertension     • Psychiatric disorder       bipolar   • 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HCC)     • Syncope        Physical Exam    Airway    Mallampati score: II  TM Distance: >3 FB  Neck ROM: full     Dental       Cardiovascular  Cardiovascular exam normal    Pulmonary  Pulmonary exam normal     Other Findings        Anesthesia Plan  ASA Score- 3     Anesthesia Type- general with ASA Monitors. Additional Monitors:   Airway Plan:           Plan Factors-Exercise tolerance (METS): >4 METS. Chart reviewed. EKG reviewed. Imaging results reviewed. Existing labs reviewed. Patient summary reviewed. Induction- intravenous. Postoperative Plan-     Informed Consent- Anesthetic plan and risks discussed with patient. I personally reviewed this patient with the CRNA. Discussed and agreed on the Anesthesia Plan with the CRNA. Yasemin Luu

## 2023-09-28 NOTE — ANESTHESIA POSTPROCEDURE EVALUATION
Post-Op Assessment Note    CV Status:  Stable    Pain management: satisfactory to patient     Mental Status:  Sleepy and arousable   Hydration Status:  Euvolemic   PONV Controlled:  Controlled   Airway Patency:  Patent      Post Op Vitals Reviewed: Yes      Staff: CRNA, Anesthesiologist         There were no known notable events for this encounter.     /77 (09/28/23 1410)    Temp      Pulse (!) 111 (09/28/23 1410)   Resp 12 (09/28/23 1410)    SpO2 93 % (09/28/23 1410)

## 2023-09-28 NOTE — H&P
History and Physical -  [facility]  Fatuma Giordano 56 y.o. male MRN: [id number]      HPI: Fatuma Giordano is a 56y.o. year old male who presents for vomiting, screening colon      REVIEW OF SYSTEMS: Per the HPI, and otherwise unremarkable. Historical Information   Past Medical History:   Diagnosis Date   • Diabetes mellitus ([address])    • Diverticulitis of colon    • Hyperlipidemia    • Hypertension    • Psychiatric disorder     bipolar   • Schizoid personality disorder [facility])    • Syncope      Past Surgical History:   Procedure Laterality Date   • CARDIAC ASCENDING AORTOGRAM N/A 10/28/2022    Procedure: Cardiac ascending aortogram;  Surgeon: Sasha Bustamante MD;  [facility]; Service: Cardiology   • CARDIAC CATHETERIZATION Left 10/28/2022    Procedure: CARDIAC RHC/LHC; Surgeon: Sasha Bustamante MD;  [facility]; Service: Cardiology   • CARDIAC CATHETERIZATION N/A 10/28/2022    Procedure: Cardiac Coronary Angiogram;  Surgeon: Sasha Bustamante MD;  Location: [address] [place] [address] CATH LAB; Service: Cardiology   • CARDIAC CATHETERIZATION Left 10/28/2022    Procedure: Cardiac Left Ventriculogram;  Surgeon: Sasha Bustamante MD;  Location: [facility];   Service: Cardiology   • LAPAROSCOPIC LYSIS INTESTINAL ADHESIONS     • ME RPLCMT AORTIC VALVE OPN W/STENTLESS TISSUE VALVE N/A 02/23/2023    Procedure: CORONARY ARTERY BYPASS GRAFT (CABG) 1 VESSEL GSV-->RCA, EVH RIGHT LEG,  WITH REPLACEMENT VALVE AORTIC (AVR) 25MM INSPIRIA RESILIA TISSUE VALVE;  Surgeon: Krista Rahman MD;  Location:  MAIN OR;  Service: Cardiac Surgery   • TONSILECTOMY AND ADNOIDECTOMY     • WISDOM TOOTH EXTRACTION       Social History   Social History     Substance and Sexual Activity   Alcohol Use Not Currently     Social History     Substance and Sexual Activity   Drug Use Yes   • Types: Marijuana     Social History     Tobacco Use   Smoking Status Former   • Packs/day: 0.50 • Years: 45.00   • Total pack years: 22.50   • Types: Cigarettes   • Quit date: 2023   • Years since quittin.5   • Passive exposure: Current   Smokeless Tobacco Never   Tobacco Comments    Patient states that he is trying to cut down     Family History   Problem Relation Age of Onset   • Cancer Mother    • Hypertension Mother    • Diabetes Mother        Meds/Allergies     (Not in a hospital admission)      Allergies   Allergen Reactions   • Neomycin-Polymyxin-Hc Abdominal Pain   • Other      Pt states he is allergic to everything. States he doesn't have a list but can only take children doses of all medication         Objective     Blood pressure 128/88, pulse 103, temperature 97.9 °F (36.6 °C), temperature source Temporal, resp. rate 18, height 5' 6" (1.676 m), weight 90.7 kg (199 lb 15.3 oz), SpO2 97 %. PHYSICAL EXAM    Gen: NAD  CV: RRR  CHEST: Clear  ABD: soft, NT/ND  EXT: no edema      ASSESSMENT/PLAN:  This is a 56y.o. year old male here for egd, colonoscopy, and he is stable and optimized for his procedure.

## 2023-10-02 ENCOUNTER — TELEPHONE (OUTPATIENT)
Dept: FAMILY MEDICINE CLINIC | Facility: CLINIC | Age: 58
End: 2023-10-02

## 2023-10-02 NOTE — TELEPHONE ENCOUNTER
Patient's girlfriend is asking if you received the results of the colonoscopy and endoscopy and if you can let them know the results of them.

## 2023-10-03 PROCEDURE — 88305 TISSUE EXAM BY PATHOLOGIST: CPT | Performed by: PATHOLOGY

## 2023-10-03 NOTE — TELEPHONE ENCOUNTER
It looks like they are waiting for the results of tissue samples that they obtained during the procedure.   But GI should be the ones contacting them once all the results are back

## 2023-10-05 ENCOUNTER — TELEPHONE (OUTPATIENT)
Dept: GASTROENTEROLOGY | Facility: CLINIC | Age: 58
End: 2023-10-05

## 2023-10-05 NOTE — TELEPHONE ENCOUNTER
----- Message from Diandra Small DO sent at 10/4/2023  6:51 AM EDT -----  The results were given the patient's MyChart. Biopsies in the stomach were benign.

## 2023-10-10 ENCOUNTER — TELEPHONE (OUTPATIENT)
Dept: GASTROENTEROLOGY | Facility: CLINIC | Age: 58
End: 2023-10-10

## 2023-10-10 NOTE — TELEPHONE ENCOUNTER
----- Message from Toby Brown DO sent at 10/10/2023  6:41 AM EDT -----  Please call the patient with the biopsy results. Biopsies of stomach were benign and negative for Helicobacter pylori as well as negative for cancer.

## 2023-10-19 ENCOUNTER — TELEPHONE (OUTPATIENT)
Dept: FAMILY MEDICINE CLINIC | Facility: CLINIC | Age: 58
End: 2023-10-19

## 2023-10-19 DIAGNOSIS — G43.919 INTRACTABLE MIGRAINE WITHOUT STATUS MIGRAINOSUS, UNSPECIFIED MIGRAINE TYPE: ICD-10-CM

## 2023-10-19 RX ORDER — ACETAMINOPHEN 325 MG/1
650 TABLET ORAL EVERY 6 HOURS PRN
Qty: 180 TABLET | Refills: 0 | Status: SHIPPED | OUTPATIENT
Start: 2023-10-19

## 2023-10-19 NOTE — TELEPHONE ENCOUNTER
Patient's girlfriend called in regards to patient's medication, aspirin 325, and his chronic headaches. Our record shows medication was last filled by cardiology and also that medication/management was previously offered for the migraines, which patient declined to at that time. Spoke with Pretty Gonzales, patient has appt next week, can discuss then. Also would need to call [facility] for refill of aspirin.

## 2023-10-19 NOTE — TELEPHONE ENCOUNTER
Called and spoke with patient Girlfriend Sim Felder and tried to get some clarity on what prescription they are actually looking for. I had to explain to them that there is Tylenol for pain and headaches and fever and then aspirin which is for the heart. They are actually looking for Tylenol 325 for his headaches. They still have the bottle from where you prescribed for him on 6/28/2023 she said he had 3 refills but the pharmacist told them their was no refills and that's because an ER doc send the same script back in July as well with no refills. They are just looking to get a refill on this please.  I did let her know that for the headaches they should see Neuro as you have suggested many times

## 2023-10-26 ENCOUNTER — OFFICE VISIT (OUTPATIENT)
Dept: FAMILY MEDICINE CLINIC | Facility: CLINIC | Age: 58
End: 2023-10-26
Payer: COMMERCIAL

## 2023-10-26 VITALS
WEIGHT: 198.2 LBS | TEMPERATURE: 97.6 F | DIASTOLIC BLOOD PRESSURE: 82 MMHG | SYSTOLIC BLOOD PRESSURE: 120 MMHG | OXYGEN SATURATION: 98 % | HEIGHT: 66 IN | HEART RATE: 92 BPM | BODY MASS INDEX: 31.85 KG/M2

## 2023-10-26 DIAGNOSIS — G43.719 INTRACTABLE CHRONIC MIGRAINE WITHOUT AURA AND WITHOUT STATUS MIGRAINOSUS: ICD-10-CM

## 2023-10-26 DIAGNOSIS — E78.2 MIXED HYPERLIPIDEMIA: ICD-10-CM

## 2023-10-26 DIAGNOSIS — I10 PRIMARY HYPERTENSION: ICD-10-CM

## 2023-10-26 DIAGNOSIS — Z00.00 ANNUAL PHYSICAL EXAM: Primary | ICD-10-CM

## 2023-10-26 DIAGNOSIS — Z72.0 TOBACCO USE: ICD-10-CM

## 2023-10-26 PROCEDURE — 99396 PREV VISIT EST AGE 40-64: CPT | Performed by: NURSE PRACTITIONER

## 2023-10-26 PROCEDURE — 99214 OFFICE O/P EST MOD 30 MIN: CPT | Performed by: NURSE PRACTITIONER

## 2023-10-26 RX ORDER — BUTALBITAL, ACETAMINOPHEN AND CAFFEINE 300; 40; 50 MG/1; MG/1; MG/1
1 CAPSULE ORAL EVERY 4 HOURS PRN
Qty: 30 CAPSULE | Refills: 0 | Status: SHIPPED | OUTPATIENT
Start: 2023-10-26

## 2023-10-26 RX ORDER — TOPIRAMATE 25 MG/1
25 TABLET ORAL DAILY
Qty: 30 TABLET | Refills: 0 | Status: SHIPPED | OUTPATIENT
Start: 2023-10-26

## 2023-10-26 NOTE — ASSESSMENT & PLAN NOTE
Patient notes increase in migraines. Was initially refusing medication for management, is not agreeable to try medications. Discussed prophylactic and abortive treatment. We will initiate Topamax. Advised patient to start with 25 mg daily, and if tolerated can increase to twice daily. Add Fioricet to use as needed for acute attacks. Discussed importance of increased hydration, avoiding caffeine, use of riboflavin and magnesium. Return in 4 weeks for reevaluation.

## 2023-10-26 NOTE — ASSESSMENT & PLAN NOTE
Pressure managed in office today. Patient states he has not been taking his metoprolol due to not being able to hold anything down. Discussed with patient attempting to take Zofran first then waiting 20 to 30 minutes to take blood pressure medication. Also advised to take with food he tolerates.

## 2023-10-26 NOTE — ASSESSMENT & PLAN NOTE
Patient stopped taking atorvastatin due to not being able to hold any medications down. Recent lipid panel reviewed with patient. Discussed with patient to use Zofran prior to taking medication, or to take with food that he tolerates.

## 2023-10-26 NOTE — PATIENT INSTRUCTIONS
Wellness Visit for Adults   AMBULATORY CARE:   A wellness visit  is when you see your healthcare provider to get screened for health problems. Your healthcare provider will also give you advice on how to stay healthy. Write down your questions so you remember to ask them. Ask your healthcare provider how often you should have a wellness visit. What happens at a wellness visit:  Your healthcare provider will ask about your health, and your family history of health problems. This includes high blood pressure, heart disease, and cancer. He or she will ask if you have symptoms that concern you, if you smoke, and about your mood. You may also be asked about your intake of medicines, supplements, food, and alcohol. Any of the following may be done: Your weight  will be checked. Your height may also be checked so your body mass index (BMI) can be calculated. Your BMI shows if you are at a healthy weight. Your blood pressure  and heart rate will be checked. Your temperature may also be checked. Blood and urine tests  may be done. Blood tests may be done to check your cholesterol levels. Abnormal cholesterol levels increase your risk for heart disease and stroke. You may also need a blood or urine test to check for diabetes if you are at increased risk. Urine tests may be done to look for signs of an infection or kidney disease. A physical exam  includes checking your heartbeat and lungs with a stethoscope. Your healthcare provider may also check your skin to look for sun damage. Screening tests  may be recommended. A screening test is done to check for diseases that may not cause symptoms. The screening tests you may need depend on your age, gender, family history, and lifestyle habits. For example, colorectal screening may be recommended if you are 48years old or older. Screening tests you need if you are a woman:   A Pap smear  is used to screen for cervical cancer.  Pap smears are usually done every 3 to 5 years depending on your age. You may need them more often if you have had abnormal Pap smear test results in the past. Ask your healthcare provider how often you should have a Pap smear. A mammogram  is an x-ray of your breasts to screen for breast cancer. Experts recommend mammograms every 2 years starting at age 48 years. You may need a mammogram at age 52 years or younger if you have an increased risk for breast cancer. Talk to your healthcare provider about when you should start having mammograms and how often you need them. Vaccines you may need:   Get an influenza vaccine  every year. The influenza vaccine protects you from the flu. Several types of viruses cause the flu. The viruses change over time, so new vaccines are made each year. Get a tetanus-diphtheria (Td) booster vaccine  every 10 years. This vaccine protects you against tetanus and diphtheria. Tetanus is a severe infection that may cause painful muscle spasms and lockjaw. Diphtheria is a severe bacterial infection that causes a thick covering in the back of your mouth and throat. Get a human papillomavirus (HPV) vaccine  if you are female and aged 23 to 32 or male 23 to 24 and never received it. This vaccine protects you from HPV infection. HPV is the most common infection spread by sexual contact. HPV may also cause vaginal, penile, and anal cancers. Get a pneumococcal vaccine  if you are aged 72 years or older. The pneumococcal vaccine is an injection given to protect you from pneumococcal disease. Pneumococcal disease is an infection caused by pneumococcal bacteria. The infection may cause pneumonia, meningitis, or an ear infection. Get a shingles vaccine  if you are 60 or older, even if you have had shingles before. The shingles vaccine is an injection to protect you from the varicella-zoster virus. This is the same virus that causes chickenpox.  Shingles is a painful rash that develops in people who had chickenpox or have been exposed to the virus. How to eat healthy:  My Plate is a model for planning healthy meals. It shows the types and amounts of foods that should go on your plate. Fruits and vegetables make up about half of your plate, and grains and protein make up the other half. A serving of dairy is included on the side of your plate. The amount of calories and serving sizes you need depends on your age, gender, weight, and height. Examples of healthy foods are listed below:  Eat a variety of vegetables  such as dark green, red, and orange vegetables. You can also include canned vegetables low in sodium (salt) and frozen vegetables without added butter or sauces. Eat a variety of fresh fruits , canned fruit in 100% juice, frozen fruit, and dried fruit. Include whole grains. At least half of the grains you eat should be whole grains. Examples include whole-wheat bread, wheat pasta, brown rice, and whole-grain cereals such as oatmeal.    Eat a variety of protein foods such as seafood (fish and shellfish), lean meat, and poultry without skin (turkey and chicken). Examples of lean meats include pork leg, shoulder, or tenderloin, and beef round, sirloin, tenderloin, and extra lean ground beef. Other protein foods include eggs and egg substitutes, beans, peas, soy products, nuts, and seeds. Choose low-fat dairy products such as skim or 1% milk or low-fat yogurt, cheese, and cottage cheese. Limit unhealthy fats  such as butter, hard margarine, and shortening. Exercise:  Exercise at least 30 minutes per day on most days of the week. Some examples of exercise include walking, biking, dancing, and swimming. You can also fit in more physical activity by taking the stairs instead of the elevator or parking farther away from stores. Include muscle strengthening activities 2 days each week. Regular exercise provides many health benefits.  It helps you manage your weight, and decreases your risk for type 2 diabetes, heart disease, stroke, and high blood pressure. Exercise can also help improve your mood. Ask your healthcare provider about the best exercise plan for you. General health and safety guidelines:   Do not smoke. Nicotine and other chemicals in cigarettes and cigars can cause lung damage. Ask your healthcare provider for information if you currently smoke and need help to quit. E-cigarettes or smokeless tobacco still contain nicotine. Talk to your healthcare provider before you use these products. Limit alcohol. A drink of alcohol is 12 ounces of beer, 5 ounces of wine, or 1½ ounces of liquor. Lose weight, if needed. Being overweight increases your risk of certain health conditions. These include heart disease, high blood pressure, type 2 diabetes, and certain types of cancer. Protect your skin. Do not sunbathe or use tanning beds. Use sunscreen with a SPF 15 or higher. Apply sunscreen at least 15 minutes before you go outside. Reapply sunscreen every 2 hours. Wear protective clothing, hats, and sunglasses when you are outside. Drive safely. Always wear your seatbelt. Make sure everyone in your car wears a seatbelt. A seatbelt can save your life if you are in an accident. Do not use your cell phone when you are driving. This could distract you and cause an accident. Pull over if you need to make a call or send a text message. Practice safe sex. Use latex condoms if are sexually active and have more than one partner. Your healthcare provider may recommend screening tests for sexually transmitted infections (STIs). Wear helmets, lifejackets, and protective gear. Always wear a helmet when you ride a bike or motorcycle, go skiing, or play sports that could cause a head injury. Wear protective equipment when you play sports. Wear a lifejacket when you are on a boat or doing water sports.     © Copyright Emily Olguin 2023 Information is for End User's use only and may not be sold, redistributed or otherwise used for commercial purposes. The above information is an  only. It is not intended as medical advice for individual conditions or treatments. Talk to your doctor, nurse or pharmacist before following any medical regimen to see if it is safe and effective for you.

## 2023-10-26 NOTE — PROGRESS NOTES
[address] [address] [place] [address]    NAME: Xavier Magallon  AGE: 66 y.o. SEX: male  : 1965     DATE: 10/26/2023     Assessment and Plan:     Problem List Items Addressed This Visit          Cardiovascular and Mediastinum    Intractable migraine without status migrainosus     Patient notes increase in migraines. Was initially refusing medication for management, is not agreeable to try medications. Discussed prophylactic and abortive treatment. We will initiate Topamax. Advised patient to start with 25 mg daily, and if tolerated can increase to twice daily. Add Fioricet to use as needed for acute attacks. Discussed importance of increased hydration, avoiding caffeine, use of riboflavin and magnesium. Return in 4 weeks for reevaluation. Relevant Medications    Butalbital-APAP-Caffeine (Fioricet) -40 MG CAPS    topiramate (Topamax) 25 mg tablet    Primary hypertension     Pressure managed in office today. Patient states he has not been taking his metoprolol due to not being able to hold anything down. Discussed with patient attempting to take Zofran first then waiting 20 to 30 minutes to take blood pressure medication. Also advised to take with food he tolerates. Other    Tobacco use    Relevant Orders    CT lung screening program    Hyperlipidemia     Patient stopped taking atorvastatin due to not being able to hold any medications down. Recent lipid panel reviewed with patient. Discussed with patient to use Zofran prior to taking medication, or to take with food that he tolerates. Other Visit Diagnoses       Annual physical exam    -  Primary              Immunizations and preventive care screenings were discussed with patient today. Appropriate education was printed on patient's after visit summary. Discussed risks and benefits of prostate cancer screening.  We discussed the controversial history of PSA screening for prostate cancer in the [facility] as well as the risk of over detection and over treatment of prostate cancer by way of PSA screening. The patient understands that PSA blood testing is an imperfect way to screen for prostate cancer and that elevated PSA levels in the blood may also be caused by infection, inflammation, prostatic trauma or manipulation, urological procedures, or by benign prostatic enlargement. The role of the digital rectal examination in prostate cancer screening was also discussed and I discussed with him that there is large interobserver variability in the findings of digital rectal examination. Counseling:  Alcohol/drug use: discussed moderation in alcohol intake, the recommendations for healthy alcohol use, and avoidance of illicit drug use. Dental Health: discussed importance of regular tooth brushing, flossing, and dental visits. Injury prevention: discussed safety/seat belts, safety helmets, smoke detectors, carbon dioxide detectors, and smoking near bedding or upholstery. Sexual health: discussed sexually transmitted diseases, partner selection, use of condoms, avoidance of unintended pregnancy, and contraceptive alternatives. Exercise: the importance of regular exercise/physical activity was discussed. Recommend exercise 3-5 times per week for at least 30 minutes. Lung Cancer Screening Shared Decision Making: I discussed with him that he is a candidate for lung cancer CT screening. The following Shared Decision-Making points were covered:  Benefits of screening were discussed, including the rates of reduction in death from lung cancer and other causes. Harms of screening were reviewed, including false positive tests, radiation exposure levels, risks of invasive procedures, risks of complications of screening, and risk of overdiagnosis.   I counseled on the importance of adherence to annual lung cancer LDCT screening, impact of co-morbidities, and ability or willingness to undergo diagnosis and treatment. I counseled on the importance of maintaining abstinence as a former smoker or was counseled on the importance of smoking cessation if a current smoker    Review of Eligibility Criteria: He meets all of the criteria for Lung Cancer Screening.   - He is 66 y.o.   - He has 20 pack year tobacco history and is a current smoker or has quit within the past 15 years  - He presents no signs or symptoms of lung cancer    After discussion, the patient decided to elect lung cancer screening. Return in about 4 weeks (around 11/23/2023) for Recheck. Chief Complaint:     Chief Complaint   Patient presents with    Physical Exam      History of Present Illness:     Adult Annual Physical   Patient here for a comprehensive physical exam. The patient reports problems - increased frequency of migraines. Diet and Physical Activity  Diet/Nutrition: well balanced diet. Exercise: walking. Depression Screening  PHQ-2/9 Depression Screening           General Health  Sleep: gets 4-6 hours of sleep on average. Hearing: normal - bilateral.  Vision: no vision problems. Dental: no dental visits for >1 year and brushes teeth once daily.  Health  Symptoms include: none    Advanced Care Planning  Do you have an advanced directive? no  Do you have a durable medical power of ? no     Review of Systems:     Review of Systems   Constitutional:  Negative for activity change, appetite change, fatigue, fever and unexpected weight change. HENT:  Negative for ear pain, sore throat and trouble swallowing. Eyes:  Positive for visual disturbance. Negative for photophobia. Respiratory:  Negative for cough and shortness of breath. Cardiovascular:  Negative for chest pain and palpitations. Gastrointestinal:  Positive for vomiting (chronic). Negative for abdominal pain, blood in stool, constipation and diarrhea.    Genitourinary: Negative for decreased urine volume, dysuria, frequency, hematuria and urgency. Musculoskeletal:  Negative for arthralgias, back pain and myalgias. Skin:  Negative for color change and rash. Neurological:  Positive for headaches. Negative for dizziness, seizures, syncope, facial asymmetry, speech difficulty, weakness, light-headedness and numbness. Hematological:  Negative for adenopathy. Does not bruise/bleed easily. Psychiatric/Behavioral:  Negative for confusion and dysphoric mood. The patient is not nervous/anxious. Past Medical History:     Past Medical History:   Diagnosis Date    Diabetes mellitus ([address])     Diverticulitis of colon     Hyperlipidemia     Hypertension     Psychiatric disorder     bipolar    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[address])     Syncope       Past Surgical History:     Past Surgical History:   Procedure Laterality Date    CARDIAC ASCENDING AORTOGRAM N/A 10/28/2022    Procedure: Cardiac ascending aortogram;  Surgeon: Sinclair Apley, MD;  Location: [facility]; Service: Cardiology    CARDIAC CATHETERIZATION Left 10/28/2022    Procedure: CARDIAC RHC/LHC; Surgeon: Sinclair Apley, MD;  Location: [facility]; Service: Cardiology    CARDIAC CATHETERIZATION N/A 10/28/2022    Procedure: Cardiac Coronary Angiogram;  Surgeon: Sinclair Apley, MD;  Location: [address] [place] [address] CATH LAB; Service: Cardiology    CARDIAC CATHETERIZATION Left 10/28/2022    Procedure: Cardiac Left Ventriculogram;  Surgeon: Sinclair Apley, MD;  Location: [facility];   Service: Cardiology    LAPAROSCOPIC LYSIS INTESTINAL ADHESIONS      OK RPLCMT AORTIC VALVE OPN W/STENTLESS TISSUE VALVE N/A 02/23/2023    Procedure: CORONARY ARTERY BYPASS GRAFT (CABG) 1 VESSEL GSV-->RCA, EVH RIGHT LEG,  WITH REPLACEMENT VALVE AORTIC (AVR) 25MM INSPIRIA RESILIA TISSUE VALVE;  Surgeon: Timothy Sepulveda MD;  Location:  MAIN OR;  Service: Cardiac Surgery    TONSILECTOMY AND ADNOIDECTOMY WISDOM TOOTH EXTRACTION        Family History:     Family History   Problem Relation Age of Onset    Cancer Mother     Hypertension Mother     Diabetes Mother       Social History:     Social History     Socioeconomic History    Marital status: Single     Spouse name: None    Number of children: None    Years of education: None    Highest education level: None   Occupational History    None   Tobacco Use    Smoking status: Former     Packs/day: 0.50     Years: 45.00     Total pack years: 22.50     Types: Cigarettes     Quit date: 2023     Years since quittin.6     Passive exposure: Current    Smokeless tobacco: Never    Tobacco comments:     Patient states that he is trying to cut down   Vaping Use    Vaping Use: Some days    Substances: Nicotine   Substance and Sexual Activity    Alcohol use: Not Currently    Drug use: Yes     Types: Marijuana    Sexual activity: Yes     Partners: Female   Other Topics Concern    None   Social History Narrative    Patient states he lives with his girlfriend      Social Determinants of Health     Financial Resource Strain: Not on file   Food Insecurity: No Food Insecurity (2023)    Hunger Vital Sign     Worried About Running Out of Food in the Last Year: Never true     Ran Out of Food in the Last Year: Never true   Transportation Needs: Unmet Transportation Needs (2023)    PRAPARE - Transportation     Lack of Transportation (Medical): Yes     Lack of Transportation (Non-Medical): Yes   Physical Activity: Not on file   Stress: Not on file   Social Connections: Not on file   Intimate Partner Violence: Not on file   Housing Stability: [address]  (2023)    Housing Stability Vital Sign     Unable to Pay for Housing in the Last Year: No     Number of Places Lived in the Last Year: 1     Unstable Housing in the Last Year: No      Current Medications:     Current Outpatient Medications   Medication Sig Dispense Refill    acetaminophen (TYLENOL) 325 mg tablet Take 2 tablets (650 mg total) by mouth every 6 (six) hours as needed for mild pain, headaches or fever 180 tablet 0    aspirin 325 mg tablet Take 1 tablet (325 mg total) by mouth daily 30 tablet 3    Butalbital-APAP-Caffeine (Fioricet) -40 MG CAPS Take 1 capsule by mouth every 4 (four) hours as needed (migraine) 30 capsule 0    ondansetron (ZOFRAN) 4 mg tablet Take 1 tablet (4 mg total) by mouth every 6 (six) hours as needed for nausea or vomiting 12 tablet 0    topiramate (Topamax) 25 mg tablet Take 1 tablet (25 mg total) by mouth daily 30 tablet 0    atorvastatin (LIPITOR) 40 mg tablet Take 1 tablet (40 mg total) by mouth daily with dinner (Patient not taking: Reported on 8/17/2023) 30 tablet 3    metoclopramide (Reglan) 10 mg tablet Take 1 tablet (10 mg total) by mouth 4 (four) times a day (Patient not taking: Reported on 8/17/2023) 120 tablet 3    metoprolol succinate (TOPROL-XL) 50 mg 24 hr tablet Take 1 tablet (50 mg total) by mouth daily (Patient not taking: Reported on 8/17/2023) 30 tablet 3     No current facility-administered medications for this visit. Allergies: Allergies   Allergen Reactions    Neomycin-Polymyxin-Hc Abdominal Pain    Other      Pt states he is allergic to everything. States he doesn't have a list but can only take children doses of all medication        Physical Exam:     /82 (BP Location: Left arm, Patient Position: Sitting, Cuff Size: Standard)   Pulse 92   Temp 97.6 °F (36.4 °C) (Tympanic)   Ht 5' 6" (1.676 m)   Wt 89.9 kg (198 lb 3.2 oz)   SpO2 98%   BMI 31.99 kg/m²     Physical Exam  Vitals reviewed. Constitutional:       General: He is not in acute distress. Appearance: Normal appearance. He is well-developed. He is not ill-appearing. HENT:      Head: Normocephalic and atraumatic.       Right Ear: Tympanic membrane, ear canal and external ear normal.      Left Ear: Tympanic membrane, ear canal and external ear normal.      Nose: Nose normal. Mouth/Throat:      Mouth: Mucous membranes are moist.      Pharynx: Oropharynx is clear. Eyes:      Extraocular Movements: Extraocular movements intact. Conjunctiva/sclera: Conjunctivae normal.      Pupils: Pupils are equal, round, and reactive to light. Neck:      Vascular: No carotid bruit. Cardiovascular:      Rate and Rhythm: Normal rate and regular rhythm. Pulses: Normal pulses. Heart sounds: Normal heart sounds. No murmur heard. Pulmonary:      Effort: Pulmonary effort is normal. No respiratory distress. Breath sounds: Normal breath sounds. Abdominal:      General: Bowel sounds are normal.      Palpations: Abdomen is soft. Tenderness: There is no abdominal tenderness. There is no right CVA tenderness or left CVA tenderness. Musculoskeletal:         General: No swelling. Normal range of motion. Cervical back: Normal range of motion and neck supple. Right lower leg: No edema. Left lower leg: No edema. Lymphadenopathy:      Cervical: No cervical adenopathy. Skin:     General: Skin is warm and dry. Capillary Refill: Capillary refill takes less than 2 seconds. Neurological:      General: No focal deficit present. Mental Status: He is alert and oriented to person, place, and time. Motor: No weakness.       Gait: Gait normal.   Psychiatric:         Mood and Affect: Mood normal.         Behavior: Behavior normal.          Sneha Matute, [address] [facility]

## 2023-11-09 ENCOUNTER — TELEPHONE (OUTPATIENT)
Dept: FAMILY MEDICINE CLINIC | Facility: CLINIC | Age: 58
End: 2023-11-09

## 2023-11-15 ENCOUNTER — TELEPHONE (OUTPATIENT)
Dept: FAMILY MEDICINE CLINIC | Facility: CLINIC | Age: 58
End: 2023-11-15

## 2023-11-15 DIAGNOSIS — G43.719 INTRACTABLE CHRONIC MIGRAINE WITHOUT AURA AND WITHOUT STATUS MIGRAINOSUS: Primary | ICD-10-CM

## 2023-11-15 RX ORDER — BUTALBITAL, ACETAMINOPHEN AND CAFFEINE 50; 325; 40 MG/1; MG/1; MG/1
1 TABLET ORAL EVERY 4 HOURS PRN
Qty: 30 TABLET | Refills: 0 | Status: SHIPPED | OUTPATIENT
Start: 2023-11-15 | End: 2023-11-17 | Stop reason: SDUPTHER

## 2023-11-15 NOTE — TELEPHONE ENCOUNTER
Called [facility] today to discuss patient's butalbital-APAP-Caffeine (Fiorcet) -40 mg  that keeps getting denied. Spoke with Elliott Kartik Ref# was her name and today's date 11/15/23. She explained the denial reasons from the denial letter that we have scanned into his chart. I explained to her that the patient has tried NSAIDs, tylenol, won't be taking it for more than 3 days and was given the side effects, risk factors of medication. However, she stated that the patient would need to try the butalbital-APAP-caffeine -40 mg first as it's recommended by [facility] and if he tries and fails this then they would be able to have him on the other dose you prescribed for him. The -40 mg dose they are recommending would be a quantity of 18 every 30 days.  She said it would still need a prior authorization, but just to make sure we let them know reasons why he'd need this medication such as it's being needed for headaches, that we are following the guidelines for the International Headache Society Classification of Headache Disorders, etc.

## 2023-11-15 NOTE — TELEPHONE ENCOUNTER
Thank you for the update. Should I just order the Fiorcet at the higher dosage (-40 mg) then?   Do not understand why they will not cover the lower dose first

## 2023-11-17 ENCOUNTER — TELEPHONE (OUTPATIENT)
Dept: FAMILY MEDICINE CLINIC | Facility: CLINIC | Age: 58
End: 2023-11-17

## 2023-11-17 DIAGNOSIS — G43.719 INTRACTABLE CHRONIC MIGRAINE WITHOUT AURA AND WITHOUT STATUS MIGRAINOSUS: ICD-10-CM

## 2023-11-17 RX ORDER — BUTALBITAL, ACETAMINOPHEN AND CAFFEINE 50; 325; 40 MG/1; MG/1; MG/1
1 TABLET ORAL EVERY 4 HOURS PRN
Qty: 18 TABLET | Refills: 0 | Status: SHIPPED | OUTPATIENT
Start: 2023-11-17

## 2023-11-17 NOTE — TELEPHONE ENCOUNTER
Sending as another medication refill request for quantity of 18 mg per 30 days for patient per [facility].

## 2023-11-17 NOTE — TELEPHONE ENCOUNTER
Redid prior authorization for patient yesterday 11/16/13 with new prescription mg. The prior authorization was till denied. One reason because the quantity was greater than the amount of 18. We it 30 for 30 days. I went back in to [address] [place] [address] 11/17/23 and changed the quantity to 18 in the prior authorization. Are you able to resend the prescription to the pharmacy and change the quantity from 30 to 18 per day 30 days? Thank you!

## 2023-11-20 ENCOUNTER — HOSPITAL ENCOUNTER (OUTPATIENT)
Dept: CT IMAGING | Facility: HOSPITAL | Age: 58
Discharge: HOME | End: 2023-11-20
Payer: COMMERCIAL

## 2023-11-20 DIAGNOSIS — Z72.0 TOBACCO USE: ICD-10-CM

## 2023-11-20 PROCEDURE — 71271 CT THORAX LUNG CANCER SCR C-: CPT

## 2024-01-01 NOTE — TELEPHONE ENCOUNTER
Rob talbert from [address] [place] [address]  [phone] contacting office regarding the metoprolol  Issue with the patient's insurance 
Spoke with Rob talbert in regards to Metoprolol  As per Rigoberto Pretty note from 3/14 pt's last office visit Metoprolol tartrate was D/C and metoprolol Succinate was prescribed  Please confirm  Thanks!
Spoke with pharmacy, resolved  Prescription for metoprolol succinate confirmed 
(M6) obeys commands

## 2024-02-19 ENCOUNTER — TELEPHONE (OUTPATIENT)
Dept: FAMILY MEDICINE CLINIC | Facility: CLINIC | Age: 59
End: 2024-02-19

## 2024-02-19 NOTE — TELEPHONE ENCOUNTER
Spoke with Rose- she stated that patient is asking for a referral to see Neurology. He is having frequent headaches again that are causing him so much pain he is throwing up. Are we able to write up a referral for him?    Please advise, thank you!

## 2024-02-20 ENCOUNTER — TELEPHONE (OUTPATIENT)
Dept: NEUROLOGY | Facility: CLINIC | Age: 59
End: 2024-02-20

## 2024-02-20 DIAGNOSIS — G43.919 INTRACTABLE MIGRAINE WITHOUT STATUS MIGRAINOSUS, UNSPECIFIED MIGRAINE TYPE: Primary | ICD-10-CM

## 2024-02-20 NOTE — TELEPHONE ENCOUNTER
Patients significant other Rose and spouse called in to schedule appointment.    Patient was triaged, triage was sent for review and is awaiting response to schedule patient accordingly.    Patient gave verbal consent for Rose to schedule appointments or speak, answer questions on his behalf.    They also both request that when scheduling appointment to please request transportation if location is not near a bus route, as patient does not drive.

## 2024-02-20 NOTE — TELEPHONE ENCOUNTER
Referral has been entered for [facility].  Please provide patient phone number if needed.  Thank you

## 2024-02-23 DIAGNOSIS — G43.919 INTRACTABLE MIGRAINE WITHOUT STATUS MIGRAINOSUS, UNSPECIFIED MIGRAINE TYPE: ICD-10-CM

## 2024-02-23 RX ORDER — ACETAMINOPHEN 325 MG/1
650 TABLET ORAL EVERY 6 HOURS PRN
Qty: 180 TABLET | Refills: 0 | Status: SHIPPED | OUTPATIENT
Start: 2024-02-23

## 2024-02-26 ENCOUNTER — TELEPHONE (OUTPATIENT)
Dept: CARDIOLOGY CLINIC | Facility: CLINIC | Age: 59
End: 2024-02-26

## 2024-02-26 DIAGNOSIS — Z95.1 S/P CABG (CORONARY ARTERY BYPASS GRAFT): ICD-10-CM

## 2024-02-26 DIAGNOSIS — Z95.2 S/P AVR: ICD-10-CM

## 2024-02-26 RX ORDER — ASPIRIN 325 MG
325 TABLET ORAL DAILY
Qty: 30 TABLET | Refills: 3 | Status: SHIPPED | OUTPATIENT
Start: 2024-02-26

## 2024-02-26 NOTE — TELEPHONE ENCOUNTER
Spoke with patient, pt stated that he had some chest pains a few days ago. Patient stated that he is ok now.     Advised pt to go to the ED if his symptoms comes back or gets worst, patient verbally understood.     Pt is aware to keep his appt with  for March 4, 24 and  will suggest if he needs a CT scan of the chest.

## 2024-02-26 NOTE — TELEPHONE ENCOUNTER
Patient's girlfriend called for refill on Aspirin 325  Also, can't keep anything down when he eats.   Please advise  [facility] [place]

## 2024-02-26 NOTE — TELEPHONE ENCOUNTER
Patient had valve replacement and triple bypass last February. He is scheduled with Dr. Patel for March 4th but wants to have scan of chest to see if everything is still okay because he had chest pains a couple of days ago.  He is okay now.    [phone]

## 2024-02-26 NOTE — TELEPHONE ENCOUNTER
Agree. Recommends he goes to ER if he has symptoms    Otherwise, no need for CT chest before his office visit    Thanks

## 2024-03-01 NOTE — TELEPHONE ENCOUNTER
1ST ATTEMPT:  Called patient and left voicemail on his/his significant others phone and asked for him to call back to schedule.

## 2024-03-01 NOTE — TELEPHONE ENCOUNTER
Patient significant other called in and the patient schedule on 3/28/24 at 4pm with Dr Munoz in [place].    Patient and significant other do not drive and a request for transportation has been made.

## 2024-03-04 ENCOUNTER — OFFICE VISIT (OUTPATIENT)
Dept: CARDIOLOGY CLINIC | Facility: CLINIC | Age: 59
End: 2024-03-04
Payer: COMMERCIAL

## 2024-03-04 VITALS
HEIGHT: 66 IN | WEIGHT: 193 LBS | SYSTOLIC BLOOD PRESSURE: 140 MMHG | DIASTOLIC BLOOD PRESSURE: 90 MMHG | OXYGEN SATURATION: 98 % | RESPIRATION RATE: 18 BRPM | HEART RATE: 102 BPM | BODY MASS INDEX: 31.02 KG/M2

## 2024-03-04 DIAGNOSIS — Z95.1 S/P CABG (CORONARY ARTERY BYPASS GRAFT): ICD-10-CM

## 2024-03-04 DIAGNOSIS — Z95.2 S/P AVR: ICD-10-CM

## 2024-03-04 DIAGNOSIS — I10 PRIMARY HYPERTENSION: Primary | ICD-10-CM

## 2024-03-04 PROCEDURE — 99214 OFFICE O/P EST MOD 30 MIN: CPT | Performed by: INTERNAL MEDICINE

## 2024-03-04 RX ORDER — ASPIRIN 81 MG/1
81 TABLET, CHEWABLE ORAL DAILY
Qty: 30 TABLET | Refills: 3 | Status: SHIPPED | OUTPATIENT
Start: 2024-03-04

## 2024-03-04 NOTE — PROGRESS NOTES
Cardiology Follow Up    Neal Walden  1965  [id number]  [facility]  [address] [place] [id number] [phone]    Discussion/Summary:  CAD s/p CABG x 1  S/p AVR  Tobacco abuse  Hypertension  Medication non-compliance    Will check echo to follow up on aortic valve replacement  Euvolemic. No cardiac complaints  Will decrease ASA to 81 mg once daily. Discussed indications, risk and benefit for ASA.   Emphasized to patient to take ASA once daily    He is not interested in taking any other cardiac medications. I did try to emphasize importance of medication compliance and following recommendations.     He will follow up with his PCP about headaches. If worse or recurrent, he needs to go to ER. They will call PCP today. They are requesting medical marijuana for headaches. I have explained to them that they will need to discuss this with PCP    All questions answered    Recommend patient presents to the emergency room or call our office if he has any new/persistent or progressive symptoms.      Previous studies were reviewed.    Safety measures were reviewed.  Questions were entertained and answered.  Patient was advised to report any problems requiring medical attention.    Follow-up with PCP and appropriate specialist and lab work as discussed.    Return for follow up visit as scheduled or earlier, if needed.    Thank you for allowing me to participate in the care and evaluation of your patient.  Should you have any questions, please feel free to contact me.      History of Present Illness:     Neal Walden is a 58 y.o. male who presents for follow up for cardiovascular care.    He has not been taking his medications. States he cannot tolerate his medications.     States he gets a lot of headaches. States he has been taking multiple doses of ASA on his own every day despite being recommended ASA once  daily.   No bleeding    No current symptoms. No headaches currently    Otherwise no complaints. Denies chest pain, chest pressure, shortness of breath, dizziness, lightheadedness, presyncope or syncope.  No weakness, tingling, or numbness. Denies orthopnea, PND or lower limb edema.          Patient Active Problem List   Diagnosis    Marijuana use    Tobacco use    Murmur    Tobacco abuse    Hyperlipidemia    Coronary artery disease involving native coronary artery of native heart without angina pectoris    Schizophrenia (HCC)    Bipolar 1 disorder (HCC)    S/P CABG (coronary artery bypass graft)    History of aortic valve replacement    Thrombocytopenia ([facility])    Leukocytosis    Acute postoperative anemia due to expected blood loss    Encounter for postoperative care    Intractable migraine without status migrainosus    Persistent vomiting    Primary hypertension    Diverticulitis of large intestine without bleeding    Prediabetes    Vitamin D insufficiency    Depression due to physical illness     Past Medical History:   Diagnosis Date    Diabetes mellitus ([facility])     Diverticulitis of colon     Hyperlipidemia     Hypertension     Psychiatric disorder     bipolar    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[facility])     Syncope      Social History     Socioeconomic History    Marital status: Single     Spouse name: Not on file    Number of children: Not on file    Years of education: Not on file    Highest education level: Not on file   Occupational History    Not on file   Tobacco Use    Smoking status: Former     Current packs/day: 0.00     Average packs/day: 0.5 packs/day for 45.0 years (22.5 ttl pk-yrs)     Types: Cigarettes     Start date: 1978     Quit date: 2023     Years since quittin.0     Passive exposure: Current    Smokeless tobacco: Never    Tobacco comments:     Patient states that he is trying to cut down   Vaping Use    Vaping status: Some Days    Substances: Nicotine   Substance and Sexual Activity     Alcohol use: Not Currently    Drug use: Yes     Types: Marijuana    Sexual activity: Yes     Partners: Female   Other Topics Concern    Not on file   Social History Narrative    Patient states he lives with his girlfriend      Social Determinants of Health     Financial Resource Strain: Not on file   Food Insecurity: No Food Insecurity (7/14/2023)    Hunger Vital Sign     Worried About Running Out of Food in the Last Year: Never true     Ran Out of Food in the Last Year: Never true   Transportation Needs: Unmet Transportation Needs (7/14/2023)    PRAPARE - Transportation     Lack of Transportation (Medical): Yes     Lack of Transportation (Non-Medical): Yes   Physical Activity: Not on file   Stress: Not on file   Social Connections: Not on file   Intimate Partner Violence: Not on file   Housing Stability: Low Risk  (7/14/2023)    Housing Stability Vital Sign     Unable to Pay for Housing in the Last Year: No     Number of Places Lived in the Last Year: 1     Unstable Housing in the Last Year: No      Family History   Problem Relation Age of Onset    Cancer Mother     Hypertension Mother     Diabetes Mother      Past Surgical History:   Procedure Laterality Date    CARDIAC ASCENDING AORTOGRAM N/A 10/28/2022    Procedure: Cardiac ascending aortogram;  Surgeon: Michele Baer MD;  Location: [facility];  Service: Cardiology    CARDIAC CATHETERIZATION Left 10/28/2022    Procedure: CARDIAC RHC/LHC;  Surgeon: Michele Baer MD;  Location: [facility];  Service: Cardiology    CARDIAC CATHETERIZATION N/A 10/28/2022    Procedure: Cardiac Coronary Angiogram;  Surgeon: Michele Baer MD;  Location: [facility];  Service: Cardiology    CARDIAC CATHETERIZATION Left 10/28/2022    Procedure: Cardiac Left Ventriculogram;  Surgeon: Michele Baer MD;  Location: [facility];  Service: Cardiology    LAPAROSCOPIC LYSIS INTESTINAL ADHESIONS      AL RPLCMT AORTIC VALVE OPN W/STENTLESS  TISSUE VALVE N/A 02/23/2023    Procedure: CORONARY ARTERY BYPASS GRAFT (CABG) 1 VESSEL GSV-->RCA, EVH RIGHT LEG,  WITH REPLACEMENT VALVE AORTIC (AVR) 25MM INSPIRIA RESILIA TISSUE VALVE;  Surgeon: AMY Fierro MD;  Location: [facility];  Service: Cardiac Surgery    TONSILECTOMY AND ADNOIDECTOMY      WISDOM TOOTH EXTRACTION         Current Outpatient Medications:     acetaminophen (TYLENOL) 325 mg tablet, Take 2 tablets (650 mg total) by mouth every 6 (six) hours as needed for mild pain, headaches or fever (Patient not taking: Reported on 3/4/2024), Disp: 180 tablet, Rfl: 0    aspirin 325 mg tablet, Take 1 tablet (325 mg total) by mouth daily (Patient not taking: Reported on 3/4/2024), Disp: 30 tablet, Rfl: 3    atorvastatin (LIPITOR) 40 mg tablet, Take 1 tablet (40 mg total) by mouth daily with dinner (Patient not taking: Reported on 8/17/2023), Disp: 30 tablet, Rfl: 3    butalbital-acetaminophen-caffeine (Bac) -40 mg per tablet, Take 1 tablet by mouth every 4 (four) hours as needed for headaches (Patient not taking: Reported on 3/4/2024), Disp: 18 tablet, Rfl: 0    metoclopramide (Reglan) 10 mg tablet, Take 1 tablet (10 mg total) by mouth 4 (four) times a day (Patient not taking: Reported on 8/17/2023), Disp: 120 tablet, Rfl: 3    metoprolol succinate (TOPROL-XL) 50 mg 24 hr tablet, Take 1 tablet (50 mg total) by mouth daily (Patient not taking: Reported on 8/17/2023), Disp: 30 tablet, Rfl: 3    ondansetron (ZOFRAN) 4 mg tablet, Take 1 tablet (4 mg total) by mouth every 6 (six) hours as needed for nausea or vomiting (Patient not taking: Reported on 3/4/2024), Disp: 12 tablet, Rfl: 0    topiramate (Topamax) 25 mg tablet, Take 1 tablet (25 mg total) by mouth daily (Patient not taking: Reported on 3/4/2024), Disp: 30 tablet, Rfl: 0  Allergies   Allergen Reactions    Neomycin-Polymyxin-Hc Abdominal Pain    Other      Pt states he is allergic to everything. States he doesn't have a list but can only take  "children doses of all medication         Labs:  Lab Results   Component Value Date    WBC 5.53 07/14/2023    HGB 13.5 07/14/2023    HCT 40.5 07/14/2023    MCV 81 (L) 07/14/2023     07/14/2023     Lab Results   Component Value Date    GLUCOSE 119 02/23/2023    CALCIUM 9.0 07/14/2023    K 3.9 07/14/2023    CO2 27 07/14/2023     07/14/2023    BUN 13 07/14/2023    CREATININE 0.95 07/14/2023     Lab Results   Component Value Date    HGBA1C 5.7 (H) 07/05/2023     No results found for: \"CHOL\"  Lab Results   Component Value Date    HDL 46 07/05/2023    HDL 40 02/13/2023    HDL 40 08/29/2022     Lab Results   Component Value Date    LDLCALC 143 (H) 07/05/2023    LDLCALC 172 (H) 02/13/2023    LDLCALC 167 (H) 08/29/2022     Lab Results   Component Value Date    TRIG 349 (H) 07/05/2023    TRIG 237 (H) 02/13/2023    TRIG 157 (H) 08/29/2022     No results found for: \"CHOLHDL\"    Review of Systems:  Review of Systems   Constitutional: Negative.  Negative for activity change, appetite change, chills, fatigue and fever.   Respiratory:  Negative for chest tightness, shortness of breath and wheezing.    Cardiovascular:  Negative for chest pain, palpitations and leg swelling.   Gastrointestinal:  Negative for nausea and vomiting.   Musculoskeletal:  Positive for arthralgias.   Neurological:  Negative for dizziness, tremors, syncope, speech difficulty, weakness, light-headedness and numbness.   All other systems reviewed and are negative.      Physical Exam:  Physical Exam  Vitals reviewed.   Constitutional:       General: He is not in acute distress.     Appearance: Normal appearance. He is not ill-appearing or diaphoretic.   HENT:      Head: Normocephalic and atraumatic.   Eyes:      Extraocular Movements: Extraocular movements intact.   Cardiovascular:      Rate and Rhythm: Normal rate and regular rhythm.      Heart sounds: No murmur heard.     No friction rub. No gallop.   Pulmonary:      Effort: Pulmonary effort is " normal. No respiratory distress.      Breath sounds: Normal breath sounds.   Abdominal:      General: There is no distension.      Palpations: Abdomen is soft.   Musculoskeletal:         General: No swelling. Normal range of motion.      Cervical back: Normal range of motion. No rigidity.   Skin:     General: Skin is warm and dry.      Capillary Refill: Capillary refill takes less than 2 seconds.      Coloration: Skin is not pale.   Neurological:      General: No focal deficit present.      Mental Status: He is alert and oriented to person, place, and time.      Cranial Nerves: No cranial nerve deficit.   Psychiatric:         Mood and Affect: Mood normal.         Behavior: Behavior normal.

## 2024-03-13 ENCOUNTER — TELEPHONE (OUTPATIENT)
Dept: CARDIOLOGY CLINIC | Facility: CLINIC | Age: 59
End: 2024-03-13

## 2024-03-13 ENCOUNTER — HOSPITAL ENCOUNTER (OUTPATIENT)
Dept: NON INVASIVE DIAGNOSTICS | Facility: CLINIC | Age: 59
Discharge: HOME | End: 2024-03-13
Payer: COMMERCIAL

## 2024-03-13 VITALS
BODY MASS INDEX: 31.02 KG/M2 | HEIGHT: 66 IN | HEART RATE: 111 BPM | DIASTOLIC BLOOD PRESSURE: 90 MMHG | SYSTOLIC BLOOD PRESSURE: 140 MMHG | WEIGHT: 193 LBS

## 2024-03-13 DIAGNOSIS — Z95.2 S/P AVR: ICD-10-CM

## 2024-03-13 DIAGNOSIS — I10 PRIMARY HYPERTENSION: ICD-10-CM

## 2024-03-13 DIAGNOSIS — Z95.1 S/P CABG (CORONARY ARTERY BYPASS GRAFT): ICD-10-CM

## 2024-03-13 LAB
AORTIC ROOT: 3.7 CM
AORTIC VALVE MEAN VELOCITY: 11.1 M/S
APICAL FOUR CHAMBER EJECTION FRACTION: 56 %
ASCENDING AORTA: 3.9 CM
AV AREA BY CONTINUOUS VTI: 2.1 CM2
AV AREA PEAK VELOCITY: 1.8 CM2
AV LVOT MEAN GRADIENT: 1 MMHG
AV LVOT PEAK GRADIENT: 2 MMHG
AV MEAN GRADIENT: 5 MMHG
AV PEAK GRADIENT: 10 MMHG
AV VALVE AREA: 2.11 CM2
AV VELOCITY RATIO: 0.45
BSA FOR ECHO PROCEDURE: 1.97 M2
DOP CALC AO PEAK VEL: 1.55 M/S
DOP CALC AO VTI: 24.43 CM
DOP CALC LVOT AREA: 4.15 CM2
DOP CALC LVOT CARDIAC INDEX: 2.62 L/MIN/M2
DOP CALC LVOT CARDIAC OUTPUT: 5.16 L/MIN
DOP CALC LVOT DIAMETER: 2.3 CM
DOP CALC LVOT PEAK VEL VTI: 12.39 CM
DOP CALC LVOT PEAK VEL: 0.69 M/S
DOP CALC LVOT STROKE INDEX: 25.9 ML/M2
DOP CALC LVOT STROKE VOLUME: 51.45
E WAVE DECELERATION TIME: 170 MS
E/A RATIO: 0.58
FRACTIONAL SHORTENING: 32 (ref 28–44)
INTERVENTRICULAR SEPTUM IN DIASTOLE (PARASTERNAL SHORT AXIS VIEW): 1.3 CM
INTERVENTRICULAR SEPTUM: 1.3 CM (ref 0.6–1.1)
LAAS-AP2: 12.7 CM2
LAAS-AP4: 11.2 CM2
LEFT ATRIUM SIZE: 3.3 CM
LEFT ATRIUM VOLUME (MOD BIPLANE): 28 ML
LEFT ATRIUM VOLUME INDEX (MOD BIPLANE): 14.2 ML/M2
LEFT INTERNAL DIMENSION IN SYSTOLE: 3.2 CM (ref 2.1–4)
LEFT VENTRICLE DIASTOLIC VOLUME (MOD BIPLANE): 82 ML
LEFT VENTRICLE DIASTOLIC VOLUME INDEX (MOD BIPLANE): 41.6 ML/M2
LEFT VENTRICLE SYSTOLIC VOLUME (MOD BIPLANE): 38 ML
LEFT VENTRICLE SYSTOLIC VOLUME INDEX (MOD BIPLANE): 19.3 ML/M2
LEFT VENTRICULAR INTERNAL DIMENSION IN DIASTOLE: 4.7 CM (ref 3.5–6)
LEFT VENTRICULAR POSTERIOR WALL IN END DIASTOLE: 1.2 CM
LEFT VENTRICULAR STROKE VOLUME: 59 ML
LV EF: 54 %
LVSV (TEICH): 59 ML
MV E'TISSUE VEL-SEP: 9 CM/S
MV PEAK A VEL: 0.98 M/S
MV PEAK E VEL: 57 CM/S
MV STENOSIS PRESSURE HALF TIME: 49 MS
MV VALVE AREA P 1/2 METHOD: 4.49
RIGHT ATRIUM AREA SYSTOLE A4C: 12 CM2
RIGHT VENTRICLE ID DIMENSION: 2.9 CM
SL CV LEFT ATRIUM LENGTH A2C: 4.1 CM
SL CV LV EF: 55
SL CV PED ECHO LEFT VENTRICLE DIASTOLIC VOLUME (MOD BIPLANE) 2D: 101 ML
SL CV PED ECHO LEFT VENTRICLE SYSTOLIC VOLUME (MOD BIPLANE) 2D: 42 ML
TRICUSPID ANNULAR PLANE SYSTOLIC EXCURSION: 1.8 CM

## 2024-03-13 PROCEDURE — 93306 TTE W/DOPPLER COMPLETE: CPT | Performed by: INTERNAL MEDICINE

## 2024-03-13 PROCEDURE — 93306 TTE W/DOPPLER COMPLETE: CPT

## 2024-03-13 NOTE — TELEPHONE ENCOUNTER
----- Message from Fátima Patel MD sent at 3/13/2024  1:34 PM EDT -----  Please let patient know that echo looks unremarkable. His heart valve looks good  Thanks

## 2024-03-21 DIAGNOSIS — G43.919 INTRACTABLE MIGRAINE WITHOUT STATUS MIGRAINOSUS, UNSPECIFIED MIGRAINE TYPE: ICD-10-CM

## 2024-03-21 RX ORDER — ACETAMINOPHEN 325 MG/1
650 TABLET ORAL EVERY 6 HOURS PRN
Qty: 180 TABLET | Refills: 0 | Status: SHIPPED | OUTPATIENT
Start: 2024-03-21 | End: 2024-03-22 | Stop reason: SDUPTHER

## 2024-03-21 NOTE — TELEPHONE ENCOUNTER
Patient's significant other stopped by the office to ask for a refill on his medication. She stated that patient is requesting that we sent a few more than we have been as he's been having to take them more frequently.    Please advise, thank you!

## 2024-03-22 ENCOUNTER — TELEPHONE (OUTPATIENT)
Dept: FAMILY MEDICINE CLINIC | Facility: CLINIC | Age: 59
End: 2024-03-22

## 2024-03-22 DIAGNOSIS — G43.919 INTRACTABLE MIGRAINE WITHOUT STATUS MIGRAINOSUS, UNSPECIFIED MIGRAINE TYPE: ICD-10-CM

## 2024-03-22 RX ORDER — ACETAMINOPHEN 325 MG/1
650 TABLET ORAL EVERY 6 HOURS PRN
Qty: 180 TABLET | Refills: 0 | Status: SHIPPED | OUTPATIENT
Start: 2024-03-22

## 2024-03-22 NOTE — TELEPHONE ENCOUNTER
Can we resend tylenol? The pharmacy stated that they did not receive the prescription. Please advise.

## 2024-03-26 ENCOUNTER — TELEPHONE (OUTPATIENT)
Dept: NEUROLOGY | Facility: CLINIC | Age: 59
End: 2024-03-26

## 2024-03-27 ENCOUNTER — TELEPHONE (OUTPATIENT)
Dept: NEUROLOGY | Facility: CLINIC | Age: 59
End: 2024-03-27

## 2024-03-27 NOTE — TELEPHONE ENCOUNTER
Spoke with patient and confirmed upcoming appointment with Dr. Munoz 3/28 at [facility].

## 2024-03-28 ENCOUNTER — CONSULT (OUTPATIENT)
Dept: NEUROLOGY | Facility: CLINIC | Age: 59
End: 2024-03-28
Payer: COMMERCIAL

## 2024-03-28 VITALS
WEIGHT: 198 LBS | DIASTOLIC BLOOD PRESSURE: 90 MMHG | SYSTOLIC BLOOD PRESSURE: 142 MMHG | BODY MASS INDEX: 31.82 KG/M2 | HEIGHT: 66 IN | HEART RATE: 100 BPM

## 2024-03-28 DIAGNOSIS — G43.919 INTRACTABLE MIGRAINE WITHOUT STATUS MIGRAINOSUS, UNSPECIFIED MIGRAINE TYPE: Primary | ICD-10-CM

## 2024-03-28 DIAGNOSIS — R56.9 SEIZURE-LIKE ACTIVITY (HCC): ICD-10-CM

## 2024-03-28 DIAGNOSIS — R55 SYNCOPE: ICD-10-CM

## 2024-03-28 PROCEDURE — 99244 OFF/OP CNSLTJ NEW/EST MOD 40: CPT | Performed by: PSYCHIATRY & NEUROLOGY

## 2024-03-28 RX ORDER — DIVALPROEX SODIUM 500 MG/1
1000 TABLET, EXTENDED RELEASE ORAL DAILY
Qty: 90 TABLET | Refills: 0 | Status: SHIPPED | OUTPATIENT
Start: 2024-03-28

## 2024-03-28 RX ORDER — SUMATRIPTAN 100 MG/1
100 TABLET, FILM COATED ORAL ONCE AS NEEDED
Qty: 9 TABLET | Refills: 1 | Status: CANCELLED | OUTPATIENT
Start: 2024-03-28

## 2024-03-28 RX ORDER — RIMEGEPANT SULFATE 75 MG/75MG
75 TABLET, ORALLY DISINTEGRATING ORAL ONCE
Qty: 8 TABLET | Refills: 0 | Status: SHIPPED | OUTPATIENT
Start: 2024-03-28 | End: 2024-03-28

## 2024-03-28 RX ORDER — AMITRIPTYLINE HYDROCHLORIDE 10 MG/1
10 TABLET, FILM COATED ORAL
Qty: 90 TABLET | Refills: 1 | Status: CANCELLED | OUTPATIENT
Start: 2024-03-28

## 2024-03-28 NOTE — PROGRESS NOTES
Neurology Consult     Assessment/Plan:  58 y.o. right handed patient presents to our office for evaluation of headaches, seizure like activity, recurrent episodes of syncope. Pertinent PMHx: DM, HTN, dyslipidemia, tobacco use, THC use, ADHD, bipolar, schizophrenia, CABG, s/p AV replacement, recurrent episodes of syncope.   Has had headaches since age 8-9 s/p severe head injury.   Has had episodes of generalized body shaking since age 8-9. Occur about 3x/month. He is able to remember the episodes and is sometimes awake during these episodes. Describes it is as an out of body experience.     Previously tried medications:  -Preventative: Metoprolol (made him sleepy, dizzy), Topamax (led to numbness), marijuana (most helpful)  -Abortive: acetaminophen 325mg (minimal help) , Triptans contraindicated due to CABG, CAD.     # Migraines   # Recurrent episodes of syncope   # episodes of generalized body shaking  # insomnia, snoring    Plan:  MRI brain + Johnny, MRA head, neck  Sleep medicine referral  rEEG   Abortive headache med: Nurtec PRN. Future option: Ubrelvy   Preventative headache med: VPA ER 1000mg QD (start by taking 500mg for first week, then 1g starting on week 2).  Future options: Gabapentin, Elavil, Duloxetine, CGRPs.   Migraine diary  For seizure like episodes: routine EEG  Will workup syncope more during next visit per patient's visits as headaches are most bothersome for today.   Counseled on abortive, preventative meds' side effects extensively; patient verbalized understanding.   Limit OTC meds such as Tylenol, Ibuprofen, Aleve, Excedrin. (No more than 2- 3 times a week or max 10 a month).  Magnesium Oxide- 400mg QD, Vitamin B2- 200 mg BID supplements.  Regular exercise   Return in about 3 months (around 6/28/2024).  Staffed w/ attending neurologist: Dr. Sanchez Acevedo.        Subjective:   HPI    Mr.William Walden is a 58 y.o. right handed patient presents to our office for evaluation of headaches.  "  Pertinent PMHx: DM, HTN, dyslipidemia, tobacco use, ADHD, bipolar, schizophrenia,  CABG, s/p AV replacement, recurrent episodes of syncope.     Prior headache hx:   Headaches initially began between age 8-9 years old after severe head injury. Headaches have continued throughout his life and are now much worse, prompting him to seek care.   Hospitalizations or ED visits for headache: multiple times at various [facility].   Fam hx: aneurysms- none, migraines- none.     Current headaches:  Currently, headache occur daily, throbbing pain.  Located in bilateral cervical area,  radiate upwards into frontal area.   No postural/positional component.   Aura: irritability for 30 mins before.   Average intensity rated as 5.5.   Triggers: dehydration  Associated neuro syx include: blurry vision, scintillating scotomas, losing vision due to pain, Left arm numbness.    Migrainous features include: nausea, vomiting, photophobia, phonophobia.   Alleviating factors include- lying down in a dark quiet room.   During an acute attack, patient has some level of disability: patient is able to do some work around the house.     Previously tried medications:  -Preventative: Metoprolol (made him sleepy, dizzy), Topamax (led to numbness), marijuana (most helpful)  -Abortive: acetaminophen 325mg (minimal help) , Triptans contraindicated due to CABG, CAD.       Patient also describes episodes of syncope that occurred during the time of his AV valve replacement, CABG.     Patient also describes episodes of \"epileptic events\"  - HE has these episodes of full shaking about 3x/month. He is able to remember the episodes and is sometimes awake during these episodes. Describes it is as an out of body experience.   - Has been having these episodes since age 8-9.     Prior imaging includes: [facility] (7/2023)- unremarkable.     Patient does not work, lives with SO, Rose. Patient  smokes cigarettes, THC, vapes. Alcohol use: socially.   Gets 4-5 hrs or sleep. " "Has insomina. No RLS syx reported. No enactment of dreams. No apneic episodes but does snore quite a bit per Rose.      The following portions of the patient's history were reviewed and updated as appropriate: allergies, current medications, past family history, past medical history, past social history, past surgical history and problem list.     Reviewed pertinent records from [facility].    Review of Systems   see HPI.    Past Medical History:   Diagnosis Date    Diabetes mellitus (HCC)     Diverticulitis of colon     Hyperlipidemia     Hypertension     Psychiatric disorder     bipolar    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HCC)     Syncope      Current Outpatient Medications   Medication Instructions    acetaminophen (TYLENOL) 650 mg, Oral, Every 6 hours PRN    aspirin 81 mg, Oral, Daily    atorvastatin (LIPITOR) 40 mg, Oral, Daily with dinner    butalbital-acetaminophen-caffeine (Rona) -40 mg per tablet 1 tablet, Oral, Every 4 hours PRN    metoclopramide (REGLAN) 10 mg, Oral, 4 times daily    metoprolol succinate (TOPROL-XL) 50 mg, Oral, Daily    ondansetron (ZOFRAN) 4 mg, Oral, Every 6 hours PRN    topiramate (TOPAMAX) 25 mg, Oral, Daily     Objective:    /90 (BP Location: Right arm, Patient Position: Sitting, Cuff Size: Standard)   Pulse 100   Ht 5' 6\" (1.676 m)   Wt 89.8 kg (198 lb)   BMI 31.96 kg/m²      Physical Exam    Vitals reviewed  General Examination: No distress, cooperative. Obese.     Pulm: Normal effort.    NCAT. NAD. Normal neck flexion and ROM.  AAOx3. Speech fluent without errors. Normal naming and repetition (including low frequency objects). Follows cross-body commands.  Pupils equal, briskly reactive. VFF. EOMI. No nystagmus. Face symmetric. No dysarthria. Uvula midline. Tongue midline.  Normal tone and bulk throughout.  Muscle strength testing by the MRC scale:        Right Left   Shoulder abduction 5 5   Elbow flexion 5 5   Elbow extension 5 5   Finger  5 5    Hip " flexion 5 5   Knee flexion 5 5   Knee extension 5 5     No drift or orbiting. No abnormal movements.      Sensory  Symmetric LT sensation t/o.     Reflexes Right Left   Brachioradialis     +2  +2   Biceps                                   +2  +2   Patellar   0 (hx of knee injuries)  0 (hx of knee injuries)     Coordination:   Intact FNF b/l.  Intact heel-to-shin bilaterally.  No truncal ataxia    Gait: Casual gait is narrow based and symmetric w/ appropriate arm swing, step height, and stride length. Able to toe and heel walk. Able to maintain stability in tandem gait.      WORKUP:  Lab Results   Component Value Date    [id number] 2.074 07/05/2023    HGBA1C 5.7 (H) 07/05/2023    [id number] 23.3 (L) 07/05/2023     NCC (7/5/23), IMP: No acute intracranial abnormality.      Thank you for involving me in the care of your patient.  If you have any additional questions or would like to discuss further, please feel free to contact me.    FARIDEH Mosqueda.  PGY-3, Neurology

## 2024-03-28 NOTE — PATIENT INSTRUCTIONS
"Obtain MRI scans.  See sleep medicine team (referral provided).  Obtain routine EEG.     Instructions for taking abortive meds:  NURTEC.  Take one NURTEC 75 mg at onset under tongue. Limit 1 in 24 hours. NO more than 8 a month.   Go online to [email or web address].com and sign up for coupon card.     Take Depakote ER 500mg at night time for 1 week, then Depakote 500 mg 2 pills starting week 2 (See side effects below).  The major side effects are tiredness, unsteadiness, weight gain. Rarely, this medication can injure liver function or reduce white blood cell counts, so these are also followed by blood levels over time. Depakote can be another very effective medicine for control of migraines and is sometimes also used as a mood stabilizing medicine.     Limit over the counter medications such as Tylenol, Ibuprofen, Aleve, Excedrin. (No more than 2- 3 times a week or max 10 a month).  Maintain headache diary.  Free GILA for a smart phone, which can be used is \"Migraine theodora\"  Limit caffeine to 1-2 cups 8 to 16 oz a day or less.  Avoid dietary trigger. (aged cheese, peanuts, MSG, aspartame and nitrates)  Patient is to have regular frequent meals to prevent headache onset.    Please drink at least 64 ounces of water a day to help remain hydrated.    Medication overuse headaches:   Avoid medications with narcotics, barbiturates, or caffeine in them as these can cause rebound headaches after very few doses and can interfere with other headache medicine efficacy. Taking  any acute/abortive over the counter medication or prescription drugs for more than 2-3 days a week can cause medication overuse headache.     Non-pharmacologic treatments:   Maintain a regular schedule with adequate sleep (sleep hygiene), blue filter film screens for computers and phones, theraspecs or axon sunglasses (FL41 karlie film), diet (not skipping/late meals), hydration (60-80 oz of water), exercise, decrease consumption of caffeine (less than 2 cups per day) and " "cut out artificial sweeteners, coping mechanisms for stress, and cognitive behavioral therapies and biofeedback.   Recommend exercising regularly.     Over-the-counter supplements:   Magnesium Oxide 400mg a day. If any diarrhea or upset stomach, decrease dose  as tolerated  Vitamin B2 200 mg twice a day. May cause urine to turn yellow which is normal for B 2 to do and is not a sign that you are dehydrated.       If you experience \"red flag\" headache symptoms including symptoms such as facial droop on one side, weakness/paralysis on either side, speech trouble, numbness on one side, balance issues, any vision changes, extreme dizziness or significant increase in severity of headache or a sudden onset severe headache, patient is to call 9-1-1 immediately or to proceed to the nearest ER.     "

## 2024-03-28 NOTE — TELEPHONE ENCOUNTER
received vm 3/26 at 8:48am- hi, my name is Rose crum. I am calling in regards to nneka tellez. Just got a voice mail asking me to call, call back and confirm his appointment for this Thursday. The 28th with Dr. blunt in [place], Yes, we are coming. I just wanted to let you know, I did receive a lyft message of, that were getting a  to bring us up. We will probably need a lyft back. Um, if somebody could please reach me at [phone]. It would be greatly appreciated. Thank you. Have a good day  --------------------------------------  See encounter from 3/27 and 2/20 encounter  Pt currently in appt with dr blunt

## 2024-04-02 NOTE — TELEPHONE ENCOUNTER
3/28/24, 1:00    Hi, my name is Rose Devi and calling in regards to my boyfriend, Neal Walden's appointment at 4 PM. I'd like somebody to reach me at [phone]. We need transportation ane we're getting the run around from everybody. I would appreciate a call back. Thank you. An immediate call back because we're getting the runaround from his insurance and such thing. One thing and I am here at another and we need to talk to somebody. Thank you. Have a good a day.    Already addressed. Pt was seen by Dr Munoz

## 2024-04-03 ENCOUNTER — TELEPHONE (OUTPATIENT)
Dept: NEUROLOGY | Facility: CLINIC | Age: 59
End: 2024-04-03

## 2024-04-04 ENCOUNTER — TELEPHONE (OUTPATIENT)
Dept: NEUROLOGY | Facility: CLINIC | Age: 59
End: 2024-04-04

## 2024-04-04 NOTE — TELEPHONE ENCOUNTER
Received vm from 4/2 at 10:26am- my name is Rose gaytan, i'm calling in regards to my boyfriend nneka tellez. His birth date is [birth year]. He saw dr blunt this last Thursday. She prescribed him medicine that is not helping, he's been throwing up and sleeping most of the day. So I'd like to talk to someone about it. Please reach me at [phone]. Thank you. Have a good day.   -------------------------------------------  Called and spoke to rose.  States that she called our office back after I left her the message yesterday.     He has only taken divalproex 3 times, 500mg 1 tab daily  The first time -Sleeping all day and a little bit of vomiting  Second time-Made his headache worse, vomiting, sleeping all day, and then can't sleep at night   Third time-same thing as second time    He stopped taking this now.  Only took for 3 days    States that they are still waiting for the other medication that we prescribed,.  It is for the onset of migraine.  Pt was prescribed nurtec.    Will complete nurtec PA in separate encounter    Please advise on divalproex  [phone] to leave detailed message

## 2024-04-05 ENCOUNTER — APPOINTMENT (EMERGENCY)
Dept: CT IMAGING | Facility: HOSPITAL | Age: 59
End: 2024-04-05
Payer: COMMERCIAL

## 2024-04-05 ENCOUNTER — HOSPITAL ENCOUNTER (EMERGENCY)
Facility: HOSPITAL | Age: 59
Discharge: HOME | End: 2024-04-05
Attending: EMERGENCY MEDICINE
Payer: COMMERCIAL

## 2024-04-05 VITALS
HEART RATE: 99 BPM | WEIGHT: 199.3 LBS | RESPIRATION RATE: 17 BRPM | HEIGHT: 67 IN | OXYGEN SATURATION: 100 % | TEMPERATURE: 97.7 F | DIASTOLIC BLOOD PRESSURE: 91 MMHG | SYSTOLIC BLOOD PRESSURE: 159 MMHG | BODY MASS INDEX: 31.28 KG/M2

## 2024-04-05 DIAGNOSIS — M54.9 BACK PAIN: ICD-10-CM

## 2024-04-05 DIAGNOSIS — R10.9 FLANK PAIN: Primary | ICD-10-CM

## 2024-04-05 LAB
ALBUMIN SERPL BCP-MCNC: 4.4 G/DL (ref 3.5–5)
ALP SERPL-CCNC: 66 U/L (ref 34–104)
ALT SERPL W P-5'-P-CCNC: 19 U/L (ref 7–52)
ANION GAP SERPL CALCULATED.3IONS-SCNC: 7 MMOL/L (ref 4–13)
AST SERPL W P-5'-P-CCNC: 14 U/L (ref 13–39)
BASOPHILS # BLD AUTO: 0.06 THOUSANDS/ÂΜL (ref 0–0.1)
BASOPHILS NFR BLD AUTO: 1 % (ref 0–1)
BILIRUB SERPL-MCNC: 0.42 MG/DL (ref 0.2–1)
BILIRUB UR QL STRIP: NEGATIVE
BUN SERPL-MCNC: 13 MG/DL (ref 5–25)
CALCIUM SERPL-MCNC: 9.6 MG/DL (ref 8.4–10.2)
CHLORIDE SERPL-SCNC: 101 MMOL/L (ref 96–108)
CLARITY UR: CLEAR
CO2 SERPL-SCNC: 27 MMOL/L (ref 21–32)
COLOR UR: NORMAL
CREAT SERPL-MCNC: 0.82 MG/DL (ref 0.6–1.3)
EOSINOPHIL # BLD AUTO: 0.12 THOUSAND/ÂΜL (ref 0–0.61)
EOSINOPHIL NFR BLD AUTO: 3 % (ref 0–6)
ERYTHROCYTE [DISTWIDTH] IN BLOOD BY AUTOMATED COUNT: 14.4 % (ref 11.6–15.1)
GFR SERPL CREATININE-BSD FRML MDRD: 98 ML/MIN/1.73SQ M
GLUCOSE SERPL-MCNC: 100 MG/DL (ref 65–140)
GLUCOSE UR STRIP-MCNC: NEGATIVE MG/DL
HCT VFR BLD AUTO: 50.6 % (ref 36.5–49.3)
HGB BLD-MCNC: 16.9 G/DL (ref 12–17)
HGB UR QL STRIP.AUTO: NEGATIVE
IMM GRANULOCYTES # BLD AUTO: 0.01 THOUSAND/UL (ref 0–0.2)
IMM GRANULOCYTES NFR BLD AUTO: 0 % (ref 0–2)
KETONES UR STRIP-MCNC: NEGATIVE MG/DL
LEUKOCYTE ESTERASE UR QL STRIP: NEGATIVE
LIPASE SERPL-CCNC: 19 U/L (ref 11–82)
LYMPHOCYTES # BLD AUTO: 1.2 THOUSANDS/ÂΜL (ref 0.6–4.47)
LYMPHOCYTES NFR BLD AUTO: 27 % (ref 14–44)
MCH RBC QN AUTO: 27.3 PG (ref 26.8–34.3)
MCHC RBC AUTO-ENTMCNC: 33.4 G/DL (ref 31.4–37.4)
MCV RBC AUTO: 82 FL (ref 82–98)
MONOCYTES # BLD AUTO: 0.42 THOUSAND/ÂΜL (ref 0.17–1.22)
MONOCYTES NFR BLD AUTO: 9 % (ref 4–12)
NEUTROPHILS # BLD AUTO: 2.64 THOUSANDS/ÂΜL (ref 1.85–7.62)
NEUTS SEG NFR BLD AUTO: 60 % (ref 43–75)
NITRITE UR QL STRIP: NEGATIVE
NRBC BLD AUTO-RTO: 0 /100 WBCS
PH UR STRIP.AUTO: 7 [PH]
PLATELET # BLD AUTO: 199 THOUSANDS/UL (ref 149–390)
PMV BLD AUTO: 10 FL (ref 8.9–12.7)
POTASSIUM SERPL-SCNC: 4.3 MMOL/L (ref 3.5–5.3)
PROT SERPL-MCNC: 8.1 G/DL (ref 6.4–8.4)
PROT UR STRIP-MCNC: NEGATIVE MG/DL
RBC # BLD AUTO: 6.18 MILLION/UL (ref 3.88–5.62)
SODIUM SERPL-SCNC: 135 MMOL/L (ref 135–147)
SP GR UR STRIP.AUTO: 1.01 (ref 1–1.03)
UROBILINOGEN UR STRIP-ACNC: <2 MG/DL
WBC # BLD AUTO: 4.45 THOUSAND/UL (ref 4.31–10.16)

## 2024-04-05 PROCEDURE — 96374 THER/PROPH/DIAG INJ IV PUSH: CPT

## 2024-04-05 PROCEDURE — 36415 COLL VENOUS BLD VENIPUNCTURE: CPT

## 2024-04-05 PROCEDURE — 74177 CT ABD & PELVIS W/CONTRAST: CPT

## 2024-04-05 PROCEDURE — 99285 EMERGENCY DEPT VISIT HI MDM: CPT

## 2024-04-05 PROCEDURE — 83690 ASSAY OF LIPASE: CPT

## 2024-04-05 PROCEDURE — 85025 COMPLETE CBC W/AUTO DIFF WBC: CPT

## 2024-04-05 PROCEDURE — 81003 URINALYSIS AUTO W/O SCOPE: CPT

## 2024-04-05 PROCEDURE — 99284 EMERGENCY DEPT VISIT MOD MDM: CPT

## 2024-04-05 PROCEDURE — 80053 COMPREHEN METABOLIC PANEL: CPT

## 2024-04-05 RX ORDER — KETOROLAC TROMETHAMINE 30 MG/ML
15 INJECTION, SOLUTION INTRAMUSCULAR; INTRAVENOUS ONCE
Status: COMPLETED | OUTPATIENT
Start: 2024-04-05 | End: 2024-04-05

## 2024-04-05 RX ORDER — METHOCARBAMOL 500 MG/1
500 TABLET, FILM COATED ORAL ONCE
Status: COMPLETED | OUTPATIENT
Start: 2024-04-05 | End: 2024-04-05

## 2024-04-05 RX ORDER — NAPROXEN 500 MG/1
500 TABLET ORAL 2 TIMES DAILY WITH MEALS
Qty: 30 TABLET | Refills: 0 | Status: SHIPPED | OUTPATIENT
Start: 2024-04-05

## 2024-04-05 RX ORDER — METHOCARBAMOL 500 MG/1
500 TABLET, FILM COATED ORAL 2 TIMES DAILY
Qty: 20 TABLET | Refills: 0 | Status: SHIPPED | OUTPATIENT
Start: 2024-04-05

## 2024-04-05 RX ORDER — LIDOCAINE 50 MG/G
1 PATCH TOPICAL ONCE
Status: DISCONTINUED | OUTPATIENT
Start: 2024-04-05 | End: 2024-04-05 | Stop reason: HOSPADM

## 2024-04-05 RX ADMIN — LIDOCAINE 5% 1 PATCH: 700 PATCH TOPICAL at 14:30

## 2024-04-05 RX ADMIN — KETOROLAC TROMETHAMINE 15 MG: 30 INJECTION, SOLUTION INTRAMUSCULAR; INTRAVENOUS at 14:29

## 2024-04-05 RX ADMIN — METHOCARBAMOL TABLETS 500 MG: 500 TABLET, COATED ORAL at 14:29

## 2024-04-05 RX ADMIN — IOHEXOL 100 ML: 350 INJECTION, SOLUTION INTRAVENOUS at 13:51

## 2024-04-05 NOTE — ED PROVIDER NOTES
History  Chief Complaint   Patient presents with    Flank Pain     Pt c/o right sided flank pain x 1 day, denies any urinary symptoms      58-year-old male presents ER for evaluation of flank pain. PMH: Diabetes mellitus, diverticulosis, hyperlipidemia, hypertension, bipolar.  Patient stated that for the last 2 days he has been having right flank pain and right lower back pain.  Patient describes the pain as dull and aching.  Patient reports that it is worse with movement especially with twisting.  Patient did not take any medication to relieve the pain.  Pain does not radiate down the leg or into her groin.  Patient stated there is no trauma or injury.  No noted erythema, ecchymosis, open wound.  No noted hematuria or UTI symptoms.  Patient denies history of IV drug use, immunosuppression, bowel or bladder incontinence, saddle paresthesia.      History provided by:  Patient      Prior to Admission Medications   Prescriptions Last Dose Informant Patient Reported? Taking?   acetaminophen (TYLENOL) 325 mg tablet   No No   Sig: Take 2 tablets (650 mg total) by mouth every 6 (six) hours as needed for mild pain, headaches or fever   aspirin 81 mg chewable tablet   No No   Sig: Chew 1 tablet (81 mg total) daily   atorvastatin (LIPITOR) 40 mg tablet  Self No No   Sig: Take 1 tablet (40 mg total) by mouth daily with dinner   Patient not taking: Reported on 8/17/2023   butalbital-acetaminophen-caffeine (Bac) -40 mg per tablet  Self No No   Sig: Take 1 tablet by mouth every 4 (four) hours as needed for headaches   Patient not taking: Reported on 3/4/2024   divalproex sodium (Depakote ER) 500 mg 24 hr tablet   No No   Sig: Take 2 tablets (1,000 mg total) by mouth daily Take 1 tablet of 500mg daily for 1 week. Then, increase to 2 tablets per day starting at week 2.   metoclopramide (Reglan) 10 mg tablet  Self No No   Sig: Take 1 tablet (10 mg total) by mouth 4 (four) times a day   Patient not taking: Reported on 8/17/2023       Facility-Administered Medications: None       Past Medical History:   Diagnosis Date    Diabetes mellitus (HCC)     Diverticulitis of colon     Hyperlipidemia     Hypertension     Psychiatric disorder     bipolar    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HCC)     Syncope        Past Surgical History:   Procedure Laterality Date    CARDIAC ASCENDING AORTOGRAM N/A 10/28/2022    Procedure: Cardiac ascending aortogram;  Surgeon: Michele Baer MD;  Location: [facility];  Service: Cardiology    CARDIAC CATHETERIZATION Left 10/28/2022    Procedure: CARDIAC RHC/LHC;  Surgeon: Michele Baer MD;  Location: [facility];  Service: Cardiology    CARDIAC CATHETERIZATION N/A 10/28/2022    Procedure: Cardiac Coronary Angiogram;  Surgeon: Michele Baer MD;  [facility]: [facility];  Service: Cardiology    CARDIAC CATHETERIZATION Left 10/28/2022    Procedure: Cardiac Left Ventriculogram;  Surgeon: Michele Baer MD;  Location: [facility];  Service: Cardiology    LAPAROSCOPIC LYSIS INTESTINAL ADHESIONS      N[place] RPLCMT AORTIC VALVE OPN W/STENTLESS TISSUE VALVE N/A 02/23/2023    Procedure: CORONARY ARTERY BYPASS GRAFT (CABG) 1 VESSEL GSV-->RCA, EVH RIGHT LEG,  WITH REPLACEMENT VALVE AORTIC (AVR) 25MM INSPIRIA RESILIA TISSUE VALVE;  Surgeon: MAY Fierro MD;  Location: [facility];  Service: Cardiac Surgery    TONSILECTOMY AND ADNOIDECTOMY      WISDOM TOOTH EXTRACTION         Family History   Problem Relation Age of Onset    Cancer Mother     Hypertension Mother     Diabetes Mother      I have reviewed and agree with the history as documented.    E-Cigarette/Vaping    E-Cigarette Use Current Some Day User      E-Cigarette/Vaping Substances    Nicotine Yes     THC No     CBD No     Flavoring No     Other No     Unknown No      Social History     Tobacco Use    Smoking status: Former     Current packs/day: 0.00     Average packs/day: 0.5 packs/day for 45.0 years (22.5 ttl pk-yrs)      Types: Cigarettes     Start date: 1978     Quit date: 2023     Years since quittin.1     Passive exposure: Current    Smokeless tobacco: Never    Tobacco comments:     Patient states that he is trying to cut down   Vaping Use    Vaping status: Some Days    Substances: Nicotine   Substance Use Topics    Alcohol use: Not Currently    Drug use: Yes     Types: Marijuana       Review of Systems   Constitutional:  Negative for chills and fever.   HENT:  Negative for ear pain and sore throat.    Eyes:  Negative for pain and visual disturbance.   Respiratory:  Negative for cough and shortness of breath.    Cardiovascular:  Negative for chest pain and palpitations.   Gastrointestinal:  Negative for abdominal pain and vomiting.   Genitourinary:  Positive for flank pain. Negative for dysuria and hematuria.   Musculoskeletal:  Positive for back pain. Negative for arthralgias.   Skin:  Negative for color change and rash.   Neurological:  Negative for seizures and syncope.   All other systems reviewed and are negative.      Physical Exam  Physical Exam  Vitals and nursing note reviewed.   Constitutional:       General: He is not in acute distress.     Appearance: He is well-developed.   HENT:      Head: Normocephalic and atraumatic.      Right Ear: External ear normal.      Left Ear: External ear normal.   Eyes:      Conjunctiva/sclera: Conjunctivae normal.   Cardiovascular:      Rate and Rhythm: Normal rate and regular rhythm.      Pulses: Normal pulses.      Heart sounds: Normal heart sounds.   Pulmonary:      Effort: Pulmonary effort is normal. No respiratory distress.      Breath sounds: Normal breath sounds.   Abdominal:      Palpations: Abdomen is soft.      Tenderness: There is right CVA tenderness.   Musculoskeletal:         General: No swelling.      Cervical back: Neck supple.      Lumbar back: Tenderness present. Negative right straight leg raise test and negative left straight leg raise test.         Back:    Skin:     General: Skin is warm and dry.      Capillary Refill: Capillary refill takes less than 2 seconds.   Neurological:      Mental Status: He is alert and oriented to person, place, and time. Mental status is at baseline.   Psychiatric:         Mood and Affect: Mood normal.         Behavior: Behavior normal.         Vital Signs  ED Triage Vitals [04/05/24 1224]   Temperature Pulse Respirations Blood Pressure SpO2   97.7 °F (36.5 °C) 99 17 159/91 100 %      Temp Source Heart Rate Source Patient Position - Orthostatic VS BP Location FiO2 (%)   Oral Monitor Sitting Right arm --      Pain Score       --           Vitals:    04/05/24 1224   BP: 159/91   Pulse: 99   Patient Position - Orthostatic VS: Sitting         Visual Acuity      ED Medications  Medications   lidocaine (LIDODERM) 5 % patch 1 patch (1 patch Topical Medication Applied 4/5/24 1430)   ketorolac (TORADOL) injection 15 mg (15 mg Intravenous Given 4/5/24 1429)   methocarbamol (ROBAXIN) tablet 500 mg (500 mg Oral Given 4/5/24 1429)   iohexol (OMNIPAQUE) 350 MG/ML injection (MULTI-DOSE) 100 mL (100 mL Intravenous Given 4/5/24 1351)       Diagnostic Studies  Results Reviewed       Procedure Component Value Units Date/Time    Comprehensive metabolic panel [[id number]] Collected: 04/05/24 1321    Lab Status: Final result Specimen: Blood from Arm, Right Updated: 04/05/24 1345     Sodium 135 mmol/L      Potassium 4.3 mmol/L      Chloride 101 mmol/L      CO2 27 mmol/L      ANION GAP 7 mmol/L      BUN 13 mg/dL      Creatinine 0.82 mg/dL      Glucose 100 mg/dL      Calcium 9.6 mg/dL      AST 14 U/L      ALT 19 U/L      Alkaline Phosphatase 66 U/L      Total Protein 8.1 g/dL      Albumin 4.4 g/dL      Total Bilirubin 0.42 mg/dL      eGFR 98 ml/min/1.73sq m     Narrative:      National Kidney Disease Foundation guidelines for Chronic Kidney Disease (CKD):     Stage 1 with normal or high GFR (GFR > 90 mL/min/1.73 square meters)    Stage 2 Mild CKD (GFR =  60-89 mL/min/1.73 square meters)    Stage 3A Moderate CKD (GFR = 45-59 mL/min/1.73 square meters)    Stage 3B Moderate CKD (GFR = 30-44 mL/min/1.73 square meters)    Stage 4 Severe CKD (GFR = 15-29 mL/min/1.73 square meters)    Stage 5 End Stage CKD (GFR <15 mL/min/1.73 square meters)  Note: GFR calculation is accurate only with a steady state creatinine    Lipase [[id number]]  (Normal) Collected: 04/05/24 1321    Lab Status: Final result Specimen: Blood from Arm, Right Updated: 04/05/24 1345     Lipase 19 u/L     UA w Reflex to Microscopic w Reflex to Culture [[id number]] Collected: 04/05/24 1321    Lab Status: Final result Specimen: Urine, Other Updated: 04/05/24 1338     Color, UA Light Yellow     Clarity, UA Clear     Specific Gravity, UA 1.010     pH, UA 7.0     Leukocytes, UA Negative     Nitrite, UA Negative     Protein, UA Negative mg/dl      Glucose, UA Negative mg/dl      Ketones, UA Negative mg/dl      Urobilinogen, UA <2.0 mg/dl      Bilirubin, UA Negative     Occult Blood, UA Negative    CBC and differential [[id number]]  (Abnormal) Collected: 04/05/24 1321    Lab Status: Final result Specimen: Blood from Arm, Right Updated: 04/05/24 1328     WBC 4.45 Thousand/uL      RBC 6.18 Million/uL      Hemoglobin 16.9 g/dL      Hematocrit 50.6 %      MCV 82 fL      MCH 27.3 pg      MCHC 33.4 g/dL      RDW 14.4 %      MPV 10.0 fL      Platelets 199 Thousands/uL      nRBC 0 /100 WBCs      Neutrophils Relative 60 %      Immature Grans % 0 %      Lymphocytes Relative 27 %      Monocytes Relative 9 %      Eosinophils Relative 3 %      Basophils Relative 1 %      Neutrophils Absolute 2.64 Thousands/µL      Absolute Immature Grans 0.01 Thousand/uL      Absolute Lymphocytes 1.20 Thousands/µL      Absolute Monocytes 0.42 Thousand/µL      Eosinophils Absolute 0.12 Thousand/µL      Basophils Absolute 0.06 Thousands/µL                    CT abdomen pelvis with contrast   Final Result by Viri Fuentes MD (04/05 1519)   No  obstructing calculus      Redemonstrated sigmoid/sigmoid colocolonic fistula, with associated tract that extends to the lateral margin of the descending colon.   No associated acute inflammatory change to suggest acute diverticulitis.      Bilateral kidneys/ureters are unremarkable.         Workstation performed: [id number]         CT recon only lumbar spine   Final Result by Bony Lakhani MD (04/05 1513)      Degenerative changes of the lumbar spine, as described above and most notable at L4-L5 as described above.      Correlate with separate CT of the abdomen and pelvis for additional findings.            Workstation performed: [id number]                    Procedures  Procedures         ED Course  ED Course as of 04/05/24 1656   Fri Apr 05, 2024   1550 CT abdomen pelvis with contrast  No obstructing calculus     Redemonstrated sigmoid/sigmoid colocolonic fistula, with associated tract that extends to the lateral margin of the descending colon.  No associated acute inflammatory change to suggest acute diverticulitis.     Bilateral kidneys/ureters are unremarkable.     1551 CT recon only lumbar spine  Degenerative changes of the lumbar spine, as described above and most notable at L4-L5 as described above.   1615 Incidental findings discussed with patient                               SBIRT 22yo+      Flowsheet Row Most Recent Value   Initial Alcohol Screen: US AUDIT-C     1. How often do you have a drink containing alcohol? 0 Filed at: 04/05/2024 1224   2. How many drinks containing alcohol do you have on a typical day you are drinking?  0 Filed at: 04/05/2024 1224   3a. Male UNDER 65: How often do you have five or more drinks on one occasion? 0 Filed at: 04/05/2024 1224   Audit-C Score 0 Filed at: 04/05/2024 1224   MARTINA: How many times in the past year have you...    Used an illegal drug or used a prescription medication for non-medical reasons? Never Filed at: 04/05/2024 1224                      Medical Decision  Making  This patient presents with back pain.  Differential diagnosis includes lumbar versus musculoskeletal spasm/strain versus sciatica.  Less likely sciatica as straight leg test was negative.  No back pain red flags on history or physical.  Presentation not consistent with malignancy, fracture, cauda equina, osteomyelitis or epidural abscess.  Patient does have any CVA tenderness so CT scan was ordered to rule out renal colic, pyelonephritis.  CT abdomen pelvis was negative for GI disorders.  Patient does have incidental findings which were discussed and advised to follow-up with gastroenterology.  Patient does have degeneration of his spine and recommended following up with comprehensive spine.  CBC negative for leukocytosis, anemia.  CMP negative for metabolic derangements.  Urinalysis negative for UTI.  Patient sent Robaxin and naproxen to pharmacy.  Return to ER symptoms worsens or questions or concerns arise at home.      Amount and/or Complexity of Data Reviewed  Labs: ordered.  Radiology: ordered. Decision-making details documented in ED Course.    Risk  Prescription drug management.             Disposition  Final diagnoses:   Flank pain   Back pain     Time reflects when diagnosis was documented in both MDM as applicable and the Disposition within this note       Time User Action Codes Description Comment    4/5/2024  4:09 PM Jenny Wan Add [R10.9] Flank pain     4/5/2024  4:09 PM Jenny Wan Add [M54.9] Back pain           ED Disposition       ED Disposition   Discharge    Condition   Stable    Date/Time   Fri Apr 5, 2024 1609    Comment   Neal Walden discharge to home/self care.                   Follow-up Information       Follow up With Specialties Details Why Contact Info Additional Information    EDWARDO Carvajal [facility]   [address] [address]  [phone]       [facility]   [phone]             Discharge Medication List as of 4/5/2024  4:10 PM        START taking these medications    Details   methocarbamol (ROBAXIN) 500 mg tablet Take 1 tablet (500 mg total) by mouth 2 (two) times a day, Starting Fri 4/5/2024, Normal      naproxen (Naprosyn) 500 mg tablet Take 1 tablet (500 mg total) by mouth 2 (two) times a day with meals, Starting Fri 4/5/2024, Normal           CONTINUE these medications which have NOT CHANGED    Details   acetaminophen (TYLENOL) 325 mg tablet Take 2 tablets (650 mg total) by mouth every 6 (six) hours as needed for mild pain, headaches or fever, Starting Fri 3/22/2024, Normal      aspirin 81 mg chewable tablet Chew 1 tablet (81 mg total) daily, Starting Mon 3/4/2024, Normal      atorvastatin (LIPITOR) 40 mg tablet Take 1 tablet (40 mg total) by mouth daily with dinner, Starting Thu 5/4/2023, Normal      butalbital-acetaminophen-caffeine (Bac) -40 mg per tablet Take 1 tablet by mouth every 4 (four) hours as needed for headaches, Starting Fri 11/17/2023, Normal      divalproex sodium (Depakote ER) 500 mg 24 hr tablet Take 2 tablets (1,000 mg total) by mouth daily Take 1 tablet of 500mg daily for 1 week. Then, increase to 2 tablets per day starting at week 2., Starting Thu 3/28/2024, Normal      metoclopramide (Reglan) 10 mg tablet Take 1 tablet (10 mg total) by mouth 4 (four) times a day, Starting Thu 7/20/2023, Normal             No discharge procedures on file.    PDMP Review         Value Time User    PDMP Reviewed  Yes 11/17/2023  2:38 PM EDWARDO Carvajal            ED Provider  Electronically Signed by             EDWARDO Ag  04/05/24 9527

## 2024-04-05 NOTE — DISCHARGE INSTRUCTIONS
Tylenol/Motrin  Robaxin- may make you sleepy  Follow up with [facility]  Return to ER if symptoms worsen

## 2024-04-05 NOTE — TELEPHONE ENCOUNTER
4/3/24 208 pm    Rose Gibbons,  im calling you regarding your message you left. The medicine is Divalaproex   500 milligram tablet. And it's, he's supposed to take one tablet per day for a week and that he was supposed to increase it with 2 tablets and the following week. He basically, the pills  won't let him sleep, he can't sleep. When he takes some, also his headaches much, much worse. It feels like it's in a vice. He wants to basically smashes headaches in school. That's how much pain use in it made it his head a 10 times worse than it was. Oh so he's been sleeping a lot as well as throwing up a lot. He hasnt taken it in a day or so. were experimenting to see if we can get the med out of his system. but please call me back at [phone]. And we'll talk about it more. Thank you. Have a good day back.    -----------------------------------------    Already addressed by Roz on 4/4/24

## 2024-04-08 ENCOUNTER — TELEPHONE (OUTPATIENT)
Dept: NEUROLOGY | Facility: CLINIC | Age: 59
End: 2024-04-08

## 2024-04-08 NOTE — TELEPHONE ENCOUNTER
"I personally called Rose at [phone].   She remained unavailable to answer the phone; I left a voicemail with a callback number for neurology office.     I also advised her of \"red flag\" headache symptoms including symptoms such as facial droop on one side, weakness/paralysis on either side, speech trouble, numbness on one side, balance issues, any vision changes, extreme dizziness or significant increase in severity of headache or a sudden onset severe headache. IN that case, Mr.William Walden is to call 9-1-1 immediately or to proceed to the nearest MARINO Mosqueda M.D.  PGY-3, Neurology         "

## 2024-04-09 NOTE — TELEPHONE ENCOUNTER
April 8, 2024  Mohamud Munoz MD   to Me  Whit OKEEFE    4/8/24  1:04 PM  If patient would like a sooner follow-up visit due to side effects, he can see a different provider as well.  I'm at [facility] for the month and don't have any clinics scheduled in the immediate future

## 2024-04-09 NOTE — TELEPHONE ENCOUNTER
Hello Good Morning,     I have called the patient and he and his significant other have scheduled on the first available ovs with , 5/7/2024, 3:30 pm.    Thank you all,     Viviana

## 2024-04-09 NOTE — TELEPHONE ENCOUNTER
Received vm from 4/5 at 10:59am-  Tee Courtney, it's Rose [place] calling in regards to your message that you left about the nurtec. Can you please call the pharmacy? I was just there like about 9 o'clock this morning and it still hasn't gone through. They're not gonna tell you to take my word for it. that it got acceptted it from his insurance, I think it needs to come from the doctor's office or physician, or nurse whatever. And if you could reach me at [phone]. I greatly appreciate it. We can talk and stuff. Thank you. Have a good day.   --------------------------------------------  Called pharm, states that nurtec was picked up on 4/6

## 2024-04-09 NOTE — TELEPHONE ENCOUNTER
"April 8, 2024  Mohamud Munoz MD     SK    4/8/24  1:03 PM  Note     I personally called Rose at [phone].   She remained unavailable to answer the phone; I left a voicemail with a callback number for neurology office.      I also advised her of \"red flag\" headache symptoms including symptoms such as facial droop on one side, weakness/paralysis on either side, speech trouble, numbness on one side, balance issues, any vision changes, extreme dizziness or significant increase in severity of headache or a sudden onset severe headache. IN that case, Mr.William Walden is to call 9-1-1 immediately or to proceed to the nearest ER.      FARIDEH Mosqueda.  PGY-3, Neurology         "

## 2024-04-10 NOTE — TELEPHONE ENCOUNTER
4/8 at 1:54 pm:    I'm calling in for Dr. Munoz. I am trying to reach her about for my boyfriend, Neal Walden was returning her voice mail. His birth date is [birth year]. Please have her call me back at [phone]. I'd like to set up an appointment with her for him. Thank you. Have a good day  _____________________________________________________________    See previous messages. This has been addressed.

## 2024-04-11 NOTE — TELEPHONE ENCOUNTER
"MD Roz Morel RN; Neurology [facility]17 minutes ago (2:25 PM)     SK  Please let patient know:    Please once again advice her of \"red flag\" headache symptoms including symptoms such as facial droop on one side, weakness/paralysis on either side, speech trouble, numbness on one side, balance issues, any vision changes, extreme dizziness or significant increase in severity of headache or a sudden onset severe headache. IN that case, Mr.William Walden is to call 9-1-1 immediately or to proceed to the nearest ER.      "

## 2024-04-12 ENCOUNTER — TELEPHONE (OUTPATIENT)
Dept: NEUROLOGY | Facility: CLINIC | Age: 59
End: 2024-04-12

## 2024-04-12 DIAGNOSIS — Z95.1 S/P CABG (CORONARY ARTERY BYPASS GRAFT): ICD-10-CM

## 2024-04-12 DIAGNOSIS — Z95.2 S/P AVR: ICD-10-CM

## 2024-04-12 DIAGNOSIS — I10 PRIMARY HYPERTENSION: ICD-10-CM

## 2024-04-12 RX ORDER — ASPIRIN 81 MG/1
81 TABLET, CHEWABLE ORAL DAILY
Qty: 30 TABLET | Refills: 3 | Status: SHIPPED | OUTPATIENT
Start: 2024-04-12

## 2024-04-12 NOTE — TELEPHONE ENCOUNTER
4/10/24, 12:45    4/10/24, 12:45    Hi, this is Rose Wade calling in regards to my boyfriend, Neal Walden. I make an appointment. I believe May 6 to see Dr Munoz. I'm gonna have to reschedule that appointment for him. Could someone please reach me at [phone]. Thank you. Have a good day.     Chart reviewed  Pt has upcoming appt on 5/7/24 at 3:30    Called [phone] and left a detailed message on pt/pt's [employer] answering machine letting them know that I received her message and that pt's appt is scheduled on 5/7/24 at 3:30. Cb if pt still wants to reschedule.

## 2024-04-22 ENCOUNTER — HOSPITAL ENCOUNTER (OUTPATIENT)
Dept: MRI IMAGING | Facility: HOSPITAL | Age: 59
Discharge: HOME | End: 2024-04-22
Payer: COMMERCIAL

## 2024-04-22 ENCOUNTER — TELEPHONE (OUTPATIENT)
Dept: NEUROLOGY | Facility: CLINIC | Age: 59
End: 2024-04-22

## 2024-04-22 DIAGNOSIS — G43.919 INTRACTABLE MIGRAINE WITHOUT STATUS MIGRAINOSUS, UNSPECIFIED MIGRAINE TYPE: ICD-10-CM

## 2024-04-22 DIAGNOSIS — R56.9 SEIZURE-LIKE ACTIVITY (HCC): ICD-10-CM

## 2024-04-22 PROCEDURE — 70553 MRI BRAIN STEM W/O & W/DYE: CPT

## 2024-04-22 PROCEDURE — A9585 GADOBUTROL INJECTION: HCPCS

## 2024-04-22 RX ORDER — GADOBUTROL 604.72 MG/ML
9 INJECTION INTRAVENOUS
Status: COMPLETED | OUTPATIENT
Start: 2024-04-22 | End: 2024-04-22

## 2024-04-22 RX ADMIN — GADOBUTROL 9 ML: 604.72 INJECTION INTRAVENOUS at 16:12

## 2024-04-22 NOTE — TELEPHONE ENCOUNTER
S/O left v/m at Ryan requesting a call back re: MRI that is sched for test. Stating two of the tests were denied and would like to speak with someone.

## 2024-04-23 ENCOUNTER — TELEPHONE (OUTPATIENT)
Dept: NEUROLOGY | Facility: CLINIC | Age: 59
End: 2024-04-23

## 2024-04-23 NOTE — TELEPHONE ENCOUNTER
MSW received a call from patient's significant other, Rose, requesting a Lyft ride for patient's appt on 5/7.     [facility], [facility] unable to transport without payor source, and no additional benefit available through insurance. MSW will notify Whit of need for Lyft and will schedule 7 days in advance.

## 2024-04-24 NOTE — TELEPHONE ENCOUNTER
VM 4/22 at 11:49 am:    This is Rose Tremarcela. I am calling about my boyfriend, Neal Walden's MRI. Two tests were denied and I would like to know why. Thank you. # [phone]  ______________________________________________    Spoke with Rose. Pt had MRI of the brain w and without contrast on 4/22. It appears that the MRA of the head and MRA of the neck were denied by pt's insurance. She is requesting details on why these tests were denied. Routed to prior auth team to follow up with pt's significant other.

## 2024-04-26 DIAGNOSIS — G43.919 INTRACTABLE MIGRAINE WITHOUT STATUS MIGRAINOSUS, UNSPECIFIED MIGRAINE TYPE: ICD-10-CM

## 2024-04-29 ENCOUNTER — TELEPHONE (OUTPATIENT)
Dept: NEUROLOGY | Facility: CLINIC | Age: 59
End: 2024-04-29

## 2024-04-30 DIAGNOSIS — G45.9 TIA (TRANSIENT ISCHEMIC ATTACK): Primary | ICD-10-CM

## 2024-04-30 RX ORDER — RIMEGEPANT SULFATE 75 MG/75MG
TABLET, ORALLY DISINTEGRATING ORAL
Qty: 8 TABLET | Refills: 0 | Status: SHIPPED | OUTPATIENT
Start: 2024-04-30 | End: 2024-05-07 | Stop reason: ALTCHOICE

## 2024-04-30 NOTE — TELEPHONE ENCOUNTER
"MSW sent the following Lyft request to the rideshare pool:    \"Patients Name: Neal Walden  : 1965  Phone Number: [phone]  Appointment Date: 24  Appointment time: 315PM arrival, 330PM appt   Address: [address] [place] [address] And [facility] [address]  Drop off Facility/Office Name: [facility]  Drop off  Address: [address] [place] [id number]  Cost Center: [id number]  Special notes/directions for  - will be traveling with his significant other  Note for scheduling team - will be ready 1 hour before appt time\"    Awaiting response.   "

## 2024-05-01 ENCOUNTER — TELEPHONE (OUTPATIENT)
Age: 59
End: 2024-05-01

## 2024-05-01 ENCOUNTER — TELEPHONE (OUTPATIENT)
Dept: NEUROLOGY | Facility: CLINIC | Age: 59
End: 2024-05-01

## 2024-05-01 DIAGNOSIS — G47.9 SLEEP DISTURBANCES: Primary | ICD-10-CM

## 2024-05-01 NOTE — TELEPHONE ENCOUNTER
Diana from [facility] sleep Medicine called in stating they will need a referral/ order placed in the patients chart from Sleep Medicine due to patient having [employer]. The referral and order will need to be placed in [facility] Amb Referral to Sleep Medicine - physician to physician not a study. DX: G47.9

## 2024-05-03 ENCOUNTER — TELEPHONE (OUTPATIENT)
Dept: NEUROLOGY | Facility: CLINIC | Age: 59
End: 2024-05-03

## 2024-05-03 NOTE — TELEPHONE ENCOUNTER
Left voicemail for patient to call back or on my chart confirm appointment with Dr. Munoz 5.7 at 330 pm in the [facility] office,.

## 2024-05-06 ENCOUNTER — HOSPITAL ENCOUNTER (OUTPATIENT)
Dept: NEUROLOGY | Facility: HOSPITAL | Age: 59
Discharge: HOME | End: 2024-05-06
Payer: COMMERCIAL

## 2024-05-06 DIAGNOSIS — R56.9 SEIZURE-LIKE ACTIVITY (HCC): ICD-10-CM

## 2024-05-06 PROCEDURE — 95816 EEG AWAKE AND DROWSY: CPT | Performed by: PSYCHIATRY & NEUROLOGY

## 2024-05-06 PROCEDURE — 95816 EEG AWAKE AND DROWSY: CPT

## 2024-05-07 ENCOUNTER — TELEPHONE (OUTPATIENT)
Age: 59
End: 2024-05-07

## 2024-05-07 ENCOUNTER — OFFICE VISIT (OUTPATIENT)
Dept: NEUROLOGY | Facility: CLINIC | Age: 59
End: 2024-05-07
Payer: COMMERCIAL

## 2024-05-07 VITALS
DIASTOLIC BLOOD PRESSURE: 92 MMHG | WEIGHT: 192 LBS | SYSTOLIC BLOOD PRESSURE: 132 MMHG | HEIGHT: 67 IN | HEART RATE: 105 BPM | TEMPERATURE: 98 F | BODY MASS INDEX: 30.13 KG/M2

## 2024-05-07 DIAGNOSIS — G43.919 INTRACTABLE MIGRAINE WITHOUT STATUS MIGRAINOSUS, UNSPECIFIED MIGRAINE TYPE: Primary | ICD-10-CM

## 2024-05-07 PROCEDURE — 99214 OFFICE O/P EST MOD 30 MIN: CPT | Performed by: STUDENT IN AN ORGANIZED HEALTH CARE EDUCATION/TRAINING PROGRAM

## 2024-05-07 RX ORDER — GALCANEZUMAB 120 MG/ML
240 INJECTION, SOLUTION SUBCUTANEOUS ONCE
Qty: 2 ML | Refills: 0 | Status: SHIPPED | OUTPATIENT
Start: 2024-05-07 | End: 2024-05-07

## 2024-05-07 RX ORDER — GALCANEZUMAB 120 MG/ML
120 INJECTION, SOLUTION SUBCUTANEOUS
Qty: 1 ML | Refills: 11 | Status: SHIPPED | OUTPATIENT
Start: 2024-05-07

## 2024-05-07 RX ORDER — AMITRIPTYLINE HYDROCHLORIDE 10 MG/1
10 TABLET, FILM COATED ORAL
Qty: 30 TABLET | Refills: 2 | Status: CANCELLED | OUTPATIENT
Start: 2024-05-07

## 2024-05-07 NOTE — TELEPHONE ENCOUNTER
Patient and his sig other was just seen in office and was referred to Dr. Marya Olivares (Sleep Consults) on 5/9.  The utilize the [employer] ride system and Rose was advised that since they are not established at the [facility] office it is responsibility of [facility] office to set this up.  Will someone please help this patient with a follow up call on his ride from [employer] needed on 05/09 appointment.  Thank you

## 2024-05-07 NOTE — TELEPHONE ENCOUNTER
5/3/24, 11:34    My name is Rose Shandra calling in regards to Neal Walden. And I just wanted to clarify if he has coming to the appointment on Tuesday. We already have a list of scheduled for the day, but we're gonna need a list back. Could somebody please reach me at [phone]. I'd appreciate it. Thank you. Have a good day.

## 2024-05-07 NOTE — PROGRESS NOTES
Neurology Follow up visit    Assessment/Plan:  58 y.o. right handed patient presents to our office for evaluation of headaches, seizure like activity, recurrent episodes of syncope. Pertinent PMHx: DM, HTN, dyslipidemia, tobacco use, THC use, ADHD, bipolar, schizophrenia, CABG, s/p AV replacement, recurrent episodes of syncope.   Has had headaches since age 8-9 s/p severe head injury.   Has had episodes of generalized body shaking since age 8-9. Occur about 3x/month. He is able to remember the episodes and is sometimes awake during these episodes. Describes it is as an out of body experience.   rEEG, MRI brain +Johnny negative.   MRA head, neck pending to assess for vascular abnormalities (TIA).      Previously tried medications:  -Preventative: Metoprolol (made him sleepy, dizzy), Topamax (led to numbness), marijuana (most helpful), valproate (made him sleepy). Elavil contraindicated due to cardiac hx.   -Abortive: acetaminophen 325mg (minimal help) , Triptans contraindicated due to CABG, CAD. Nurtec (made him sleepy)     # Intractable migraines   # Recurrent episodes of syncope   # episodes of generalized body shaking  # insomnia, snoring     Plan:  Abortive headache med: Ubrelvy 100mg PRN.    Preventative headache med: Emgality 240mg for 1st month, then 120mg monthly. Future options: Gabapentin, Duloxetine, Ajovy, Aimovig (no hx of constipation), Qulipta.   Migraine diary recommended   Will workup syncope more during next visit per patient's visits as headaches are most bothersome for today. Pending MRA head, neck.   Sleep medicine referral - appointment coming up later this week.   Counseled on abortive, preventative meds' proper use and side effects extensively; patient verbalized understanding.   Limit OTC meds such as Tylenol, Ibuprofen, Aleve, Excedrin. (No more than 2- 3 times a week or max 10 a month).  Magnesium Oxide- 400mg QD, Vitamin B2- 200 mg BID supplements.  Regular exercise   No follow-ups on  "file.  Staffed w/ attending neurologist: Dr. Dewayne Garcia.       Subjective:    HPI  Mr.William Walden is a 58 y.o. right handed patient presents to our office for f/u of headaches, syncopal episodes. Last visit 3/28/24.   Pertinent PMHx: DM, HTN, dyslipidemia, tobacco use, ADHD, bipolar, schizophrenia,  CABG, s/p AV replacement, recurrent episodes of syncope.     Prior headache hx:   Headaches initially began between age 8-9 years old after severe head injury. Headaches have continued throughout his life and are now much worse, prompting him to seek care.   Hospitalizations or ED visits for headache: multiple times at various [facility].   Fam hx: aneurysms- none, migraines- none.      Current headaches:  Currently, headache occur daily, throbbing pain.  Located in bilateral cervical area,  radiate upwards into frontal area.   No postural/positional component.   Aura: irritability for 30 mins before.   Average intensity rated as 5.5.   Triggers: dehydration  Associated neuro syx include: blurry vision, scintillating scotomas, losing vision due to pain, Left arm numbness.    Migrainous features include: nausea, vomiting, photophobia, phonophobia.   Alleviating factors include- lying down in a dark quiet room.   During an acute attack, patient has some level of disability: patient is able to do some work around the house.      Previously tried medications:  -Preventative: Metoprolol (made him sleepy, dizzy), Topamax (led to numbness), marijuana (most helpful), valproate (made him very sleepy).   -Abortive: acetaminophen 325mg (minimal help), Triptans contraindicated due to CABG, CAD. Nurtec (made him very sleepy).      Patient also describes episodes of syncope that occurred during the time of his AV valve replacement, CABG.      Patient also describes episodes of \"epileptic events\"  - HE has these episodes of full shaking about 3x/month. He is able to remember the episodes and is sometimes awake during these episodes. " "Describes it is as an out of body experience.   - Has been having these episodes since age 8-9.      Prior workup/ imaging includes:   NCCTH (7/2023)- unremarkable.   MRI brain (4/22/24)- unremarkable.   MRA head/neck pending.    During last visit, we prescribed Nurtec (abortive), VPA (preventative) - pt reports these meds did not work and continues to get daily migraines of same character. He reports he became very sleepy and unable to function as a result of using these meds.     Patient does not work, lives with SO, Rose. Patient  smokes cigarettes, THC, vapes. Alcohol use: socially.   Gets 4-5 hrs or sleep. Has insomina. No RLS syx reported. No enactment of dreams. No apneic episodes but does snore quite a bit per Rose reported during last visit.       The following portions of the patient's history were reviewed and updated as appropriate: allergies, current medications, past family history, past medical history, past social history, past surgical history and problem list.      Reviewed pertinent records from Care Everywhere.     Objective:    Blood pressure 132/92, pulse 105, temperature 98 °F (36.7 °C), height 5' 6.5\" (1.689 m), weight 87.1 kg (192 lb).    Physical Exam  Vitals reviewed  General Examination: No distress, cooperative. Obese.     Pulm: Normal effort.    Neurological Exam  NCAT. NAD. Normal neck flexion and ROM.  AAOx3. Speech fluent without errors. Normal naming and repetition. Follows cross-body commands.  Pupils equal, briskly reactive. VFF. EOMI. No nystagmus. Face symmetric. No dysarthria. Uvula midline. Tongue midline.  Normal tone and bulk throughout.  Muscle strength testing by the MRC scale:        Right Left   Shoulder abduction 5 5   Elbow flexion 5 5   Elbow extension 5 5   Finger  5 5    Hip flexion 5 5   Knee flexion 5 5   Knee extension 5 5      No drift or orbiting. No abnormal movements.       Sensory  Symmetric LT sensation t/o.     Reflexes Right Left "   Brachioradialis     +2  +2   Biceps                                   +2  +2   Patellar   0 (hx of knee injuries)  0 (hx of knee injuries)      Coordination:   Intact FNF b/l.  No truncal ataxia     Gait: normal casual gait.     ROS:    Review of Systems  See HPI.      Workup:   MRI brain (4/22/24), IMP:   No acute intracranial abnormality.  Minimal white matter disease, likely chronic microangiopathy or chronic migraines.     EEG (3/28/24): This Routine EEG recorded during wakefulness, drowsiness, and sleep is normal.

## 2024-05-07 NOTE — PATIENT INSTRUCTIONS
"Complete MRA head, neck studies.  Follow up with sleep medicine.    Medication instructions:     Instructions for taking abortive meds:    -Take 100mg w/in 5 mins of headache onset. If needed, a second dose may be taken at least 2 hours after the initial dose. The maximum dose in a 24-hour period is 200 mg.    Instructions for Emgality: 2 injections the first month (total 240mg). Then 1 injection (120mg) per month thereafter.     General headache management instructions:   When you have a moderate to severe headache, you should seek rest, initiate relaxation and apply cold compresses to the head.  Limit over the counter medications such as Tylenol, Ibuprofen, Aleve, Excedrin. (No more than 2- 3 times a week or max 10 a month).  Maintain headache diary.  Free GILA for a smart phone, which can be used is \"Migraine theodora\"  Limit caffeine to 1-2 cups 8 to 16 oz a day or less.  Avoid dietary trigger. (aged cheese, peanuts, MSG, aspartame and nitrates)  Patient is to have regular frequent meals to prevent headache onset.    Please drink at least 64 ounces of water a day to help remain hydrated.    Medication overuse headaches:   Avoid medications with narcotics, barbiturates, or caffeine in them as these can cause rebound headaches after very few doses and can interfere with other headache medicine efficacy. Taking  any acute/abortive over the counter medication or prescription drugs for more than 2-3 days a week can cause medication overuse headache.     Non-pharmacologic treatments:   Maintain a regular schedule with adequate sleep (sleep hygiene), blue filter film screens for computers and phones, theraspecs or axon sunglasses (FL41 karlie film), diet (not skipping/late meals), hydration (60-80 oz of water), exercise, decrease consumption of caffeine (less than 2 cups per day) and cut out artificial sweeteners, coping mechanisms for stress, and cognitive behavioral therapies and biofeedback.   Recommend exercising " "regularly.     Over-the-counter supplements:   Magnesium Oxide 400mg a day. If any diarrhea or upset stomach, decrease dose  as tolerated  Vitamin B2 200 mg twice a day. May cause urine to turn yellow which is normal for B 2 to do and is not a sign that you are dehydrated.       If you experience \"red flag\" headache symptoms including symptoms such as facial droop on one side, weakness/paralysis on either side, speech trouble, numbness on one side, balance issues, any vision changes, extreme dizziness or significant increase in severity of headache or a sudden onset severe headache, patient is to call 9-1-1 immediately or to proceed to the nearest ER.       Return in about 3 months (around 8/7/2024).    "

## 2024-05-07 NOTE — TELEPHONE ENCOUNTER
MSW received call from patient's significant other, Rose, this date requesting a Lyft ride to patient's Sleep Medicine appt on 5/9/24 at [address] in [place]. MSW was under the impression that patient was using the [facility] fixed route bus service to get to local appts, but Rose stated that they do not travel to that area (MSW did verify with [facility] Fixed Route Customer Service that the fixed route does not travel to that area).     MSW reviewed chart and noted that the sleep consult was ordered by patient's PCP so it is not neurology's responsibility to set up the Lyft ride. MSW phoned patient's significant other at [phone] to make her aware of above. MSW advised that as per [facility] Policy, if the PCP utilizes the Lyft program, the PCP office would be responsible for the initial Lyft ride when patients are being referred to a specialist. MSW informed patient's significant other of this. Patient's significant other will contact the PCP office to request a Lyft ride to the Sleep Medicine appt. MSW did advise that there will be changes coming to the Lyft ride program offered by [facility], but that we are not sure of the exact changes at this time. Rose requested to be notified of changes as they become available.     MSW did offer to send patient the [employer] applications for the shared ride programs. MSW will need to clarify with [employer] if patient needs to complete the MaTP only or MaTP and PWD application based upon where patient lives. Rose requested that the application be mailed to her at:    Rose Wade   [address]  [place] [address]    MSW phoned [employer] to inquire about what applications patient will need to complete. No answer at the 's extension. MSW left a message requesting callback. Awaiting same.     MSW reviewed chart - patient did arrive to his neurology appt in [place] this date via Lyft.    MSW then received a callback from patient's significant other, Rose,  who advised that the PCP's office does not provide Lyfts. Rose stated that it was this writer's responsibility to call the PCP office to explain the [facility] Policy. MSW advised that this writer works in the Neurology office so it would not be this writer's place to direct the PCP's office. Rose stated that she may need to cancel the appt. MSW did make Rose aware that [facility] does offer a transport called [employer] which is on demand service that anyone living in [facility] can use to get to the area of [address] in [place]. MSW advised that the cost of the transport is $2 one way ($4 round trip). Roes stated that they cannot afford same and disconnected the call as this writer was trying to provide the number for the [facility] where they can sign up to use the [employer]. MSW notified Whit, [facility] , as patient/significant other are at the [place] location where she is located today. Whit stated that she will try to meet with patient/significant other to reiterate the information that this writer provided.    MSW spoke with Whit after patient's appointment. She stated that patient's significant other did not opt to speak with her.     MSW will await callback from [facility] and will mail the appropriate application's to significant other's PO [address].

## 2024-05-08 ENCOUNTER — TELEPHONE (OUTPATIENT)
Dept: NEUROLOGY | Facility: CLINIC | Age: 59
End: 2024-05-08

## 2024-05-08 NOTE — TELEPHONE ENCOUNTER
MSW had yet to hear back from Nalini at [facility], so MSW MSW phoned [facility] again to inquire about what applications patient will need to complete. No answer at the 's extension. MSW left a message requesting callback. Awaiting same.

## 2024-05-08 NOTE — TELEPHONE ENCOUNTER
MSW received call from Nalini at [facility]. She advised that patient will need to complete the MaTP and PWD applications. Nalini stated that she will mail same to patient along with an escort form. MSW provided patient's significant other's [address] address as:    Rose Wade  [address]  [place] [address]    Nalini stated that once patient is registered, she will contact patient/significant other to explain the services that patient qualifies for.     Nalini stated that this office should complete 2 Verification of Disability Forms, as one is needed for both applications. Nalini stated that this writer can email her the disability forms to [email or web address]. MSW will initiate these forms and will send to Dr. Munoz for completion/signature.

## 2024-05-09 ENCOUNTER — TELEPHONE (OUTPATIENT)
Dept: NEUROLOGY | Facility: CLINIC | Age: 59
End: 2024-05-09

## 2024-05-09 NOTE — TELEPHONE ENCOUNTER
Recd  5/6 12:02 PM  this is Rose calling in regards to my boyfriend, nneka tellez. He will be coming in to his appointment tomorrow as long as the  gets here. We're gonna need a lyft back to our residence as well.I t's been set up to get there it needs to be set up to get back. If somebody could reach me at [phone].

## 2024-05-09 NOTE — TELEPHONE ENCOUNTER
LVM attempting to reach patient with regard to message about needing to schedule a ride homed from appointment scheduled for today at 1:50 PM 5/9/24. Gave [phone] to call back.

## 2024-05-15 NOTE — TELEPHONE ENCOUNTER
MSW initiated the [facility] Physician's Verification form for this patient. Same send to Dr. Munoz for review/signature.

## 2024-05-16 NOTE — TELEPHONE ENCOUNTER
Physician Verification of Disability form signed by Dr. Munoz this date. Two copies of same emailed to [email or web address] this date.     MSW attempted to reach patient's significant's other, Rose, at [phone] to see if she received/completed/returned the [employer] applications yet. MSW also left a message that our office completed the Verification of Disability forms for [employer]. MSW requested a callback. Awaiting same.

## 2024-05-17 NOTE — TELEPHONE ENCOUNTER
MSW phoned [facility] at [phone] to ensure that they received the Disability Verification. No answer at Nalini, 's extension. MSW left a message requesting callback. Awaiting same.

## 2024-05-20 NOTE — TELEPHONE ENCOUNTER
"MSW received the following response from Nalini at [facility] that was sent at 545PM on 5/17/24:    \"Magno Styles,    I recevied your voicemail and I have not yet received his application, when is he scheduled to receive services  I can call him and do a verbal application.    Let me know what is easiest for you.    Thank you,  Nalini Flowers      Programs Specialist  [facility] [employer]   [phone] ext [phone]  [phone] fax\"    Nalini stated that when she gets patients application she can register him for the 30 day pass so that gives him time to sign all needed forms, such as the Physician Certification.        "

## 2024-05-21 NOTE — TELEPHONE ENCOUNTER
MSW attempted to reach patient's significant's other, Rose, at [phone] to see if she received/completed/returned the [employer] applications yet. MSW also left a message that our office completed the Verification of Disability forms for [employer]. MSW requested a callback. Awaiting same.

## 2024-05-24 NOTE — TELEPHONE ENCOUNTER
"MSW attempted to reach patient's significant's other, Rose, at [phone] to see if she received/completed/returned the [facility] applications yet. MSW also left a message that our office completed the Verification of Disability forms for [facility]. MSW requested a callback. Awaiting same.     Since there have been 3 unsuccessful attempts to reach patient, MSW sent an \"Unable to Reach\" letter via [employer] this date. If same is not read within a timely manner, MSW will mail a copy of the letter to patient's home address.    MSW also attempted to reach Nalini at [facility] to see if she had received patient's applications yet. Awaiting response.      "

## 2024-05-27 NOTE — TELEPHONE ENCOUNTER
Swoopo message not read.     MSW placed copy of Unable to Reach letter in the mail to patient's home address this date.

## 2024-05-27 NOTE — TELEPHONE ENCOUNTER
"LATE ENTRY FROM 5/24/24:    MSW received the following response from Nalini at [facility]:    \"Magno Tsai     I haven't gone to the post office, yet I will keep an eye out and let you know.    In my experience just wait until they reach out there is nothing more we can do    Nalini Flowers      Programs Specialist  [facility]\"        "

## 2024-05-31 NOTE — TELEPHONE ENCOUNTER
MSW attempted to reach patient's significant other, Roes, at [phone]. No answer. MSW left a detailed message encouraging them to complete the applications that were mailed to them as they are for MaTP/PWD. MSW  explained that [facility] offers several programs, and that they will require patient to use the most cost effective means of transportation. This means that patient will need to use the fixed route bus service to get to appts on the route, but that patient may be able to qualify for free trips to locations not on the regular bus stop. MSW also provided the number for the [facility]  [phone], [phone], in case they want to reach out to her to learn more about [facility]'s services.

## 2024-05-31 NOTE — TELEPHONE ENCOUNTER
"LATE ENTRY FROM 5/29/24:    MSW received the following voicemail from patient's significant other, Rose:    \"Tee Tsai, it's Rose. Sorry for not being able to answer. My life has gotten crazy. Yes, we I did get these applications for shared ride. Unfortunately the reason we don't use it or  this lack of money, that's why we use the lift or the Uber. Unfortunately if we have to it's taking out of food money and such. That's why the problem don't know was gonna use it, but we're gonna have to find something else to use because it $2.00 per ride is a little much for us right now. Bus passes only cause a dollar and however much money you put on it. So you can use it constantly and just put money on when you need it. So I don't know what to tell you. I don't know how we can resolve this. If you haven't yet ideas let me know. Thank you. My number is [phone]. Thank you.\"        "

## 2024-06-03 ENCOUNTER — TELEPHONE (OUTPATIENT)
Age: 59
End: 2024-06-03

## 2024-06-14 NOTE — TELEPHONE ENCOUNTER
MSW phoned [facility] at [phone] and spoke with Lincoln Yao - he stated that he does not see any applications on file for patient, but that it is possible that they have come in but have not been inputted yet. Lincoln stated that the  is out of the office until 6/24, but that this writer can call back on 6/17 and speak with Selene to see if the applications have been received yet or not. MSRELL will call back to [facility] on 6/17.

## 2024-06-17 ENCOUNTER — TELEPHONE (OUTPATIENT)
Dept: NEUROLOGY | Facility: CLINIC | Age: 59
End: 2024-06-17

## 2024-06-17 NOTE — TELEPHONE ENCOUNTER
STORMYM attempting to cancel and reschedule Dr. Malhotra appointment for 6/17/24 due to office being closed.  Gave [phone] to call back to reschedule.

## 2024-06-18 ENCOUNTER — TELEPHONE (OUTPATIENT)
Dept: NEUROLOGY | Facility: CLINIC | Age: 59
End: 2024-06-18

## 2024-06-20 ENCOUNTER — TELEPHONE (OUTPATIENT)
Age: 59
End: 2024-06-20

## 2024-06-20 NOTE — TELEPHONE ENCOUNTER
Patient is still vomiting.  It seems that everything he eats and drinks.  This has been going on for months.  He needs to know what he can do for this.  Please contact patient's girlfriend, Rose.

## 2024-06-20 NOTE — TELEPHONE ENCOUNTER
Called patient girlfriend Rose and explained that he needs to be seen for this as he hasn't been seen since October and this is something new where he hasn't been seen for this and that it has been going on for a little while. Patient is scheduled with you on Monday

## 2024-06-21 NOTE — TELEPHONE ENCOUNTER
MSW phoned [facility] at [phone] to confirm if they received patient's applications. No answer at [facility] extension who is covering for Nalnii, . MSW left a message requesting callback. Awaiting same.

## 2024-06-24 ENCOUNTER — OFFICE VISIT (OUTPATIENT)
Dept: FAMILY MEDICINE CLINIC | Facility: CLINIC | Age: 59
End: 2024-06-24
Payer: COMMERCIAL

## 2024-06-24 VITALS
TEMPERATURE: 97.6 F | SYSTOLIC BLOOD PRESSURE: 152 MMHG | OXYGEN SATURATION: 98 % | HEART RATE: 104 BPM | BODY MASS INDEX: 30.48 KG/M2 | DIASTOLIC BLOOD PRESSURE: 98 MMHG | HEIGHT: 67 IN | WEIGHT: 194.2 LBS

## 2024-06-24 DIAGNOSIS — F12.90 MARIJUANA USE: ICD-10-CM

## 2024-06-24 DIAGNOSIS — R11.15 PERSISTENT VOMITING: Primary | ICD-10-CM

## 2024-06-24 DIAGNOSIS — F06.31 DEPRESSION DUE TO PHYSICAL ILLNESS: ICD-10-CM

## 2024-06-24 DIAGNOSIS — I10 PRIMARY HYPERTENSION: ICD-10-CM

## 2024-06-24 PROCEDURE — 99214 OFFICE O/P EST MOD 30 MIN: CPT | Performed by: NURSE PRACTITIONER

## 2024-06-24 NOTE — ASSESSMENT & PLAN NOTE
Blood pressure elevated in office today.  Per patient, stopped taking metoprolol due to continuous vomiting.  Patient was advised to restart metoprolol, or an alternate such as clonidine transdermal, but refusing.  Advised to monitor blood pressure at home when possible, to return for continuous elevated readings.

## 2024-06-24 NOTE — ASSESSMENT & PLAN NOTE
Patient uses marijuana to help with migraines and vomiting.  Is attempting to get medical marijuana card, but is having difficulty getting into a practice for evaluation.  Resources have been provided to patient for information.

## 2024-06-24 NOTE — ASSESSMENT & PLAN NOTE
"Patient continues with persistent vomiting.  States the only thing that prevents vomiting is marijuana use.  Patient has been given local resources for medical marijuana card, but as per patient having difficulty getting into an office due to office \"not taking new patients\" or \"not taking his insurance.\"    Offered patient amitriptyline, but refusing.  Also refusing any additional medication.  While in office, appointment for GI made.  Advised to obtain blood work and ultrasound as ordered.  Follow-up with GI as scheduled.  "

## 2024-06-24 NOTE — TELEPHONE ENCOUNTER
MSW received a call from Selene at [facility] ([phone]) this date. She stated that they have NOT received patient's applications yet.     MSW attempted to reach patient's significant other, Rose, at [phone]. No answer. MSW left a detailed message encouraging them to complete the applications that were mailed to them as they are for MaTP/PWD. MSW explained that [facility] offers several programs, and that they will require patient to use the most cost effective means of transportation. This means that patient will need to use the fixed route bus service to get to appts on the route, but that patient may be able to qualify for free trips to locations not on the regular bus stop.     MSW requested callback to ensure that they plan to apply. Awaiting same.

## 2024-06-24 NOTE — PROGRESS NOTES
"Ambulatory Visit  Name: Neal Walden      : 1965      MRN: [id number]  Encounter Provider: EDWARDO Carvajal  Encounter Date: 2024   Encounter department: [facility] [address] [place] [address]    Assessment & Plan   1. Persistent vomiting  Assessment & Plan:  Patient continues with persistent vomiting.  States the only thing that prevents vomiting is marijuana use.  Patient has been given local resources for medical marijuana card, but as per patient having difficulty getting into an office due to office \"not taking new patients\" or \"not taking his insurance.\"    Offered patient amitriptyline, but refusing.  Also refusing any additional medication.  While in office, appointment for GI made.  Advised to obtain blood work and ultrasound as ordered.  Follow-up with GI as scheduled.  Orders:  -     CBC and differential; Future  -     Comprehensive metabolic panel; Future  -     Amylase; Future  -     Lipase; Future  -     US abdomen complete; Future; Expected date: 2024  2. Primary hypertension  Assessment & Plan:  Blood pressure elevated in office today.  Per patient, stopped taking metoprolol due to continuous vomiting.  Patient was advised to restart metoprolol, or an alternate such as clonidine transdermal, but refusing.  Advised to monitor blood pressure at home when possible, to return for continuous elevated readings.  3. Depression due to physical illness  Assessment & Plan:  Patient voicing wanting to \"give up\" and thoughts of \"not waking up.\"  Voices frustration over health issues since cardiac surgery.  Patient denies SI/HI, denies active plans or acts of self-harm.  Contracts for safety at home.  Offered behavioral services, but patient declining.  Advised patient to return to office for thoughts of self-harm, to ED for crisis evaluation if needed.  4. Marijuana use  Assessment & Plan:  Patient uses marijuana to help with migraines and vomiting.  Is " attempting to get medical marijuana card, but is having difficulty getting into a practice for evaluation.  Resources have been provided to patient for information.       History of Present Illness     Patient presents to the office for evaluation of vomiting.  This has been a chronic issue.  Per patient, all medical issues have been occurring since cardiac surgery February 2023.  Patient states he did not have issues with migraines or vomiting prior to surgery.  Has been unable to tolerate ending prior medications for vomiting.  Does see neurology for migraines, but medications have not been started due due to insurance purposes and medications not being covered.  Patient is established with GI, but has not connected back with them since prior EGD and colonoscopy last fall.  Vomiting occurs throughout the day.  Denies abdominal pain, blood in emesis, rectal bleeding.  Denies weakness or syncope.  Per patient, marijuana use helps with migraines and vomiting.  Per patient, has been unable to obtain medical marijuana card due to offices not taking new patients, or not taking his insurance.        Review of Systems   Constitutional:  Negative for chills, fever and unexpected weight change.   HENT:  Negative for sore throat and trouble swallowing.    Eyes:  Negative for photophobia and visual disturbance.   Respiratory:  Negative for cough and shortness of breath.    Cardiovascular:  Negative for chest pain and palpitations.   Gastrointestinal:  Positive for nausea and vomiting. Negative for abdominal distention, abdominal pain and blood in stool.   Genitourinary:  Negative for decreased urine volume, flank pain, frequency, hematuria and urgency.   Musculoskeletal:  Negative for arthralgias and myalgias.   Skin:  Negative for color change and rash.   Neurological:  Negative for dizziness, syncope, light-headedness and headaches.   Psychiatric/Behavioral:  Positive for agitation, dysphoric mood and suicidal ideas  (passive). Negative for hallucinations, self-injury and sleep disturbance. The patient is not nervous/anxious.      Pertinent Medical History     Medical History Reviewed by provider this encounter:  Tobacco  Allergies  Meds  Problems  Med Hx  Surg Hx  Fam Hx  Soc   Hx      Past Medical History   Past Medical History:   Diagnosis Date    Diabetes mellitus (HCC)     Diverticulitis of colon     Hyperlipidemia     Hypertension     Psychiatric disorder     bipolar    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HCC)     Syncope      Past Surgical History:   Procedure Laterality Date    CARDIAC ASCENDING AORTOGRAM N/A 10/28/2022    Procedure: Cardiac ascending aortogram;  Surgeon: Michele Baer MD;  [facility];  Service: Cardiology    CARDIAC CATHETERIZATION Left 10/28/2022    Procedure: CARDIAC RHC/LHC;  Surgeon: Michele Baer MD;  [facility];  Service: Cardiology    CARDIAC CATHETERIZATION N/A 10/28/2022    Procedure: Cardiac Coronary Angiogram;  Surgeon: Michele Baer MD;  [facility];  Service: Cardiology    CARDIAC CATHETERIZATION Left 10/28/2022    Procedure: Cardiac Left Ventriculogram;  Surgeon: Michele Baer MD;  [facility];  Service: Cardiology    LAPAROSCOPIC LYSIS INTESTINAL ADHESIONS      NM RPLCMT AORTIC VALVE OPN W/STENTLESS TISSUE VALVE N/A 02/23/2023    Procedure: CORONARY ARTERY BYPASS GRAFT (CABG) 1 VESSEL GSV-->RCA, EVH RIGHT LEG,  WITH REPLACEMENT VALVE AORTIC (AVR) 25MM INSPIRIA RESILIA TISSUE VALVE;  Surgeon: MAY Fierro MD;  Location:  MAIN OR;  Service: Cardiac Surgery    TONSILECTOMY AND ADNOIDECTOMY      WISDOM TOOTH EXTRACTION       Family History   Problem Relation Age of Onset    Cancer Mother     Hypertension Mother     Diabetes Mother      Current Outpatient Medications on File Prior to Visit   Medication Sig Dispense Refill    Galcanezumab-gnlm (Emgality) 120 MG/ML SOAJ Inject 120 mg under  the skin every 30 (thirty) days After loading dose of 240mg. (Patient not taking: Reported on 2024) 1 mL 11    Ubrogepant (UBRELVY) 100 MG tablet Take 1 tablet (100 mg) one time as needed for migraine. May repeat one additional tablet 100 mg) at least two hours after the first dose. Do not use more than two doses per day, or for more than eight days per month. (Patient not taking: Reported on 2024) 16 tablet 6     No current facility-administered medications on file prior to visit.     Allergies   Allergen Reactions    Neomycin-Polymyxin-Hc Abdominal Pain    Other      Pt states he is allergic to everything. States he doesn't have a list but can only take children doses of all medication        Current Outpatient Medications on File Prior to Visit   Medication Sig Dispense Refill    Galcanezumab-gnlm (Emgality) 120 MG/ML SOAJ Inject 120 mg under the skin every 30 (thirty) days After loading dose of 240mg. (Patient not taking: Reported on 2024) 1 mL 11    Ubrogepant (UBRELVY) 100 MG tablet Take 1 tablet (100 mg) one time as needed for migraine. May repeat one additional tablet 100 mg) at least two hours after the first dose. Do not use more than two doses per day, or for more than eight days per month. (Patient not taking: Reported on 2024) 16 tablet 6     No current facility-administered medications on file prior to visit.      Social History     Tobacco Use    Smoking status: Former     Current packs/day: 0.00     Average packs/day: 0.5 packs/day for 45.0 years (22.5 ttl pk-yrs)     Types: Cigarettes     Start date: 1978     Quit date: 2023     Years since quittin.3     Passive exposure: Current    Smokeless tobacco: Never    Tobacco comments:     Patient states that he is trying to cut down   Vaping Use    Vaping status: Some Days    Substances: Nicotine   Substance and Sexual Activity    Alcohol use: Not Currently    Drug use: Yes     Types: Marijuana    Sexual activity: Yes      "Partners: Female     Objective     /98 (BP Location: Left arm, Patient Position: Sitting)   Pulse 104   Temp 97.6 °F (36.4 °C)   Ht 5' 6.5\" (1.689 m)   Wt 88.1 kg (194 lb 3.2 oz)   SpO2 98%   BMI 30.88 kg/m²     Physical Exam  Vitals reviewed.   Constitutional:       General: He is not in acute distress.     Appearance: Normal appearance. He is not ill-appearing.   HENT:      Head: Normocephalic and atraumatic.      Right Ear: Tympanic membrane, ear canal and external ear normal.      Left Ear: Tympanic membrane, ear canal and external ear normal.      Nose: Nose normal.      Mouth/Throat:      Mouth: Mucous membranes are moist.      Pharynx: Oropharynx is clear.   Eyes:      Conjunctiva/sclera: Conjunctivae normal.      Pupils: Pupils are equal, round, and reactive to light.   Neck:      Vascular: No carotid bruit.   Cardiovascular:      Rate and Rhythm: Normal rate and regular rhythm.      Pulses: Normal pulses.      Heart sounds: Normal heart sounds. No murmur heard.  Pulmonary:      Effort: Pulmonary effort is normal.      Breath sounds: Normal breath sounds.   Abdominal:      General: Bowel sounds are normal.      Palpations: Abdomen is soft.      Tenderness: There is abdominal tenderness in the right lower quadrant. There is no right CVA tenderness or left CVA tenderness.   Musculoskeletal:         General: Normal range of motion.      Cervical back: Normal range of motion and neck supple.   Skin:     General: Skin is warm and dry.      Capillary Refill: Capillary refill takes less than 2 seconds.   Neurological:      General: No focal deficit present.      Mental Status: He is alert and oriented to person, place, and time.   Psychiatric:         Attention and Perception: Attention and perception normal. He does not perceive auditory or visual hallucinations.         Mood and Affect: Mood and affect normal.         Speech: Speech normal.         Behavior: Behavior normal. Behavior is cooperative.   "       Thought Content: Thought content is not paranoid. Thought content does not include homicidal or suicidal plan.

## 2024-06-24 NOTE — ASSESSMENT & PLAN NOTE
"Patient voicing wanting to \"give up\" and thoughts of \"not waking up.\"  Voices frustration over health issues since cardiac surgery.  Patient denies SI/HI, denies active plans or acts of self-harm.  Contracts for safety at home.  Offered behavioral services, but patient declining.  Advised patient to return to office for thoughts of self-harm, to ED for crisis evaluation if needed.  "

## 2024-06-27 NOTE — TELEPHONE ENCOUNTER
MSW attempted to reach patient's significant other, Rose, at [phone]. No answer. MSW left a detailed message encouraging them to complete the applications that were mailed to them as they are for MaTP/PWD. MSW explained that [facility] offers several programs, and that they will require patient to use the most cost effective means of transportation. This means that patient will need to use the fixed route bus service to get to appts on the route, but that patient may be able to qualify for free trips to locations not on the regular bus stop.     MSW requested callback to ensure that they plan to apply. Awaiting same.

## 2024-06-28 NOTE — TELEPHONE ENCOUNTER
"MSW attempted to reach patient's significant other, Rsoe, at [phone]. No answer. MSW left a detailed message encouraging them to complete the applications that were mailed to them as they are for MaTP/PWD. MSW explained that [facility] offers several programs, and that they will require patient to use the most cost effective means of transportation. This means that patient will need to use the fixed route bus service to get to appts on the route, but that patient may be able to qualify for free trips to locations not on the regular bus stop. MSW requested callback to ensure that they plan to apply. Awaiting same.      Since there have been 3 unsuccessful attempts to reach patient, MSW sent an \"Unable to Reach\" letter via [employer] and postal mail this date. MSW will be available should patient/significant other call back.   "

## 2024-07-02 NOTE — TELEPHONE ENCOUNTER
MSW reviewed chart - [employer] message has not been read but paper copy of Unable to Reach letter mailed to patient's home address.    MSW will be available to should patient/significant other callback.

## 2024-07-16 ENCOUNTER — APPOINTMENT (OUTPATIENT)
Dept: LAB | Facility: HOSPITAL | Age: 59
End: 2024-07-16
Payer: COMMERCIAL

## 2024-07-16 ENCOUNTER — HOSPITAL ENCOUNTER (OUTPATIENT)
Dept: ULTRASOUND IMAGING | Facility: HOSPITAL | Age: 59
Discharge: HOME | End: 2024-07-16
Payer: COMMERCIAL

## 2024-07-16 DIAGNOSIS — R11.15 PERSISTENT VOMITING: ICD-10-CM

## 2024-07-16 LAB
ALBUMIN SERPL BCG-MCNC: 4 G/DL (ref 3.5–5)
ALP SERPL-CCNC: 60 U/L (ref 34–104)
ALT SERPL W P-5'-P-CCNC: 15 U/L (ref 7–52)
AMYLASE SERPL-CCNC: 43 IU/L (ref 29–103)
ANION GAP SERPL CALCULATED.3IONS-SCNC: 4 MMOL/L (ref 4–13)
AST SERPL W P-5'-P-CCNC: 13 U/L (ref 13–39)
BASOPHILS # BLD AUTO: 0.05 THOUSANDS/ÂΜL (ref 0–0.1)
BASOPHILS NFR BLD AUTO: 1 % (ref 0–1)
BILIRUB SERPL-MCNC: 0.41 MG/DL (ref 0.2–1)
BUN SERPL-MCNC: 10 MG/DL (ref 5–25)
CALCIUM SERPL-MCNC: 9 MG/DL (ref 8.4–10.2)
CHLORIDE SERPL-SCNC: 103 MMOL/L (ref 96–108)
CO2 SERPL-SCNC: 31 MMOL/L (ref 21–32)
CREAT SERPL-MCNC: 0.92 MG/DL (ref 0.6–1.3)
EOSINOPHIL # BLD AUTO: 0.11 THOUSAND/ÂΜL (ref 0–0.61)
EOSINOPHIL NFR BLD AUTO: 3 % (ref 0–6)
ERYTHROCYTE [DISTWIDTH] IN BLOOD BY AUTOMATED COUNT: 14.1 % (ref 11.6–15.1)
GFR SERPL CREATININE-BSD FRML MDRD: 92 ML/MIN/1.73SQ M
GLUCOSE P FAST SERPL-MCNC: 100 MG/DL (ref 65–99)
HCT VFR BLD AUTO: 46.3 % (ref 36.5–49.3)
HGB BLD-MCNC: 14.5 G/DL (ref 12–17)
IMM GRANULOCYTES # BLD AUTO: 0.01 THOUSAND/UL (ref 0–0.2)
IMM GRANULOCYTES NFR BLD AUTO: 0 % (ref 0–2)
LIPASE SERPL-CCNC: 20 U/L (ref 11–82)
LYMPHOCYTES # BLD AUTO: 1.43 THOUSANDS/ÂΜL (ref 0.6–4.47)
LYMPHOCYTES NFR BLD AUTO: 33 % (ref 14–44)
MCH RBC QN AUTO: 27.4 PG (ref 26.8–34.3)
MCHC RBC AUTO-ENTMCNC: 31.3 G/DL (ref 31.4–37.4)
MCV RBC AUTO: 88 FL (ref 82–98)
MONOCYTES # BLD AUTO: 0.49 THOUSAND/ÂΜL (ref 0.17–1.22)
MONOCYTES NFR BLD AUTO: 11 % (ref 4–12)
NEUTROPHILS # BLD AUTO: 2.19 THOUSANDS/ÂΜL (ref 1.85–7.62)
NEUTS SEG NFR BLD AUTO: 52 % (ref 43–75)
NRBC BLD AUTO-RTO: 0 /100 WBCS
PLATELET # BLD AUTO: 220 THOUSANDS/UL (ref 149–390)
PMV BLD AUTO: 10.3 FL (ref 8.9–12.7)
POTASSIUM SERPL-SCNC: 4.9 MMOL/L (ref 3.5–5.3)
PROT SERPL-MCNC: 7.2 G/DL (ref 6.4–8.4)
RBC # BLD AUTO: 5.29 MILLION/UL (ref 3.88–5.62)
SODIUM SERPL-SCNC: 138 MMOL/L (ref 135–147)
WBC # BLD AUTO: 4.28 THOUSAND/UL (ref 4.31–10.16)

## 2024-07-16 PROCEDURE — 76700 US EXAM ABDOM COMPLETE: CPT

## 2024-07-16 PROCEDURE — 85025 COMPLETE CBC W/AUTO DIFF WBC: CPT

## 2024-07-16 PROCEDURE — 82150 ASSAY OF AMYLASE: CPT

## 2024-07-16 PROCEDURE — 36415 COLL VENOUS BLD VENIPUNCTURE: CPT

## 2024-07-16 PROCEDURE — 80053 COMPREHEN METABOLIC PANEL: CPT

## 2024-07-16 PROCEDURE — 83690 ASSAY OF LIPASE: CPT

## 2024-07-22 ENCOUNTER — OFFICE VISIT (OUTPATIENT)
Dept: GASTROENTEROLOGY | Facility: CLINIC | Age: 59
End: 2024-07-22
Payer: COMMERCIAL

## 2024-07-22 ENCOUNTER — TELEPHONE (OUTPATIENT)
Age: 59
End: 2024-07-22

## 2024-07-22 VITALS
HEIGHT: 66 IN | DIASTOLIC BLOOD PRESSURE: 92 MMHG | BODY MASS INDEX: 30.98 KG/M2 | SYSTOLIC BLOOD PRESSURE: 130 MMHG | WEIGHT: 192.8 LBS | HEART RATE: 110 BPM | TEMPERATURE: 98.4 F | OXYGEN SATURATION: 97 %

## 2024-07-22 DIAGNOSIS — R11.0 NAUSEA: ICD-10-CM

## 2024-07-22 DIAGNOSIS — R68.81 EARLY SATIETY: Primary | ICD-10-CM

## 2024-07-22 PROCEDURE — 99214 OFFICE O/P EST MOD 30 MIN: CPT | Performed by: INTERNAL MEDICINE

## 2024-07-22 RX ORDER — ONDANSETRON 4 MG/1
4 TABLET, FILM COATED ORAL EVERY 8 HOURS PRN
Qty: 20 TABLET | Refills: 0 | Status: SHIPPED | OUTPATIENT
Start: 2024-07-22

## 2024-07-22 NOTE — PROGRESS NOTES
[facility] - Outpatient Note  Neal Walden 58 y.o. male MRN: [id number]  Encounter: [id number]      ASSESSMENT AND PLAN:    Neal Walden is a 58 y.o. old pleasant male with PMH of marijuana use, CABG, bipolar, schizophrenia, vitamin D deficiency who presents for followup    Persistent nausea and vomiting check gastric emptying study-patient's symptoms all started after his bypass.  He has many non-GI symptoms including blurry vision, headache and insomnia since the cardiac bypass.  It is hard for me to relate his GI symptoms as cardiac bypass unless there was some vagus nerve injury which could cause gastroparesis.  Check gastric emptying study  Start Zofran  I counseled him on marijuana cessation however he states that his symptoms did not improve after he stopped marijuana for 6 months after his bypass  Consider HIDA scan in the future  There appears to be a significant functional component to his symptoms so can consider TCA in the future        There are no diagnoses linked to this encounter.  ______________________________________________________________________    SUBJECTIVE:    Had cardiac bypass in 2023, since then has developed a large amount of symptoms    Vomiting daily, decreased appetite blurry vision headaches insomnia    Daily headaches on THC which help pain, decrease appetiite, blurry visiion, headaches, insomina    Can't afford marijuana as much as he wants, smokes daily to once per week    Recent EGD/colonoscopy 2023 was unremarkable except esophageal erosion and mild diverticulosis.  Biopsies negative for H. pylori.    No significant NSAID use    Believes his symptoms are all related to his surgery    I reviewed prior external notes    I reviewed previous lab results and images      REVIEW OF SYSTEMS:     REVIEW OF ALL OTHER SYSTEMS IS OTHERWISE NEGATIVE.      Historical Information   Past Medical History:   Diagnosis Date    Diabetes mellitus (HCC)      Diverticulitis of colon     Hyperlipidemia     Hypertension     Psychiatric disorder     bipolar    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HCC)     Syncope      Past Surgical History:   Procedure Laterality Date    CARDIAC ASCENDING AORTOGRAM N/A 10/28/2022    Procedure: Cardiac ascending aortogram;  Surgeon: Michele Baer MD;  [facility];  Service: Cardiology    CARDIAC CATHETERIZATION Left 10/28/2022    Procedure: CARDIAC RHC/LHC;  Surgeon: Michele Baer MD;  [facility];  Service: Cardiology    CARDIAC CATHETERIZATION N/A 10/28/2022    Procedure: Cardiac Coronary Angiogram;  Surgeon: Michele Baer MD;  [facility];  Service: Cardiology    CARDIAC CATHETERIZATION Left 10/28/2022    Procedure: Cardiac Left Ventriculogram;  Surgeon: Michele Baer MD;  [facility];  Service: Cardiology    LAPAROSCOPIC LYSIS INTESTINAL ADHESIONS      CA RPLCMT AORTIC VALVE OPN W/STENTLESS TISSUE VALVE N/A 2023    Procedure: CORONARY ARTERY BYPASS GRAFT (CABG) 1 VESSEL GSV-->RCA, EVH RIGHT LEG,  WITH REPLACEMENT VALVE AORTIC (AVR) 25MM INSPIRIA RESILIA TISSUE VALVE;  Surgeon: MAY Fierro MD;  [facility]: [facility];  Service: Cardiac Surgery    TONSILECTOMY AND ADNOIDECTOMY      WISDOM TOOTH EXTRACTION       Social History   Social History     Substance and Sexual Activity   Alcohol Use Not Currently     Social History     Substance and Sexual Activity   Drug Use Yes    Types: Marijuana     Social History     Tobacco Use   Smoking Status Former    Current packs/day: 0.00    Average packs/day: 0.5 packs/day for 45.0 years (22.5 ttl pk-yrs)    Types: Cigarettes    Start date: 1978    Quit date: 2023    Years since quittin.4    Passive exposure: Current   Smokeless Tobacco Never   Tobacco Comments    Patient states that he is trying to cut down     Family History   Problem Relation Age of Onset    Cancer Mother      "Hypertension Mother     Diabetes Mother        Meds/Allergies       Current Outpatient Medications:     Galcanezumab-gnlm (Emgality) 120 MG/ML SOAJ    Ubrogepant (UBRELVY) 100 MG tablet    Allergies   Allergen Reactions    Neomycin-Polymyxin-Hc Abdominal Pain    Other      Pt states he is allergic to everything. States he doesn't have a list but can only take children doses of all medication             Objective     Blood pressure 130/92, pulse (!) 110, temperature 98.4 °F (36.9 °C), temperature source Temporal, height 5' 6\" (1.676 m), weight 87.5 kg (192 lb 12.8 oz), SpO2 97%. Body mass index is 31.12 kg/m².      PHYSICAL EXAM:      General Appearance:   Alert, cooperative, no distress   HEENT:   Normocephalic, atraumatic, anicteric.     Neck:  Supple, symmetrical, trachea midline   Lungs:   Clear to auscultation bilaterally; no rales, rhonchi or wheezing; respirations unlabored    Heart::   Regular rate and rhythm; no murmur, rub, or gallop.   Abdomen:   Soft, non-tender, non-distended; normal bowel sounds; no masses, no organomegaly    Genitalia:   Deferred    Rectal:   Deferred    Extremities:  No cyanosis, clubbing or edema    Pulses:  2+ and symmetric    Skin:  No jaundice, rashes, or lesions    Lymph nodes:  No palpable cervical lymphadenopathy        Lab Results:   No visits with results within 1 Day(s) from this visit.   Latest known visit with results is:   Appointment on 07/16/2024   Component Date Value    WBC 07/16/2024 4.28 (L)     RBC 07/16/2024 5.29     Hemoglobin 07/16/2024 14.5     Hematocrit 07/16/2024 46.3     MCV 07/16/2024 88     MCH 07/16/2024 27.4     MCHC 07/16/2024 31.3 (L)     RDW 07/16/2024 14.1     MPV 07/16/2024 10.3     Platelets 07/16/2024 220     nRBC 07/16/2024 0     Segmented % 07/16/2024 52     Immature Grans % 07/16/2024 0     Lymphocytes % 07/16/2024 33     Monocytes % 07/16/2024 11     Eosinophils Relative 07/16/2024 3     Basophils Relative 07/16/2024 1     Absolute " Neutrophils 07/16/2024 2.19     Absolute Immature Grans 07/16/2024 0.01     Absolute Lymphocytes 07/16/2024 1.43     Absolute Monocytes 07/16/2024 0.49     Eosinophils Absolute 07/16/2024 0.11     Basophils Absolute 07/16/2024 0.05     Sodium 07/16/2024 138     Potassium 07/16/2024 4.9     Chloride 07/16/2024 103     CO2 07/16/2024 31     ANION GAP 07/16/2024 4     BUN 07/16/2024 10     Creatinine 07/16/2024 0.92     Glucose, Fasting 07/16/2024 100 (H)     Calcium 07/16/2024 9.0     AST 07/16/2024 13     ALT 07/16/2024 15     Alkaline Phosphatase 07/16/2024 60     Total Protein 07/16/2024 7.2     Albumin 07/16/2024 4.0     Total Bilirubin 07/16/2024 0.41     eGFR 07/16/2024 92     Amylase 07/16/2024 43     Lipase 07/16/2024 20          Radiology Results:   US abdomen complete    Result Date: 7/18/2024  Narrative: ABDOMEN ULTRASOUND, COMPLETE INDICATION: R11.15: Cyclical vomiting syndrome unrelated to migraine. COMPARISON: CT abdomen pelvis 4/5/2024 TECHNIQUE: Real-time ultrasound of the abdomen was performed with a curvilinear transducer with both volumetric sweeps and still imaging techniques. FINDINGS: PANCREAS: Visualized portions of the pancreas are within normal limits. AORTA AND IVC: Atherosclerosis seen in the visualized portions of the aorta. Otherwise normal. LIVER: Size: Within normal range. The liver measures 15.3 cm in the midclavicular line. Contour: Surface contour is smooth. Parenchyma: There is mild diffuse increased echogenicity with smooth echotexture, without significant beam attenuation or loss of periportal echogenicity. Most consistent with mild hepatic steatosis. No suspicious liver mass identified. Right hepatic lobe 1.2 cm simple cyst. Limited imaging of the main portal vein shows it to be patent and hepatopetal. BILIARY: The gallbladder is normal in caliber. No wall thickening or pericholecystic fluid. No stones or sludge identified. No sonographic Walters sign. No intrahepatic biliary  dilatation. CBD measures 4.0 mm. No choledocholithiasis. KIDNEY: Right kidney measures 10.3 x 7.8 x 5.6 cm. Volume 236.1 mL Kidney within normal limits. Left kidney measures 9.8 x 7.2 x 5.2 cm. Volume 190.9 mL Kidney within normal limits. SPLEEN: Measures 9.6 x 10.1 x 3.8 cm. Volume 193.6 mL Within normal limits. ASCITES: None.     Impression: 1. No cholelithiasis. No biliary ductal dilation. 2. Mild hepatic steatosis. Resident: TYLER STEPHEN I, the attending radiologist, have reviewed the images and agree with the final report above. Workstation performed: [id number] \

## 2024-07-22 NOTE — TELEPHONE ENCOUNTER
Pt's girlfriend Rose (on consent) called stating pt will need lyft set up for his gastric emptying study on 9/3/24. She is requesting a call back to confirm once it has been set up

## 2024-07-23 NOTE — TELEPHONE ENCOUNTER
Spoke to Rose. I apologized for not being able to arrange a Lyft ride for Neal's appt.  I gave her [employer] phone number..

## 2024-07-23 NOTE — TELEPHONE ENCOUNTER
Pt's girlfriend returning a call from Fausto. She says scheduling the lyft a week prior to his appt is perfectly fine and is asking if we can call her back once it is in so she knows the  time. She is on consent and the number for PT is her cell

## 2024-08-07 ENCOUNTER — TELEPHONE (OUTPATIENT)
Dept: NEUROLOGY | Facility: CLINIC | Age: 59
End: 2024-08-07

## 2024-08-07 NOTE — TELEPHONE ENCOUNTER
Left voicemail for patient to call back or on my chart confirm appointment with Dr. Munoz 8/8 at [facility].

## 2024-09-03 ENCOUNTER — HOSPITAL ENCOUNTER (OUTPATIENT)
Dept: NUCLEAR MEDICINE | Facility: HOSPITAL | Age: 59
Discharge: HOME | End: 2024-09-03
Attending: INTERNAL MEDICINE
Payer: COMMERCIAL

## 2024-09-03 DIAGNOSIS — R68.81 EARLY SATIETY: ICD-10-CM

## 2024-09-03 PROCEDURE — A9541 TC99M SULFUR COLLOID: HCPCS

## 2024-09-03 PROCEDURE — 78264 GASTRIC EMPTYING IMG STUDY: CPT

## 2024-09-09 ENCOUNTER — TELEPHONE (OUTPATIENT)
Age: 59
End: 2024-09-09

## 2024-09-09 NOTE — TELEPHONE ENCOUNTER
Patients GI provider:  Dr. Gomez    Number to return call:  [phone]    Reason for call: Pts girl friend Rose on the contact list not on the consent form verified pt all info called in to check on pts results for the test on 9/3/24. please contact for further details.    Scheduled procedure/appointment date if applicable: None

## 2024-09-09 NOTE — TELEPHONE ENCOUNTER
Called [facility] advising the results of the gastric emptying study dated 9/3/24. If any questions/concerns to call the office.    NM gastric emptying: Patient Communication     Edit Comments   Add Notifications     Magno De Jesus. Your study shows normal emptying which means you do not have gastroparesis. This is good news overall. Have a wonderful day.   Written by Vilma Gomez MD on 9/4/2024  2:04 PM EDT

## 2024-09-09 NOTE — TELEPHONE ENCOUNTER
Pt received results and is still having vomiting. He would like advice on what to do    Call back #     [phone]

## 2024-09-09 NOTE — TELEPHONE ENCOUNTER
Patient's girlfriend, Rose called asking for results of the patient's Gastric Emptying Study.  I explained to her that the test was ordered by his gastroenterologist and they would have to give the results but also, she is not on his communication consent form.    In any case, they wanted Sneha to know that he is still throwing up constantly.  He cannot keep anything down.

## 2024-09-10 DIAGNOSIS — R19.8: Primary | ICD-10-CM

## 2024-09-10 DIAGNOSIS — R11.0 NAUSEA: Primary | ICD-10-CM

## 2024-09-10 RX ORDER — AMITRIPTYLINE HYDROCHLORIDE 10 MG/1
10 TABLET ORAL
Qty: 30 TABLET | Refills: 2 | Status: SHIPPED | OUTPATIENT
Start: 2024-09-10 | End: 2024-09-10

## 2024-09-10 RX ORDER — OMEPRAZOLE 40 MG/1
40 CAPSULE, DELAYED RELEASE ORAL DAILY
Qty: 90 CAPSULE | Refills: 0 | Status: SHIPPED | OUTPATIENT
Start: 2024-09-10

## 2024-09-10 NOTE — TELEPHONE ENCOUNTER
Pt was yelling the entire conversation, I tried asking what medications he is taking, he said he has tried every pill that the provider's have recommended. He is tired of people telling him they are sorry he does not feel well. He stated he would rather die than be vomiting daily. He stated THC is the only thing that helps him and his symptoms have been going on since his cardiac surgery.

## 2024-09-10 NOTE — TELEPHONE ENCOUNTER
Spoke with pt he was very upset that he does not have an answer as to why he is vomiting and would like a resolution as THC is the only thing that helps him. Pt disconnected call.

## 2024-09-11 NOTE — TELEPHONE ENCOUNTER
Called pt he was not available to speak and there is not an updated consent form in chart, significant other understood and asked me to call tomorrow.

## 2024-09-11 NOTE — TELEPHONE ENCOUNTER
Received fax from [employer], stating they have made multiple attempts to contact patient re: filling her ambrisentan Rx, with no response from patient. Letter mailed to patient to contact [employer]. Thanks Marisol for your time and effort. I can see and understand he is very frustrated. If he call back he can try omeprazole because I don't see any evidence that he has tried PPI. Thank you.

## 2024-09-12 ENCOUNTER — NURSE TRIAGE (OUTPATIENT)
Age: 59
End: 2024-09-12

## 2024-09-12 DIAGNOSIS — R11.0 NAUSEA: ICD-10-CM

## 2024-09-12 DIAGNOSIS — R11.11 VOMITING WITHOUT NAUSEA, UNSPECIFIED VOMITING TYPE: ICD-10-CM

## 2024-09-12 DIAGNOSIS — R51.9 NONINTRACTABLE HEADACHE, UNSPECIFIED CHRONICITY PATTERN, UNSPECIFIED HEADACHE TYPE: ICD-10-CM

## 2024-09-12 DIAGNOSIS — R19.8: Primary | ICD-10-CM

## 2024-09-12 RX ORDER — PROCHLORPERAZINE MALEATE 5 MG
5 TABLET ORAL EVERY 6 HOURS PRN
Qty: 30 TABLET | Refills: 0 | Status: SHIPPED | OUTPATIENT
Start: 2024-09-12

## 2024-09-12 NOTE — TELEPHONE ENCOUNTER
"LOV 07/22/24, NM GE 09/03/24, US abd com 07/16/24              EGD/Colonoscopy 09/28/24    Pt and significant other made aware script for omeprazole 40 mg daily was sent to the [facility] on file. Pt states medications do not alleviate his nausea and vomiting. Pt states he has not eaten today however he continues to vomit \"fluids and mucous\". Pt reports nausea is constantly present throughout the day. He is inquiring next steps following negative GE study. Pt requests callback from provider today to further discuss. Informed pt I would relay his concerns to . Encounter will be forwarded to provider via Secure Chat.    Neal: [phone]      Reason for Disposition   Receiving cancer chemotherapy medication    Answer Assessment - Initial Assessment Questions  1. NAUSEA SEVERITY: \"How bad is the nausea?\" (e.g., mild, moderate, severe; dehydration, weight loss)    - MILD: loss of appetite without change in eating habits    - MODERATE: decreased oral intake without significant weight loss, dehydration, or malnutrition    - SEVERE: inadequate caloric or fluid intake, significant weight loss, symptoms of dehydration      Mod-severe  2. ONSET: \"When did the nausea begin?\"      chronic  3. VOMITING: \"Any vomiting?\" If Yes, ask: \"How many times today?\"      \"All day\"  4. RECURRENT SYMPTOM: \"Have you had nausea before?\" If Yes, ask: \"When was the last time?\" \"What happened that time?\"      chronic  5. CAUSE: \"What do you think is causing the nausea?\"      unsure  6. PREGNANCY: \"Is there any chance you are pregnant?\" (e.g., unprotected intercourse, missed birth control pill, broken condom)      male    Protocols used: Nausea-ADULT-[place]    "

## 2024-09-12 NOTE — TELEPHONE ENCOUNTER
Called pt left voicemail that I would send mychart message with provider recommendations. Message sent

## 2024-09-12 NOTE — TELEPHONE ENCOUNTER
Pt's girlfriend Rose returned Marisol's call. Transferred her over to triage and Tristan hayes took the call to further assist.

## 2024-09-19 ENCOUNTER — TELEPHONE (OUTPATIENT)
Age: 59
End: 2024-09-19

## 2024-09-19 NOTE — TELEPHONE ENCOUNTER
Patient/significant other previously sent calin for [facility] but never answered calls to follow-up regarding same. [facility] now travels Out of [facility] on limited days/times. Will inquire if appt could be moved to time that [facility] travels out of [facility]

## 2024-09-19 NOTE — TELEPHONE ENCOUNTER
Pt and SO called in to ask about upcoming appt. I made them aware pt was scheduled on 10/15 @ 2:00 with Dr. Munoz in [place].   PT stated that they need transportation assistance as they can not travel that far.     Can you please assist?     Thank you.

## 2024-09-20 NOTE — TELEPHONE ENCOUNTER
MSW had previously provided patient with the [facility] MaTP and PWD application, but despite numerous attempts to follow-up on same, patient/significant other did not call back.     [facility] phoned [facility] at [phone] to see if patient did end up registering - MSW was told that [facility] has no one in their system with patient's name.     MSW spoke with Whit, [facility]  - she advised that Dr. Munoz does not have any appts available on 10/17 or future Thursdays until after the new year. Whit authorized one additional round trip Lyft/Uber ride for patient's 10/15 neurology appt.    MSW phoned patient's significant other, Rose, at [phone] to offer one last round trip Lyft ride for the 10/15 neurology appt. MSW did advise that as of 9/9/24, [facility] began to offer limited out of county travel on:    1st Thursday of the month to [place]    3rd Thursday of the month to [place]    MSW did advise that appointments must be scheduled between 10AM-1PM. Rose inquired why and MSW explained that one van will bring a max of 9 patients out of [facility] and all will come at the same time. MSW advised that all patients need to be ready to return to [facility] by 2PM. MSW advised that our office will not be able to offer ongoing Lyft rides since there is now a service to meet patient's needs. MSW encouraged patient/significant other to complete/return the [employer] applications ASAP.     Rose requested that same be resent to her at:    Rose Wade  [address]  [place] [address]    For Lyft/Uber ride, Rose stated that she will be traveling with patient. Rose stated that patient does not use any assistive devices or oxygen, but that she will be using a cane.    [facility]oned [facility] at [phone] and pressed option 5 for Registration. No answer. MSW left a detailed message requesting that the MaTP and PWD applications be sent to patient at above [address] address.     Lyft Qualifier Tool  "and Waiver already on file.     MSW sent the following transportation request to the [employer]:    \"Patients Name: Neal Walden  : 1965  Phone Number: [phone]  Appointment Date: TUESDAY 10/15/24  Appointment time: 145PM ARRIVAL, 2PM APPT   Address: [address] [facility] [address]  Drop off Facility/Office Name: [facility]  Drop off  Address: [address] [place] [id number]  Cost Center: [id number]  Special notes/directions for  - PATIENT WILL BE TRAVELING WITH HIS GIRLFRIEND. PATIENT DOES NOT USE ANY ASSISTIVE DEVICES BUT THE GIRLFRIEND USES A CANE  Note for scheduling team - NONE\"    Awaiting response.  "

## 2024-09-23 NOTE — TELEPHONE ENCOUNTER
"MSW received the following response from the [facility]:    \"Eric Kennedy; P Patient [facility]  [place]rennen, Oct 15  1:09pm\"      "

## 2024-09-24 ENCOUNTER — VBI (OUTPATIENT)
Dept: ADMINISTRATIVE | Facility: OTHER | Age: 59
End: 2024-09-24

## 2024-09-24 NOTE — TELEPHONE ENCOUNTER
09/24/24 10:57 AM     Chart reviewed for Hemoglobin A1c ; nothing is submitted to the patient's insurance at this time.     Yaneli Lilly   [facility]

## 2024-09-27 NOTE — TELEPHONE ENCOUNTER
MSW attempted to reach patient's significant other, Rose, at [phone]. No answer. MSW left a detailed message with ETA for Lyft ride as 109PM on 10/15, also inquired if they received the mailed [facility] applications, and again provided the days of the week/times for [facility] travel out of the [facility]. MSW requested callback. Awaiting same.

## 2024-10-09 ENCOUNTER — HOSPITAL ENCOUNTER (OUTPATIENT)
Dept: NUCLEAR MEDICINE | Facility: HOSPITAL | Age: 59
Discharge: HOME | End: 2024-10-09
Attending: INTERNAL MEDICINE
Payer: COMMERCIAL

## 2024-10-09 DIAGNOSIS — R11.11 VOMITING WITHOUT NAUSEA, UNSPECIFIED VOMITING TYPE: ICD-10-CM

## 2024-10-09 DIAGNOSIS — R11.0 NAUSEA: ICD-10-CM

## 2024-10-09 PROCEDURE — A9537 TC99M MEBROFENIN: HCPCS

## 2024-10-09 PROCEDURE — 78227 HEPATOBIL SYST IMAGE W/DRUG: CPT

## 2024-10-09 RX ORDER — SINCALIDE 5 UG/5ML
0.02 INJECTION, POWDER, LYOPHILIZED, FOR SOLUTION INTRAVENOUS ONCE
Status: COMPLETED | OUTPATIENT
Start: 2024-10-09 | End: 2024-10-09

## 2024-10-09 RX ADMIN — SINCALIDE 1.8 MCG: 5 INJECTION, POWDER, LYOPHILIZED, FOR SOLUTION INTRAVENOUS at 11:12

## 2024-10-15 ENCOUNTER — OFFICE VISIT (OUTPATIENT)
Dept: NEUROLOGY | Facility: CLINIC | Age: 59
End: 2024-10-15

## 2024-10-15 VITALS
HEART RATE: 110 BPM | DIASTOLIC BLOOD PRESSURE: 82 MMHG | WEIGHT: 198.6 LBS | SYSTOLIC BLOOD PRESSURE: 136 MMHG | HEIGHT: 66 IN | BODY MASS INDEX: 31.92 KG/M2

## 2024-10-15 DIAGNOSIS — G43.919 INTRACTABLE MIGRAINE WITHOUT STATUS MIGRAINOSUS, UNSPECIFIED MIGRAINE TYPE: Primary | ICD-10-CM

## 2024-10-15 DIAGNOSIS — G47.00 INSOMNIA: ICD-10-CM

## 2024-10-15 RX ORDER — ERENUMAB-AOOE 140 MG/ML
140 INJECTION, SOLUTION SUBCUTANEOUS
Qty: 140 ML | Refills: 0 | Status: SHIPPED | OUTPATIENT
Start: 2024-10-15

## 2024-10-15 RX ORDER — DEXAMETHASONE 2 MG/1
2 TABLET ORAL
Qty: 5 TABLET | Refills: 0 | Status: SHIPPED | OUTPATIENT
Start: 2024-10-15

## 2024-10-15 NOTE — PATIENT INSTRUCTIONS
"Complete MRA head, neck studies.  Follow up with sleep medicine.  Take Dexamethasone 2mg daily for 5 days.      Medication instructions:      Instructions for taking abortive meds:    -Take 100mg w/in 5 mins of headache onset. If needed, a second dose may be taken at least 2 hours after the initial dose. The maximum dose in a 24-hour period is 200 mg.     Instructions for Aimovi injection (140mg) per month     General headache management instructions:   When you have a moderate to severe headache, you should seek rest, initiate relaxation and apply cold compresses to the head.  Limit over the counter medications such as Tylenol, Ibuprofen, Aleve, Excedrin. (No more than 2- 3 times a week or max 10 a month).  Maintain headache diary.  Free GILA for a smart phone, which can be used is \"Migraine theodora\"  Limit caffeine to 1-2 cups 8 to 16 oz a day or less.  Avoid dietary trigger. (aged cheese, peanuts, MSG, aspartame and nitrates)  Patient is to have regular frequent meals to prevent headache onset.    Please drink at least 64 ounces of water a day to help remain hydrated.     Medication overuse headaches:   Avoid medications with narcotics, barbiturates, or caffeine in them as these can cause rebound headaches after very few doses and can interfere with other headache medicine efficacy. Taking  any acute/abortive over the counter medication or prescription drugs for more than 2-3 days a week can cause medication overuse headache.      Non-pharmacologic treatments:   Maintain a regular schedule with adequate sleep (sleep hygiene), blue filter film screens for computers and phones, theraspecs or axon sunglasses (FL41 karlie film), diet (not skipping/late meals), hydration (60-80 oz of water), exercise, decrease consumption of caffeine (less than 2 cups per day) and cut out artificial sweeteners, coping mechanisms for stress, and cognitive behavioral therapies and biofeedback.   Recommend exercising regularly.    " "  Over-the-counter supplements:   Magnesium Oxide 400mg a day. If any diarrhea or upset stomach, decrease dose  as tolerated  Vitamin B2 200 mg twice a day. May cause urine to turn yellow which is normal for B 2 to do and is not a sign that you are dehydrated.         If you experience \"red flag\" headache symptoms including symptoms such as facial droop on one side, weakness/paralysis on either side, speech trouble, numbness on one side, balance issues, any vision changes, extreme dizziness or significant increase in severity of headache or a sudden onset severe headache, patient is to call -1-1 immediately or to proceed to the nearest ER.      Return in about 4 months (around 2/15/2025).  "

## 2024-10-15 NOTE — PROGRESS NOTES
Neurology Ambulatory Visit  Name: Neal Walden       : 1965       MRN: [id number]   Encounter Provider: Mohamud Munoz MD   Encounter Date: 10/15/2024  Encounter department: [facility]    59 y.o.right handed patient presents to our office for evaluation of headaches, seizure like activity, recurrent episodes of syncope. Pertinent PMHx: DM, HTN, dyslipidemia, tobacco use, THC use, ADHD, bipolar, schizophrenia, CABG, s/p AV replacement, recurrent episodes of syncope.     Assessment   Intractable migraines. Has had headaches since age 8-9 s/p severe head injury.     Previously tried medications:  -Preventative: Metoprolol (made him sleepy, dizzy), Topamax (led to numbness), marijuana (most helpful), valproate (made him sleepy). Elavil contraindicated due to cardiac hx.   -Abortive: acetaminophen 325mg (minimal help) , Triptans contraindicated due to CABG, CAD. Nurtec (made him sleepy)  - During last visit in May 2024, we prescribed Ubrelvy (abortive), Emgality (preventative) - pt reports he did not get these meds as he could not afford them and continues to get daily migraines of same character.  He reports that he is in constant pain and the only thing that helps his headaches is THC and tylenol (which he takes on a daily basis).    Recurrent episodes of syncope.  Patient also describes episodes of syncope that occurred around the time of his AV valve replacement, CABG. Vessel imaging pending. Have not happened since May 2024 visit.      Episodes of generalized body shaking. Has had episodes of generalized body shaking since age 8-9. Occur about 3x/month. He is able to remember the episodes and is sometimes awake during these episodes. Describes it is as an out of body experience.  rEEG, MRI brain +Johnny negative. These episodes have not occurred since May 2024 visit.      Insomnia, snoring    Plan:  Abortive headache med: Ubrelvy 100mg PRN.    Preventative headache med:  Aimovig 140mg monthly. Future options: Emgality, Gabapentin, Duloxetine, Ajovy, Qulipta.   MRA head, neck pending to assess for vascular abnormalities given recurrent events mentioned above.   Migraine diary recommended   Will workup syncope more during future visits per patient's wishes as headaches are most bothersome for today. Pending MRA head, neck.   Sleep medicine referral - advised following up with them when able.   Counseled on abortive, preventative meds' proper use and side effects extensively; patient verbalized understanding.   Limit OTC meds such as Tylenol, Ibuprofen, Aleve, Excedrin. (No more than 2- 3 times a week or max 10 a month).  Magnesium Oxide- 400mg QD, Vitamin B2- 200 mg BID supplements.  Regular exercise     History of Present Illness     HPI   Nael Walden is a 59 y.o. right handed male who presents to our office for f/u of headaches, syncopal episodes. Last seen on May 7, 2024.   Pertinent PMHx: DM, HTN, dyslipidemia, tobacco use, ADHD, bipolar, schizophrenia,  CABG, s/p AV replacement, recurrent episodes of syncope.      Headache hx:  Headaches initially began between age 8-9 years old after severe head injury. Headaches have continued throughout his life and are now much worse, prompting him to seek care.   Hospitalizations or ED visits for headache: multiple times at various [facility].   Fam hx: aneurysms- none, migraines- none.      Currently, headache occur daily, throbbing pain.  Located in bilateral cervical area,  radiate upwards into frontal area.   No postural/positional component.   Aura: irritability for 30 mins before.   Average intensity rated as 5.5.   Triggers: dehydration  Associated neuro syx include: blurry vision, scintillating scotomas, losing vision due to pain, Left arm numbness.    Migrainous features include: nausea, vomiting, photophobia, phonophobia.   Alleviating factors include- lying down in a dark quiet room.   During an acute attack, patient has some level  "of disability: patient is able to do some work around the house.      Previously tried medications:  -Preventative: Metoprolol (made him sleepy, dizzy), Topamax (led to numbness), marijuana (most helpful), valproate (made him very sleepy). Elavil contraindicated due to cardiac hx.  Emgality (prescribed, but never tried as he could not afford it).    -Abortive: acetaminophen 325mg (minimal help), Triptans contraindicated due to CABG, CAD. Nurtec (made him very sleepy).     Episodes of syncope:   Patient also describes episodes of syncope that occurred around the time of his AV valve replacement, CABG.  Have not have any episodes since May 2024 visit.      Episodes of epileptic events:   Patient also described episodes of \"epileptic events\" during prior visits. These episodes have not occurred since May 2024 visit.   - He had these episodes of full shaking about 3x/month. He is able to remember the episodes and is sometimes awake during these episodes. Describes it is as an out of body experience.   - Has been having these episodes since age 8-9.      Prior workup/ imaging includes:   [facility] (7/2023)- unremarkable.   MRI brain (4/22/24)- unremarkable.   MRA head/neck pending.  rEEG (5/6/24) negative.     During last visit, we prescribed Ubrelvy (abortive), started Emgality (preventative) - pt reports he did not get these meds as he could not afford them and continues to get daily migraines of same character.  He reports that he is in constant pain and the only thing that helps his headaches is THC  and tylenol (which he takes on a daily basis).     Patient does not work, lives with SO, Rose. Patient  smokes cigarettes, THC, vapes. Alcohol use: socially. Gets 4-5 hrs or sleep. Has insomina. No RLS syx reported. No enactment of dreams. No apneic episodes but does snore quite a bit per Rose reported during last visit.       The following portions of the patient's history were reviewed and updated as appropriate: " allergies, current medications, past family history, past medical history, past social history, past surgical history and problem list. See detailed list below.      Reviewed pertinent records from Care Everywhere.    Review of Systems  See HPI.     Past Medical History   Past Medical History:   Diagnosis Date    Diabetes mellitus (HCC)     Diverticulitis of colon     Hyperlipidemia     Hypertension     Psychiatric disorder     bipolar    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HCC)     Syncope      Past Surgical History:   Procedure Laterality Date    CARDIAC ASCENDING AORTOGRAM N/A 10/28/2022    Procedure: Cardiac ascending aortogram;  Surgeon: Michele Baer MD;  Location: [facility];  Service: Cardiology    CARDIAC CATHETERIZATION Left 10/28/2022    Procedure: CARDIAC RHC/LHC;  Surgeon: Michele Baer MD;  Location: [facility];  Service: Cardiology    CARDIAC CATHETERIZATION N/A 10/28/2022    Procedure: Cardiac Coronary Angiogram;  Surgeon: Michele Baer MD;  Location: [facility];  Service: Cardiology    CARDIAC CATHETERIZATION Left 10/28/2022    Procedure: Cardiac Left Ventriculogram;  Surgeon: Michele Baer MD;  Location: [facility];  Service: Cardiology    LAPAROSCOPIC LYSIS INTESTINAL ADHESIONS      TX RPLCMT AORTIC VALVE OPN W/STENTLESS TISSUE VALVE N/A 02/23/2023    Procedure: CORONARY ARTERY BYPASS GRAFT (CABG) 1 VESSEL GSV-->RCA, EVH RIGHT LEG,  WITH REPLACEMENT VALVE AORTIC (AVR) 25MM INSPIRIA RESILIA TISSUE VALVE;  Surgeon: MAY Fierro MD;  Location:  MAIN OR;  Service: Cardiac Surgery    TONSILECTOMY AND ADNOIDECTOMY      WISDOM TOOTH EXTRACTION       Family History   Problem Relation Age of Onset    Cancer Mother     Hypertension Mother     Diabetes Mother      Current Outpatient Medications on File Prior to Visit   Medication Sig Dispense Refill    Galcanezumab-gnlm (Emgality) 120 MG/ML SOAJ Inject 120 mg under the skin every 30 (thirty) days  After loading dose of 240mg. (Patient not taking: Reported on 6/24/2024) 1 mL 11    omeprazole (PriLOSEC) 40 MG capsule Take 1 capsule (40 mg total) by mouth daily (Patient not taking: Reported on 10/15/2024) 90 capsule 0    ondansetron (ZOFRAN) 4 mg tablet Take 1 tablet (4 mg total) by mouth every 8 (eight) hours as needed for nausea or vomiting (Patient not taking: Reported on 10/15/2024) 20 tablet 0    prochlorperazine (COMPAZINE) 5 mg tablet Take 1 tablet (5 mg total) by mouth every 6 (six) hours as needed for nausea or vomiting (Patient not taking: Reported on 10/15/2024) 30 tablet 0    Ubrogepant (UBRELVY) 100 MG tablet Take 1 tablet (100 mg) one time as needed for migraine. May repeat one additional tablet 100 mg) at least two hours after the first dose. Do not use more than two doses per day, or for more than eight days per month. (Patient not taking: Reported on 6/24/2024) 16 tablet 6     No current facility-administered medications on file prior to visit.     Allergies   Allergen Reactions    Neomycin-Polymyxin-Hc Abdominal Pain    Other      Pt states he is allergic to everything. States he doesn't have a list but can only take children doses of all medication        Current Outpatient Medications on File Prior to Visit   Medication Sig Dispense Refill    Galcanezumab-gnlm (Emgality) 120 MG/ML SOAJ Inject 120 mg under the skin every 30 (thirty) days After loading dose of 240mg. (Patient not taking: Reported on 6/24/2024) 1 mL 11    omeprazole (PriLOSEC) 40 MG capsule Take 1 capsule (40 mg total) by mouth daily (Patient not taking: Reported on 10/15/2024) 90 capsule 0    ondansetron (ZOFRAN) 4 mg tablet Take 1 tablet (4 mg total) by mouth every 8 (eight) hours as needed for nausea or vomiting (Patient not taking: Reported on 10/15/2024) 20 tablet 0    prochlorperazine (COMPAZINE) 5 mg tablet Take 1 tablet (5 mg total) by mouth every 6 (six) hours as needed for nausea or vomiting (Patient not taking:  "Reported on 10/15/2024) 30 tablet 0    Ubrogepant (UBRELVY) 100 MG tablet Take 1 tablet (100 mg) one time as needed for migraine. May repeat one additional tablet 100 mg) at least two hours after the first dose. Do not use more than two doses per day, or for more than eight days per month. (Patient not taking: Reported on 2024) 16 tablet 6     No current facility-administered medications on file prior to visit.      Social History     Tobacco Use    Smoking status: Former     Current packs/day: 0.00     Average packs/day: 0.5 packs/day for 45.0 years (22.5 ttl pk-yrs)     Types: Cigarettes     Start date: 1978     Quit date: 2023     Years since quittin.6     Passive exposure: Current    Smokeless tobacco: Never    Tobacco comments:     Patient states that he is trying to cut down   Vaping Use    Vaping status: Some Days    Substances: Nicotine   Substance and Sexual Activity    Alcohol use: Not Currently    Drug use: Yes     Types: Marijuana    Sexual activity: Yes     Partners: Female       Objective     /82 (BP Location: Left arm, Patient Position: Sitting, Cuff Size: Standard)   Pulse (!) 110   Ht 5' 6\" (1.676 m)   Wt 90.1 kg (198 lb 9.6 oz)   BMI 32.05 kg/m²    Physical Exam  VS reviewed   Gen: NAD. Obese.   Pulm: normal effort on room air    Neurological Exam  MS: AAOx3. Speech fluent w/o errors. Normal naming, and repetition. Follows lateralized commands.   CN: VFF. PERRL. EOMI. No nystagmus. Face symmetric. Intact LT V1-3 b/l. No dysarthria. Tongue midline.   MOT: Strength 5/5 t/o. No drift or orbiting. Normal bulk and tone. No abnormal movements.   SENS: Intact to LT t/o.    REFL: +2 biceps, BR. Absent patellars b/l (hx of knee injuries).    CEREB: No truncal ataxia. Intact FNF b/l.   GAIT: Normal gait and turns.      Workup:    MRI brain (24), IMP: No acute intracranial abnormality. Minimal white matter disease, likely chronic microangiopathy or chronic migraines.  EEG " (3/28/24): This Routine EEG recorded during wakefulness, drowsiness, and sleep is normal.    FARIDEH Mosqueda.  PGY-4, Neurology

## 2024-10-16 ENCOUNTER — TELEPHONE (OUTPATIENT)
Dept: NEUROLOGY | Facility: CLINIC | Age: 59
End: 2024-10-16

## 2024-10-16 NOTE — TELEPHONE ENCOUNTER
----- Message from Mohamud Munoz MD sent at 10/15/2024  5:27 PM EDT -----  Regarding: medication pricing help  Hello,     Can we help this patient with migraine meds?  We prescribed Ubrelvy and Aimovig. Can we get the preauth process going?    Thanks!  - Mohamud.

## 2024-10-16 NOTE — TELEPHONE ENCOUNTER
Medication needs PA.  I am not 100% sure but anticipate with patient's insurance there will not be much of a copay.     Historically, we have been involved with these cases only if truly there is an affordability issue. Nursing team, could you please look into the cost/PA/Tier exception and if affordability issue is truly identified once all options have been exhausted, please feel free to send the task to the SW team.

## 2024-10-17 NOTE — TELEPHONE ENCOUNTER
Aimovig PA:  Key: [id number]    Ubrelvy PA:  Key: [id number]    PA's submitted via CM. Awaiting determination.

## 2024-10-21 ENCOUNTER — NURSE TRIAGE (OUTPATIENT)
Age: 59
End: 2024-10-21

## 2024-10-21 NOTE — TELEPHONE ENCOUNTER
"Answer Assessment - Initial Assessment Questions  1. REASON FOR CALL or QUESTION: \"What is your reason for calling today?\" or \"How can I best help you?\" or \"What question do you have that I can help answer?\"      Spoke with pt and his significant other. They were questioning why the dexamethasone was only ordered for 5 days. Made them aware that it is ordered for a short period only to break the cycle of a headache/migraine. All questions were answered. Also discussed Aimovig and the frequency of one injection/month.    Protocols used: Information Only Call - No Triage-ADULT-[place]    "

## 2024-10-21 NOTE — TELEPHONE ENCOUNTER
ubrelvyi approved 10/17/2024 - 10/17/2025  aimovig approved 10/27/2024 - 4/17/2025    Approvals scanned to media.    Called patient to advise, reached vm, left detailed message (consent on file).

## 2024-10-21 NOTE — TELEPHONE ENCOUNTER
Regarding: medication clarification  ----- Message from Parveen DELA CRUZ sent at 10/21/2024 12:25 PM EDT -----  Pts spouse calling in regarding questions on medication dexamethasone.  Pt and pts spouse are confused why medication is only for 5 days and would like clarification. Please assist, Thank you.

## 2024-10-31 NOTE — TELEPHONE ENCOUNTER
10/31/24    Patient Significant Other Miss. Rose called office today to inform   Dr. Munoz that the Last 2 Medication that she prescribed on 10/15/24 are not Working.    Medication:   Erenumab-aooe (Aimovig) 140 MG/ML SOAJ    dexamethasone (DECADRON) 2 mg tablet     Per Miss. Rose No Medication works for Patient Discomfort and the only thing that works is THC.    Miss. Rose stated that patient is looking for Marijuana Medical Card.    Additionally, Davon Rose would like to know how many Prescriptions Dr. Munoz has tried with patient in the past, before the last two 10/15/24 were prescribed.      Please contact Davon Rose or Patient with answer of medication list and advice on THC.  Thank You.

## 2024-10-31 NOTE — TELEPHONE ENCOUNTER
Warm transfer received from Neurology [facility] and spoke with Rose, patient's spouse.    Rose stated that the medications prescribed for pt's migraines are not working. RN reviewed medications with Rose.     Current Migraine medications prescribed:  Ubrelvy 100 mg tablets: PA was just approved from 10/17/24-10/17/25. Informed Rose that the medication would be available to  at the pharmacy but stated that pt is not going to take the medication.     Aimovig injection: pt only took first dose. Stated that it takes time for the medication to work and pt may need to have another dose or 2 before medication is fully effective for pt.     Decadron 2 mg: finished 5 day course x 1 week ago. Stated it did not help pt at all.     RN tried to educate Rose that it may take some time and a few doses of the medications to help with pt's symptoms. Stated that the pt's body needs to get used to taking the medication and needs to give it some time. Rose stated that pt needs a medication that would work right away and does not need to go on any other medications. Rose was adamant that pt needs to go on MM.     Rose is requesting that the provider discuss pt being prescribed Medical Marijuana. Stated that the MM helps alleviate pt's migraines.     Dr. Munoz: please review and advise.    Clinical: please call Rose back with provider's response.

## 2024-10-31 NOTE — TELEPHONE ENCOUNTER
Called the phone number for Rose X 2. The call was unable to be completed.     Called the phone number that is listed for pt. Left a message on voice mail requesting a return call to discuss pt's concerns/questions.

## 2024-11-01 ENCOUNTER — VBI (OUTPATIENT)
Dept: ADMINISTRATIVE | Facility: OTHER | Age: 59
End: 2024-11-01

## 2024-11-01 NOTE — TELEPHONE ENCOUNTER
"Mohamud Munoz MD   to Neurology [facility]    11/1/24  8:54 AM   I called rose at the number on the chart. She remained unavailable to answer the phone; I left a voicemail with a callback number for neurology office.      If they call back and I'm unavailable to , please let the patient/Rose know, in case of an unrelenting headache, he should seek care in the ED.    Please let them know of \"red flag\" headache symptoms including symptoms such as facial droop on one side, weakness/paralysis on either side, speech trouble, numbness on one side, balance issues, any vision changes, extreme dizziness or significant increase in severity of headache or a sudden onset severe headache.  In this case, patient should call 9-1-1 immediately or to proceed to the nearest ER.  "

## 2024-11-01 NOTE — TELEPHONE ENCOUNTER
11/01/24 2:08 PM     Chart reviewed for   Controlling High Blood Pressure    was/were submitted to the patient's insurance.     Yaneli Lilly   [facility]

## 2024-11-01 NOTE — TELEPHONE ENCOUNTER
Warm transfer received from Neurology [facility].    Reviewed message from Dr. Munoz with Rose. Rose verbalized understanding.    Rose/patient are requesting a referral be placed so pt can see a provider who prescribes Medical Marijuana to be further evaluated. Rose would like a CB from the office or provider.

## 2024-11-20 ENCOUNTER — HOSPITAL ENCOUNTER (EMERGENCY)
Facility: HOSPITAL | Age: 59
Discharge: HOME | End: 2024-11-20
Attending: EMERGENCY MEDICINE
Payer: COMMERCIAL

## 2024-11-20 VITALS
OXYGEN SATURATION: 96 % | RESPIRATION RATE: 18 BRPM | SYSTOLIC BLOOD PRESSURE: 128 MMHG | DIASTOLIC BLOOD PRESSURE: 73 MMHG | HEART RATE: 101 BPM | TEMPERATURE: 98.2 F

## 2024-11-20 DIAGNOSIS — R51.9 HEADACHE: Primary | ICD-10-CM

## 2024-11-20 DIAGNOSIS — R07.9 CHEST PAIN: ICD-10-CM

## 2024-11-20 DIAGNOSIS — R11.10 VOMITING: ICD-10-CM

## 2024-11-20 LAB
ALBUMIN SERPL BCG-MCNC: 3.9 G/DL (ref 3.5–5)
ALP SERPL-CCNC: 63 U/L (ref 34–104)
ALT SERPL W P-5'-P-CCNC: 17 U/L (ref 7–52)
ANION GAP SERPL CALCULATED.3IONS-SCNC: 8 MMOL/L (ref 4–13)
AST SERPL W P-5'-P-CCNC: 20 U/L (ref 13–39)
BASOPHILS # BLD AUTO: 0.05 THOUSANDS/ÂΜL (ref 0–0.1)
BASOPHILS NFR BLD AUTO: 1 % (ref 0–1)
BILIRUB DIRECT SERPL-MCNC: 0.01 MG/DL (ref 0–0.2)
BILIRUB SERPL-MCNC: 0.32 MG/DL (ref 0.2–1)
BUN SERPL-MCNC: 13 MG/DL (ref 5–25)
CALCIUM SERPL-MCNC: 8.9 MG/DL (ref 8.4–10.2)
CARDIAC TROPONIN I PNL SERPL HS: 3 NG/L (ref ?–50)
CHLORIDE SERPL-SCNC: 106 MMOL/L (ref 96–108)
CO2 SERPL-SCNC: 22 MMOL/L (ref 21–32)
CREAT SERPL-MCNC: 0.91 MG/DL (ref 0.6–1.3)
EOSINOPHIL # BLD AUTO: 0.09 THOUSAND/ÂΜL (ref 0–0.61)
EOSINOPHIL NFR BLD AUTO: 2 % (ref 0–6)
ERYTHROCYTE [DISTWIDTH] IN BLOOD BY AUTOMATED COUNT: 14.2 % (ref 11.6–15.1)
GFR SERPL CREATININE-BSD FRML MDRD: 93 ML/MIN/1.73SQ M
GLUCOSE SERPL-MCNC: 105 MG/DL (ref 65–140)
HCT VFR BLD AUTO: 43.5 % (ref 36.5–49.3)
HGB BLD-MCNC: 14.2 G/DL (ref 12–17)
IMM GRANULOCYTES # BLD AUTO: 0.01 THOUSAND/UL (ref 0–0.2)
IMM GRANULOCYTES NFR BLD AUTO: 0 % (ref 0–2)
LYMPHOCYTES # BLD AUTO: 1.57 THOUSANDS/ÂΜL (ref 0.6–4.47)
LYMPHOCYTES NFR BLD AUTO: 33 % (ref 14–44)
MCH RBC QN AUTO: 26.8 PG (ref 26.8–34.3)
MCHC RBC AUTO-ENTMCNC: 32.6 G/DL (ref 31.4–37.4)
MCV RBC AUTO: 82 FL (ref 82–98)
MONOCYTES # BLD AUTO: 0.33 THOUSAND/ÂΜL (ref 0.17–1.22)
MONOCYTES NFR BLD AUTO: 7 % (ref 4–12)
NEUTROPHILS # BLD AUTO: 2.73 THOUSANDS/ÂΜL (ref 1.85–7.62)
NEUTS SEG NFR BLD AUTO: 57 % (ref 43–75)
NRBC BLD AUTO-RTO: 0 /100 WBCS
PLATELET # BLD AUTO: 269 THOUSANDS/UL (ref 149–390)
PMV BLD AUTO: 9.8 FL (ref 8.9–12.7)
POTASSIUM SERPL-SCNC: 5 MMOL/L (ref 3.5–5.3)
PROT SERPL-MCNC: 7.3 G/DL (ref 6.4–8.4)
RBC # BLD AUTO: 5.29 MILLION/UL (ref 3.88–5.62)
SODIUM SERPL-SCNC: 136 MMOL/L (ref 135–147)
WBC # BLD AUTO: 4.78 THOUSAND/UL (ref 4.31–10.16)

## 2024-11-20 PROCEDURE — 84484 ASSAY OF TROPONIN QUANT: CPT | Performed by: EMERGENCY MEDICINE

## 2024-11-20 PROCEDURE — 80048 BASIC METABOLIC PNL TOTAL CA: CPT | Performed by: EMERGENCY MEDICINE

## 2024-11-20 PROCEDURE — 99285 EMERGENCY DEPT VISIT HI MDM: CPT

## 2024-11-20 PROCEDURE — 96375 TX/PRO/DX INJ NEW DRUG ADDON: CPT

## 2024-11-20 PROCEDURE — 99284 EMERGENCY DEPT VISIT MOD MDM: CPT | Performed by: EMERGENCY MEDICINE

## 2024-11-20 PROCEDURE — 80076 HEPATIC FUNCTION PANEL: CPT | Performed by: EMERGENCY MEDICINE

## 2024-11-20 PROCEDURE — 85025 COMPLETE CBC W/AUTO DIFF WBC: CPT | Performed by: EMERGENCY MEDICINE

## 2024-11-20 PROCEDURE — 96374 THER/PROPH/DIAG INJ IV PUSH: CPT

## 2024-11-20 PROCEDURE — 93005 ELECTROCARDIOGRAM TRACING: CPT

## 2024-11-20 PROCEDURE — 36415 COLL VENOUS BLD VENIPUNCTURE: CPT | Performed by: EMERGENCY MEDICINE

## 2024-11-20 RX ORDER — DIPHENHYDRAMINE HYDROCHLORIDE 50 MG/ML
50 INJECTION INTRAMUSCULAR; INTRAVENOUS ONCE
Status: COMPLETED | OUTPATIENT
Start: 2024-11-20 | End: 2024-11-20

## 2024-11-20 RX ORDER — METOCLOPRAMIDE HYDROCHLORIDE 5 MG/ML
10 INJECTION INTRAMUSCULAR; INTRAVENOUS ONCE
Status: COMPLETED | OUTPATIENT
Start: 2024-11-20 | End: 2024-11-20

## 2024-11-20 RX ORDER — ONDANSETRON 2 MG/ML
1 INJECTION INTRAMUSCULAR; INTRAVENOUS ONCE
Status: COMPLETED | OUTPATIENT
Start: 2024-11-20 | End: 2024-11-20

## 2024-11-20 RX ADMIN — METOCLOPRAMIDE 10 MG: 5 INJECTION, SOLUTION INTRAMUSCULAR; INTRAVENOUS at 15:23

## 2024-11-20 RX ADMIN — DIPHENHYDRAMINE HYDROCHLORIDE 50 MG: 50 INJECTION, SOLUTION INTRAMUSCULAR; INTRAVENOUS at 15:24

## 2024-11-20 NOTE — ED PROVIDER NOTES
Time reflects when diagnosis was documented in both MDM as applicable and the Disposition within this note       Time User Action Codes Description Comment    11/20/2024  3:26 PM Hsu, Darrin Add [R51.9] Headache     11/20/2024  3:26 PM Hsu, Darrin Add [R11.10] Vomiting     11/20/2024  3:26 PM Hsu, Darrin Add [R07.9] Chest pain           ED Disposition       ED Disposition   Discharge    Condition   Stable    Date/Time   Wed Nov 20, 2024  4:03 PM    Comment   Neal Walden discharge to home/self care.                   Assessment & Plan       Medical Decision Making  Problems Addressed:  Chest pain: chronic illness or injury  Headache: chronic illness or injury  Vomiting: chronic illness or injury    Amount and/or Complexity of Data Reviewed  Labs: ordered.    Risk  Prescription drug management.             Medications   ondansetron (FOR EMS ONLY) (ZOFRAN) 4 mg/2 mL injection 4 mg (0 mg Does not apply Given to EMS 11/20/24 1513)   metoclopramide (REGLAN) injection 10 mg (10 mg Intravenous Given 11/20/24 1523)   diphenhydrAMINE (BENADRYL) injection 50 mg (50 mg Intravenous Given 11/20/24 1524)       ED Risk Strat Scores   HEART Risk Score      Flowsheet Row Most Recent Value   Heart Score Risk Calculator    History 0 Filed at: 11/20/2024 1527   ECG 1 Filed at: 11/20/2024 1527   Age 1 Filed at: 11/20/2024 1527   Risk Factors 2 Filed at: 11/20/2024 1527   Troponin 0 Filed at: 11/20/2024 1527   HEART Score 4 Filed at: 11/20/2024 1527                               SBIRT 20yo+      Flowsheet Row Most Recent Value   Initial Alcohol Screen: US AUDIT-C     1. How often do you have a drink containing alcohol? 0 Filed at: 11/20/2024 1514   2. How many drinks containing alcohol do you have on a typical day you are drinking?  0 Filed at: 11/20/2024 1514   3a. Male UNDER 65: How often do you have five or more drinks on one occasion? 0 Filed at: 11/20/2024 1514   3b. FEMALE Any Age, or MALE 65+: How often do you have 4 or  more drinks on one occassion? 0 Filed at: 11/20/2024 1519   Audit-C Score 0 Filed at: 11/20/2024 1515   MARTINA: How many times in the past year have you...    Used an illegal drug or used a prescription medication for non-medical reasons? Never Filed at: 11/20/2024 1516                            History of Present Illness       Chief Complaint   Patient presents with    Chest Pain     Pt came in EMS reporting chest pain and vomiting x 2 months. Was given ASA and zofran by EMS. Reports headaches for over a year. Dec 20 he is scheduled for CT of head.        Past Medical History:   Diagnosis Date    Diabetes mellitus (HCC)     Diverticulitis of colon     Hyperlipidemia     Hypertension     Psychiatric disorder     bipolar    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HCC)     Syncope       Past Surgical History:   Procedure Laterality Date    CARDIAC ASCENDING AORTOGRAM N/A 10/28/2022    Procedure: Cardiac ascending aortogram;  Surgeon: Michele Baer MD;  Location: [facility];  Service: Cardiology    CARDIAC CATHETERIZATION Left 10/28/2022    Procedure: CARDIAC RHC/LHC;  Surgeon: Michele Baer MD;  Location: [facility];  Service: Cardiology    CARDIAC CATHETERIZATION N/A 10/28/2022    Procedure: Cardiac Coronary Angiogram;  Surgeon: Michele Baer MD;  Location: [facility];  Service: Cardiology    CARDIAC CATHETERIZATION Left 10/28/2022    Procedure: Cardiac Left Ventriculogram;  Surgeon: Michele Baer MD;  Location: [facility];  Service: Cardiology    LAPAROSCOPIC LYSIS INTESTINAL ADHESIONS      KS RPLCMT AORTIC VALVE OPN W/STENTLESS TISSUE VALVE N/A 02/23/2023    Procedure: CORONARY ARTERY BYPASS GRAFT (CABG) 1 VESSEL GSV-->RCA, EVH RIGHT LEG,  WITH REPLACEMENT VALVE AORTIC (AVR) 25MM INSPIRIA RESILIA TISSUE VALVE;  Surgeon: MAY Fierro MD;  Location: [facility];  Service: Cardiac Surgery    TONSILECTOMY AND ADNOIDECTOMY      WISDOM TOOTH EXTRACTION        Family  History   Problem Relation Age of Onset    Cancer Mother     Hypertension Mother     Diabetes Mother       Social History     Tobacco Use    Smoking status: Former     Current packs/day: 0.00     Average packs/day: 0.5 packs/day for 45.0 years (22.5 ttl pk-yrs)     Types: Cigarettes     Start date: 1978     Quit date: 2023     Years since quittin.7     Passive exposure: Current    Smokeless tobacco: Never    Tobacco comments:     Patient states that he is trying to cut down   Vaping Use    Vaping status: Some Days    Substances: Nicotine   Substance Use Topics    Alcohol use: Not Currently    Drug use: Yes     Types: Marijuana      E-Cigarette/Vaping    E-Cigarette Use Current Some Day User     Comments thc- for stomach upset/ headaches and pain       E-Cigarette/Vaping Substances    Nicotine Yes     THC No     CBD No     Flavoring No     Other No     Unknown No       I have reviewed and agree with the history as documented.     60 yo male with chronic headaches, vomiting and chest pain, all ongoing x years, unchanged in character, severity and nature. Came to ED by EMS today for evaluation of same. Multiple prior normal CT and MRI brain including most recent in April of this year which I have reviewed the report of. Pmh significant for bipolar and schizophrenia, noncompliant with medicine for same.         Review of Systems   Constitutional:  Negative for chills and fever.   Gastrointestinal:  Positive for nausea and vomiting.   Musculoskeletal:  Negative for neck pain and neck stiffness.   Neurological:  Positive for headaches. Negative for dizziness, tremors, seizures, syncope, facial asymmetry, speech difficulty, weakness, light-headedness and numbness.           Objective       ED Triage Vitals [24 1514]   Temperature Pulse Blood Pressure Respirations SpO2 Patient Position - Orthostatic VS   98.2 °F (36.8 °C) (!) 106 128/73 18 97 % --      Temp src Heart Rate Source BP Location FiO2 (%) Pain  Score    -- Monitor -- -- --      Vitals      Date and Time Temp Pulse SpO2 Resp BP Pain Score FACES Pain Rating User   11/20/24 1515 -- 101 96 % 18 128/73 -- -- RS   11/20/24 1514 98.2 °F (36.8 °C) 106 97 % 18 128/73 -- -- RS            Physical Exam  Vitals and nursing note reviewed.   Constitutional:       General: He is not in acute distress.     Appearance: He is well-developed. He is not ill-appearing, toxic-appearing or diaphoretic.   HENT:      Head: Normocephalic and atraumatic.      Mouth/Throat:      Mouth: Mucous membranes are moist.      Pharynx: Oropharynx is clear.   Eyes:      Conjunctiva/sclera: Conjunctivae normal.      Pupils: Pupils are equal, round, and reactive to light.   Neck:      Vascular: No JVD.   Cardiovascular:      Rate and Rhythm: Normal rate and regular rhythm.      Pulses: Normal pulses.      Heart sounds: Normal heart sounds. No murmur heard.     No friction rub. No gallop.   Pulmonary:      Effort: Pulmonary effort is normal. No respiratory distress.      Breath sounds: Normal breath sounds. No stridor. No wheezing or rales.   Abdominal:      General: There is no distension.      Palpations: Abdomen is soft.      Tenderness: There is no abdominal tenderness. There is no guarding or rebound.   Musculoskeletal:         General: No swelling, tenderness, deformity or signs of injury. Normal range of motion.      Cervical back: Normal range of motion and neck supple. No rigidity.   Skin:     General: Skin is warm and dry.      Capillary Refill: Capillary refill takes less than 2 seconds.      Coloration: Skin is not jaundiced or pale.      Findings: No bruising or erythema.   Neurological:      General: No focal deficit present.      Mental Status: He is alert and oriented to person, place, and time.      Cranial Nerves: No cranial nerve deficit.      Sensory: No sensory deficit.      Motor: No weakness or abnormal muscle tone.      Coordination: Coordination normal.      Gait: Gait  normal.         Results Reviewed       Procedure Component Value Units Date/Time    HS Troponin I 4hr [[id number]]     Lab Status: No result Specimen: Blood     HS Troponin 0hr (reflex protocol) [[id number]]  (Normal) Collected: 11/20/24 1523    Lab Status: Final result Specimen: Blood from Arm, Right Updated: 11/20/24 1558     hs TnI 0hr 3 ng/L     HS Troponin I 2hr [[id number]]     Lab Status: No result Specimen: Blood     Basic metabolic panel [[id number]] Collected: 11/20/24 1523    Lab Status: Final result Specimen: Blood from Arm, Right Updated: 11/20/24 1553     Sodium 136 mmol/L      Potassium 5.0 mmol/L      Chloride 106 mmol/L      CO2 22 mmol/L      ANION GAP 8 mmol/L      BUN 13 mg/dL      Creatinine 0.91 mg/dL      Glucose 105 mg/dL      Calcium 8.9 mg/dL      eGFR 93 ml/min/1.73sq m     Narrative:      National Kidney Disease Foundation guidelines for Chronic Kidney Disease (CKD):     Stage 1 with normal or high GFR (GFR > 90 mL/min/1.73 square meters)    Stage 2 Mild CKD (GFR = 60-89 mL/min/1.73 square meters)    Stage 3A Moderate CKD (GFR = 45-59 mL/min/1.73 square meters)    Stage 3B Moderate CKD (GFR = 30-44 mL/min/1.73 square meters)    Stage 4 Severe CKD (GFR = 15-29 mL/min/1.73 square meters)    Stage 5 End Stage CKD (GFR <15 mL/min/1.73 square meters)  Note: GFR calculation is accurate only with a steady state creatinine    Hepatic function panel [[id number]]  (Normal) Collected: 11/20/24 1523    Lab Status: Final result Specimen: Blood from Arm, Right Updated: 11/20/24 1553     Total Bilirubin 0.32 mg/dL      Bilirubin, Direct 0.01 mg/dL      Alkaline Phosphatase 63 U/L      AST 20 U/L      ALT 17 U/L      Total Protein 7.3 g/dL      Albumin 3.9 g/dL     CBC and differential [[id number]] Collected: 11/20/24 1523    Lab Status: Final result Specimen: Blood from Arm, Right Updated: 11/20/24 1536     WBC 4.78 Thousand/uL      RBC 5.29 Million/uL      Hemoglobin 14.2 g/dL      Hematocrit 43.5 %      MCV  82 fL      MCH 26.8 pg      MCHC 32.6 g/dL      RDW 14.2 %      MPV 9.8 fL      Platelets 269 Thousands/uL      nRBC 0 /100 WBCs      Segmented % 57 %      Immature Grans % 0 %      Lymphocytes % 33 %      Monocytes % 7 %      Eosinophils Relative 2 %      Basophils Relative 1 %      Absolute Neutrophils 2.73 Thousands/µL      Absolute Immature Grans 0.01 Thousand/uL      Absolute Lymphocytes 1.57 Thousands/µL      Absolute Monocytes 0.33 Thousand/µL      Eosinophils Absolute 0.09 Thousand/µL      Basophils Absolute 0.05 Thousands/µL             No orders to display       ECG 12 Lead Documentation Only    Date/Time: 11/20/2024 3:21 PM    Performed by: Darrin Hsu MD  Authorized by: Darrin Hsu MD    Indications / Diagnosis:  Cp  ECG reviewed by me, the ED Provider: yes    Patient location:  ED  Previous ECG:     Previous ECG:  Compared to current    Comparison ECG info:  12 july 23    Similarity:  No change  Interpretation:     Interpretation: non-specific    Rate:     ECG rate:  107    ECG rate assessment: tachycardic    Rhythm:     Rhythm: sinus tachycardia        ED Medication and Procedure Management   Prior to Admission Medications   Prescriptions Last Dose Informant Patient Reported? Taking?   Erenumab-aooe (Aimovig) 140 MG/ML SOAJ   No No   Sig: Inject 140 mg under the skin every 30 (thirty) days   Ubrogepant (UBRELVY) 100 MG tablet  Self No No   Sig: Take 1 tablet (100 mg) one time as needed for migraine. May repeat one additional tablet 100 mg) at least two hours after the first dose. Do not use more than two doses per day, or for more than eight days per month.   Patient not taking: Reported on 6/24/2024   dexamethasone (DECADRON) 2 mg tablet   No No   Sig: Take 1 tablet (2 mg total) by mouth daily with breakfast   omeprazole (PriLOSEC) 40 MG capsule   No No   Sig: Take 1 capsule (40 mg total) by mouth daily   Patient not taking: Reported on 10/15/2024   ondansetron (ZOFRAN) 4 mg tablet   No No    Sig: Take 1 tablet (4 mg total) by mouth every 8 (eight) hours as needed for nausea or vomiting   Patient not taking: Reported on 10/15/2024   prochlorperazine (COMPAZINE) 5 mg tablet   No No   Sig: Take 1 tablet (5 mg total) by mouth every 6 (six) hours as needed for nausea or vomiting   Patient not taking: Reported on 10/15/2024      Facility-Administered Medications: None     Patient's Medications   Discharge Prescriptions    No medications on file     No discharge procedures on file.  ED SEPSIS DOCUMENTATION   Time reflects when diagnosis was documented in both MDM as applicable and the Disposition within this note       Time User Action Codes Description Comment    11/20/2024  3:26 PM Darrin Hsu Add [R51.9] Headache     11/20/2024  3:26 PM Darrin Hsu Add [R11.10] Vomiting     11/20/2024  3:26 PM Darrin Hsu Add [R07.9] Chest pain                  Darrin Hsu MD  11/20/24 3795

## 2024-11-21 LAB
ATRIAL RATE: 107 BPM
P AXIS: 70 DEGREES
PR INTERVAL: 128 MS
QRS AXIS: 43 DEGREES
QRSD INTERVAL: 86 MS
QT INTERVAL: 338 MS
QTC INTERVAL: 451 MS
T WAVE AXIS: 73 DEGREES
VENTRICULAR RATE: 107 BPM

## 2024-11-21 PROCEDURE — 93010 ELECTROCARDIOGRAM REPORT: CPT | Performed by: INTERNAL MEDICINE

## 2024-12-20 ENCOUNTER — HOSPITAL ENCOUNTER (OUTPATIENT)
Dept: MRI IMAGING | Facility: HOSPITAL | Age: 59
End: 2024-12-20
Payer: COMMERCIAL

## 2024-12-20 DIAGNOSIS — G45.9 TIA (TRANSIENT ISCHEMIC ATTACK): ICD-10-CM

## 2024-12-20 PROCEDURE — 70544 MR ANGIOGRAPHY HEAD W/O DYE: CPT

## 2024-12-20 PROCEDURE — 70547 MR ANGIOGRAPHY NECK W/O DYE: CPT

## 2024-12-23 ENCOUNTER — TELEPHONE (OUTPATIENT)
Dept: NEUROLOGY | Facility: CLINIC | Age: 59
End: 2024-12-23

## 2024-12-23 ENCOUNTER — TELEPHONE (OUTPATIENT)
Age: 59
End: 2024-12-23

## 2024-12-23 DIAGNOSIS — G89.29 CHRONIC PAIN: Primary | ICD-10-CM

## 2024-12-23 NOTE — TELEPHONE ENCOUNTER
12/23/24    Patient Significant Other Miss. Rose called office today asking for any Updates of Patient MRA Results.    Can someone from the Clinical Team please contact Miss. Rose with Results.    Patient gave the OK to contact Miss. Rose and she is on the Medical Consent Form.    Contact Number [phone].  Thank You.

## 2024-12-23 NOTE — TELEPHONE ENCOUNTER
"Neal Walden and his SO, Ms. Rose called to inquire about MRI results.    I explained the results to them and answered all questions to the best of my ability.  I did obtain permission from the patient to be able to speak about the results with his SO also listening to our conversation.     Patient told me that he had a severe headache and wanted to \"bang his head against the wall and crawl inside a hole.\"  I informed him that as prior discussed during office visits, he should visit the ED in case of a new severe headache or headaches with any associated neurological symptoms.  At this point, patient demanded that he be seen by a physician in our neurology group who prescribes medical marijuana for headaches.  I told him that to the best of my knowledge, medical marijuana is not typically prescribed to treat headaches.    At this point, he started screaming and using inappropriate language. I reinforced the purpose of my phone call and attempted to redirect patient to the same. His significant other, Ms. Rose, was also listening to our discussion and she was the one who took over the conversation afterwards.     Patient also told me that he has been having chest pain, for which he has been to the ED multiple times.  I told him that chest pain should be worked up and if he is experiencing it, I would advise him to go to the ED.  I explained the risks and complications of not being seen in the ED. He said he did not want to do this. I then told him that he should notify his primary care doctor's office at the very least.     Pt and Ms. Rose told me pt would like to be seen by provider who prescribes medical marijuana for his chronic pain.  I discussed seeing pain management team as an alternative.  Patient and SO, Ms. Rose, are agreeable to this.  I will reach out to our clinical staff as well in order to expedite the appointment if at all possible and put in a pain management " referral.        FARIDEH Mosqueda.  PGY-4, Neurology

## 2024-12-27 ENCOUNTER — TELEPHONE (OUTPATIENT)
Age: 59
End: 2024-12-27

## 2024-12-27 NOTE — TELEPHONE ENCOUNTER
Patients significant other called. She is sad and upset seeing Neal in distress so often. Says he is still constantly throwing up and getting headaches. Rose wants to know what Sneha advises they do next? Says he's had so many appointments and tests done, yet nothing has been resolved. Please advise.    Rose Wade (Significant Other)  [phone] (Mobile)

## 2024-12-31 ENCOUNTER — NURSE TRIAGE (OUTPATIENT)
Age: 59
End: 2024-12-31

## 2024-12-31 NOTE — TELEPHONE ENCOUNTER
I do not see in his chart that he was seeing pain management, unless it was someone outside of the network.  He can start with the referral that neuro gave him.  He has been interested in obtaining medical marijuana as well.  He can start with Dr. Gallegos for that

## 2024-12-31 NOTE — TELEPHONE ENCOUNTER
Please inform patient that he should follow-up with GI in regards to the continued vomiting.  It looks like he just followed with neurology and they gave him a referral to pain management regarding his recurring headaches.

## 2024-12-31 NOTE — TELEPHONE ENCOUNTER
----- Message from Leatha ZACARIAS sent at 12/31/2024 10:34 AM EST -----  Rose calling for the patient to find out what can be done for the patient.  The pt keeps vomiting and this is an ongoing issue.
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
## 2024-12-31 NOTE — TELEPHONE ENCOUNTER
Rose called back , read message to her (she is on the consent form). Rose states patient is already seeing a pain management doctor. However, the patient does not like her because all she wants to do is prescribe medication that he has bad reactions to. Rose is asking if there is another pain management doctor he can go to

## 2025-01-06 ENCOUNTER — TELEPHONE (OUTPATIENT)
Dept: NEUROLOGY | Facility: CLINIC | Age: 60
End: 2025-01-06

## 2025-01-06 NOTE — TELEPHONE ENCOUNTER
Left voicemail for patient to call back, call was in regards to offering a sooner appointment, per Dr. Munoz request. Left a call back number for patient,

## 2025-01-13 ENCOUNTER — OFFICE VISIT (OUTPATIENT)
Dept: GASTROENTEROLOGY | Facility: CLINIC | Age: 60
End: 2025-01-13
Payer: COMMERCIAL

## 2025-01-13 VITALS
OXYGEN SATURATION: 98 % | SYSTOLIC BLOOD PRESSURE: 118 MMHG | DIASTOLIC BLOOD PRESSURE: 80 MMHG | HEART RATE: 93 BPM | TEMPERATURE: 98.7 F | WEIGHT: 198 LBS | HEIGHT: 66 IN | BODY MASS INDEX: 31.82 KG/M2

## 2025-01-13 DIAGNOSIS — R11.2 NAUSEA AND VOMITING, UNSPECIFIED VOMITING TYPE: Primary | ICD-10-CM

## 2025-01-13 PROCEDURE — 99214 OFFICE O/P EST MOD 30 MIN: CPT | Performed by: INTERNAL MEDICINE

## 2025-01-13 RX ORDER — SODIUM CHLORIDE, SODIUM LACTATE, POTASSIUM CHLORIDE, CALCIUM CHLORIDE 600; 310; 30; 20 MG/100ML; MG/100ML; MG/100ML; MG/100ML
125 INJECTION, SOLUTION INTRAVENOUS CONTINUOUS
OUTPATIENT
Start: 2025-01-13

## 2025-01-13 NOTE — PROGRESS NOTES
[facility] - Outpatient Note  Neal Walden 59 y.o. male MRN: [id number]  Encounter: [id number]      ASSESSMENT AND PLAN:    Neal Walden is a 59 y.o. old pleasant male with PMH of intractable migraines, diverticulitis, tobacco use, schizophrenia, marijuana use who presents for followup    Recurrent nausea vomiting-overall patient a very poor historian.  I suspect his GI symptoms are largely due to head trauma and daily intractable migraines.  No organic etiology for nausea vomiting found.  He may also have a component of cannabinoid hyperemesis syndrome however he is unwilling to accept the marijuana could be contributing to his symptoms.  He states he stopped marijuana 20+ years ago for 6 months and it did not help with the symptoms.  He is also inconsistent historian initially telling me symptoms started after recent cardiac procedure however now he states his symptoms started after severe head injury when he was [age] years old.  He does not want any medications.  Also of note, questionably he did appear to be possibly under the influence during our visit today though it was difficult for me to discern.  He denies any illicit drugs.  He has not had EGD in over a year and states his symptoms are worsening.  He is open to endoscopic evaluation.  Plan for EGD  Can see allergist in case this is contributing to symptoms  Advised him to keep food diary  Advised marijuana cessation however he is not willing at this time  He is using excessive NSAIDs and Tylenol which is likely also contributing to symptoms so I counseled him on cessation        There are no diagnoses linked to this encounter.  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      Historical Information   Past Medical History:   Diagnosis Date    Diabetes mellitus (HCC)     Diverticulitis of colon     Hyperlipidemia     Hypertension     Psychiatric disorder     bipolar    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HCC)     Syncope      Past Surgical History:   Procedure Laterality Date    CARDIAC ASCENDING AORTOGRAM N/A 10/28/2022    Procedure: Cardiac ascending aortogram;  Surgeon: Michele Baer MD;  [facility];  Service: Cardiology    CARDIAC CATHETERIZATION Left 10/28/2022    Procedure: CARDIAC RHC/LHC;  Surgeon: Michele Baer MD;  [facility];  Service: Cardiology    CARDIAC CATHETERIZATION N/A 10/28/2022    Procedure: Cardiac Coronary Angiogram;  Surgeon: Michele Baer MD;  [facility];  Service: Cardiology    CARDIAC CATHETERIZATION Left 10/28/2022    Procedure: Cardiac Left Ventriculogram;  Surgeon: Michele Baer MD;  [facility];  Service: Cardiology    LAPAROSCOPIC LYSIS INTESTINAL ADHESIONS      [place] RPLCMT AORTIC VALVE OPN W/STENTLESS TISSUE VALVE N/A 02/23/2023    Procedure: CORONARY ARTERY BYPASS GRAFT (CABG) 1 VESSEL GSV-->RCA, EVH RIGHT LEG,  WITH REPLACEMENT VALVE AORTIC (AVR) 25MM INSPIRIA RESILIA TISSUE VALVE;  Surgeon: MAY Fierro MD;  Location:  MAIN OR;  Service: Cardiac Surgery    TONSILECTOMY AND ADNOIDECTOMY      WISDOM TOOTH EXTRACTION       Social History   Social History     Substance and Sexual Activity   Alcohol Use Not Currently     Social History     Substance and Sexual Activity   Drug Use Yes    Types: Marijuana  "    Social History     Tobacco Use   Smoking Status Former    Current packs/day: 0.00    Average packs/day: 0.5 packs/day for 45.0 years (22.5 ttl pk-yrs)    Types: Cigarettes    Start date: 1978    Quit date: 2023    Years since quittin.8    Passive exposure: Current   Smokeless Tobacco Never   Tobacco Comments    Patient states that he is trying to cut down     Family History   Problem Relation Age of Onset    Cancer Mother     Hypertension Mother     Diabetes Mother        Meds/Allergies       Current Outpatient Medications:     dexamethasone (DECADRON) 2 mg tablet    Erenumab-aooe (Aimovig) 140 MG/ML SOAJ    omeprazole (PriLOSEC) 40 MG capsule    ondansetron (ZOFRAN) 4 mg tablet    prochlorperazine (COMPAZINE) 5 mg tablet    Ubrogepant (UBRELVY) 100 MG tablet    Allergies   Allergen Reactions    Neomycin-Polymyxin-Hc Abdominal Pain    Other      Pt states he is allergic to everything. States he doesn't have a list but can only take children doses of all medication             Objective     Blood pressure 118/80, pulse 93, temperature 98.7 °F (37.1 °C), temperature source Tympanic, height 5' 6\" (1.676 m), weight 89.8 kg (198 lb), SpO2 98%. Body mass index is 31.96 kg/m².      PHYSICAL EXAM:      General Appearance:   Alert, cooperative, no distress   HEENT:   Normocephalic, atraumatic, anicteric.     Neck:  Supple, symmetrical, trachea midline   Lungs:   Clear to auscultation bilaterally; no rales, rhonchi or wheezing; respirations unlabored    Heart::   Regular rate and rhythm; no murmur, rub, or gallop.   Abdomen:   Soft, non-tender, non-distended; normal bowel sounds; no masses, no organomegaly    Genitalia:   Deferred    Rectal:   Deferred    Extremities:  No cyanosis, clubbing or edema    Pulses:  2+ and symmetric    Skin:  No jaundice, rashes, or lesions    Lymph nodes:  No palpable cervical lymphadenopathy        Lab Results:   No visits with results within 1 Day(s) from this visit.   Latest " known visit with results is:   Admission on 11/20/2024, Discharged on 11/20/2024   Component Date Value    Ventricular Rate 11/20/2024 107     Atrial Rate 11/20/2024 107     [place] Interval 11/20/2024 128     QRSD Interval 11/20/2024 86     QT Interval 11/20/2024 338     QTC Interval 11/20/2024 451     P Axis 11/20/2024 70     QRS [place] 11/20/2024 43     T Wave [place] 11/20/2024 73     WBC 11/20/2024 4.78     RBC 11/20/2024 5.29     Hemoglobin 11/20/2024 14.2     Hematocrit 11/20/2024 43.5     MCV 11/20/2024 82     MCH 11/20/2024 26.8     MCHC 11/20/2024 32.6     RDW 11/20/2024 14.2     MPV 11/20/2024 9.8     Platelets 11/20/2024 269     nRBC 11/20/2024 0     Segmented % 11/20/2024 57     Immature Grans % 11/20/2024 0     Lymphocytes % 11/20/2024 33     Monocytes % 11/20/2024 7     Eosinophils Relative 11/20/2024 2     Basophils Relative 11/20/2024 1     Absolute Neutrophils 11/20/2024 2.73     Absolute Immature Grans 11/20/2024 0.01     Absolute Lymphocytes 11/20/2024 1.57     Absolute Monocytes 11/20/2024 0.33     Eosinophils Absolute 11/20/2024 0.09     Basophils Absolute 11/20/2024 0.05     Sodium 11/20/2024 136     Potassium 11/20/2024 5.0     Chloride 11/20/2024 106     CO2 11/20/2024 22     ANION GAP 11/20/2024 8     BUN 11/20/2024 13     Creatinine 11/20/2024 0.91     Glucose 11/20/2024 105     Calcium 11/20/2024 8.9     eGFR 11/20/2024 93     hs TnI 0hr 11/20/2024 3     Total Bilirubin 11/20/2024 0.32     Bilirubin, Direct 11/20/2024 0.01     Alkaline Phosphatase 11/20/2024 63     AST 11/20/2024 20     ALT 11/20/2024 17     Total Protein 11/20/2024 7.3     Albumin 11/20/2024 3.9          Radiology Results:   MRA carotids wo contrast  Result Date: 12/23/2024  Narrative: MRA NECK WITHOUT CONTRAST INDICATION: G45.9: Transient cerebral ischemic attack, unspecified COMPARISON:  None. TECHNIQUE:  Time of Flight angiography with 3D reconstructions. IMAGE QUALITY:  Diagnostic. FINDINGS: VERTEBRAL ARTERIES: Patent. RIGHT  CAROTID ARTERY: No high-grade stenosis or occlusion. LEFT CAROTID ARTERY: No high-grade stenosis or occlusion. VISUALIZED INTRACRANIAL VASCULATURE: Please refer to concurrent MRA head. NASCET criteria was used to determine the degree of internal carotid artery diameter stenosis.     Impression: No proximal large vessel occlusion or high-grade vascular stenosis within the confines of unenhanced MRA neck. Workstation performed: [id number]     MRI angiogram head without contrast  Result Date: 12/23/2024  Narrative: MRA BRAIN WITHOUT CONTRAST INDICATION:  G45.9: Transient cerebral ischemic attack, unspecified COMPARISON: MRI brain 4/22/2024. TECHNIQUE:  Axial 3-D time-of-flight imaging with 3-D reconstructions performed without contrast. IV Contrast:  Not administered. FINDINGS: IMAGE QUALITY:  Diagnostic. ANATOMY INTERNAL CAROTID ARTERIES: No significant stenosis or occlusion. ANTERIOR CEREBRAL ARTERY CIRCULATION: Hypoplastic right A1, normal variant. No significant stenosis or occlusion. MIDDLE CEREBRAL ARTERY CIRCULATION: No significant stenosis or occlusion. DISTAL VERTEBRAL ARTERIES: Congenitally small right vertebral artery appears to terminate in PICA, with a hypoplastic intradural right vertebral artery. BASILAR ARTERY:  Normal. POSTERIOR CEREBRAL ARTERIES: Bilateral fetal PCA, normal variant. No significant stenosis or occlusion.     Impression: No proximal large vessel occlusion or high-grade vascular stenosis within the confines of unenhanced MRA brain. Workstation performed: [id number]

## 2025-01-13 NOTE — PATIENT INSTRUCTIONS
Scheduled date of EGD(as of today):1/21/25  Physician performing EGD:Patricia  Location of EGD:[place]  Instructions reviewed with patient by:Indra gonzalez  Clearances:  none

## 2025-01-13 NOTE — H&P (VIEW-ONLY)
[facility] - Outpatient Note  Neal Walden 59 y.o. male MRN: [id number]  Encounter: [id number]      ASSESSMENT AND PLAN:    Neal Walden is a 59 y.o. old pleasant male with PMH of intractable migraines, diverticulitis, tobacco use, schizophrenia, marijuana use who presents for followup    Recurrent nausea vomiting-overall patient a very poor historian.  I suspect his GI symptoms are largely due to head trauma and daily intractable migraines.  No organic etiology for nausea vomiting found.  He may also have a component of cannabinoid hyperemesis syndrome however he is unwilling to accept the marijuana could be contributing to his symptoms.  He states he stopped marijuana 20+ years ago for 6 months and it did not help with the symptoms.  He is also inconsistent historian initially telling me symptoms started after recent cardiac procedure however now he states his symptoms started after severe head injury when he was [age] years old.  He does not want any medications.  Also of note, questionably he did appear to be possibly under the influence during our visit today though it was difficult for me to discern.  He denies any illicit drugs.  He has not had EGD in over a year and states his symptoms are worsening.  He is open to endoscopic evaluation.  Plan for EGD  Can see allergist in case this is contributing to symptoms  Advised him to keep food diary  Advised marijuana cessation however he is not willing at this time  He is using excessive NSAIDs and Tylenol which is likely also contributing to symptoms so I counseled him on cessation        There are no diagnoses linked to this encounter.  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      Historical Information   Past Medical History:   Diagnosis Date    Diabetes mellitus (HCC)     Diverticulitis of colon     Hyperlipidemia     Hypertension     Psychiatric disorder     bipolar    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HCC)     Syncope      Past Surgical History:   Procedure Laterality Date    CARDIAC ASCENDING AORTOGRAM N/A 10/28/2022    Procedure: Cardiac ascending aortogram;  Surgeon: Michele Baer MD;  [facility];  Service: Cardiology    CARDIAC CATHETERIZATION Left 10/28/2022    Procedure: CARDIAC RHC/LHC;  Surgeon: Michele Baer MD;  [facility];  Service: Cardiology    CARDIAC CATHETERIZATION N/A 10/28/2022    Procedure: Cardiac Coronary Angiogram;  Surgeon: Michele Baer MD;  [facility];  Service: Cardiology    CARDIAC CATHETERIZATION Left 10/28/2022    Procedure: Cardiac Left Ventriculogram;  Surgeon: Michele Baer MD;  [facility];  Service: Cardiology    LAPAROSCOPIC LYSIS INTESTINAL ADHESIONS      [place] RPLCMT AORTIC VALVE OPN W/STENTLESS TISSUE VALVE N/A 02/23/2023    Procedure: CORONARY ARTERY BYPASS GRAFT (CABG) 1 VESSEL GSV-->RCA, EVH RIGHT LEG,  WITH REPLACEMENT VALVE AORTIC (AVR) 25MM INSPIRIA RESILIA TISSUE VALVE;  Surgeon: MAY Fierro MD;  Location:  MAIN OR;  Service: Cardiac Surgery    TONSILECTOMY AND ADNOIDECTOMY      WISDOM TOOTH EXTRACTION       Social History   Social History     Substance and Sexual Activity   Alcohol Use Not Currently     Social History     Substance and Sexual Activity   Drug Use Yes    Types: Marijuana  "    Social History     Tobacco Use   Smoking Status Former    Current packs/day: 0.00    Average packs/day: 0.5 packs/day for 45.0 years (22.5 ttl pk-yrs)    Types: Cigarettes    Start date: 1978    Quit date: 2023    Years since quittin.8    Passive exposure: Current   Smokeless Tobacco Never   Tobacco Comments    Patient states that he is trying to cut down     Family History   Problem Relation Age of Onset    Cancer Mother     Hypertension Mother     Diabetes Mother        Meds/Allergies       Current Outpatient Medications:     dexamethasone (DECADRON) 2 mg tablet    Erenumab-aooe (Aimovig) 140 MG/ML SOAJ    omeprazole (PriLOSEC) 40 MG capsule    ondansetron (ZOFRAN) 4 mg tablet    prochlorperazine (COMPAZINE) 5 mg tablet    Ubrogepant (UBRELVY) 100 MG tablet    Allergies   Allergen Reactions    Neomycin-Polymyxin-Hc Abdominal Pain    Other      Pt states he is allergic to everything. States he doesn't have a list but can only take children doses of all medication             Objective     Blood pressure 118/80, pulse 93, temperature 98.7 °F (37.1 °C), temperature source Tympanic, height 5' 6\" (1.676 m), weight 89.8 kg (198 lb), SpO2 98%. Body mass index is 31.96 kg/m².      PHYSICAL EXAM:      General Appearance:   Alert, cooperative, no distress   HEENT:   Normocephalic, atraumatic, anicteric.     Neck:  Supple, symmetrical, trachea midline   Lungs:   Clear to auscultation bilaterally; no rales, rhonchi or wheezing; respirations unlabored    Heart::   Regular rate and rhythm; no murmur, rub, or gallop.   Abdomen:   Soft, non-tender, non-distended; normal bowel sounds; no masses, no organomegaly    Genitalia:   Deferred    Rectal:   Deferred    Extremities:  No cyanosis, clubbing or edema    Pulses:  2+ and symmetric    Skin:  No jaundice, rashes, or lesions    Lymph nodes:  No palpable cervical lymphadenopathy        Lab Results:   No visits with results within 1 Day(s) from this visit.   Latest " known visit with results is:   Admission on 11/20/2024, Discharged on 11/20/2024   Component Date Value    Ventricular Rate 11/20/2024 107     Atrial Rate 11/20/2024 107     [place] Interval 11/20/2024 128     QRSD Interval 11/20/2024 86     QT Interval 11/20/2024 338     QTC Interval 11/20/2024 451     P Axis 11/20/2024 70     QRS [place] 11/20/2024 43     T Wave [place] 11/20/2024 73     WBC 11/20/2024 4.78     RBC 11/20/2024 5.29     Hemoglobin 11/20/2024 14.2     Hematocrit 11/20/2024 43.5     MCV 11/20/2024 82     MCH 11/20/2024 26.8     MCHC 11/20/2024 32.6     RDW 11/20/2024 14.2     MPV 11/20/2024 9.8     Platelets 11/20/2024 269     nRBC 11/20/2024 0     Segmented % 11/20/2024 57     Immature Grans % 11/20/2024 0     Lymphocytes % 11/20/2024 33     Monocytes % 11/20/2024 7     Eosinophils Relative 11/20/2024 2     Basophils Relative 11/20/2024 1     Absolute Neutrophils 11/20/2024 2.73     Absolute Immature Grans 11/20/2024 0.01     Absolute Lymphocytes 11/20/2024 1.57     Absolute Monocytes 11/20/2024 0.33     Eosinophils Absolute 11/20/2024 0.09     Basophils Absolute 11/20/2024 0.05     Sodium 11/20/2024 136     Potassium 11/20/2024 5.0     Chloride 11/20/2024 106     CO2 11/20/2024 22     ANION GAP 11/20/2024 8     BUN 11/20/2024 13     Creatinine 11/20/2024 0.91     Glucose 11/20/2024 105     Calcium 11/20/2024 8.9     eGFR 11/20/2024 93     hs TnI 0hr 11/20/2024 3     Total Bilirubin 11/20/2024 0.32     Bilirubin, Direct 11/20/2024 0.01     Alkaline Phosphatase 11/20/2024 63     AST 11/20/2024 20     ALT 11/20/2024 17     Total Protein 11/20/2024 7.3     Albumin 11/20/2024 3.9          Radiology Results:   MRA carotids wo contrast  Result Date: 12/23/2024  Narrative: MRA NECK WITHOUT CONTRAST INDICATION: G45.9: Transient cerebral ischemic attack, unspecified COMPARISON:  None. TECHNIQUE:  Time of Flight angiography with 3D reconstructions. IMAGE QUALITY:  Diagnostic. FINDINGS: VERTEBRAL ARTERIES: Patent. RIGHT  CAROTID ARTERY: No high-grade stenosis or occlusion. LEFT CAROTID ARTERY: No high-grade stenosis or occlusion. VISUALIZED INTRACRANIAL VASCULATURE: Please refer to concurrent MRA head. NASCET criteria was used to determine the degree of internal carotid artery diameter stenosis.     Impression: No proximal large vessel occlusion or high-grade vascular stenosis within the confines of unenhanced MRA neck. Workstation performed: [id number]     MRI angiogram head without contrast  Result Date: 12/23/2024  Narrative: MRA BRAIN WITHOUT CONTRAST INDICATION:  G45.9: Transient cerebral ischemic attack, unspecified COMPARISON: MRI brain 4/22/2024. TECHNIQUE:  Axial 3-D time-of-flight imaging with 3-D reconstructions performed without contrast. IV Contrast:  Not administered. FINDINGS: IMAGE QUALITY:  Diagnostic. ANATOMY INTERNAL CAROTID ARTERIES: No significant stenosis or occlusion. ANTERIOR CEREBRAL ARTERY CIRCULATION: Hypoplastic right A1, normal variant. No significant stenosis or occlusion. MIDDLE CEREBRAL ARTERY CIRCULATION: No significant stenosis or occlusion. DISTAL VERTEBRAL ARTERIES: Congenitally small right vertebral artery appears to terminate in PICA, with a hypoplastic intradural right vertebral artery. BASILAR ARTERY:  Normal. POSTERIOR CEREBRAL ARTERIES: Bilateral fetal PCA, normal variant. No significant stenosis or occlusion.     Impression: No proximal large vessel occlusion or high-grade vascular stenosis within the confines of unenhanced MRA brain. Workstation performed: [id number]

## 2025-01-14 NOTE — TELEPHONE ENCOUNTER
Patients spouse Rose called back; advised no sooner availability at this time and will forward message to have someone call back.    Please assist,    Thank you

## 2025-01-16 ENCOUNTER — TELEPHONE (OUTPATIENT)
Age: 60
End: 2025-01-16

## 2025-01-16 NOTE — TELEPHONE ENCOUNTER
Patients GI provider:  Dr. Gomez    Number to return call: [phone]    Reason for call: Pt calling to update address on file. Would also like to have Rajan arranged for proced on 01/21. SO will be accompanying pt. Please call pt back to arrange.    Scheduled procedure/appointment date if applicable: procedure 01/21/2025

## 2025-01-28 ENCOUNTER — HOSPITAL ENCOUNTER (OUTPATIENT)
Dept: GASTROENTEROLOGY | Facility: HOSPITAL | Age: 60
Setting detail: OUTPATIENT SURGERY
Discharge: HOME | End: 2025-01-28
Attending: INTERNAL MEDICINE
Payer: COMMERCIAL

## 2025-01-28 ENCOUNTER — ANESTHESIA EVENT (OUTPATIENT)
Dept: GASTROENTEROLOGY | Facility: HOSPITAL | Age: 60
End: 2025-01-28
Payer: COMMERCIAL

## 2025-01-28 ENCOUNTER — ANESTHESIA (OUTPATIENT)
Dept: GASTROENTEROLOGY | Facility: HOSPITAL | Age: 60
End: 2025-01-28
Payer: COMMERCIAL

## 2025-01-28 VITALS
HEIGHT: 66 IN | TEMPERATURE: 97 F | RESPIRATION RATE: 22 BRPM | WEIGHT: 198.19 LBS | OXYGEN SATURATION: 98 % | DIASTOLIC BLOOD PRESSURE: 93 MMHG | BODY MASS INDEX: 31.85 KG/M2 | HEART RATE: 83 BPM | SYSTOLIC BLOOD PRESSURE: 120 MMHG

## 2025-01-28 DIAGNOSIS — R11.2 NAUSEA AND VOMITING, UNSPECIFIED VOMITING TYPE: ICD-10-CM

## 2025-01-28 PROCEDURE — 88313 SPECIAL STAINS GROUP 2: CPT | Performed by: PATHOLOGY

## 2025-01-28 PROCEDURE — 88305 TISSUE EXAM BY PATHOLOGIST: CPT | Performed by: PATHOLOGY

## 2025-01-28 PROCEDURE — 88342 IMHCHEM/IMCYTCHM 1ST ANTB: CPT | Performed by: PATHOLOGY

## 2025-01-28 PROCEDURE — 43239 EGD BIOPSY SINGLE/MULTIPLE: CPT | Performed by: INTERNAL MEDICINE

## 2025-01-28 RX ORDER — PROPOFOL 10 MG/ML
INJECTION, EMULSION INTRAVENOUS AS NEEDED
Status: DISCONTINUED | OUTPATIENT
Start: 2025-01-28 | End: 2025-01-28

## 2025-01-28 RX ORDER — SODIUM CHLORIDE, SODIUM LACTATE, POTASSIUM CHLORIDE, CALCIUM CHLORIDE 600; 310; 30; 20 MG/100ML; MG/100ML; MG/100ML; MG/100ML
125 INJECTION, SOLUTION INTRAVENOUS CONTINUOUS
Status: DISCONTINUED | OUTPATIENT
Start: 2025-01-28 | End: 2025-02-01 | Stop reason: HOSPADM

## 2025-01-28 RX ORDER — SODIUM CHLORIDE, SODIUM LACTATE, POTASSIUM CHLORIDE, CALCIUM CHLORIDE 600; 310; 30; 20 MG/100ML; MG/100ML; MG/100ML; MG/100ML
INJECTION, SOLUTION INTRAVENOUS CONTINUOUS PRN
Status: DISCONTINUED | OUTPATIENT
Start: 2025-01-28 | End: 2025-01-28

## 2025-01-28 RX ORDER — LIDOCAINE HYDROCHLORIDE 20 MG/ML
INJECTION, SOLUTION EPIDURAL; INFILTRATION; INTRACAUDAL; PERINEURAL AS NEEDED
Status: DISCONTINUED | OUTPATIENT
Start: 2025-01-28 | End: 2025-01-28

## 2025-01-28 RX ADMIN — SODIUM CHLORIDE, SODIUM LACTATE, POTASSIUM CHLORIDE, AND CALCIUM CHLORIDE: .6; .31; .03; .02 INJECTION, SOLUTION INTRAVENOUS at 11:24

## 2025-01-28 RX ADMIN — LIDOCAINE HYDROCHLORIDE 50 MG: 20 INJECTION, SOLUTION EPIDURAL; INFILTRATION; INTRACAUDAL; PERINEURAL at 11:27

## 2025-01-28 RX ADMIN — SODIUM CHLORIDE, SODIUM LACTATE, POTASSIUM CHLORIDE, AND CALCIUM CHLORIDE 125 ML/HR: .6; .31; .03; .02 INJECTION, SOLUTION INTRAVENOUS at 10:34

## 2025-01-28 RX ADMIN — PROPOFOL 150 MG: 10 INJECTION, EMULSION INTRAVENOUS at 11:27

## 2025-01-28 NOTE — ANESTHESIA POSTPROCEDURE EVALUATION
Post-Op Assessment Note    CV Status:  Stable    Pain management: adequate       Mental Status:  Sleepy and arousable   Hydration Status:  Euvolemic   PONV Controlled:  Controlled   Airway Patency:  Patent  Two or more mitigation strategies used for obstructive sleep apnea   Post Op Vitals Reviewed: Yes    No anethesia notable event occurred.    Staff: Anesthesiologist, CRNA           Last Filed PACU Vitals:  Vitals Value Taken Time   Temp 97.2    Pulse 99    /60    Resp 22    SpO2 96

## 2025-01-28 NOTE — INTERVAL H&P NOTE
H&P reviewed. After examining the patient I find no changes in the patients condition since the H&P had been written.    Vitals:    01/28/25 1023   BP: 139/90   Pulse: 79   Resp: 18   Temp: (!) 97 °F (36.1 °C)   SpO2: 97%

## 2025-01-28 NOTE — ANESTHESIA PREPROCEDURE EVALUATION
Procedure:  EGD    Relevant Problems   CARDIO   (+) Coronary artery disease involving native coronary artery of native heart without angina pectoris   (+) History of aortic valve replacement   (+) Hyperlipidemia   (+) Intractable migraine without status migrainosus   (+) Murmur   (+) Primary hypertension      HEMATOLOGY   (+) Acute postoperative anemia due to expected blood loss   (+) Thrombocytopenia (HCC)      NEURO/PSYCH   (+) Depression due to physical illness   (+) Intractable migraine without status migrainosus   (+) Schizophrenia (HCC)        Physical Exam    Airway    Mallampati score: II  TM Distance: >3 FB  Neck ROM: full     Dental    upper dentures    Cardiovascular      Pulmonary      Other Findings        Anesthesia Plan  ASA Score- 3     Anesthesia Type- IV sedation with anesthesia with ASA Monitors.         Additional Monitors:     Airway Plan:            Plan Factors-Exercise tolerance (METS): >4 METS.    Chart reviewed. EKG reviewed.   Patient summary reviewed.    Patient is a current smoker.  Patient instructed to abstain from smoking on day of procedure. Patient smoked on day of surgery.    There is medical exclusion for perioperative obstructive sleep apnea risk education.        Induction- intravenous.    Postoperative Plan-         Informed Consent- Anesthetic plan and risks discussed with patient.  I personally reviewed this patient with the CRNA. Discussed and agreed on the Anesthesia Plan with the CRNA..      NPO Status:  Vitals Value Taken Time   Date of last liquid 01/27/25 01/28/25 1024   Time of last liquid 2300 01/28/25 1024   Date of last solid 01/27/25 01/28/25 1024   Time of last solid 2300 01/28/25 1024

## 2025-01-30 ENCOUNTER — RESULTS FOLLOW-UP (OUTPATIENT)
Age: 60
End: 2025-01-30

## 2025-01-30 PROCEDURE — 88313 SPECIAL STAINS GROUP 2: CPT | Performed by: PATHOLOGY

## 2025-01-30 PROCEDURE — 88305 TISSUE EXAM BY PATHOLOGIST: CPT | Performed by: PATHOLOGY

## 2025-01-30 PROCEDURE — 88342 IMHCHEM/IMCYTCHM 1ST ANTB: CPT | Performed by: PATHOLOGY

## 2025-03-10 ENCOUNTER — TELEPHONE (OUTPATIENT)
Age: 60
End: 2025-03-10

## 2025-03-10 NOTE — TELEPHONE ENCOUNTER
Pt calling in requesting information / script for medical marijuana card. Did inform pt that they can get this info from State Website.     Please assist, Thank you.

## 2025-03-10 NOTE — TELEPHONE ENCOUNTER
Follow up call to patient regarding below. Spoke with patient and Sig Other Rose (Listed on Communication Consent.)  Advised Dr. Munoz does not have a certification to approved patients for a medical marijuana card. Patient can go on the state website as suggested below or contact a local dispensary for a list of certified practitioners in their area that will accept his insurance. Patient voiced clear understanding and was appreciate of my assistance.

## 2025-03-25 ENCOUNTER — NURSE TRIAGE (OUTPATIENT)
Age: 60
End: 2025-03-25

## 2025-03-25 DIAGNOSIS — R11.0 FUNCTIONAL NAUSEA: Primary | ICD-10-CM

## 2025-03-25 RX ORDER — AMITRIPTYLINE HYDROCHLORIDE 10 MG/1
10 TABLET ORAL
Qty: 90 TABLET | Refills: 0 | Status: SHIPPED | OUTPATIENT
Start: 2025-03-25

## 2025-03-25 RX ORDER — ESOMEPRAZOLE MAGNESIUM 40 MG/1
40 CAPSULE, DELAYED RELEASE ORAL DAILY
Qty: 90 CAPSULE | Refills: 0 | Status: SHIPPED | OUTPATIENT
Start: 2025-03-25

## 2025-03-25 NOTE — TELEPHONE ENCOUNTER
No additional testing. We have done EGD, Colonoscopy, HIDA, Gastric emptying, many images that are all negative. It seems that most of his symptoms started after his open heart surgery which I do not know how to correlate. He is also reported to me that he has nearly daily severe migraines which is likely contributing to his nausea. He needs to follow up with neurology. I have prescribed nexium and elavil for functional symptoms. We do not prescribe marijuana and as far I'm aware I dont know anyone at [facility] who prescribes marijuana. He will have to look outside the network. Thank you.

## 2025-03-25 NOTE — TELEPHONE ENCOUNTER
"FOLLOW UP: review/advise    REASON FOR CONVERSATION: Advice only    SYMPTOMS: chronic issues with chest pain (since open heart surgery 2 1/2 yrs ago), blurry vision, n/v    OTHER:  I spoke with patient's significant other Rose (on communication sheet). Patient's symptoms are chronic and they are looking for answers as to why. He is scheduled to see cardiology 4/2/25. She states patient has nausea/vomiting after eating. He is not on any medications Rx or OTC due to side effects he always experiences whenever he takes anything. He may take an Advil on rare occasions. Any additional testing since EGD was fine? Patient requesting medical marijuana. Please review/advise.    DISPOSITION: Discuss with provider and call back patient (significant other)    Answer Assessment - Initial Assessment Questions  1. REASON FOR CALL: \"What is the main reason for your call?\" or \"How can I best help you?\"      Request for medical marijuana due to chronic issues  2. SYMPTOMS : \"Do you have any symptoms?\"       Chest pain, blurry vision, n/v after eating x long term  3. OTHER QUESTIONS: \"Do you have any other questions?\"      Any further testing?    Protocols used: Information Only Call - No Triage-Adult-[place]    "

## 2025-03-25 NOTE — TELEPHONE ENCOUNTER
Regarding: vomiting and nausea  ----- Message from Heidi VILLANUEVA sent at 3/25/2025 11:07 AM EDT -----  Pt is having chest pain, blurry vision, nausea and vomiting. He had recent EGD and is aware of his results. He wants to know if Dr Gomez can prescribe medical marijuana for him. He has been dealing with these issues for years and that is the only thing that helps him.

## 2025-03-26 ENCOUNTER — TELEPHONE (OUTPATIENT)
Age: 60
End: 2025-03-26

## 2025-03-26 NOTE — TELEPHONE ENCOUNTER
Called patient and spoke to patients S/O Rose ( on contact list ) gave her message as per Dr Gomez. She voiced understanding and had no further questions or concerns

## 2025-03-26 NOTE — TELEPHONE ENCOUNTER
Patient girlfriend Rose is calling that he is still having issues with vomiting and wanted a doctor referral for neurology needs someone in the area please.    Please call Rose at [phone]

## 2025-03-26 NOTE — TELEPHONE ENCOUNTER
Patient does have an active referral for neurology in his chart.  Can his care be transferred from the [place] office to the office in [place]?  If so,please provide patient with phone number to local neurology office.  Thank you

## 2025-03-26 NOTE — TELEPHONE ENCOUNTER
Called patient girlfriend and gave her the number to Neurology in [place] location and inform them you would likek to switch providers

## 2025-03-27 ENCOUNTER — NURSE TRIAGE (OUTPATIENT)
Age: 60
End: 2025-03-27

## 2025-03-27 NOTE — TELEPHONE ENCOUNTER
"Regarding: chest pain  ----- Message from Leila ZACARIAS sent at 3/27/2025  1:49 PM EDT -----  Patient having chest pain, states it feels like he's being \"electrocuted\". He has been having this pain for about 24 hours now.    "

## 2025-03-27 NOTE — TELEPHONE ENCOUNTER
"FOLLOW UP: Instructed patient to go to ED for evaluation.   Patient has scheduled appointment on 4/9/25, would like sooner appointment if possible. Added to waitlist.     REASON FOR CONVERSATION: Chest Pain  Returned triage call from patient regarding chest pain. Patient complains of severe chest pain that has been present for past 2 years, since his CABG, but has become severe over past 24 hours.   Patient states pain radiates to bilateral arms, and he develops sob, sweating, dizziness, and he has been vomiting for past 24 hours.  Patient states he has not kept food down in 2 years, he is only able to drink coffee. Instructed patient to seek ED evaluation. Patient states he has been to multiple doctors and no one can find a cause for his pain and vomiting.  Advised patient that he can call his PCP for nausea medication, patient states he cannot take medication.     SYMPTOMS: severe chest pain that radiates down both arms, sob, sweating, dizziness, cough, vomiting    OTHER: Patient does not monitor his bp at home    DISPOSITION: Go to ED Now  Unsure if patient will follow instructions.     Reason for Disposition   SEVERE chest pain    Answer Assessment - Initial Assessment Questions  1. LOCATION: \"Where does it hurt?\"        Middle of chest  2. RADIATION: \"Does the pain go anywhere else?\" (e.g., into neck, jaw, arms, back)      Left and right arm  3. ONSET: \"When did the chest pain begin?\" (Minutes, hours or days)       Past 24 hours- pt states it is an ongoing issue since heart surgery   4. PATTERN: \"Does the pain come and go, or has it been constant since it started?\"  \"Does it get worse with exertion?\"       Comes and goes   5. DURATION: \"How long does it last\" (e.g., seconds, minutes, hours)      varies  6. SEVERITY: \"How bad is the pain?\"  (e.g., Scale 1-10; mild, moderate, or severe)      Moderate to severe  7. CARDIAC RISK FACTORS: \"Do you have any history of heart problems or risk factors for heart disease?\" " "(e.g., angina, prior heart attack; diabetes, high blood pressure, high cholesterol, smoker, or strong family history of heart disease)      CABG, CAD  8. PULMONARY RISK FACTORS: \"Do you have any history of lung disease?\"  (e.g., blood clots in lung, asthma, emphysema, birth control pills)      denies  9. CAUSE: \"What do you think is causing the chest pain?\"      unsure  10. OTHER SYMPTOMS: \"Do you have any other symptoms?\" (e.g., dizziness, nausea, vomiting, sweating, fever, difficulty breathing, cough)        Vomiting, dizziness, fever on and off, difficulty breathing, cough and sweating    Protocols used: Chest Pain-Adult-[place]    "

## 2025-03-28 ENCOUNTER — HOSPITAL ENCOUNTER (EMERGENCY)
Facility: HOSPITAL | Age: 60
Discharge: HOME | End: 2025-03-28
Attending: EMERGENCY MEDICINE
Payer: COMMERCIAL

## 2025-03-28 ENCOUNTER — APPOINTMENT (EMERGENCY)
Dept: RADIOLOGY | Facility: HOSPITAL | Age: 60
End: 2025-03-28
Payer: COMMERCIAL

## 2025-03-28 VITALS
HEART RATE: 94 BPM | HEIGHT: 66 IN | BODY MASS INDEX: 30.05 KG/M2 | TEMPERATURE: 98.2 F | SYSTOLIC BLOOD PRESSURE: 122 MMHG | RESPIRATION RATE: 15 BRPM | WEIGHT: 186.95 LBS | OXYGEN SATURATION: 97 % | DIASTOLIC BLOOD PRESSURE: 78 MMHG

## 2025-03-28 DIAGNOSIS — R07.9 CHEST PAIN: Primary | ICD-10-CM

## 2025-03-28 LAB
ANION GAP SERPL CALCULATED.3IONS-SCNC: 10 MMOL/L (ref 4–13)
BASOPHILS # BLD AUTO: 0.04 THOUSANDS/ÂΜL (ref 0–0.1)
BASOPHILS NFR BLD AUTO: 1 % (ref 0–1)
BUN SERPL-MCNC: 13 MG/DL (ref 5–25)
CALCIUM SERPL-MCNC: 9.5 MG/DL (ref 8.4–10.2)
CARDIAC TROPONIN I PNL SERPL HS: 3 NG/L (ref ?–50)
CHLORIDE SERPL-SCNC: 106 MMOL/L (ref 96–108)
CO2 SERPL-SCNC: 19 MMOL/L (ref 21–32)
CREAT SERPL-MCNC: 0.86 MG/DL (ref 0.6–1.3)
EOSINOPHIL # BLD AUTO: 0.12 THOUSAND/ÂΜL (ref 0–0.61)
EOSINOPHIL NFR BLD AUTO: 2 % (ref 0–6)
ERYTHROCYTE [DISTWIDTH] IN BLOOD BY AUTOMATED COUNT: 14.3 % (ref 11.6–15.1)
GFR SERPL CREATININE-BSD FRML MDRD: 96 ML/MIN/1.73SQ M
GLUCOSE SERPL-MCNC: 113 MG/DL (ref 65–140)
HCT VFR BLD AUTO: 48.8 % (ref 36.5–49.3)
HGB BLD-MCNC: 16.1 G/DL (ref 12–17)
IMM GRANULOCYTES # BLD AUTO: 0.01 THOUSAND/UL (ref 0–0.2)
IMM GRANULOCYTES NFR BLD AUTO: 0 % (ref 0–2)
LYMPHOCYTES # BLD AUTO: 1.2 THOUSANDS/ÂΜL (ref 0.6–4.47)
LYMPHOCYTES NFR BLD AUTO: 24 % (ref 14–44)
MCH RBC QN AUTO: 26.7 PG (ref 26.8–34.3)
MCHC RBC AUTO-ENTMCNC: 33 G/DL (ref 31.4–37.4)
MCV RBC AUTO: 81 FL (ref 82–98)
MONOCYTES # BLD AUTO: 0.45 THOUSAND/ÂΜL (ref 0.17–1.22)
MONOCYTES NFR BLD AUTO: 9 % (ref 4–12)
NEUTROPHILS # BLD AUTO: 3.18 THOUSANDS/ÂΜL (ref 1.85–7.62)
NEUTS SEG NFR BLD AUTO: 64 % (ref 43–75)
NRBC BLD AUTO-RTO: 0 /100 WBCS
PLATELET # BLD AUTO: 231 THOUSANDS/UL (ref 149–390)
PMV BLD AUTO: 10.1 FL (ref 8.9–12.7)
POTASSIUM SERPL-SCNC: 4.6 MMOL/L (ref 3.5–5.3)
RBC # BLD AUTO: 6.04 MILLION/UL (ref 3.88–5.62)
SODIUM SERPL-SCNC: 135 MMOL/L (ref 135–147)
WBC # BLD AUTO: 5 THOUSAND/UL (ref 4.31–10.16)

## 2025-03-28 PROCEDURE — 80048 BASIC METABOLIC PNL TOTAL CA: CPT

## 2025-03-28 PROCEDURE — 85025 COMPLETE CBC W/AUTO DIFF WBC: CPT

## 2025-03-28 PROCEDURE — 36415 COLL VENOUS BLD VENIPUNCTURE: CPT

## 2025-03-28 PROCEDURE — 99285 EMERGENCY DEPT VISIT HI MDM: CPT | Performed by: EMERGENCY MEDICINE

## 2025-03-28 PROCEDURE — 99285 EMERGENCY DEPT VISIT HI MDM: CPT

## 2025-03-28 PROCEDURE — 71046 X-RAY EXAM CHEST 2 VIEWS: CPT

## 2025-03-28 PROCEDURE — 84484 ASSAY OF TROPONIN QUANT: CPT

## 2025-03-28 RX ORDER — SODIUM CHLORIDE 9 MG/ML
3 INJECTION INTRAVENOUS
Status: DISCONTINUED | OUTPATIENT
Start: 2025-03-28 | End: 2025-03-28 | Stop reason: HOSPADM

## 2025-03-28 RX ORDER — ASPIRIN 81 MG/1
324 TABLET, CHEWABLE ORAL ONCE
Status: COMPLETED | OUTPATIENT
Start: 2025-03-28 | End: 2025-03-28

## 2025-03-28 RX ADMIN — ASPIRIN 324 MG: 81 TABLET, CHEWABLE ORAL at 10:22

## 2025-03-28 NOTE — ED PROVIDER NOTES
Time reflects when diagnosis was documented in both MDM as applicable and the Disposition within this note       Time User Action Codes Description Comment    3/28/2025 10:52 AM Quinton Saucedo Add [R07.9] Chest pain           ED Disposition       ED Disposition   Discharge    Condition   Stable    Date/Time   Fri Mar 28, 2025 10:52 AM    Comment   Neal Walden discharge to home/self care.                   Assessment & Plan       Medical Decision Making  Initially tachycardic at 117 bpm.  Vital signs stable otherwise.  Afebrile. Patient is resting on bed, not in any acute distress. Cooperative. Patient has a midline scar over the sternum.  Rates regular rate and rhythm.  Lungs clear to auscultation.  Pain to palpation patient has most of his pain over the area of scarring.  No crepitus.  No lower extremity edema.  2+ distal pulses.    Ordered CBC, BMP, troponin, chest x-ray, and EKG.  Significant abnormality on labs.  Troponin is 3.  EKG is sinus tachycardia at 113 bpm but otherwise normal.  No acute cardiopulmonary abnormality on chest x-ray.    Recommended taking aspirin or Tylenol for pain management, heat application, and ice application as needed for pain management. Advised to f/u with cardiology. Case discussed with Dr. Paige. Advised patient to return with any new or worsening symptoms including but not limited to worsening or uncontrolled chest pain, shortness of breath, light headedness, feeling faint, nausea, vomiting, or any other concerning symptoms. Discussed assessment and plan with patient who verbalizes understanding and agrees with plan.    Amount and/or Complexity of Data Reviewed  Labs: ordered. Decision-making details documented in ED Course.  Radiology: ordered. Decision-making details documented in ED Course.    Risk  OTC drugs.        ED Course as of 03/29/25 1724   Fri Mar 28, 2025   0959 Blood Pressure: 118/89   0959 Temperature: 98.2 °F (36.8 °C)   0959 Pulse(!): 117   0959  Respirations: 18   0959 SpO2: 100 %   1037 RBC(!): 6.04   1037 CBC and differential(!)  No anemia or leukocytosis.   1037 Blood Pressure: 109/83   1037 Pulse(!): 106   1037 Respirations: 17   1037 SpO2: 100 %   1051 hs TnI 0hr: 3   1051 Basic metabolic panel(!)  No electrolyte abnormalities. Carbon dioxide  at 19.   1051 X-ray chest 2 views  No acute cardiopulmonary abnormality.    1102 EKG shows sinus tachycardia at 113 bpm. Otherwise normal.   Sat Mar 29, 2025   1724     Heart Score Risk Calculator    History 1  Age 1  Risk Factors 2  Troponin 0       Medications   aspirin chewable tablet 324 mg (324 mg Oral Given 3/28/25 1022)       ED Risk Strat Scores      HEART Risk Score      Flowsheet Row Most Recent Value   Heart Score Risk Calculator    History 1 Filed at: 2025 1053   ECG --   Age 1 Filed at: 2025 1053   Risk Factors 2 Filed at: 2025 1053   Troponin 0 Filed at: 2025 1053   HEART Score --                                                  History of Present Illness       Chief Complaint   Patient presents with    Chest Pain     C/o chest pain x 2 days.        Past Medical History:   Diagnosis Date    Diabetes mellitus (HCC)     Diverticulitis of colon     Hyperlipidemia     Hypertension     Psychiatric disorder     bipolar    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HCC)     Syncope       Past Surgical History:   Procedure Laterality Date    CARDIAC ASCENDING AORTOGRAM N/A 10/28/2022    Procedure: Cardiac ascending aortogram;  Surgeon: Michele Baer MD;  Location: [facility];  Service: Cardiology    CARDIAC CATHETERIZATION Left 10/28/2022    Procedure: CARDIAC RHC/LHC;  Surgeon: Michele Baer MD;  Location: [facility];  Service: Cardiology    CARDIAC CATHETERIZATION N/A 10/28/2022    Procedure: Cardiac Coronary Angiogram;  Surgeon: Michele Baer MD;  Location: [facility];  Service: Cardiology    CARDIAC CATHETERIZATION Left 10/28/2022     Procedure: Cardiac Left Ventriculogram;  Surgeon: Michele Baer MD;  Location: [facility];  Service: Cardiology    LAPAROSCOPIC LYSIS INTESTINAL ADHESIONS      GA RPLCMT AORTIC VALVE OPN W/STENTLESS TISSUE VALVE N/A 2023    Procedure: CORONARY ARTERY BYPASS GRAFT (CABG) 1 VESSEL GSV-->RCA, EVH RIGHT LEG,  WITH REPLACEMENT VALVE AORTIC (AVR) 25MM INSPIRIA RESILIA TISSUE VALVE;  Surgeon: MAY Fierro MD;  Location: [facility];  Service: Cardiac Surgery    TONSILECTOMY AND ADNOIDECTOMY      WISDOM TOOTH EXTRACTION        Family History   Problem Relation Age of Onset    Cancer Mother     Hypertension Mother     Diabetes Mother       Social History     Tobacco Use    Smoking status: Some Days     Current packs/day: 0.00     Average packs/day: 0.5 packs/day for 45.0 years (22.5 ttl pk-yrs)     Types: Cigarettes     Start date: 1978     Last attempt to quit: 2023     Years since quittin.0     Passive exposure: Current    Smokeless tobacco: Never    Tobacco comments:     Patient states that he is trying to cut down   Vaping Use    Vaping status: Some Days    Substances: Nicotine, THC   Substance Use Topics    Alcohol use: Yes    Drug use: Yes     Types: Marijuana      E-Cigarette/Vaping    E-Cigarette Use Current Some Day User     Comments thc- for stomach upset/ headaches and pain       E-Cigarette/Vaping Substances    Nicotine Yes     THC Yes     CBD No     Flavoring No     Other No     Unknown No       I have reviewed and agree with the history as documented.     Patient is a 59-year-old male with a history of CABG, hypertension, hyperlipidemia, diabetes, bipolar disorder, and schizophrenia, who presents complaining of chest pain onset 1 and half years ago.  Patient states pain has worsened over the last 2 days.  Patient describes it as pressure.  Patient also reports intermittent shortness of breath which is also chronic.  Patient has a cardiologist which he has not a  scheduled appointment for on April 8.  Patient denies any fever, chills, recent illnesses, cough, leg swelling, palpitations, new onset nausea/vomiting, numbness, weakness, loss of consciousness, suicidal ideation, or any other symptoms at this time.      History provided by:  Patient   used: No        Review of Systems   Constitutional:  Negative for chills and fever.   HENT:  Negative for ear pain, rhinorrhea and sore throat.    Eyes:  Negative for pain and visual disturbance.   Respiratory:  Positive for shortness of breath. Negative for cough.    Cardiovascular:  Positive for chest pain. Negative for palpitations.   Gastrointestinal:  Negative for abdominal pain, diarrhea, nausea and vomiting.   Genitourinary:  Negative for dysuria and hematuria.   Musculoskeletal:  Negative for arthralgias and back pain.   Skin:  Negative for color change and rash.   Neurological:  Negative for seizures, syncope, weakness and numbness.   All other systems reviewed and are negative.          Objective       ED Triage Vitals [03/28/25 0947]   Temperature Pulse Blood Pressure Respirations SpO2 Patient Position - Orthostatic VS   98.2 °F (36.8 °C) (!) 117 118/89 18 100 % Sitting      Temp Source Heart Rate Source BP Location FiO2 (%) Pain Score    Temporal Monitor Left arm -- --      Vitals      Date and Time Temp Pulse SpO2 Resp BP Pain Score FACES Pain Rating User   03/28/25 1100 -- 94 97 % 15 122/78 -- -- LF   03/28/25 1030 -- 96 97 % 16 117/89 -- -- LF   03/28/25 1021 -- 106 100 % 17 109/83 -- --    03/28/25 0947 98.2 °F (36.8 °C) 117 100 % 18 118/89 -- -- LA            Physical Exam  Vitals and nursing note reviewed.   Constitutional:       General: He is awake. He is not in acute distress.     Appearance: Normal appearance. He is well-developed and well-groomed. He is not ill-appearing, toxic-appearing or diaphoretic.      Comments: Patient is resting on bed, not in any acute distress. Cooperative.    HENT:      Head: Normocephalic and atraumatic.      Right Ear: External ear normal.      Left Ear: External ear normal.      Nose: Nose normal.      Mouth/Throat:      Lips: Pink.   Eyes:      General: Lids are normal.      Extraocular Movements: Extraocular movements intact.      Conjunctiva/sclera: Conjunctivae normal.   Cardiovascular:      Rate and Rhythm: Normal rate and regular rhythm.      Pulses:           Radial pulses are 2+ on the right side and 2+ on the left side.        Posterior tibial pulses are 2+ on the right side and 2+ on the left side.      Heart sounds: Normal heart sounds. No murmur heard.  Pulmonary:      Effort: Pulmonary effort is normal. No tachypnea or respiratory distress.      Breath sounds: Normal breath sounds and air entry. No stridor. No decreased breath sounds, wheezing, rhonchi or rales.   Chest:      Chest wall: Tenderness present. No mass, lacerations, deformity, swelling, crepitus or edema.      Comments: Patient has a midline scar over the sternum.  Pain to palpation patient has most of his pain over the area of scarring.  Abdominal:      General: Bowel sounds are normal.      Palpations: Abdomen is soft.      Tenderness: There is no abdominal tenderness. There is no guarding or rebound.   Musculoskeletal:         General: No swelling. Normal range of motion.      Cervical back: Normal range of motion.      Right lower leg: No edema.      Left lower leg: No edema.   Skin:     General: Skin is warm and dry.      Coloration: Skin is not ashen or pale.   Neurological:      Mental Status: He is alert and oriented to person, place, and time.      GCS: GCS eye subscore is 4. GCS verbal subscore is 5. GCS motor subscore is 6.   Psychiatric:         Attention and Perception: Attention normal.         Mood and Affect: Mood normal.         Speech: Speech normal.         Behavior: Behavior normal. Behavior is cooperative.         Results Reviewed       Procedure Component Value Units  Date/Time    HS Troponin 0hr (reflex protocol) [[id number]]  (Normal) Collected: 03/28/25 1021    Lab Status: Final result Specimen: Blood from Arm, Right Updated: 03/28/25 1050     hs TnI 0hr 3 ng/L     Basic metabolic panel [[id number]]  (Abnormal) Collected: 03/28/25 1021    Lab Status: Final result Specimen: Blood from Arm, Right Updated: 03/28/25 1049     Sodium 135 mmol/L      Potassium 4.6 mmol/L      Chloride 106 mmol/L      CO2 19 mmol/L      ANION GAP 10 mmol/L      BUN 13 mg/dL      Creatinine 0.86 mg/dL      Glucose 113 mg/dL      Calcium 9.5 mg/dL      eGFR 96 ml/min/1.73sq m     Narrative:      National Kidney Disease Foundation guidelines for Chronic Kidney Disease (CKD):     Stage 1 with normal or high GFR (GFR > 90 mL/min/1.73 square meters)    Stage 2 Mild CKD (GFR = 60-89 mL/min/1.73 square meters)    Stage 3A Moderate CKD (GFR = 45-59 mL/min/1.73 square meters)    Stage 3B Moderate CKD (GFR = 30-44 mL/min/1.73 square meters)    Stage 4 Severe CKD (GFR = 15-29 mL/min/1.73 square meters)    Stage 5 End Stage CKD (GFR <15 mL/min/1.73 square meters)  Note: GFR calculation is accurate only with a steady state creatinine    CBC and differential [[id number]]  (Abnormal) Collected: 03/28/25 1021    Lab Status: Final result Specimen: Blood from Arm, Right Updated: 03/28/25 1025     WBC 5.00 Thousand/uL      RBC 6.04 Million/uL      Hemoglobin 16.1 g/dL      Hematocrit 48.8 %      MCV 81 fL      MCH 26.7 pg      MCHC 33.0 g/dL      RDW 14.3 %      MPV 10.1 fL      Platelets 231 Thousands/uL      nRBC 0 /100 WBCs      Segmented % 64 %      Immature Grans % 0 %      Lymphocytes % 24 %      Monocytes % 9 %      Eosinophils Relative 2 %      Basophils Relative 1 %      Absolute Neutrophils 3.18 Thousands/µL      Absolute Immature Grans 0.01 Thousand/uL      Absolute Lymphocytes 1.20 Thousands/µL      Absolute Monocytes 0.45 Thousand/µL      Eosinophils Absolute 0.12 Thousand/µL      Basophils Absolute 0.04  Thousands/µL             X-ray chest 2 views   Final Interpretation by Jason Servin MD (03/28 9917)      No acute cardiopulmonary disease.            Workstation performed: [id number]             Procedures    ED Medication and Procedure Management   Prior to Admission Medications   Prescriptions Last Dose Informant Patient Reported? Taking?   amitriptyline (ELAVIL) 10 mg tablet   No No   Sig: Take 1 tablet (10 mg total) by mouth daily at bedtime   esomeprazole (NexIUM) 40 MG capsule   No No   Sig: Take 1 capsule (40 mg total) by mouth in the morning      Facility-Administered Medications: None     Discharge Medication List as of 3/28/2025 11:03 AM        CONTINUE these medications which have NOT CHANGED    Details   amitriptyline (ELAVIL) 10 mg tablet Take 1 tablet (10 mg total) by mouth daily at bedtime, Starting Tue 3/25/2025, Normal      esomeprazole (NexIUM) 40 MG capsule Take 1 capsule (40 mg total) by mouth in the morning, Starting Tue 3/25/2025, Normal           No discharge procedures on file.  ED SEPSIS DOCUMENTATION   Time reflects when diagnosis was documented in both MDM as applicable and the Disposition within this note       Time User Action Codes Description Comment    3/28/2025 10:52 AM Quinton Saucedo Add [R07.9] Chest pain                  Quinton Saucedo PA-C  03/29/25 1723       Quinton Saucedo PA-C  03/29/25 1724

## 2025-03-28 NOTE — DISCHARGE INSTRUCTIONS
You were evaluated in the Emergency Department today for chest pain. Your evaluation has shown no signs of medical conditions requiring emergent intervention at this time, however we recommend that you follow up with your primary care physician or your cardiologist as soon as possible for further testing as an outpatient.  Please schedule an appointment for follow up with your primary care physician as soon as possible.  Return to the Emergency Department if you experience worsening or uncontrolled chest pain, shortness of breath, light headedness, feeling faint, nausea, vomiting, or any other concerning symptoms.

## 2025-03-29 NOTE — ED ATTENDING ATTESTATION
3/28/2025  I, Nam Paige MD, saw and evaluated the patient. I have discussed the patient with the resident/non-physician practitioner and agree with the resident's/non-physician practitioner's findings, Plan of Care, and MDM as documented in the resident's/non-physician practitioner's note, except where noted. All available labs and Radiology studies were reviewed.  I was present for key portions of any procedure(s) performed by the resident/non-physician practitioner and I was immediately available to provide assistance.       At this point I agree with the current assessment done in the Emergency Department.  I have conducted an independent evaluation of this patient a history and physical is as follows: Patient is a 59-year-old male who presents emergency department with anterior chest pain describing it as a somewhat sharp pressure somewhat worse with movement and palpation of his chest.  Patient reports the pain occurred approximately year and a half ago.  Denies any shortness of breath there is no fever.  Denies any diaphoresis.  Physical exam shows an awake alert 59-year-old male in no acute distress, lungs are clear heart regular rate and rhythm without murmur, there is some anterior chest tenderness over his sternum that his CABG scar.  No redness no warmth,    ED Course     Laboratory testing shows a troponin of 3 despite 1 year of pain, low heart score, no indication for hospitalization at this time.    Critical Care Time  Procedures

## 2025-05-20 ENCOUNTER — TELEPHONE (OUTPATIENT)
Age: 60
End: 2025-05-20

## 2025-05-20 NOTE — TELEPHONE ENCOUNTER
"Hello,    The following message was sent via e-mail to the leadership team:     Please advise if you can help facilitate the following overbook request:    Patient Name: Neal Walden    Patient MRN: [id number]     Call back #: [phone]     Insurance: [facility]    Department:Cardiology    Speciality: General Cardiology    Reason for overbook request: PATIENT REQUEST    Comments (Write \"N/a\" if no comments): Patient was last seen in the office by Dr. Patel in March 2024.  Patient in March of this year was experiencing chest pain, triaged and sent to ED. Followup was scheduled but due to transportation issues, this had to be canceled.  Most recent appt with Charis Rosales on 5/29/25 in [place] was canceled as patient travels by bus and location of appt did not coincide with bus route.  Patient request is being made to be scheduled for a cardiology followup in the [place] office as that location is familiar to the patient.  Unable at the time of call to schedule an appt with either provider or [facility].      Requested doctor and location: Any provider/[place]     Date of current appointment: N/A      Thank you.    "

## 2025-06-11 ENCOUNTER — TELEPHONE (OUTPATIENT)
Age: 60
End: 2025-06-11

## 2025-06-11 NOTE — TELEPHONE ENCOUNTER
Girlfriend called for the phone number for patient's cardiologist.  The phone number was provided and a soft transfer was completed.

## 2025-07-01 ENCOUNTER — HOSPITAL ENCOUNTER (EMERGENCY)
Facility: HOSPITAL | Age: 60
Discharge: HOME | End: 2025-07-01
Attending: FAMILY MEDICINE | Admitting: FAMILY MEDICINE
Payer: OTHER GOVERNMENT

## 2025-07-01 ENCOUNTER — APPOINTMENT (EMERGENCY)
Dept: RADIOLOGY | Facility: HOSPITAL | Age: 60
End: 2025-07-01
Payer: OTHER GOVERNMENT

## 2025-07-01 VITALS
RESPIRATION RATE: 16 BRPM | HEIGHT: 66 IN | OXYGEN SATURATION: 97 % | DIASTOLIC BLOOD PRESSURE: 85 MMHG | BODY MASS INDEX: 27.48 KG/M2 | HEART RATE: 86 BPM | SYSTOLIC BLOOD PRESSURE: 144 MMHG | TEMPERATURE: 98.2 F | WEIGHT: 171 LBS

## 2025-07-01 DIAGNOSIS — R07.9 CHEST PAIN: Primary | ICD-10-CM

## 2025-07-01 LAB
ALBUMIN SERPL BCG-MCNC: 4.3 G/DL (ref 3.5–5)
ALP SERPL-CCNC: 67 U/L (ref 34–104)
ALT SERPL W P-5'-P-CCNC: 15 U/L (ref 7–52)
ANION GAP SERPL CALCULATED.3IONS-SCNC: 9 MMOL/L (ref 4–13)
AST SERPL W P-5'-P-CCNC: 15 U/L (ref 13–39)
BASOPHILS # BLD AUTO: 0.05 THOUSANDS/ÂΜL (ref 0–0.1)
BASOPHILS NFR BLD AUTO: 1 % (ref 0–1)
BILIRUB SERPL-MCNC: 0.53 MG/DL (ref 0.2–1)
BUN SERPL-MCNC: 8 MG/DL (ref 5–25)
CALCIUM SERPL-MCNC: 9.7 MG/DL (ref 8.4–10.2)
CARDIAC TROPONIN I PNL SERPL HS: 4 NG/L (ref ?–50)
CHLORIDE SERPL-SCNC: 105 MMOL/L (ref 96–108)
CO2 SERPL-SCNC: 24 MMOL/L (ref 21–32)
CREAT SERPL-MCNC: 0.7 MG/DL (ref 0.6–1.3)
EOSINOPHIL # BLD AUTO: 0.17 THOUSAND/ÂΜL (ref 0–0.61)
EOSINOPHIL NFR BLD AUTO: 3 % (ref 0–6)
ERYTHROCYTE [DISTWIDTH] IN BLOOD BY AUTOMATED COUNT: 14.3 % (ref 11.6–15.1)
GFR SERPL CREATININE-BSD FRML MDRD: 104 ML/MIN/1.73SQ M
GLUCOSE SERPL-MCNC: 101 MG/DL (ref 65–140)
HCT VFR BLD AUTO: 42.6 % (ref 36.5–49.3)
HGB BLD-MCNC: 14.3 G/DL (ref 12–17)
IMM GRANULOCYTES # BLD AUTO: 0.01 THOUSAND/UL (ref 0–0.2)
IMM GRANULOCYTES NFR BLD AUTO: 0 % (ref 0–2)
LYMPHOCYTES # BLD AUTO: 1.16 THOUSANDS/ÂΜL (ref 0.6–4.47)
LYMPHOCYTES NFR BLD AUTO: 21 % (ref 14–44)
MCH RBC QN AUTO: 27.9 PG (ref 26.8–34.3)
MCHC RBC AUTO-ENTMCNC: 33.6 G/DL (ref 31.4–37.4)
MCV RBC AUTO: 83 FL (ref 82–98)
MONOCYTES # BLD AUTO: 0.52 THOUSAND/ÂΜL (ref 0.17–1.22)
MONOCYTES NFR BLD AUTO: 9 % (ref 4–12)
NEUTROPHILS # BLD AUTO: 3.68 THOUSANDS/ÂΜL (ref 1.85–7.62)
NEUTS SEG NFR BLD AUTO: 66 % (ref 43–75)
NRBC BLD AUTO-RTO: 0 /100 WBCS
PLATELET # BLD AUTO: 240 THOUSANDS/UL (ref 149–390)
PMV BLD AUTO: 10 FL (ref 8.9–12.7)
POTASSIUM SERPL-SCNC: 3.7 MMOL/L (ref 3.5–5.3)
PROT SERPL-MCNC: 7.2 G/DL (ref 6.4–8.4)
RBC # BLD AUTO: 5.12 MILLION/UL (ref 3.88–5.62)
SODIUM SERPL-SCNC: 138 MMOL/L (ref 135–147)
WBC # BLD AUTO: 5.59 THOUSAND/UL (ref 4.31–10.16)

## 2025-07-01 PROCEDURE — 93005 ELECTROCARDIOGRAM TRACING: CPT

## 2025-07-01 PROCEDURE — 99285 EMERGENCY DEPT VISIT HI MDM: CPT

## 2025-07-01 PROCEDURE — 85025 COMPLETE CBC W/AUTO DIFF WBC: CPT

## 2025-07-01 PROCEDURE — 71045 X-RAY EXAM CHEST 1 VIEW: CPT

## 2025-07-01 PROCEDURE — 84484 ASSAY OF TROPONIN QUANT: CPT

## 2025-07-01 PROCEDURE — 36415 COLL VENOUS BLD VENIPUNCTURE: CPT

## 2025-07-01 PROCEDURE — 80053 COMPREHEN METABOLIC PANEL: CPT

## 2025-07-01 RX ORDER — ACETAMINOPHEN 325 MG/1
650 TABLET ORAL ONCE
Status: COMPLETED | OUTPATIENT
Start: 2025-07-01 | End: 2025-07-01

## 2025-07-01 RX ADMIN — ACETAMINOPHEN 650 MG: 325 TABLET ORAL at 10:23

## 2025-07-01 NOTE — ED PROVIDER NOTES
Time reflects when diagnosis was documented in both MDM as applicable and the Disposition within this note       Time User Action Codes Description Comment    7/1/2025 11:16 AM MistyRandy Add [R07.9] Chest pain           ED Disposition       ED Disposition   Discharge    Condition   Stable    Date/Time   Tue Jul 1, 2025 11:16 AM    Comment   Neal Walden discharge to home/self care.                   Assessment & Plan       Medical Decision Making  Patient is a 59-year-old male presenting to the emergency department for evaluation of a 2-day history of chest pain.  Patient states he has had several episodes of chest pain since March 2023, with no clear etiology of his symptoms.    Will perform cardiac workup with chest x-ray to assess for ACS, pneumonia, pneumothorax, pleural effusions.  Provided Tylenol for pain relief, as patient reports he is allergic to all medications except ibuprofen and Tylenol.    EKG performed on 7/1/2025 9:51 AM independently interpreted demonstrating normal sinus rhythm with rate of 84 bpm.  Normal axis.  No ST segment elevations or depressions.  CT interval 150 ms.  No prolonged QRS or abnormal QT interval.    CBC and CMP unremarkable.  Initial troponin of 4.  Heart score of 3.  Chest x-ray independently interpreted demonstrating no evidence of pneumothorax, pleural effusions, or pneumonia.  He is appropriate for discharge at this time.  Discussed with patient that his workup was reassuring and he is appropriate for discharge.  Recommend he follow-up with his primary care provider and cardiologist for this episode of chest pain to discuss possible alternative etiologies and further management.  Reviewed return precautions with the patient.  All questions answered to the patient's satisfaction, he understands and agrees to stated plan.    Amount and/or Complexity of Data Reviewed  Labs: ordered.  Radiology: ordered and independent interpretation performed.    Risk  OTC  drugs.             Medications   acetaminophen (TYLENOL) tablet 650 mg (650 mg Oral Given 7/1/25 1023)       ED Risk Strat Scores   HEART Risk Score      Flowsheet Row Most Recent Value   Heart Score Risk Calculator    History 0 Filed at: 07/01/2025 1109   ECG 0 Filed at: 07/01/2025 1109   Age 1 Filed at: 07/01/2025 1109   Risk Factors 2 Filed at: 07/01/2025 1109   Troponin 0 Filed at: 07/01/2025 1109   HEART Score 3 Filed at: 07/01/2025 1109          HEART Risk Score      Flowsheet Row Most Recent Value   Heart Score Risk Calculator    History 0 Filed at: 07/01/2025 1109   ECG 0 Filed at: 07/01/2025 1109   Age 1 Filed at: 07/01/2025 1109   Risk Factors 2 Filed at: 07/01/2025 1109   Troponin 0 Filed at: 07/01/2025 1109   HEART Score 3 Filed at: 07/01/2025 1109                      No data recorded        SBIRT 22yo+      Flowsheet Row Most Recent Value   Initial Alcohol Screen: US AUDIT-C     1. How often do you have a drink containing alcohol? 0 Filed at: 07/01/2025 0951   2. How many drinks containing alcohol do you have on a typical day you are drinking?  0 Filed at: 07/01/2025 0951   3a. Male UNDER 65: How often do you have five or more drinks on one occasion? 0 Filed at: 07/01/2025 0951   Audit-C Score 0 Filed at: 07/01/2025 0951   MARTINA: How many times in the past year have you...    Used an illegal drug or used a prescription medication for non-medical reasons? Never Filed at: 07/01/2025 0951            Wells' Criteria for PE      Flowsheet Row Most Recent Value   Wells' Criteria for PE    Clinical signs and symptoms of DVT 0 Filed at: 07/01/2025 1027   PE is primary diagnosis or equally likely 0 Filed at: 07/01/2025 1027   HR >100 0 Filed at: 07/01/2025 1027   Immobilization at least 3 days or Surgery in the previous 4 weeks 0 Filed at: 07/01/2025 1027   Previous, objectively diagnosed PE or DVT 0 Filed at: 07/01/2025 1027   Hemoptysis 0 Filed at: 07/01/2025 1027   Malignancy with treatment within 6 months or  "palliative 0 Filed at: 07/01/2025 1027   Wells' Criteria Total 0 Filed at: 07/01/2025 1027                        History of Present Illness       Chief Complaint   Patient presents with    Chest Pain     Pt reports chest pain since March 2023. Last night he had pain in his left arm, today he \"passed out\" when they did an EKG at the [facility].       Past Medical History[1]   Past Surgical History[2]   Family History[3]   Social History[4]   E-Cigarette/Vaping    E-Cigarette Use Current Some Day User     Comments thc- for stomach upset/ headaches and pain       E-Cigarette/Vaping Substances    Nicotine Yes     THC Yes     CBD No     Flavoring No     Other No     Unknown No       I have reviewed and agree with the history as documented.     Neal is a 59-year-old male with past medical history of hypertension, hyperlipidemia, s/p CABG in 2023, and psychiatric disorder presenting to the emergency department for evaluation of a 2 day history of chest pain. Symptoms started last night, localized to the substernal region with radiation into his left arm. Patient states he has had similar episodes of this pain since March of 2023. Endorses some mild shortness of breath and nausea. Denies back pain, neck pain, abdominal pain, vomiting, changes in urinary or bowel habits, headaches, leg swelling, numbness or weakness. He reports he is allergic to all medications except ibuprofen or Tylenol, which he has been taking for his symptoms. Last took ibuprofen this morning. Reports history of marijuana use, which also alleviates his symptoms of chest pain.       History provided by:  Patient   used: No        Review of Systems   Respiratory:  Positive for shortness of breath. Negative for cough.    Cardiovascular:  Positive for chest pain.   Gastrointestinal:  Positive for nausea. Negative for abdominal pain, diarrhea and vomiting.   Genitourinary:  Negative for dysuria and flank pain.   Musculoskeletal:  Negative " for back pain and neck pain.   Neurological:  Negative for weakness, numbness and headaches.   All other systems reviewed and are negative.          Objective       ED Triage Vitals [07/01/25 0952]   Temperature Pulse Blood Pressure Respirations SpO2 Patient Position - Orthostatic VS   98.3 °F (36.8 °C) 82 156/89 16 98 % Lying      Temp Source Heart Rate Source BP Location FiO2 (%) Pain Score    Temporal Monitor Left arm -- 4      Vitals      Date and Time Temp Pulse SpO2 Resp BP Pain Score FACES Pain Rating User   07/01/25 1122 -- -- -- -- -- 4 --    07/01/25 1100 98.2 °F (36.8 °C) 86 97 % 16 144/85 -- --    07/01/25 0952 98.3 °F (36.8 °C) 82 98 % 16 156/89 4 --             Physical Exam  Vitals and nursing note reviewed.   Constitutional:       General: He is not in acute distress.     Appearance: Normal appearance.   HENT:      Head: Normocephalic and atraumatic.     Eyes:      Extraocular Movements: Extraocular movements intact.      Conjunctiva/sclera: Conjunctivae normal.      Pupils: Pupils are equal, round, and reactive to light.       Cardiovascular:      Rate and Rhythm: Normal rate and regular rhythm.      Pulses: Normal pulses.   Pulmonary:      Effort: Pulmonary effort is normal. No respiratory distress.      Breath sounds: Normal breath sounds. No wheezing, rhonchi or rales.   Abdominal:      General: Abdomen is flat.      Palpations: Abdomen is soft.      Tenderness: There is no abdominal tenderness. There is no right CVA tenderness, left CVA tenderness, guarding or rebound.     Musculoskeletal:      Right lower leg: No edema.      Left lower leg: No edema.     Skin:     General: Skin is warm and dry.     Neurological:      Mental Status: He is alert.     Psychiatric:         Mood and Affect: Mood normal.         Behavior: Behavior normal.         Results Reviewed       Procedure Component Value Units Date/Time    HS Troponin 0hr (reflex protocol) [[id number]]  (Normal) Collected: 07/01/25 1021     Lab Status: Final result Specimen: Blood from Arm, Left Updated: 07/01/25 1052     hs TnI 0hr 4 ng/L     Comprehensive metabolic panel [[id number]] Collected: 07/01/25 1021    Lab Status: Final result Specimen: Blood from Arm, Left Updated: 07/01/25 1045     Sodium 138 mmol/L      Potassium 3.7 mmol/L      Chloride 105 mmol/L      CO2 24 mmol/L      ANION GAP 9 mmol/L      BUN 8 mg/dL      Creatinine 0.70 mg/dL      Glucose 101 mg/dL      Calcium 9.7 mg/dL      AST 15 U/L      ALT 15 U/L      Alkaline Phosphatase 67 U/L      Total Protein 7.2 g/dL      Albumin 4.3 g/dL      Total Bilirubin 0.53 mg/dL      eGFR 104 ml/min/1.73sq m     Narrative:      National Kidney Disease Foundation guidelines for Chronic Kidney Disease (CKD):     Stage 1 with normal or high GFR (GFR > 90 mL/min/1.73 square meters)    Stage 2 Mild CKD (GFR = 60-89 mL/min/1.73 square meters)    Stage 3A Moderate CKD (GFR = 45-59 mL/min/1.73 square meters)    Stage 3B Moderate CKD (GFR = 30-44 mL/min/1.73 square meters)    Stage 4 Severe CKD (GFR = 15-29 mL/min/1.73 square meters)    Stage 5 End Stage CKD (GFR <15 mL/min/1.73 square meters)  Note: GFR calculation is accurate only with a steady state creatinine    CBC and differential [[id number]] Collected: 07/01/25 1021    Lab Status: Final result Specimen: Blood from Arm, Left Updated: 07/01/25 1026     WBC 5.59 Thousand/uL      RBC 5.12 Million/uL      Hemoglobin 14.3 g/dL      Hematocrit 42.6 %      MCV 83 fL      MCH 27.9 pg      MCHC 33.6 g/dL      RDW 14.3 %      MPV 10.0 fL      Platelets 240 Thousands/uL      nRBC 0 /100 WBCs      Segmented % 66 %      Immature Grans % 0 %      Lymphocytes % 21 %      Monocytes % 9 %      Eosinophils Relative 3 %      Basophils Relative 1 %      Absolute Neutrophils 3.68 Thousands/µL      Absolute Immature Grans 0.01 Thousand/uL      Absolute Lymphocytes 1.16 Thousands/µL      Absolute Monocytes 0.52 Thousand/µL      Eosinophils Absolute 0.17 Thousand/µL       Basophils Absolute 0.05 Thousands/µL             XR chest 1 view portable   ED Interpretation by Randy Jay PA-C (07/01 1051)   No pneumothorax, pleural effusions, or consolidations to suggest pneumonia          Procedures    ED Medication and Procedure Management   Prior to Admission Medications   Prescriptions Last Dose Informant Patient Reported? Taking?   amitriptyline (ELAVIL) 10 mg tablet Not Taking  No No   Sig: Take 1 tablet (10 mg total) by mouth daily at bedtime   Patient not taking: Reported on 7/1/2025   esomeprazole (NexIUM) 40 MG capsule Not Taking  No No   Sig: Take 1 capsule (40 mg total) by mouth in the morning   Patient not taking: Reported on 7/1/2025      Facility-Administered Medications: None     Discharge Medication List as of 7/1/2025 11:19 AM        CONTINUE these medications which have NOT CHANGED    Details   amitriptyline (ELAVIL) 10 mg tablet Take 1 tablet (10 mg total) by mouth daily at bedtime, Starting Tue 3/25/2025, Normal      esomeprazole (NexIUM) 40 MG capsule Take 1 capsule (40 mg total) by mouth in the morning, Starting Tue 3/25/2025, Normal           No discharge procedures on file.  ED SEPSIS DOCUMENTATION   Time reflects when diagnosis was documented in both MDM as applicable and the Disposition within this note       Time User Action Codes Description Comment    7/1/2025 11:16 AM Randy Jay Add [R07.9] Chest pain                      [1]   Past Medical History:  Diagnosis Date    Diabetes mellitus (HCC)     Diverticulitis of colon     Hyperlipidemia     Hypertension     Psychiatric disorder     bipolar    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HCC)     Syncope    [2]   Past Surgical History:  Procedure Laterality Date    CARDIAC ASCENDING AORTOGRAM N/A 10/28/2022    Procedure: Cardiac ascending aortogram;  Surgeon: Michele Baer MD;  Location: [facility];  Service: Cardiology    CARDIAC CATHETERIZATION Left 10/28/2022    Procedure: CARDIAC RHC/LHC;   Surgeon: Michele Baer MD;  Location: [facility];  Service: Cardiology    CARDIAC CATHETERIZATION N/A 10/28/2022    Procedure: Cardiac Coronary Angiogram;  Surgeon: Michele Baer MD;  Location: [facility];  Service: Cardiology    CARDIAC CATHETERIZATION Left 10/28/2022    Procedure: Cardiac Left Ventriculogram;  Surgeon: Michele Baer MD;  Location: [facility];  Service: Cardiology    LAPAROSCOPIC LYSIS INTESTINAL ADHESIONS      NM RPLCMT AORTIC VALVE OPN W/STENTLESS TISSUE VALVE N/A 2023    Procedure: CORONARY ARTERY BYPASS GRAFT (CABG) 1 VESSEL GSV-->RCA, EVH RIGHT LEG,  WITH REPLACEMENT VALVE AORTIC (AVR) 25MM INSPIRIA RESILIA TISSUE VALVE;  Surgeon: MAY Fierro MD;  Location:  MAIN OR;  Service: Cardiac Surgery    TONSILECTOMY AND ADNOIDECTOMY      WISDOM TOOTH EXTRACTION     [3]   Family History  Problem Relation Name Age of Onset    Cancer Mother      Hypertension Mother      Diabetes Mother     [4]   Social History  Tobacco Use    Smoking status: Former     Current packs/day: 0.00     Average packs/day: 0.5 packs/day for 45.0 years (22.5 ttl pk-yrs)     Types: Cigarettes     Start date: 1978     Quit date: 2023     Years since quittin.3     Passive exposure: Current    Smokeless tobacco: Never    Tobacco comments:     Patient states that he is trying to cut down   Vaping Use    Vaping status: Some Days    Substances: Nicotine, THC   Substance Use Topics    Alcohol use: Not Currently    Drug use: Yes     Types: Marijuana        Randy Jay PA-C  [phone]

## 2025-07-01 NOTE — ED ATTENDING ATTESTATION
"7/1/2025  I, Baldemar Velasco MD, saw and evaluated the patient. I have discussed the patient with the resident/non-physician practitioner and agree with the resident's/non-physician practitioner's findings, Plan of Care, and MDM as documented in the resident's/non-physician practitioner's note, except where noted. All available labs and Radiology studies were reviewed.  I was present for key portions of any procedure(s) performed by the resident/non-physician practitioner and I was immediately available to provide assistance.       At this point I agree with the current assessment done in the Emergency Department.  I have conducted an independent evaluation of this patient a history and physical is as follows:    ED Course     59-year-old male presented to ED with complaint of chest pain since March 2023 last night had a pain in his left arm stated that it passed out today they did an EKG recommend to come to the ED.  Otherwise stable awake alert oriented GCS 15  Differential diagnoses rule out ACS causes like MI/pancreatitis/pneumonia/pleural  Effusion/pneumothorax  Plan recommend to obtain labs chest x-ray  /85   Pulse 86   Temp 98.2 °F (36.8 °C) (Temporal)   Resp 16   Ht 5' 6\" (1.676 m)   Wt 77.6 kg (171 lb)   SpO2 97%   BMI 27.60 kg/m²   General appearance: alert and oriented, in no acute distress  Lungs: clear to auscultation bilaterally  Heart: regular rate and rhythm, S1, S2 normal, no murmur, click, rub or gallop  Abdomen: soft, non-tender; bowel sounds normal; no masses,  no organomegaly  Extremities: extremities normal, warm and well-perfused; no cyanosis, clubbing, or edema  Pulses: 2+ and symmetric  Skin: Skin color, texture, turgor normal. No rashes or lesions   The cardiac monitor revealed normal sinus rhythm with a rate of 86 as interpreted by me. The cardiac monitor was ordered secondary to the patient’s history of chest pain and to monitor the patient for dysrhythmia.   Patient seen " "examined at bedside 2-hour troponin obtained.  Patient is refusing any medications states \"I am allergic to all the medication except Tylenol and Motrin will not be able to take any other medication\".  Again multiple discussion with patient patient states that he had this chest pain for years felt slightly lightheaded today and wanted to get checked.  Labs reviewed within normal limits  Chest x-ray within normal limits recommending following up patient will be discharged back to [facility].  Critical Care Time  Procedures      "

## 2025-07-01 NOTE — DISCHARGE INSTRUCTIONS
You were evaluated for chest pain today in the ER. Your lab work and chest x-ray were normal. Please follow up with your PCP to further discuss what may be causing this pain, as you reported this has been going on for over a year now.     Please return to the ER if you experience new or worsening symptoms.

## 2025-07-02 LAB
ATRIAL RATE: 84 BPM
P AXIS: 21 DEGREES
PR INTERVAL: 150 MS
QRS AXIS: 27 DEGREES
QRSD INTERVAL: 92 MS
QT INTERVAL: 368 MS
QTC INTERVAL: 434 MS
T WAVE AXIS: 71 DEGREES
VENTRICULAR RATE: 84 BPM

## 2025-07-02 PROCEDURE — 93010 ELECTROCARDIOGRAM REPORT: CPT | Performed by: INTERNAL MEDICINE

## 2025-07-21 ENCOUNTER — TELEPHONE (OUTPATIENT)
Age: 60
End: 2025-07-21

## 2025-07-21 NOTE — TELEPHONE ENCOUNTER
"Hello,    The following message was sent via e-mail to the leadership team:     Please advise if you can help facilitate the following overbook request:    Patient Name: Neal Walden     Patient MRN: [id number]    Call back #: [phone]    Insurance: [employer] [facility]     Department:Cardiology    Speciality: General Cardiology    Reason for overbook request: OTHER (PLEASE WRITE REASON IN COMMENT SECTION)    Comments (Write \"N/a\" if no comments): patient was seen in the ED on 7/1/25 for chest pains. Patient spouse is requesting a hospital follow up appointment. No appointments available.     Requested doctor and location: [place]    Date of current appointment: N/A      Thank you.      "

## 2025-07-22 ENCOUNTER — NURSE TRIAGE (OUTPATIENT)
Age: 60
End: 2025-07-22

## 2025-07-22 NOTE — TELEPHONE ENCOUNTER
"Hello,    The following message was sent via e-mail to the leadership team:     Please advise if you can help facilitate the following overbook request:    Patient Name: Neal Walden    Patient MRN: [id number]    Call back #: [phone]    Insurance:     [facility]       Department:Cardiology    Speciality: General Cardiology    Reason for overbook request: PATIENT REQUEST    Comments (Write \"N/a\" if no comments):   Complains of Chest pain, offered 911 EMS , declined, had multiple hospital visits. Requesting Urgent appointment.  [place] appointment offered, unable to schedule there.     Requested doctor and location: [place]    Date of current appointment: N/A      Thank you.        "

## 2025-07-22 NOTE — TELEPHONE ENCOUNTER
"REASON FOR CONVERSATION: Chest Pain    SYMPTOMS: Shortness of breath, chest pain that is localized to center of chest.     OTHER HEALTH INFORMATION: Multiple hospital visits.     PROTOCOL DISPOSITION: Call  Now    CARE ADVICE PROVIDED: Recommended to dial 911 and go to nearest Emergency Room. Patient refused care advise provided.   Call back advise given to his Fiance as patient had to go back to work. Offered appointment in [place].   Patient and Fiance state will only schedule appointment in [place].   Needs help with transportation.   Patient and Fiance recommends appointment in office.     PRACTICE FOLLOW-UP: Scheduled appointment in [place]        Reason for Disposition   Chest pain lasting longer than 5 minutes and over 44 years old    Answer Assessment - Initial Assessment Questions  1. LOCATION: \"Where does it hurt?\"        Center of chest   2. RADIATION: \"Does the pain go anywhere else?\" (e.g., into neck, jaw, arms, back)      Denies, localized to center of chest  3. ONSET: \"When did the chest pain begin?\" (Minutes, hours or days)       Constant , for 3-4 months   4. PATTERN: \"Does the pain come and go, or has it been constant since it started?\"  \"Does it get worse with exertion?\"       constant  5. DURATION: \"How long does it last\" (e.g., seconds, minutes, hours)      \"Lasts all day\"  6. SEVERITY: \"How bad is the pain?\"  (e.g., Scale 1-10; mild, moderate, or severe)      10/10, \"baby elephant sitting on chest  7. CARDIAC RISK FACTORS: \"Do you have any history of heart problems or risk factors for heart disease?\" (e.g., angina, prior heart attack; diabetes, high blood pressure, high cholesterol, smoker, or strong family history of heart disease)      Triple bipass  8. PULMONARY RISK FACTORS: \"Do you have any history of lung disease?\"  (e.g., blood clots in lung, asthma, emphysema, birth control pills)      Denies  9. CAUSE: \"What do you think is causing the chest pain?\"      " "unsure  10. OTHER SYMPTOMS: \"Do you have any other symptoms?\" (e.g., dizziness, nausea, vomiting, sweating, fever, difficulty breathing, cough)        Shortness of breath,   Going through bottle of Aspirin  Taking tylenol, ibuprofen, aleve with no symptom relief.    Protocols used: Chest Pain-Adult-[place]    "

## 2025-07-29 ENCOUNTER — OFFICE VISIT (OUTPATIENT)
Dept: FAMILY MEDICINE CLINIC | Facility: CLINIC | Age: 60
End: 2025-07-29
Payer: COMMERCIAL

## 2025-07-29 VITALS
BODY MASS INDEX: 28.06 KG/M2 | OXYGEN SATURATION: 99 % | RESPIRATION RATE: 16 BRPM | HEIGHT: 66 IN | WEIGHT: 174.6 LBS | HEART RATE: 94 BPM | TEMPERATURE: 98.3 F | SYSTOLIC BLOOD PRESSURE: 130 MMHG | DIASTOLIC BLOOD PRESSURE: 90 MMHG

## 2025-07-29 DIAGNOSIS — I10 PRIMARY HYPERTENSION: ICD-10-CM

## 2025-07-29 DIAGNOSIS — R21 RASH AND NONSPECIFIC SKIN ERUPTION: Primary | ICD-10-CM

## 2025-07-29 PROCEDURE — 99213 OFFICE O/P EST LOW 20 MIN: CPT | Performed by: NURSE PRACTITIONER

## 2025-07-29 RX ORDER — PREDNISONE 20 MG/1
20 TABLET ORAL 2 TIMES DAILY WITH MEALS
Qty: 10 TABLET | Refills: 0 | Status: SHIPPED | OUTPATIENT
Start: 2025-07-29 | End: 2025-08-03

## 2025-07-30 ENCOUNTER — TELEPHONE (OUTPATIENT)
Age: 60
End: 2025-07-30

## 2025-07-30 DIAGNOSIS — G43.919 INTRACTABLE MIGRAINE WITHOUT STATUS MIGRAINOSUS, UNSPECIFIED MIGRAINE TYPE: Primary | ICD-10-CM

## 2025-08-05 ENCOUNTER — TELEPHONE (OUTPATIENT)
Age: 60
End: 2025-08-05

## 2025-08-12 ENCOUNTER — APPOINTMENT (OUTPATIENT)
Dept: LAB | Facility: HOSPITAL | Age: 60
End: 2025-08-12
Payer: COMMERCIAL

## (undated) DEVICE — SUT PROLENE 7-0 BV175-8/BV175-8 24 IN EPM8747

## (undated) DEVICE — GAUZE SPONGES,USP TYPE VII GAUZE, 12 PLY: Brand: CURITY

## (undated) DEVICE — LIGHT HANDLE COVER SLEEVE DISP BLUE STELLAR

## (undated) DEVICE — DRESSING ALLEVYN LIFE HEEL 25 X 25.2CM

## (undated) DEVICE — BULB SYRINGE,IRRIGATION WITH PROTECTIVE CAP: Brand: DOVER

## (undated) DEVICE — SUT PDS PLUS 1 CTB 36 IN PDPB359T

## (undated) DEVICE — INTENDED FOR TISSUE SEPARATION, AND OTHER PROCEDURES THAT REQUIRE A SHARP SURGICAL BLADE TO PUNCTURE OR CUT.: Brand: BARD-PARKER SAFETY BLADES SIZE 15, STERILE

## (undated) DEVICE — BLANKET HYPOTHERMIA ADULT GAYMAR

## (undated) DEVICE — SILVER-COATED ANTIBACTERIAL BARRIER DRESSING: Brand: ACTICOAT SURGIC 10X12CM 5PK US

## (undated) DEVICE — VASOVIEW HEMOPRO 2: Brand: VASOVIEW HEMOPRO 2

## (undated) DEVICE — PLUMEPEN PRO 10FT

## (undated) DEVICE — PUMP TUBING FUSION PACK

## (undated) DEVICE — GUIDEWIRE WHOLEY HI TORQUE INTERM MOD J.035 260CM

## (undated) DEVICE — SWAN-GANZ DOUBLE LUMEN MONITORING CATHETER: Brand: SWAN-GANZ

## (undated) DEVICE — RECIP.STERNUM SAW BLADE 34/7.5/0.7MM: Brand: AESCULAP

## (undated) DEVICE — TR BAND RADIAL ARTERY COMPRESSION DEVICE: Brand: TR BAND

## (undated) DEVICE — PACK CABG PBDS

## (undated) DEVICE — SUT VICRYL PLUS 1 CTB-1 36 IN VCPB947H

## (undated) DEVICE — FILTER SMOKE EVAC VIROSAFE

## (undated) DEVICE — CATH DIAG 5FR IMPULSE 100CM FL3.5

## (undated) DEVICE — TUBING INSUFFLATION SET ISO CONNECTOR

## (undated) DEVICE — SILVER-COATED ANTIBACTERIAL BARRIER DRESSING: Brand: ACTICOAT SURGIC 10X25CM 5PK US

## (undated) DEVICE — ACE WRAP 6 IN UNSTERILE

## (undated) DEVICE — SUT SILK 2 60 IN SA8H

## (undated) DEVICE — SUT PROLENE 5-0 C-1/C-1 36 IN 8321H

## (undated) DEVICE — ALCON OPHTHALMIC KNIFE 15 °: Brand: ALCON

## (undated) DEVICE — CATH DIAG 5FR IMPULSE 100CM FR4

## (undated) DEVICE — SURGICEL NU-KNIT 3 X 4

## (undated) DEVICE — SUT SILK 2-0 SH CR/8 18 IN C012D

## (undated) DEVICE — INTENDED FOR TISSUE SEPARATION, AND OTHER PROCEDURES THAT REQUIRE A SHARP SURGICAL BLADE TO PUNCTURE OR CUT.: Brand: BARD-PARKER ® CARBON RIB-BACK BLADES

## (undated) DEVICE — BONE WAX WHITE: Brand: BONE WAX WHITE

## (undated) DEVICE — ANTIBACTERIAL UNDYED BRAIDED (POLYGLACTIN 910), SYNTHETIC ABSORBABLE SUTURE: Brand: COATED VICRYL

## (undated) DEVICE — TRAY FOLEY 16FR SURESTEP TEMP SENS URIMETER STAT LOK

## (undated) DEVICE — SUT MONOCRYL 4-0 PS-2 18 IN Y496G

## (undated) DEVICE — SUT ETHIBOND 2-0 SH-1/SH-1 30 IN X763H

## (undated) DEVICE — JP PERF DRN SIL FLT 10MM FULL: Brand: CARDINAL HEALTH

## (undated) DEVICE — ELECTRODE BLADE E-Z CLEAN 4IN -0014A

## (undated) DEVICE — PLEDGET CARDIO PTFE 9.5 X 4.8 SOFT LF (6EA/PK)

## (undated) DEVICE — DGW .035 FC J3MM 260CM TEF: Brand: EMERALD

## (undated) DEVICE — GLIDESHEATH SLENDER STAINLESS STEEL KIT: Brand: GLIDESHEATH SLENDER

## (undated) DEVICE — PENCIL ELECTROSURG E-Z CLEAN -0035H

## (undated) DEVICE — CATH DIAG 6FR IMPULSE 110CM PIG 145 ANG

## (undated) DEVICE — 3000CC GUARDIAN II: Brand: GUARDIAN

## (undated) DEVICE — SUT PROLENE 7-0 BV-1/BV-1 24 IN 8304H

## (undated) DEVICE — OASIS DRAIN, SINGLE, INLINE & ATS COMPATIBLE: Brand: OASIS

## (undated) DEVICE — SUT SILK 0 CT-1 30 IN 424H

## (undated) DEVICE — EVERGRIP INSERT SET 86MM: Brand: FOGARTY EVERGRIP

## (undated) DEVICE — SUT MONOCRYL PLUS 3-0 PS-2 27 IN MCP427H

## (undated) DEVICE — SUT ETHIBOND 2-0 SH/SH 36 IN X523H

## (undated) DEVICE — RED RUBBER URETHRAL CATHETER: Brand: DOVER

## (undated) DEVICE — SUT PROLENE 4-0 BB 36 IN 8581H

## (undated) DEVICE — AORTIC PUNCH 5.2 MM DISP

## (undated) DEVICE — SUCTION CATH 18 FR

## (undated) DEVICE — 32 FR STRAIGHT – SOFT PVC CATHETER: Brand: PVC THORACIC CATHETERS

## (undated) DEVICE — Device: Brand: RETRACT-O-TAPE 18G X 30.5CM BLUNT NEEDLE

## (undated) DEVICE — STERNAL WIRE

## (undated) DEVICE — 40601 PROLONGED POSITIONING SYSTEM: Brand: 40601 PROLONGED POSITIONING SYSTEM

## (undated) DEVICE — HEMOCLIP CARTRIDGE LRG

## (undated) DEVICE — GLOVE SRG BIOGEL ECLIPSE 8

## (undated) DEVICE — BLADE BEAVER MINI SZ 69

## (undated) DEVICE — THERMOFLECT BLANKET, L, 25EA                               TS THERMOFLECT BLANKET, 48" X 84", SILVER, 5/BG, 5 BG/CS NW: Brand: THERMOFLECT

## (undated) DEVICE — 32 FR RIGHT ANGLE – SOFT PVC CATHETER: Brand: PVC THORACIC CATHETERS

## (undated) DEVICE — GLOVE INDICATOR PI UNDERGLOVE SZ 8 BLUE

## (undated) DEVICE — GAUZE SPONGES,16 PLY: Brand: CURITY

## (undated) DEVICE — SYRINGE 50ML LL

## (undated) DEVICE — ADHESIVE SKIN HIGH VISCOSITY EXOFIN 1ML